# Patient Record
Sex: MALE | Race: ASIAN | NOT HISPANIC OR LATINO | Employment: OTHER | URBAN - METROPOLITAN AREA
[De-identification: names, ages, dates, MRNs, and addresses within clinical notes are randomized per-mention and may not be internally consistent; named-entity substitution may affect disease eponyms.]

---

## 2017-02-07 PROBLEM — I67.9 CVD (CEREBROVASCULAR DISEASE): Status: ACTIVE | Noted: 2017-02-07

## 2017-02-07 PROBLEM — R09.89 RIGHT CAROTID BRUIT: Status: ACTIVE | Noted: 2017-02-07

## 2017-02-07 PROBLEM — R01.1 HEART MURMUR: Status: ACTIVE | Noted: 2017-02-07

## 2017-02-13 ENCOUNTER — HOSPITAL ENCOUNTER (OUTPATIENT)
Dept: RADIOLOGY | Facility: HOSPITAL | Age: 67
Discharge: HOME | End: 2017-02-13
Attending: INTERNAL MEDICINE
Payer: MEDICARE

## 2017-02-13 ENCOUNTER — HOSPITAL ENCOUNTER (OUTPATIENT)
Dept: CARDIOLOGY | Facility: HOSPITAL | Age: 67
Discharge: HOME | End: 2017-02-13
Attending: INTERNAL MEDICINE
Payer: MEDICARE

## 2017-02-13 DIAGNOSIS — I25.110 CORONARY ARTERY DISEASE INVOLVING NATIVE CORONARY ARTERY OF NATIVE HEART WITH UNSTABLE ANGINA PECTORIS: ICD-10-CM

## 2017-02-13 DIAGNOSIS — I20.9 ANGINA PECTORIS: ICD-10-CM

## 2017-02-13 PROCEDURE — 78452 HT MUSCLE IMAGE SPECT MULT: CPT | Mod: 26,,, | Performed by: RADIOLOGY

## 2017-02-13 PROCEDURE — 78452 HT MUSCLE IMAGE SPECT MULT: CPT | Mod: TC

## 2017-02-15 LAB — DIASTOLIC DYSFUNCTION: NO

## 2017-02-17 ENCOUNTER — HOSPITAL ENCOUNTER (OUTPATIENT)
Dept: RADIOLOGY | Facility: HOSPITAL | Age: 67
Discharge: HOME | End: 2017-02-17
Attending: INTERNAL MEDICINE
Payer: MEDICARE

## 2017-02-17 DIAGNOSIS — R09.89 RIGHT CAROTID BRUIT: ICD-10-CM

## 2017-02-17 DIAGNOSIS — I67.9 CVD (CEREBROVASCULAR DISEASE): ICD-10-CM

## 2017-02-17 PROCEDURE — 93880 EXTRACRANIAL BILAT STUDY: CPT | Mod: TC

## 2017-02-17 PROCEDURE — 93880 EXTRACRANIAL BILAT STUDY: CPT | Mod: 26,,, | Performed by: RADIOLOGY

## 2017-08-10 DIAGNOSIS — I12.9 HYPERTENSION, RENAL DISEASE, STAGE 1-4 OR UNSPECIFIED CHRONIC KIDNEY DISEASE: ICD-10-CM

## 2017-08-10 DIAGNOSIS — N18.30 CHRONIC KIDNEY DISEASE, STAGE III (MODERATE): Primary | ICD-10-CM

## 2017-11-29 ENCOUNTER — TELEPHONE (OUTPATIENT)
Dept: CARDIOLOGY | Facility: CLINIC | Age: 67
End: 2017-11-29

## 2017-11-29 NOTE — TELEPHONE ENCOUNTER
----- Message from SANCHEZ Mcnair sent at 11/28/2017  2:08 PM CST -----  Angella Hernandez,     This patient would like for you to return his call.     Contact number is [phone]    Deepak

## 2017-12-07 ENCOUNTER — OFFICE VISIT (OUTPATIENT)
Dept: CARDIOLOGY | Facility: CLINIC | Age: 67
End: 2017-12-07
Payer: MEDICARE

## 2017-12-07 VITALS
BODY MASS INDEX: 26.12 KG/M2 | SYSTOLIC BLOOD PRESSURE: 120 MMHG | HEIGHT: 64 IN | DIASTOLIC BLOOD PRESSURE: 72 MMHG | HEART RATE: 77 BPM | WEIGHT: 153 LBS

## 2017-12-07 DIAGNOSIS — Z95.1 S/P CABG (CORONARY ARTERY BYPASS GRAFT): ICD-10-CM

## 2017-12-07 DIAGNOSIS — I67.9 CVD (CEREBROVASCULAR DISEASE): ICD-10-CM

## 2017-12-07 DIAGNOSIS — I10 ESSENTIAL HYPERTENSION: ICD-10-CM

## 2017-12-07 DIAGNOSIS — N18.30 STAGE 3 CHRONIC KIDNEY DISEASE: ICD-10-CM

## 2017-12-07 DIAGNOSIS — J84.9 INTERSTITIAL LUNG DISEASE: ICD-10-CM

## 2017-12-07 DIAGNOSIS — I20.9 ANGINA PECTORIS: ICD-10-CM

## 2017-12-07 DIAGNOSIS — I25.118 CORONARY ARTERY DISEASE OF NATIVE ARTERY OF NATIVE HEART WITH STABLE ANGINA PECTORIS: Primary | ICD-10-CM

## 2017-12-07 DIAGNOSIS — E78.5 HYPERLIPIDEMIA, UNSPECIFIED HYPERLIPIDEMIA TYPE: ICD-10-CM

## 2017-12-07 PROCEDURE — 99213 OFFICE O/P EST LOW 20 MIN: CPT | Mod: S$GLB,,, | Performed by: INTERNAL MEDICINE

## 2017-12-07 PROCEDURE — 93000 ELECTROCARDIOGRAM COMPLETE: CPT | Mod: S$GLB,,, | Performed by: INTERNAL MEDICINE

## 2017-12-07 PROCEDURE — 99999 PR PBB SHADOW E&M-EST. PATIENT-LVL II: CPT | Mod: PBBFAC,,, | Performed by: INTERNAL MEDICINE

## 2017-12-07 NOTE — PROGRESS NOTES
Subjective:   Patient ID:  Jose Antonio Allen is a 67 y.o. male     Chief Complaint   Patient presents with    Follow-up       HPI: Had chest pain last Monday while mowing the lawn; the pain eased with resting for 5 minutes.  Pt also has mild shortness of breath with exertion.  No overall change in pattern of angina pectoris.  Review of Systems   Cardiovascular: Positive for chest pain and dyspnea on exertion. Negative for claudication, irregular heartbeat, leg swelling, near-syncope, orthopnea, palpitations and syncope.       Objective:   Physical Exam   Constitutional: He is oriented to person, place, and time. He appears well-developed and well-nourished. No distress.   HENT:   Head: Normocephalic.   Eyes: No scleral icterus.   Neck: No JVD present.   Cardiovascular: Normal rate and regular rhythm.  Exam reveals no gallop (Ii/VI systolic) and no friction rub.    Murmur heard.  Pulmonary/Chest: Effort normal. No stridor. He has rales.   Musculoskeletal: He exhibits no edema.   Neurological: He is alert and oriented to person, place, and time.   Skin: Skin is warm and dry. He is not diaphoretic.   Psychiatric: He has a normal mood and affect. His behavior is normal. Judgment and thought content normal.   Vitals reviewed.    Note Cardiolite stress test 2/17 showed no ischemia, mildly depressed LVEF 43-50%    Note October 2017 lab--creat 1.59, BUN 31, HgbA1C 8.2,    CT of chest 11/10/17--calcified blood vessels, emphysema and interstitial fibrosis bilaterally, no nodule    ECG: NSR, vertical axis, minimal ST sagging    Wt up 5 lbs  Assessment:     1. Coronary artery disease of native artery of native heart with stable angina pectoris    2. Angina pectoris    3. Essential hypertension    4. Hyperlipidemia, unspecified hyperlipidemia type    5. CVD (cerebrovascular disease)    6. S/P CABG (coronary artery bypass graft)    7. Stage 3 chronic kidney disease    8. Interstitial lung disease        Plan:   F/u with pulmonary  MD who recently ordered PFT's  F/u with PCP  Continue same medical regimen.  Return to clinic in 6 months with ECG.  Discussed with pt--would repeat stress test if angina pectoris worsens.

## 2018-04-17 ENCOUNTER — OFFICE VISIT (OUTPATIENT)
Dept: FAMILY MEDICINE | Facility: CLINIC | Age: 68
End: 2018-04-17
Payer: MEDICARE

## 2018-04-17 VITALS
BODY MASS INDEX: 25.97 KG/M2 | HEIGHT: 64 IN | HEART RATE: 83 BPM | WEIGHT: 152.13 LBS | SYSTOLIC BLOOD PRESSURE: 130 MMHG | DIASTOLIC BLOOD PRESSURE: 62 MMHG

## 2018-04-17 DIAGNOSIS — R53.82 CHRONIC FATIGUE: ICD-10-CM

## 2018-04-17 DIAGNOSIS — Z79.4 TYPE 2 DIABETES MELLITUS WITH HYPERGLYCEMIA, WITH LONG-TERM CURRENT USE OF INSULIN: ICD-10-CM

## 2018-04-17 DIAGNOSIS — E11.65 TYPE 2 DIABETES MELLITUS WITH HYPERGLYCEMIA, WITH LONG-TERM CURRENT USE OF INSULIN: ICD-10-CM

## 2018-04-17 DIAGNOSIS — I25.708 CORONARY ARTERY DISEASE OF BYPASS GRAFT OF NATIVE HEART WITH STABLE ANGINA PECTORIS: ICD-10-CM

## 2018-04-17 DIAGNOSIS — J84.10 INTERSTITIAL PULMONARY FIBROSIS: ICD-10-CM

## 2018-04-17 DIAGNOSIS — I73.9 CLAUDICATION: ICD-10-CM

## 2018-04-17 DIAGNOSIS — N18.30 STAGE 3 CHRONIC KIDNEY DISEASE: Primary | ICD-10-CM

## 2018-04-17 DIAGNOSIS — E03.9 HYPOTHYROIDISM, UNSPECIFIED TYPE: ICD-10-CM

## 2018-04-17 DIAGNOSIS — Z12.5 SCREENING FOR PROSTATE CANCER: ICD-10-CM

## 2018-04-17 DIAGNOSIS — R06.02 SOB (SHORTNESS OF BREATH): ICD-10-CM

## 2018-04-17 DIAGNOSIS — I10 ESSENTIAL HYPERTENSION: ICD-10-CM

## 2018-04-17 PROCEDURE — 99387 INIT PM E/M NEW PAT 65+ YRS: CPT | Mod: S$GLB,,, | Performed by: FAMILY MEDICINE

## 2018-04-17 PROCEDURE — 3078F DIAST BP <80 MM HG: CPT | Mod: CPTII,S$GLB,, | Performed by: FAMILY MEDICINE

## 2018-04-17 PROCEDURE — 3075F SYST BP GE 130 - 139MM HG: CPT | Mod: CPTII,S$GLB,, | Performed by: FAMILY MEDICINE

## 2018-04-17 PROCEDURE — 99999 PR PBB SHADOW E&M-EST. PATIENT-LVL III: CPT | Mod: PBBFAC,,, | Performed by: FAMILY MEDICINE

## 2018-04-17 RX ORDER — BUDESONIDE AND FORMOTEROL FUMARATE DIHYDRATE 160; 4.5 UG/1; UG/1
2 AEROSOL RESPIRATORY (INHALATION) EVERY 12 HOURS
Qty: 10.2 G | Refills: 0 | Status: SHIPPED | OUTPATIENT
Start: 2018-04-17 | End: 2018-07-09

## 2018-04-17 RX ORDER — AMLODIPINE BESYLATE 10 MG/1
10 TABLET ORAL DAILY
COMMUNITY
End: 2022-05-03

## 2018-04-17 RX ORDER — CILOSTAZOL 100 MG/1
100 TABLET ORAL 2 TIMES DAILY
COMMUNITY
End: 2018-08-07

## 2018-04-17 RX ORDER — INSULIN DEGLUDEC 100 U/ML
34 INJECTION, SOLUTION SUBCUTANEOUS DAILY
COMMUNITY
End: 2021-05-05 | Stop reason: SDUPTHER

## 2018-04-17 RX ORDER — FERROUS GLUCONATE 324(38)MG
324 TABLET ORAL
COMMUNITY
End: 2019-06-27

## 2018-04-17 NOTE — PATIENT INSTRUCTIONS
Diabetes: Activity Tips    Being more active can help you manage your diabetes. The tips on this sheet can help you get the most from your exercise. They can also help you stay safe.  Staying Active  Its important for adults to spend less time sitting and being inactive. This is especially true if you have type 2 diabetes. When you are sitting for long periods of time, get up for short sessions of light activity every 30 minutes.  You should aim for at least 150 minutes a week of exercise or physical activity. Dont let more than 2 days go by without being active.  Benefit from briskness  Brisk activity gets your heart beating faster. This can help you increase your fitness, lose extra weight, and manage your blood sugar level. Try brisk walking. Or, if you have foot or leg problems, you can try swimming or bike riding. You can break up your exercise into chunks throughout the day. Work up to at least 30 minutes of steady, brisk exercise on most days.  Warm up and cool down  Warming up and cooling down reduce your risk of injury. They also help limit muscle soreness. Do a mild version of your activity for 5 minutes before and after your routine. You can also learn stretches that will help keep your muscles loose. Your healthcare provider may show you good ways to warm up and stretch.  Do the talk-sing test  The talk-sing test is a simple way to tell how hard youre exercising. If you can talk while exercising, youre in a safe range. If youre out of breath, slow down. If you can carry a tune, its time to  the pace. Walk up a hill. Increase the resistance on your stationary bike. Or swim faster.  What about eating?  You may be told to plan your exercise for 1 to 2 hours after a meal. In most cases, you dont need to eat while being active. If you take insulin or medicine that can cause low blood sugar, test your blood sugar before exercising. And carry a fast-acting sugar that will raise your blood sugar  level quickly. This includes glucose tablets or hard candy. Use it if you feel low blood sugar symptoms.  Safety tips  These tips can help you stay safe as you become fit:  · Exercise with a friend or carry a cell phone if you have one.  · Carry or wear identification, such as a necklace or bracelet, that says you have diabetes.  · Use the proper footwear and safety equipment for your activity.  · Drink water before, during, and after exercise.  · Dress properly for the weather.  · Dont exercise in very hot or very cold weather.  · Dont exercise if you are sick.  · If you are instructed to do so, test your blood sugar before and after you exercise. Have a small carbohydrate snack if your blood sugar is low before you start exercising.   When to stop exercising and call your healthcare provider  Stop exercising and call your healthcare provider right away if you notice any of the following:  · Pain, pressure, tightness, or heaviness in the chest  · Pain or heaviness in the neck, shoulders, back, arms, legs, or feet  · Unusual shortness of breath  · Dizziness or lightheadedness  · Unusually rapid or slow pulse  · Increased joint or muscle pain  · Nausea or vomiting  Date Last Reviewed: 5/1/2016  © 0609-4450 [employer]. [address] [place] [address], [place] [address]. All rights reserved. This information is not intended as a substitute for professional medical care. Always follow your healthcare professional's instructions.

## 2018-04-17 NOTE — PROGRESS NOTES
Subjective:       Patient ID: Jose Antonio Allen is a 67 y.o. male.    Chief Complaint: Establish Care and Annual Exam    67 years old male came to the clinic for his physical examination.  Patient with no recent A1c.  No polyuria polydipsia polyphagia.  Blood pressure today stable.  No chest pain palpitations orthopnea or PND.  Patient with pulmonary fibrosis requesting a medicine to help him with the persistent shortness of breath.  Patient with coronary artery disease sometimes with episodic angina.  No palpitations orthopnea or PND.  Patient feels tired for the last year.  Patient with decreased kidney function but stable in comparison with previous reports.        Review of Systems   Constitutional: Positive for fatigue.   HENT: Negative.    Eyes: Negative.    Respiratory: Negative.    Cardiovascular: Positive for chest pain. Negative for palpitations and leg swelling.   Gastrointestinal: Negative.    Endocrine: Negative for polydipsia, polyphagia and polyuria.   Genitourinary: Negative.    Musculoskeletal: Negative.    Skin: Negative.    Neurological: Negative.    Psychiatric/Behavioral: Negative.        Objective:      Physical Exam   Constitutional: He is oriented to person, place, and time. He appears well-developed and well-nourished. No distress.   HENT:   Head: Normocephalic and atraumatic.   Right Ear: External ear normal.   Left Ear: External ear normal.   Nose: Nose normal.   Mouth/Throat: Oropharynx is clear and moist. No oropharyngeal exudate.   Eyes: Conjunctivae and EOM are normal. Pupils are equal, round, and reactive to light. Right eye exhibits no discharge. Left eye exhibits no discharge. No scleral icterus.   Neck: Normal range of motion. Neck supple. No JVD present. No tracheal deviation present. No thyromegaly present.   Cardiovascular: Normal rate, regular rhythm, normal heart sounds and intact distal pulses.  Exam reveals no gallop and no friction rub.    No murmur heard.  Pulmonary/Chest:  Effort normal and breath sounds normal. No stridor. No respiratory distress. He has no wheezes. He has no rales. He exhibits no tenderness.   Abdominal: Soft. Bowel sounds are normal. He exhibits no distension and no mass. There is no tenderness. There is no rebound and no guarding.   Musculoskeletal: Normal range of motion. He exhibits no edema or tenderness.   Feet:   Right Foot:   Protective Sensation: 10 sites tested. 10 sites sensed.   Skin Integrity: Negative for ulcer, blister, skin breakdown, erythema, warmth, callus or dry skin.   Left Foot:   Protective Sensation: 10 sites tested. 10 sites sensed.   Skin Integrity: Negative for ulcer, blister, skin breakdown, erythema, warmth, callus or dry skin.   Lymphadenopathy:     He has no cervical adenopathy.   Neurological: He is alert and oriented to person, place, and time. He has normal reflexes. He displays normal reflexes. No cranial nerve deficit. He exhibits normal muscle tone. Coordination normal.   Skin: Skin is warm and dry. No rash noted. He is not diaphoretic. No erythema. No pallor.   Psychiatric: He has a normal mood and affect. His behavior is normal. Judgment and thought content normal.   Nursing note and vitals reviewed.      Assessment:       1. Stage 3 chronic kidney disease    2. SOB (shortness of breath)    3. Claudication    4. Coronary artery disease of bypass graft of native heart with stable angina pectoris    5. Interstitial pulmonary fibrosis    6. Hypothyroidism, unspecified type    7. Essential hypertension    8. Screening for prostate cancer    9. Chronic fatigue    10. Type 2 diabetes mellitus with hyperglycemia, with long-term current use of insulin        Plan:         Jose Antonio was seen today for establish care and annual exam.    Diagnoses and all orders for this visit:    Stage 3 chronic kidney disease  -     Vitamin D; Future    SOB (shortness of breath)  -     Comprehensive metabolic panel; Future  -     TSH; Future  -     CBC auto  differential; Future    Claudication  -     US Lower Extremity Arteries Bilateral; Future    Coronary artery disease of bypass graft of native heart with stable angina pectoris  -     Lipid panel; Future    Interstitial pulmonary fibrosis  -     budesonide-formoterol 160-4.5 mcg (SYMBICORT) 160-4.5 mcg/actuation HFAA; Inhale 2 puffs into the lungs every 12 (twelve) hours. Controller    Hypothyroidism, unspecified type  -     Comprehensive metabolic panel; Future  -     Lipid panel; Future  -     TSH; Future    Essential hypertension  -     Comprehensive metabolic panel; Future  -     Lipid panel; Future  -     Urinalysis; Future  -     TSH; Future  -     CBC auto differential; Future    Screening for prostate cancer  -     PSA, Screening; Future    Chronic fatigue  -     TSH; Future    Type 2 diabetes mellitus with hyperglycemia, with long-term current use of insulin  -     Hemoglobin A1c; Future

## 2018-04-18 ENCOUNTER — LAB VISIT (OUTPATIENT)
Dept: LAB | Facility: HOSPITAL | Age: 68
End: 2018-04-18
Attending: FAMILY MEDICINE
Payer: MEDICARE

## 2018-04-18 ENCOUNTER — HOSPITAL ENCOUNTER (OUTPATIENT)
Dept: RADIOLOGY | Facility: HOSPITAL | Age: 68
Discharge: HOME | End: 2018-04-18
Attending: FAMILY MEDICINE
Payer: MEDICARE

## 2018-04-18 DIAGNOSIS — Z79.4 TYPE 2 DIABETES MELLITUS WITH HYPERGLYCEMIA, WITH LONG-TERM CURRENT USE OF INSULIN: ICD-10-CM

## 2018-04-18 DIAGNOSIS — I10 ESSENTIAL HYPERTENSION: ICD-10-CM

## 2018-04-18 DIAGNOSIS — R06.02 SOB (SHORTNESS OF BREATH): ICD-10-CM

## 2018-04-18 DIAGNOSIS — I73.9 CLAUDICATION: ICD-10-CM

## 2018-04-18 DIAGNOSIS — Z12.5 SCREENING FOR PROSTATE CANCER: ICD-10-CM

## 2018-04-18 DIAGNOSIS — I25.708 CORONARY ARTERY DISEASE OF BYPASS GRAFT OF NATIVE HEART WITH STABLE ANGINA PECTORIS: ICD-10-CM

## 2018-04-18 DIAGNOSIS — E11.65 TYPE 2 DIABETES MELLITUS WITH HYPERGLYCEMIA, WITH LONG-TERM CURRENT USE OF INSULIN: ICD-10-CM

## 2018-04-18 DIAGNOSIS — N18.30 STAGE 3 CHRONIC KIDNEY DISEASE: ICD-10-CM

## 2018-04-18 DIAGNOSIS — E03.9 HYPOTHYROIDISM, UNSPECIFIED TYPE: ICD-10-CM

## 2018-04-18 DIAGNOSIS — R53.82 CHRONIC FATIGUE: ICD-10-CM

## 2018-04-18 LAB
25(OH)D3+25(OH)D2 SERPL-MCNC: 70 NG/ML
ALBUMIN SERPL BCP-MCNC: 3.5 G/DL
ALP SERPL-CCNC: 89 U/L
ALT SERPL W/O P-5'-P-CCNC: 23 U/L
ANION GAP SERPL CALC-SCNC: 10 MMOL/L
AST SERPL-CCNC: 30 U/L
BASOPHILS # BLD AUTO: 0.04 K/UL
BASOPHILS NFR BLD: 0.6 %
BILIRUB SERPL-MCNC: 0.3 MG/DL
BUN SERPL-MCNC: 21 MG/DL
CALCIUM SERPL-MCNC: 9.7 MG/DL
CHLORIDE SERPL-SCNC: 110 MMOL/L
CHOLEST SERPL-MCNC: 126 MG/DL
CHOLEST/HDLC SERPL: 2.3 {RATIO}
CO2 SERPL-SCNC: 25 MMOL/L
COMPLEXED PSA SERPL-MCNC: 3 NG/ML
CREAT SERPL-MCNC: 1.8 MG/DL
DIFFERENTIAL METHOD: ABNORMAL
EOSINOPHIL # BLD AUTO: 0.4 K/UL
EOSINOPHIL NFR BLD: 5.2 %
ERYTHROCYTE [DISTWIDTH] IN BLOOD BY AUTOMATED COUNT: 16.9 %
EST. GFR  (AFRICAN AMERICAN): 44 ML/MIN/1.73 M^2
EST. GFR  (NON AFRICAN AMERICAN): 38.1 ML/MIN/1.73 M^2
ESTIMATED AVG GLUCOSE: 143 MG/DL
GLUCOSE SERPL-MCNC: 59 MG/DL
HBA1C MFR BLD HPLC: 6.6 %
HCT VFR BLD AUTO: 34.6 %
HDLC SERPL-MCNC: 54 MG/DL
HDLC SERPL: 42.9 %
HGB BLD-MCNC: 9.7 G/DL
IMM GRANULOCYTES # BLD AUTO: 0.03 K/UL
IMM GRANULOCYTES NFR BLD AUTO: 0.4 %
LDLC SERPL CALC-MCNC: 63 MG/DL
LYMPHOCYTES # BLD AUTO: 1.1 K/UL
LYMPHOCYTES NFR BLD: 16.3 %
MCH RBC QN AUTO: 21.4 PG
MCHC RBC AUTO-ENTMCNC: 28 G/DL
MCV RBC AUTO: 76 FL
MONOCYTES # BLD AUTO: 0.7 K/UL
MONOCYTES NFR BLD: 9.6 %
NEUTROPHILS # BLD AUTO: 4.7 K/UL
NEUTROPHILS NFR BLD: 67.9 %
NONHDLC SERPL-MCNC: 72 MG/DL
NRBC BLD-RTO: 0 /100 WBC
PLATELET # BLD AUTO: 212 K/UL
PMV BLD AUTO: 11 FL
POTASSIUM SERPL-SCNC: 4 MMOL/L
PROT SERPL-MCNC: 7.5 G/DL
RBC # BLD AUTO: 4.54 M/UL
SODIUM SERPL-SCNC: 145 MMOL/L
TRIGL SERPL-MCNC: 45 MG/DL
TSH SERPL DL<=0.005 MIU/L-ACNC: 2.81 UIU/ML
WBC # BLD AUTO: 6.95 K/UL

## 2018-04-18 PROCEDURE — 93925 LOWER EXTREMITY STUDY: CPT | Mod: TC

## 2018-04-18 PROCEDURE — 82306 VITAMIN D 25 HYDROXY: CPT

## 2018-04-18 PROCEDURE — 80061 LIPID PANEL: CPT

## 2018-04-18 PROCEDURE — 80053 COMPREHEN METABOLIC PANEL: CPT

## 2018-04-18 PROCEDURE — 93925 LOWER EXTREMITY STUDY: CPT | Mod: 26,,, | Performed by: RADIOLOGY

## 2018-04-18 PROCEDURE — 83036 HEMOGLOBIN GLYCOSYLATED A1C: CPT

## 2018-04-18 PROCEDURE — 36415 COLL VENOUS BLD VENIPUNCTURE: CPT | Mod: PO

## 2018-04-18 PROCEDURE — 84153 ASSAY OF PSA TOTAL: CPT

## 2018-04-18 PROCEDURE — 85025 COMPLETE CBC W/AUTO DIFF WBC: CPT

## 2018-04-18 PROCEDURE — 84443 ASSAY THYROID STIM HORMONE: CPT

## 2018-04-20 DIAGNOSIS — Z12.11 COLON CANCER SCREENING: ICD-10-CM

## 2018-04-21 ENCOUNTER — TELEPHONE (OUTPATIENT)
Dept: FAMILY MEDICINE | Facility: CLINIC | Age: 68
End: 2018-04-21

## 2018-04-21 DIAGNOSIS — I73.9 PAD (PERIPHERAL ARTERY DISEASE): Primary | ICD-10-CM

## 2018-04-21 DIAGNOSIS — D50.9 IRON DEFICIENCY ANEMIA, UNSPECIFIED IRON DEFICIENCY ANEMIA TYPE: Primary | ICD-10-CM

## 2018-04-21 DIAGNOSIS — R31.21 ASYMPTOMATIC MICROSCOPIC HEMATURIA: Primary | ICD-10-CM

## 2018-04-21 RX ORDER — FERROUS SULFATE 325(65) MG
325 TABLET ORAL
Qty: 90 TABLET | Refills: 0 | Status: SHIPPED | OUTPATIENT
Start: 2018-04-21 | End: 2018-07-09

## 2018-04-21 NOTE — TELEPHONE ENCOUNTER
Decreased kidney function but stable in comparison with previous reports.      Stable blood sugar for the last 3 months.      Significant anemia in comparison with previous reports.  Iron therapy was ordered for 1 month.  Repeat testing after the treatment.    Please notify the patient with appointment.

## 2018-04-21 NOTE — TELEPHONE ENCOUNTER
Urine with trace of blood.  Repeat urine with culture.  Please notify the patient with appointment.

## 2018-04-21 NOTE — TELEPHONE ENCOUNTER
Patient with positive lower extremities ultrasound for PAD.  Vascular referral was done.  Please notify the patient with appointment.

## 2018-04-23 ENCOUNTER — LAB VISIT (OUTPATIENT)
Dept: LAB | Facility: HOSPITAL | Age: 68
End: 2018-04-23
Attending: FAMILY MEDICINE
Payer: MEDICARE

## 2018-04-23 DIAGNOSIS — R31.21 ASYMPTOMATIC MICROSCOPIC HEMATURIA: ICD-10-CM

## 2018-04-23 LAB
BACTERIA #/AREA URNS AUTO: ABNORMAL /HPF
BILIRUB UR QL STRIP: NEGATIVE
CLARITY UR REFRACT.AUTO: CLEAR
COLOR UR AUTO: YELLOW
GLUCOSE UR QL STRIP: NEGATIVE
HGB UR QL STRIP: NEGATIVE
HYALINE CASTS UR QL AUTO: 0 /LPF
KETONES UR QL STRIP: NEGATIVE
LEUKOCYTE ESTERASE UR QL STRIP: ABNORMAL
MICROSCOPIC COMMENT: ABNORMAL
NITRITE UR QL STRIP: NEGATIVE
PH UR STRIP: 6 [PH] (ref 5–8)
PROT UR QL STRIP: ABNORMAL
RBC #/AREA URNS AUTO: 2 /HPF (ref 0–4)
SP GR UR STRIP: 1.01 (ref 1–1.03)
URN SPEC COLLECT METH UR: ABNORMAL
UROBILINOGEN UR STRIP-ACNC: NEGATIVE EU/DL
WBC #/AREA URNS AUTO: 6 /HPF (ref 0–5)

## 2018-04-23 PROCEDURE — 87086 URINE CULTURE/COLONY COUNT: CPT

## 2018-04-23 PROCEDURE — 81001 URINALYSIS AUTO W/SCOPE: CPT

## 2018-04-25 LAB
BACTERIA UR CULT: NORMAL
BACTERIA UR CULT: NORMAL

## 2018-05-02 DIAGNOSIS — E78.5 HYPERLIPIDEMIA, UNSPECIFIED HYPERLIPIDEMIA TYPE: ICD-10-CM

## 2018-05-02 RX ORDER — ATORVASTATIN CALCIUM 80 MG/1
80 TABLET, FILM COATED ORAL DAILY
Qty: 90 TABLET | Refills: 3 | Status: SHIPPED | OUTPATIENT
Start: 2018-05-02 | End: 2018-05-02 | Stop reason: SDUPTHER

## 2018-05-02 RX ORDER — ATORVASTATIN CALCIUM 80 MG/1
80 TABLET, FILM COATED ORAL DAILY
Qty: 30 TABLET | Refills: 3 | Status: SHIPPED | OUTPATIENT
Start: 2018-05-02 | End: 2018-05-07 | Stop reason: SDUPTHER

## 2018-05-07 DIAGNOSIS — E78.5 HYPERLIPIDEMIA, UNSPECIFIED HYPERLIPIDEMIA TYPE: ICD-10-CM

## 2018-05-07 RX ORDER — ATORVASTATIN CALCIUM 80 MG/1
80 TABLET, FILM COATED ORAL DAILY
Qty: 90 TABLET | Refills: 3 | Status: SHIPPED | OUTPATIENT
Start: 2018-05-07 | End: 2018-05-21 | Stop reason: SDUPTHER

## 2018-05-18 DIAGNOSIS — Z11.59 NEED FOR HEPATITIS C SCREENING TEST: ICD-10-CM

## 2018-05-21 DIAGNOSIS — E78.5 HYPERLIPIDEMIA, UNSPECIFIED HYPERLIPIDEMIA TYPE: ICD-10-CM

## 2018-05-21 RX ORDER — ATORVASTATIN CALCIUM 80 MG/1
80 TABLET, FILM COATED ORAL DAILY
Qty: 90 TABLET | Refills: 3 | Status: SHIPPED | OUTPATIENT
Start: 2018-05-21 | End: 2018-11-15 | Stop reason: SDUPTHER

## 2018-06-15 DIAGNOSIS — Z11.59 NEED FOR HEPATITIS C SCREENING TEST: ICD-10-CM

## 2018-07-09 ENCOUNTER — OFFICE VISIT (OUTPATIENT)
Dept: CARDIOLOGY | Facility: CLINIC | Age: 68
End: 2018-07-09
Payer: MEDICARE

## 2018-07-09 VITALS
BODY MASS INDEX: 25.66 KG/M2 | HEART RATE: 89 BPM | DIASTOLIC BLOOD PRESSURE: 74 MMHG | SYSTOLIC BLOOD PRESSURE: 136 MMHG | HEIGHT: 64 IN | WEIGHT: 150.31 LBS

## 2018-07-09 DIAGNOSIS — I25.10 CAD (CORONARY ARTERY DISEASE), NATIVE CORONARY ARTERY: ICD-10-CM

## 2018-07-09 DIAGNOSIS — G89.29 CHRONIC CHEST PAIN: ICD-10-CM

## 2018-07-09 DIAGNOSIS — R07.9 CHRONIC CHEST PAIN: ICD-10-CM

## 2018-07-09 DIAGNOSIS — I20.9 ANGINA PECTORIS: ICD-10-CM

## 2018-07-09 DIAGNOSIS — Z87.19 HISTORY OF GI DIVERTICULAR BLEED: ICD-10-CM

## 2018-07-09 DIAGNOSIS — I73.9 PAD (PERIPHERAL ARTERY DISEASE): ICD-10-CM

## 2018-07-09 DIAGNOSIS — R09.89 RIGHT CAROTID BRUIT: ICD-10-CM

## 2018-07-09 DIAGNOSIS — Z95.1 S/P CABG (CORONARY ARTERY BYPASS GRAFT): Primary | ICD-10-CM

## 2018-07-09 DIAGNOSIS — I67.9 CVD (CEREBROVASCULAR DISEASE): ICD-10-CM

## 2018-07-09 DIAGNOSIS — E78.5 HYPERLIPIDEMIA, UNSPECIFIED HYPERLIPIDEMIA TYPE: ICD-10-CM

## 2018-07-09 DIAGNOSIS — I10 ESSENTIAL HYPERTENSION: ICD-10-CM

## 2018-07-09 DIAGNOSIS — J84.9 INTERSTITIAL LUNG DISEASE: ICD-10-CM

## 2018-07-09 DIAGNOSIS — N18.30 STAGE 3 CHRONIC KIDNEY DISEASE: ICD-10-CM

## 2018-07-09 DIAGNOSIS — I25.708 CORONARY ARTERY DISEASE OF BYPASS GRAFT OF NATIVE HEART WITH STABLE ANGINA PECTORIS: ICD-10-CM

## 2018-07-09 PROCEDURE — 93000 ELECTROCARDIOGRAM COMPLETE: CPT | Mod: S$GLB,,, | Performed by: INTERNAL MEDICINE

## 2018-07-09 PROCEDURE — 3078F DIAST BP <80 MM HG: CPT | Mod: CPTII,S$GLB,, | Performed by: INTERNAL MEDICINE

## 2018-07-09 PROCEDURE — 99214 OFFICE O/P EST MOD 30 MIN: CPT | Mod: S$GLB,,, | Performed by: INTERNAL MEDICINE

## 2018-07-09 PROCEDURE — 99999 PR PBB SHADOW E&M-EST. PATIENT-LVL III: CPT | Mod: PBBFAC,,, | Performed by: INTERNAL MEDICINE

## 2018-07-09 PROCEDURE — 3075F SYST BP GE 130 - 139MM HG: CPT | Mod: CPTII,S$GLB,, | Performed by: INTERNAL MEDICINE

## 2018-07-09 RX ORDER — FINASTERIDE 5 MG/1
5 TABLET, FILM COATED ORAL DAILY
Refills: 3 | COMMUNITY
Start: 2018-06-04 | End: 2020-12-08 | Stop reason: ALTCHOICE

## 2018-07-09 RX ORDER — OMEPRAZOLE 40 MG/1
40 CAPSULE, DELAYED RELEASE ORAL EVERY MORNING
COMMUNITY
Start: 2018-04-23 | End: 2020-05-28

## 2018-07-09 RX ORDER — MELOXICAM 7.5 MG/1
7.5 TABLET ORAL DAILY
COMMUNITY
Start: 2018-05-07 | End: 2018-08-07

## 2018-07-09 NOTE — PROGRESS NOTES
Subjective:      Patient ID: Jose Antonio Allen is a 68 y.o. male.    Chief Complaint: Pre-op Exam (Dr Scotty Schulz)    HPI:  Pt is scheduled for vascular surgery with Dr Schulz in 7 days. Pt had an angiogram of his legs a couple of weeks ago at [facility].  Pt is scheduled for a staged left fem-pop bypass and a right femoral endarterectomy.   Pt exercises at the gym--does a little lifting and walks on the treadmill slowly for about a mile.  Sometimes legs feel bad and weak.  Last May pt walked for 5 or 10 minutes and developed severe leg pain and shortness of breath    Review of Systems   Cardiovascular: Positive for chest pain (No chest pain since last May), claudication (which has improved with walking program) and dyspnea on exertion (chronic when shampooing or when bending over.). Negative for irregular heartbeat, leg swelling, near-syncope, orthopnea, palpitations and syncope.      Pt sees Dr Dykes for chronic interstitial lung disease and chronic shortness of breath with exertion.    Pt also sees a nephrologist for CKD    Pt denies any sores on the skin of his feet.    Pt also sees Dr Silva  Past Medical History:   Diagnosis Date    Angina pectoris     CKD (chronic kidney disease)     Coronary artery disease     Diabetes mellitus     Diabetes mellitus, type 2     GERD (gastroesophageal reflux disease)     Hyperlipidemia     Hypertension     S/P CABG (coronary artery bypass graft) 1996    Thyroid disease         Past Surgical History:   Procedure Laterality Date    CATARACT EXTRACTION  3YRS    CORONARY ARTERY BYPASS GRAFT  1996    EYELID SURGERY  03/2012    VEIN BYPASS SURGERY  1996       Family History   Problem Relation Age of Onset    Diabetes Mother     Diabetes Father     Blindness Neg Hx     Glaucoma Neg Hx     Macular degeneration Neg Hx     Retinal detachment Neg Hx     Strabismus Neg Hx     Stroke Neg Hx     Thyroid disease Neg Hx     Cancer Neg Hx     Cataracts Neg Hx     Hypertension  Neg Hx        Social History     Social History    Marital status:      Spouse name: N/A    Number of children: N/A    Years of education: N/A     Social History Main Topics    Smoking status: Never Smoker    Smokeless tobacco: Never Used    Alcohol use Yes    Drug use: No    Sexual activity: Not Asked     Other Topics Concern    None     Social History Narrative    None       Current Outpatient Prescriptions on File Prior to Visit   Medication Sig Dispense Refill    amLODIPine (NORVASC) 10 MG tablet Take 10 mg by mouth once daily.      aspirin 81 MG Chew 81 mg once daily.       atorvastatin (LIPITOR) 80 MG tablet Take 1 tablet (80 mg total) by mouth once daily. 90 tablet 3    cilostazol (PLETAL) 100 MG Tab Take 100 mg by mouth 2 (two) times daily.      ferrous gluconate (FERGON) 324 MG tablet Take 324 mg by mouth 3 (three) times daily with meals.      insulin degludec (TRESIBA FLEXTOUCH U-100) 100 unit/mL (3 mL) InPn Inject 36 Units into the skin once daily.      levothyroxine (SYNTHROID) 75 MCG tablet Take 1 tablet (75 mcg total) by mouth once daily. 90 tablet 3    LUBRICANT EYE DROPS 0.5 % Dpet       metoprolol succinate (TOPROL-XL) 100 MG 24 hr tablet TAKE 1 TABLET ONE TIME DAILY 90 tablet 3    nitroGLYCERIN (NITROSTAT) 0.4 MG SL tablet Place 1 tablet (0.4 mg total) under the tongue every 5 (five) minutes as needed for Chest pain. 25 tablet 12    NOVOLOG 100 unit/mL injection Sliding scale      ramipril (ALTACE) 10 MG capsule TAKE 1 CAPSULE EVERY DAY 90 capsule 3    tamsulosin (FLOMAX) 0.4 mg Cp24 0.4 mg once daily.       [DISCONTINUED] B-complex with vitamin C (Z-BEC OR EQUIV) tablet       [DISCONTINUED] budesonide-formoterol 160-4.5 mcg (SYMBICORT) 160-4.5 mcg/actuation HFAA Inhale 2 puffs into the lungs every 12 (twelve) hours. Controller 10.2 g 0    [DISCONTINUED] ferrous sulfate 325 mg (65 mg iron) Tab tablet Take 1 tablet (325 mg total) by mouth daily with breakfast. 90  "tablet 0    [DISCONTINUED] folic acid (FOLVITE) 800 MCG Tab Take 800 mcg by mouth once daily.      [DISCONTINUED] multivitamin (THERAGRAN) per tablet Take 1 tablet by mouth once daily.      [DISCONTINUED] pantoprazole (PROTONIX) 40 MG tablet Take 40 mg by mouth once daily.       [DISCONTINUED] VITAMIN B-12 1000 MCG tablet       [DISCONTINUED] vitamin D 1000 units Tab Take 1,000 Units by mouth once daily.       No current facility-administered medications on file prior to visit.        Review of patient's allergies indicates:  No Known Allergies  Objective:     Vitals:    07/09/18 1636   BP: 136/74   BP Location: Left arm   Patient Position: Sitting   BP Method: Medium (Automatic)   Pulse: 89   Weight: 68.2 kg (150 lb 4.8 oz)   Height: 5' 4" (1.626 m)        Physical Exam   Constitutional: He is oriented to person, place, and time. He appears well-developed and well-nourished. No distress.   Eyes: No scleral icterus.   Neck: No JVD present. Carotid bruit is not present.       Cardiovascular: Regular rhythm and normal heart sounds.  Exam reveals no gallop and no friction rub.    No murmur heard.  Pulmonary/Chest: Effort normal and breath sounds normal. No respiratory distress.   Musculoskeletal: He exhibits no edema.   Neurological: He is alert and oriented to person, place, and time.   Skin: Skin is warm and dry. He is not diaphoretic.   Psychiatric: He has a normal mood and affect. His behavior is normal. Judgment and thought content normal.   Vitals reviewed.     Note Cardiolite stress test 2/17 showed a mildly depressed LVEF and no evidence of infarct or ischemia.    Lab 2 months ago showed creatinine 1.8, Hgb 9.7  Pt sees Dr Zurita for chronic GI blood loss    ECG today--NSR, WNL  Assessment:     1. S/P CABG (coronary artery bypass graft)    2. Coronary artery disease of bypass graft of native heart with stable angina pectoris    3. Angina pectoris    4. Essential hypertension    5. Hyperlipidemia, " unspecified hyperlipidemia type    6. Right carotid bruit    7. Stage 3 chronic kidney disease    8. CVD (cerebrovascular disease)    9. Interstitial lung disease    10. PAD (peripheral artery disease)    11. History of GI diverticular bleed    12. Chronic chest pain    13. CAD (coronary artery disease), native coronary artery      Plan:   Jose Antonio was seen today for pre-op exam.    Diagnoses and all orders for this visit:    S/P CABG (coronary artery bypass graft)  -     IN OFFICE EKG 12-LEAD (to [facility])    Coronary artery disease of bypass graft of native heart with stable angina pectoris  -     NM Multi Study RX Stress Card Component; Future    Angina pectoris    Essential hypertension    Hyperlipidemia, unspecified hyperlipidemia type    Right carotid bruit    Stage 3 chronic kidney disease    CVD (cerebrovascular disease)    Interstitial lung disease    PAD (peripheral artery disease)    History of GI diverticular bleed    Chronic chest pain  -     NM Myocardial Perfusion Spect Multi Pharmacologic; Future  -     NM Multi Study RX Stress Card Component; Future    CAD (coronary artery disease), native coronary artery  -     IN OFFICE EKG 12-LEAD (to [facility])     Will repeat Lexiscan Cardiolite stress test pre-op  Pt is clinically stable for left fem-pop bypass and right femoral endarterectomy    Pt is scheduled for pre-op at [facility] tomorrow at 8 AM    Follow-up in about 3 months (around 10/9/2018).

## 2018-07-12 ENCOUNTER — TELEPHONE (OUTPATIENT)
Dept: FAMILY MEDICINE | Facility: CLINIC | Age: 68
End: 2018-07-12

## 2018-07-12 ENCOUNTER — HOSPITAL ENCOUNTER (OUTPATIENT)
Dept: RADIOLOGY | Facility: HOSPITAL | Age: 68
Discharge: HOME | End: 2018-07-12
Attending: INTERNAL MEDICINE
Payer: MEDICARE

## 2018-07-12 ENCOUNTER — TELEPHONE (OUTPATIENT)
Dept: CARDIOLOGY | Facility: CLINIC | Age: 68
End: 2018-07-12

## 2018-07-12 ENCOUNTER — HOSPITAL ENCOUNTER (OUTPATIENT)
Dept: CARDIOLOGY | Facility: HOSPITAL | Age: 68
Discharge: HOME | End: 2018-07-12
Attending: INTERNAL MEDICINE
Payer: MEDICARE

## 2018-07-12 DIAGNOSIS — I25.708 CORONARY ARTERY DISEASE OF BYPASS GRAFT OF NATIVE HEART WITH STABLE ANGINA PECTORIS: ICD-10-CM

## 2018-07-12 DIAGNOSIS — G89.29 CHRONIC CHEST PAIN: ICD-10-CM

## 2018-07-12 DIAGNOSIS — R07.9 CHRONIC CHEST PAIN: ICD-10-CM

## 2018-07-12 LAB — DIASTOLIC DYSFUNCTION: NO

## 2018-07-12 PROCEDURE — 78452 HT MUSCLE IMAGE SPECT MULT: CPT | Mod: 26,,, | Performed by: RADIOLOGY

## 2018-07-12 PROCEDURE — 78452 HT MUSCLE IMAGE SPECT MULT: CPT | Mod: TC

## 2018-07-12 PROCEDURE — 93016 CV STRESS TEST SUPVJ ONLY: CPT | Mod: ,,, | Performed by: INTERNAL MEDICINE

## 2018-07-12 PROCEDURE — 93018 CV STRESS TEST I&R ONLY: CPT | Mod: ,,, | Performed by: INTERNAL MEDICINE

## 2018-07-12 NOTE — TELEPHONE ENCOUNTER
Lexiscan Cardiolite stress test is normal with normal LVEF.    Pt reassured.    Cardiac status is stable for surgery

## 2018-08-07 ENCOUNTER — TELEPHONE (OUTPATIENT)
Dept: FAMILY MEDICINE | Facility: CLINIC | Age: 68
End: 2018-08-07

## 2018-08-07 ENCOUNTER — OFFICE VISIT (OUTPATIENT)
Dept: INTERNAL MEDICINE | Facility: CLINIC | Age: 68
End: 2018-08-07
Payer: MEDICARE

## 2018-08-07 VITALS
TEMPERATURE: 98 F | SYSTOLIC BLOOD PRESSURE: 124 MMHG | DIASTOLIC BLOOD PRESSURE: 72 MMHG | HEIGHT: 64 IN | WEIGHT: 147.5 LBS | BODY MASS INDEX: 25.18 KG/M2 | OXYGEN SATURATION: 94 % | HEART RATE: 85 BPM

## 2018-08-07 DIAGNOSIS — M70.22 OLECRANON BURSITIS OF LEFT ELBOW: Primary | ICD-10-CM

## 2018-08-07 PROCEDURE — 99999 PR PBB SHADOW E&M-EST. PATIENT-LVL III: CPT | Mod: PBBFAC,,, | Performed by: INTERNAL MEDICINE

## 2018-08-07 PROCEDURE — 3078F DIAST BP <80 MM HG: CPT | Mod: CPTII,S$GLB,, | Performed by: INTERNAL MEDICINE

## 2018-08-07 PROCEDURE — 3074F SYST BP LT 130 MM HG: CPT | Mod: CPTII,S$GLB,, | Performed by: INTERNAL MEDICINE

## 2018-08-07 PROCEDURE — 20605 DRAIN/INJ JOINT/BURSA W/O US: CPT | Mod: LT,S$GLB,, | Performed by: INTERNAL MEDICINE

## 2018-08-07 PROCEDURE — 99202 OFFICE O/P NEW SF 15 MIN: CPT | Mod: 25,S$GLB,, | Performed by: INTERNAL MEDICINE

## 2018-08-07 RX ORDER — FUROSEMIDE 20 MG/1
20 TABLET ORAL DAILY
Refills: 1 | COMMUNITY
Start: 2018-07-31 | End: 2019-05-09

## 2018-08-07 RX ORDER — OXYCODONE AND ACETAMINOPHEN 5; 325 MG/1; MG/1
1 TABLET ORAL EVERY 4 HOURS PRN
Refills: 0 | COMMUNITY
Start: 2018-07-25 | End: 2019-03-07

## 2018-08-07 NOTE — PROGRESS NOTES
Subjective:       Patient ID: Jose Antonio Allen is a 68 y.o. male.    Chief Complaint: Edema (left elbow)    HPI  Pt c/o swelling in his L elbow x 1 day.  No pain.  Denies trauma but just started lifting weights.  Review of Systems    Objective:      Physical Exam   Musculoskeletal:   L olecranon bursa boggy.       Assessment:       1. Olecranon bursitis of left elbow        Plan:       Jose Antonio was seen today for edema.    Diagnoses and all orders for this visit:    Olecranon bursitis of left elbow   Injected L olecranon bursa with 1 cc Kenalog NDC# [id number]    Follow-up if symptoms worsen or fail to improve.

## 2018-08-07 NOTE — TELEPHONE ENCOUNTER
----- Message from Netta Suarez sent at 8/7/2018  4:15 PM CDT -----  Contact: self/[phone]  He has a painful swollen elbow and he would like to have an appointment some time tomorrow.

## 2018-11-08 ENCOUNTER — OFFICE VISIT (OUTPATIENT)
Dept: CARDIOLOGY | Facility: CLINIC | Age: 68
End: 2018-11-08
Payer: MEDICARE

## 2018-11-08 VITALS
WEIGHT: 149.69 LBS | HEIGHT: 64 IN | SYSTOLIC BLOOD PRESSURE: 139 MMHG | BODY MASS INDEX: 25.56 KG/M2 | HEART RATE: 86 BPM | DIASTOLIC BLOOD PRESSURE: 70 MMHG

## 2018-11-08 DIAGNOSIS — I67.9 CVD (CEREBROVASCULAR DISEASE): ICD-10-CM

## 2018-11-08 DIAGNOSIS — Z95.1 S/P CABG (CORONARY ARTERY BYPASS GRAFT): Primary | ICD-10-CM

## 2018-11-08 DIAGNOSIS — I73.9 PAD (PERIPHERAL ARTERY DISEASE): ICD-10-CM

## 2018-11-08 DIAGNOSIS — E78.2 MIXED HYPERLIPIDEMIA: ICD-10-CM

## 2018-11-08 DIAGNOSIS — Z95.1 HX OF CABG: ICD-10-CM

## 2018-11-08 DIAGNOSIS — I25.10 CORONARY ARTERY DISEASE INVOLVING NATIVE CORONARY ARTERY OF NATIVE HEART WITHOUT ANGINA PECTORIS: ICD-10-CM

## 2018-11-08 DIAGNOSIS — I10 ESSENTIAL HYPERTENSION: ICD-10-CM

## 2018-11-08 DIAGNOSIS — J84.9 INTERSTITIAL LUNG DISEASE: ICD-10-CM

## 2018-11-08 PROCEDURE — 3078F DIAST BP <80 MM HG: CPT | Mod: CPTII,HCNC,S$GLB, | Performed by: INTERNAL MEDICINE

## 2018-11-08 PROCEDURE — 3075F SYST BP GE 130 - 139MM HG: CPT | Mod: CPTII,HCNC,S$GLB, | Performed by: INTERNAL MEDICINE

## 2018-11-08 PROCEDURE — 1101F PT FALLS ASSESS-DOCD LE1/YR: CPT | Mod: CPTII,HCNC,S$GLB, | Performed by: INTERNAL MEDICINE

## 2018-11-08 PROCEDURE — 93000 ELECTROCARDIOGRAM COMPLETE: CPT | Mod: HCNC,S$GLB,, | Performed by: INTERNAL MEDICINE

## 2018-11-08 PROCEDURE — 99999 PR PBB SHADOW E&M-EST. PATIENT-LVL III: CPT | Mod: PBBFAC,HCNC,, | Performed by: INTERNAL MEDICINE

## 2018-11-08 PROCEDURE — 99213 OFFICE O/P EST LOW 20 MIN: CPT | Mod: HCNC,S$GLB,, | Performed by: INTERNAL MEDICINE

## 2018-11-08 RX ORDER — NINTEDANIB 150 MG/1
CAPSULE ORAL
COMMUNITY
Start: 2018-10-17 | End: 2019-05-09

## 2018-11-08 RX ORDER — POTASSIUM CITRATE 15 MEQ/1
TABLET, EXTENDED RELEASE ORAL
COMMUNITY
Start: 2018-10-12 | End: 2018-11-08

## 2018-11-08 RX ORDER — POTASSIUM CITRATE 15 MEQ/1
1 TABLET, EXTENDED RELEASE ORAL
Refills: 3 | COMMUNITY
Start: 2018-10-21 | End: 2019-05-09

## 2018-11-08 NOTE — PROGRESS NOTES
Subjective:      Patient ID: Jose Antonio Allen is a 68 y.o. male.    Chief Complaint: Follow-up (CAD)    HPI:  Pt has had bilateral vascular surgery with Dr Schulz.  Pt still has incisional pain.    Main concern is breathing  Review of Systems   Cardiovascular: Positive for claudication and dyspnea on exertion (chronic). Negative for chest pain, irregular heartbeat, leg swelling, near-syncope, orthopnea, palpitations and syncope.      Pt is taking OPEV from pulmonologist, Dr Boss  Past Medical History:   Diagnosis Date    Angina pectoris     CKD (chronic kidney disease)     Coronary artery disease     Diabetes mellitus     Diabetes mellitus, type 2     GERD (gastroesophageal reflux disease)     Hyperlipidemia     Hypertension     S/P CABG (coronary artery bypass graft) 1996    Thyroid disease         Past Surgical History:   Procedure Laterality Date    CATARACT EXTRACTION  3YRS    CORONARY ARTERY BYPASS GRAFT  1996    EYELID SURGERY  03/2012    VEIN BYPASS SURGERY  1996       Family History   Problem Relation Age of Onset    Diabetes Mother     Diabetes Father     Blindness Neg Hx     Glaucoma Neg Hx     Macular degeneration Neg Hx     Retinal detachment Neg Hx     Strabismus Neg Hx     Stroke Neg Hx     Thyroid disease Neg Hx     Cancer Neg Hx     Cataracts Neg Hx     Hypertension Neg Hx        Social History     Socioeconomic History    Marital status:      Spouse name: None    Number of children: None    Years of education: None    Highest education level: None   Social Needs    Financial resource strain: None    Food insecurity - worry: None    Food insecurity - inability: None    Transportation needs - medical: None    Transportation needs - non-medical: None   Occupational History    None   Tobacco Use    Smoking status: Never Smoker    Smokeless tobacco: Never Used   Substance and Sexual Activity    Alcohol use: Yes    Drug use: No    Sexual activity: None    Other Topics Concern    None   Social History Narrative    None       Current Outpatient Medications on File Prior to Visit   Medication Sig Dispense Refill    amLODIPine (NORVASC) 10 MG tablet Take 10 mg by mouth once daily.      aspirin 81 MG Chew 81 mg once daily.       atorvastatin (LIPITOR) 80 MG tablet Take 1 tablet (80 mg total) by mouth once daily. 90 tablet 3    ferrous gluconate (FERGON) 324 MG tablet Take 324 mg by mouth 3 (three) times daily with meals.      finasteride (PROSCAR) 5 mg tablet Take 5 mg by mouth once daily.  3    furosemide (LASIX) 20 MG tablet Take 20 mg by mouth once daily.  1    insulin degludec (TRESIBA FLEXTOUCH U-100) 100 unit/mL (3 mL) InPn Inject 36 Units into the skin once daily.      levothyroxine (SYNTHROID) 75 MCG tablet Take 1 tablet (75 mcg total) by mouth once daily. 90 tablet 3    LUBRICANT EYE DROPS 0.5 % Dpet       metoprolol succinate (TOPROL-XL) 100 MG 24 hr tablet TAKE 1 TABLET ONE TIME DAILY 90 tablet 3    nitroGLYCERIN (NITROSTAT) 0.4 MG SL tablet Place 1 tablet (0.4 mg total) under the tongue every 5 (five) minutes as needed for Chest pain. 25 tablet 12    NOVOLOG 100 unit/mL injection Sliding scale      OFEV 150 mg Cap       omeprazole (PRILOSEC) 40 MG capsule Take 40 mg by mouth every morning.       oxyCODONE-acetaminophen (PERCOCET) 5-325 mg per tablet Take 1 tablet by mouth every 4 (four) hours as needed. for pain.  0    potassium citrate (UROCIT-K 15) 15 mEq TbSR Take 1 tablet by mouth 3 (three) times daily with meals.  3    tamsulosin (FLOMAX) 0.4 mg Cp24 0.4 mg once daily.       [DISCONTINUED] potassium citrate (UROCIT-K 15) 15 mEq TbSR Take by mouth.       No current facility-administered medications on file prior to visit.        Review of patient's allergies indicates:  No Known Allergies  Objective:     Vitals:    11/08/18 1322 11/08/18 1345   BP: (!) 149/85 139/70   BP Location: Left arm Left arm   Patient Position: Sitting Sitting  "  BP Method: Large (Automatic)    Pulse: 86    Weight: 67.9 kg (149 lb 11.2 oz)    Height: 5' 4" (1.626 m)         Physical Exam   Constitutional: He is oriented to person, place, and time. He appears well-developed and well-nourished. No distress.   Eyes: No scleral icterus.   Neck: No JVD present. Carotid bruit is not present.   Cardiovascular: Regular rhythm and normal heart sounds. Exam reveals no gallop and no friction rub.   No murmur heard.  Pulmonary/Chest: Effort normal. No respiratory distress. He has rales.   Musculoskeletal: He exhibits edema (trace bilateral RLE>LLE).   Neurological: He is alert and oriented to person, place, and time.   Skin: Skin is warm and dry. He is not diaphoretic.   Psychiatric: He has a normal mood and affect. His behavior is normal. Judgment and thought content normal.   Vitals reviewed.     ECG:NSR, slightly low T waves, WNL    Note recent Cardiolite stress test WNL    Note April lab OK  Assessment:     1. S/P CABG (coronary artery bypass graft)    2. Coronary artery disease involving native coronary artery of native heart without angina pectoris    3. Interstitial lung disease    4. CVD (cerebrovascular disease)    5. Mixed hyperlipidemia    6. Essential hypertension    7. PAD (peripheral artery disease)      Plan:   Jose Antonio was seen today for follow-up.    Diagnoses and all orders for this visit:    S/P CABG (coronary artery bypass graft)    Coronary artery disease involving native coronary artery of native heart without angina pectoris    Interstitial lung disease    CVD (cerebrovascular disease)    Mixed hyperlipidemia    Essential hypertension    PAD (peripheral artery disease)    Other orders  -     IN OFFICE EKG 12-LEAD (to Muse)     Same meds    RTC 4 months    F/u with Dr Schulz    F/u with Dr De Ramsey    F/u with Dr Mcclure, nephrologist, who does labs    F/u with Dr Jillian Howard, endocrinologist who does labs    Follow-up in about 4 months (around 3/8/2019). "     Pt will bring copies of labs

## 2018-11-15 DIAGNOSIS — E78.5 HYPERLIPIDEMIA, UNSPECIFIED HYPERLIPIDEMIA TYPE: ICD-10-CM

## 2018-11-15 DIAGNOSIS — I10 ESSENTIAL HYPERTENSION: ICD-10-CM

## 2018-11-15 RX ORDER — LEVOTHYROXINE SODIUM 75 UG/1
75 TABLET ORAL DAILY
Qty: 90 TABLET | Refills: 3 | Status: SHIPPED | OUTPATIENT
Start: 2018-11-15 | End: 2019-11-18 | Stop reason: SDUPTHER

## 2018-11-15 RX ORDER — ATORVASTATIN CALCIUM 80 MG/1
80 TABLET, FILM COATED ORAL DAILY
Qty: 90 TABLET | Refills: 3 | Status: SHIPPED | OUTPATIENT
Start: 2018-11-15 | End: 2019-11-22 | Stop reason: SDUPTHER

## 2018-11-15 RX ORDER — METOPROLOL SUCCINATE 100 MG/1
TABLET, EXTENDED RELEASE ORAL
Qty: 90 TABLET | Refills: 3 | Status: SHIPPED | OUTPATIENT
Start: 2018-11-15 | End: 2019-11-18 | Stop reason: SDUPTHER

## 2019-02-19 ENCOUNTER — OFFICE VISIT (OUTPATIENT)
Dept: INTERNAL MEDICINE | Facility: CLINIC | Age: 69
End: 2019-02-19
Payer: MEDICARE

## 2019-02-19 VITALS
HEART RATE: 88 BPM | BODY MASS INDEX: 25.52 KG/M2 | DIASTOLIC BLOOD PRESSURE: 70 MMHG | OXYGEN SATURATION: 94 % | TEMPERATURE: 98 F | HEIGHT: 64 IN | WEIGHT: 149.5 LBS | SYSTOLIC BLOOD PRESSURE: 122 MMHG

## 2019-02-19 DIAGNOSIS — R55 NEAR SYNCOPE: Primary | ICD-10-CM

## 2019-02-19 DIAGNOSIS — I10 ESSENTIAL HYPERTENSION: ICD-10-CM

## 2019-02-19 DIAGNOSIS — J84.9 INTERSTITIAL LUNG DISEASE: ICD-10-CM

## 2019-02-19 DIAGNOSIS — I73.9 PAD (PERIPHERAL ARTERY DISEASE): ICD-10-CM

## 2019-02-19 DIAGNOSIS — I25.10 CORONARY ARTERY DISEASE INVOLVING NATIVE CORONARY ARTERY OF NATIVE HEART WITHOUT ANGINA PECTORIS: ICD-10-CM

## 2019-02-19 PROCEDURE — 99999 PR PBB SHADOW E&M-EST. PATIENT-LVL III: ICD-10-PCS | Mod: PBBFAC,HCNC,, | Performed by: INTERNAL MEDICINE

## 2019-02-19 PROCEDURE — 3288F PR FALLS RISK ASSESSMENT DOCUMENTED: ICD-10-PCS | Mod: HCNC,CPTII,S$GLB, | Performed by: INTERNAL MEDICINE

## 2019-02-19 PROCEDURE — 3078F DIAST BP <80 MM HG: CPT | Mod: HCNC,CPTII,S$GLB, | Performed by: INTERNAL MEDICINE

## 2019-02-19 PROCEDURE — 3074F SYST BP LT 130 MM HG: CPT | Mod: HCNC,CPTII,S$GLB, | Performed by: INTERNAL MEDICINE

## 2019-02-19 PROCEDURE — 99214 PR OFFICE/OUTPT VISIT, EST, LEVL IV, 30-39 MIN: ICD-10-PCS | Mod: HCNC,S$GLB,, | Performed by: INTERNAL MEDICINE

## 2019-02-19 PROCEDURE — 93000 EKG 12-LEAD: ICD-10-PCS | Mod: HCNC,S$GLB,, | Performed by: STUDENT IN AN ORGANIZED HEALTH CARE EDUCATION/TRAINING PROGRAM

## 2019-02-19 PROCEDURE — 1100F PTFALLS ASSESS-DOCD GE2>/YR: CPT | Mod: HCNC,CPTII,S$GLB, | Performed by: INTERNAL MEDICINE

## 2019-02-19 PROCEDURE — 93000 ELECTROCARDIOGRAM COMPLETE: CPT | Mod: HCNC,S$GLB,, | Performed by: STUDENT IN AN ORGANIZED HEALTH CARE EDUCATION/TRAINING PROGRAM

## 2019-02-19 PROCEDURE — 99999 PR PBB SHADOW E&M-EST. PATIENT-LVL III: CPT | Mod: PBBFAC,HCNC,, | Performed by: INTERNAL MEDICINE

## 2019-02-19 PROCEDURE — 3078F PR MOST RECENT DIASTOLIC BLOOD PRESSURE < 80 MM HG: ICD-10-PCS | Mod: HCNC,CPTII,S$GLB, | Performed by: INTERNAL MEDICINE

## 2019-02-19 PROCEDURE — 99214 OFFICE O/P EST MOD 30 MIN: CPT | Mod: HCNC,S$GLB,, | Performed by: INTERNAL MEDICINE

## 2019-02-19 PROCEDURE — 3288F FALL RISK ASSESSMENT DOCD: CPT | Mod: HCNC,CPTII,S$GLB, | Performed by: INTERNAL MEDICINE

## 2019-02-19 PROCEDURE — 1100F PR PT FALLS ASSESS DOC 2+ FALLS/FALL W/INJURY/YR: ICD-10-PCS | Mod: HCNC,CPTII,S$GLB, | Performed by: INTERNAL MEDICINE

## 2019-02-19 PROCEDURE — 3074F PR MOST RECENT SYSTOLIC BLOOD PRESSURE < 130 MM HG: ICD-10-PCS | Mod: HCNC,CPTII,S$GLB, | Performed by: INTERNAL MEDICINE

## 2019-02-20 NOTE — PROGRESS NOTES
Subjective:       Patient ID: Jose Antonio Allen is a 68 y.o. male.    Chief Complaint: Dizziness    HPI  Yesterday pt felt suddenly weak, causing him to fall. Episode lasted 2-3 minutes.  Deniies cP or palpitations.  SOB at baseline.  Review of Systems   All other systems reviewed and are negative.      Objective:      Physical Exam   Constitutional: He appears well-developed. No distress.   HENT:   Head: Normocephalic.   Eyes: EOM are normal. No scleral icterus.   Neck: Normal range of motion. No tracheal deviation present.   Cardiovascular: Normal rate, regular rhythm, normal heart sounds and intact distal pulses.   Pulmonary/Chest: Effort normal and breath sounds normal. No respiratory distress.   Abdominal: Soft. Bowel sounds are normal. He exhibits no distension. There is no tenderness.   Musculoskeletal: Normal range of motion. He exhibits no edema.   Neurological: He is alert.   Skin: Skin is warm and dry. No rash noted. He is not diaphoretic. No erythema.   Psychiatric: He has a normal mood and affect. His behavior is normal.   Vitals reviewed.      Assessment:       1. Near syncope    2. PAD (peripheral artery disease)    3. Interstitial lung disease    4. Essential hypertension    5. Coronary artery disease involving native coronary artery of native heart without angina pectoris        Plan:       Jose Antonio was seen today for dizziness.    Diagnoses and all orders for this visit:    Near syncope  -     EKG 12-lead    PAD (peripheral artery disease)    Interstitial lung disease   Per Pulmonary    Essential hypertension   Well-cont    Coronary artery disease involving native coronary artery of native heart without angina pectoris  -     EKG 12-lead      No Follow-up on file.

## 2019-02-21 ENCOUNTER — HOSPITAL ENCOUNTER (OUTPATIENT)
Dept: CARDIOLOGY | Facility: HOSPITAL | Age: 69
Discharge: HOME | End: 2019-02-21
Attending: INTERNAL MEDICINE
Payer: MEDICARE

## 2019-02-21 DIAGNOSIS — R55 NEAR SYNCOPE: ICD-10-CM

## 2019-02-21 PROCEDURE — 93226 XTRNL ECG REC<48 HR SCAN A/R: CPT | Mod: HCNC

## 2019-02-21 PROCEDURE — 93227 HOLTER MONITOR - 24 HOUR (CUPID ONLY): ICD-10-PCS | Mod: HCNC,,, | Performed by: INTERNAL MEDICINE

## 2019-02-21 PROCEDURE — 93227 XTRNL ECG REC<48 HR R&I: CPT | Mod: HCNC,,, | Performed by: INTERNAL MEDICINE

## 2019-02-26 ENCOUNTER — TELEPHONE (OUTPATIENT)
Dept: INTERNAL MEDICINE | Facility: CLINIC | Age: 69
End: 2019-02-26

## 2019-02-26 DIAGNOSIS — I47.20 V-TACH: Primary | ICD-10-CM

## 2019-02-26 LAB
OHS CV EVENT MONITOR DAY: 0
OHS CV HOLTER LENGTH DECIMAL HOURS: 23.98
OHS CV HOLTER LENGTH HOURS: 23
OHS CV HOLTER LENGTH MINUTES: 59

## 2019-03-07 ENCOUNTER — OFFICE VISIT (OUTPATIENT)
Dept: CARDIOLOGY | Facility: CLINIC | Age: 69
End: 2019-03-07
Payer: MEDICARE

## 2019-03-07 VITALS
HEIGHT: 64 IN | WEIGHT: 155 LBS | SYSTOLIC BLOOD PRESSURE: 132 MMHG | HEART RATE: 74 BPM | BODY MASS INDEX: 26.46 KG/M2 | OXYGEN SATURATION: 97 % | DIASTOLIC BLOOD PRESSURE: 60 MMHG

## 2019-03-07 DIAGNOSIS — Z95.1 HX OF CABG: ICD-10-CM

## 2019-03-07 DIAGNOSIS — Z87.19 HISTORY OF GI DIVERTICULAR BLEED: ICD-10-CM

## 2019-03-07 DIAGNOSIS — J84.9 INTERSTITIAL LUNG DISEASE: ICD-10-CM

## 2019-03-07 DIAGNOSIS — I10 ESSENTIAL HYPERTENSION: ICD-10-CM

## 2019-03-07 DIAGNOSIS — I25.10 CORONARY ARTERY DISEASE INVOLVING NATIVE CORONARY ARTERY OF NATIVE HEART WITHOUT ANGINA PECTORIS: Primary | ICD-10-CM

## 2019-03-07 DIAGNOSIS — R55 NEAR SYNCOPE: ICD-10-CM

## 2019-03-07 DIAGNOSIS — R01.1 HEART MURMUR: ICD-10-CM

## 2019-03-07 DIAGNOSIS — I73.9 PAD (PERIPHERAL ARTERY DISEASE): ICD-10-CM

## 2019-03-07 DIAGNOSIS — R09.89 RIGHT CAROTID BRUIT: ICD-10-CM

## 2019-03-07 DIAGNOSIS — E78.00 PURE HYPERCHOLESTEROLEMIA: ICD-10-CM

## 2019-03-07 DIAGNOSIS — Z95.1 S/P CABG (CORONARY ARTERY BYPASS GRAFT): ICD-10-CM

## 2019-03-07 DIAGNOSIS — N18.30 STAGE 3 CHRONIC KIDNEY DISEASE: ICD-10-CM

## 2019-03-07 PROCEDURE — 3075F PR MOST RECENT SYSTOLIC BLOOD PRESS GE 130-139MM HG: ICD-10-PCS | Mod: HCNC,CPTII,S$GLB, | Performed by: INTERNAL MEDICINE

## 2019-03-07 PROCEDURE — 3078F PR MOST RECENT DIASTOLIC BLOOD PRESSURE < 80 MM HG: ICD-10-PCS | Mod: HCNC,CPTII,S$GLB, | Performed by: INTERNAL MEDICINE

## 2019-03-07 PROCEDURE — 99999 PR PBB SHADOW E&M-EST. PATIENT-LVL IV: CPT | Mod: PBBFAC,HCNC,, | Performed by: INTERNAL MEDICINE

## 2019-03-07 PROCEDURE — 99214 OFFICE O/P EST MOD 30 MIN: CPT | Mod: HCNC,S$GLB,, | Performed by: INTERNAL MEDICINE

## 2019-03-07 PROCEDURE — 3078F DIAST BP <80 MM HG: CPT | Mod: HCNC,CPTII,S$GLB, | Performed by: INTERNAL MEDICINE

## 2019-03-07 PROCEDURE — 1101F PR PT FALLS ASSESS DOC 0-1 FALLS W/OUT INJ PAST YR: ICD-10-PCS | Mod: HCNC,CPTII,S$GLB, | Performed by: INTERNAL MEDICINE

## 2019-03-07 PROCEDURE — 3075F SYST BP GE 130 - 139MM HG: CPT | Mod: HCNC,CPTII,S$GLB, | Performed by: INTERNAL MEDICINE

## 2019-03-07 PROCEDURE — 1101F PT FALLS ASSESS-DOCD LE1/YR: CPT | Mod: HCNC,CPTII,S$GLB, | Performed by: INTERNAL MEDICINE

## 2019-03-07 PROCEDURE — 99999 PR PBB SHADOW E&M-EST. PATIENT-LVL IV: ICD-10-PCS | Mod: PBBFAC,HCNC,, | Performed by: INTERNAL MEDICINE

## 2019-03-07 PROCEDURE — 93000 ELECTROCARDIOGRAM COMPLETE: CPT | Mod: HCNC,S$GLB,, | Performed by: INTERNAL MEDICINE

## 2019-03-07 PROCEDURE — 99214 PR OFFICE/OUTPT VISIT, EST, LEVL IV, 30-39 MIN: ICD-10-PCS | Mod: HCNC,S$GLB,, | Performed by: INTERNAL MEDICINE

## 2019-03-07 PROCEDURE — 93000 EKG 12-LEAD: ICD-10-PCS | Mod: HCNC,S$GLB,, | Performed by: INTERNAL MEDICINE

## 2019-03-07 NOTE — LETTER
March 7, 2019      Choco Montanez MD  [address]  [address]  [facility] [address]           [facility]  [address], [address]  [facility] [address]  Phone: [phone]          Patient: Jose Antonio Allen   MR Number: [id number]   YOB: 1950   Date of Visit: 3/7/2019       Dear Dr. Choco Montanez:    Thank you for referring Jose Antonio Allen to me for evaluation. Attached you will find relevant portions of my assessment and plan of care.    If you have questions, please do not hesitate to call me. I look forward to following Jose Antonio Allen along with you.    Sincerely,    Rey Connelly MD    Enclosure  CC:  No Recipients    If you would like to receive this communication electronically, please contact [email or web address] or [phone] to request more information on [facility] access.    For providers and/or their staff who would like to refer a patient to [facility], please contact us through our one-stop-shop provider referral line, [facility], at [phone].    If you feel you have received this communication in error or would no longer like to receive these types of communications, please e-mail [email or web address]

## 2019-03-07 NOTE — PROGRESS NOTES
"  Subjective:      Patient ID: Jose Antonio Allen is a 68 y.o. male.    Chief Complaint: Shortness of Breath    HPI:  Sees Dr Linares at [facility] has recommended pt use oxygen continuously for interstitial lung disease.  Legs are better after vascular surgery by Dr Schulz.    Pt rports that both legs are numb below the knees.  Pt is able to walk 15 minutes on treadmill  Pt is mostly limited by shortness of breath.    Review of Systems   Cardiovascular: Positive for dyspnea on exertion and palpitations ("My pulse rate is high when I get short of breath with exertion"). Negative for chest pain, claudication, irregular heartbeat, leg swelling, near-syncope, orthopnea and syncope.      Legs are not swelling even after discontinuing the furosemide.    Dr De Ramsey recently did a holter to evaluate 2 episodes of dizziness which occurred 2 or 3 weeks ago..  The first dizzy episode occurred after arising from a sofa and lasted a minute or two, abating with sitting down.  The second episode occurred while walking around his bedroom and pt felt very weak and kneeled down so he wouldn't fall.  Pt lay down for a few minutes and flet better.  No loss of consciousness..Pt has had no recurrent weak or dizzy spells since the last episode a couple of weeks ago.    Dr Jillian Howard manages pt's DM    Dr Mcclure manages CKD  Past Medical History:   Diagnosis Date    Angina pectoris     CKD (chronic kidney disease)     Coronary artery disease     Diabetes mellitus     Diabetes mellitus, type 2     GERD (gastroesophageal reflux disease)     Hyperlipidemia     Hypertension     S/P CABG (coronary artery bypass graft) 1996    Thyroid disease         Past Surgical History:   Procedure Laterality Date    CATARACT EXTRACTION  3YRS    CORONARY ARTERY BYPASS GRAFT  1996    EYELID SURGERY  03/2012    VEIN BYPASS SURGERY  1996       Family History   Problem Relation Age of Onset    Diabetes Mother     Diabetes Father     Blindness Neg " Hx     Glaucoma Neg Hx     Macular degeneration Neg Hx     Retinal detachment Neg Hx     Strabismus Neg Hx     Stroke Neg Hx     Thyroid disease Neg Hx     Cancer Neg Hx     Cataracts Neg Hx     Hypertension Neg Hx        Social History     Socioeconomic History    Marital status:      Spouse name: None    Number of children: None    Years of education: None    Highest education level: None   Social Needs    Financial resource strain: None    Food insecurity - worry: None    Food insecurity - inability: None    Transportation needs - medical: None    Transportation needs - non-medical: None   Occupational History    None   Tobacco Use    Smoking status: Never Smoker    Smokeless tobacco: Never Used   Substance and Sexual Activity    Alcohol use: Yes    Drug use: No    Sexual activity: None   Other Topics Concern    None   Social History Narrative    None       Current Outpatient Medications on File Prior to Visit   Medication Sig Dispense Refill    amLODIPine (NORVASC) 10 MG tablet Take 10 mg by mouth once daily.      aspirin 81 MG Chew 81 mg once daily.       atorvastatin (LIPITOR) 80 MG tablet Take 1 tablet (80 mg total) by mouth once daily. 90 tablet 3    ferrous gluconate (FERGON) 324 MG tablet Take 324 mg by mouth 3 (three) times daily with meals.      finasteride (PROSCAR) 5 mg tablet Take 5 mg by mouth once daily.  3    insulin degludec (TRESIBA FLEXTOUCH U-100) 100 unit/mL (3 mL) InPn Inject 36 Units into the skin once daily.      levothyroxine (SYNTHROID) 75 MCG tablet Take 1 tablet (75 mcg total) by mouth once daily. 90 tablet 3    LUBRICANT EYE DROPS 0.5 % Dpet       metoprolol succinate (TOPROL-XL) 100 MG 24 hr tablet TAKE 1 TABLET ONE TIME DAILY 90 tablet 3    nitroGLYCERIN (NITROSTAT) 0.4 MG SL tablet Place 1 tablet (0.4 mg total) under the tongue every 5 (five) minutes as needed for Chest pain. 25 tablet 12    NOVOLOG 100 unit/mL injection Sliding scale    "   OFEV 150 mg Cap       omeprazole (PRILOSEC) 40 MG capsule Take 40 mg by mouth every morning.       potassium citrate (UROCIT-K 15) 15 mEq TbSR Take 1 tablet by mouth 3 (three) times daily with meals.  3    tamsulosin (FLOMAX) 0.4 mg Cp24 0.4 mg once daily.       furosemide (LASIX) 20 MG tablet Take 20 mg by mouth once daily.  1    [DISCONTINUED] oxyCODONE-acetaminophen (PERCOCET) 5-325 mg per tablet Take 1 tablet by mouth every 4 (four) hours as needed. for pain.  0     No current facility-administered medications on file prior to visit.        Review of patient's allergies indicates:  No Known Allergies  Objective:     Vitals:    03/07/19 0816 03/07/19 0850   BP: 118/69 132/60   BP Location: Left arm Left arm   Patient Position: Sitting Sitting   BP Method: Large (Automatic)    Pulse: 74    SpO2: 97%    Weight: 70.3 kg (154 lb 15.7 oz)    Height: 5' 4" (1.626 m)         Physical Exam   Constitutional: He is oriented to person, place, and time. He appears well-developed and well-nourished. No distress.   Eyes: No scleral icterus.   Neck: No JVD present. Carotid bruit is present (right).   Cardiovascular: Regular rhythm and normal heart sounds. Exam reveals no gallop and no friction rub.   No murmur heard.  Pulmonary/Chest: Effort normal. No respiratory distress. He has rales (Velcro crackles bilaterally).   Musculoskeletal: He exhibits edema (trace bilateral).   Neurological: He is alert and oriented to person, place, and time.   Skin: Skin is warm and dry. He is not diaphoretic.   Psychiatric: He has a normal mood and affect. His behavior is normal. Judgment and thought content normal.   Vitals reviewed.     Recent holter showed NSR within the physiologic range with occasional PVC's and one 4 beat run of ventricular tachycardia.    Note Cardiolite stress test 7/18 was normal with no ischemia and no infarct and normal LVEF    ECG today-NSR, right axis deviation, otherwise normal ECG  Assessment:     1. " Coronary artery disease involving native coronary artery of native heart without angina pectoris    2. S/P CABG (coronary artery bypass graft)    3. Essential hypertension    4. Pure hypercholesterolemia    5. Right carotid bruit    6. Heart murmur    7. PAD (peripheral artery disease)    8. Stage 3 chronic kidney disease    9. History of GI diverticular bleed    10. Interstitial lung disease    11. Hx of CABG    12. Near syncope      Plan:   Jose Antonio was seen today for shortness of breath.    Diagnoses and all orders for this visit:    Coronary artery disease involving native coronary artery of native heart without angina pectoris    S/P CABG (coronary artery bypass graft)  -     IN OFFICE EKG 12-LEAD (to Muse)    Essential hypertension    Pure hypercholesterolemia    Right carotid bruit    Heart murmur    PAD (peripheral artery disease)    Stage 3 chronic kidney disease    History of GI diverticular bleed    Interstitial lung disease    Hx of CABG  -     IN OFFICE EKG 12-LEAD (to Muse)  -     Transthoracic echo (TTE) complete (Cupid Only); Future    Near syncope  -     Transthoracic echo (TTE) complete (Cupid Only); Future       Recent near-syncope was likely due to orthostatic hypotension.  Pt has not had any recurrent dizziness or near syncope since discontinuing the furosemide.  Doubt symptomatic nonsustained ventricular tachycardia.    Edema is venous insufficiency edema exacerbated by amlodipine    Pt instructed to stay off the furosemide    Will repeat the echocardiogram    If near syncope recurs will consider implanted loop recorder and will consider empirically reducing the dose of amlodipine to 5 mg daily    RTC 2 months    Will review last labs from nephrologist and endocrinologist    Follow-up in about 2 months (around 5/7/2019).

## 2019-03-14 ENCOUNTER — HOSPITAL ENCOUNTER (OUTPATIENT)
Dept: CARDIOLOGY | Facility: HOSPITAL | Age: 69
Discharge: HOME | End: 2019-03-14
Attending: INTERNAL MEDICINE
Payer: MEDICARE

## 2019-03-14 ENCOUNTER — TELEPHONE (OUTPATIENT)
Dept: CARDIOLOGY | Facility: CLINIC | Age: 69
End: 2019-03-14

## 2019-03-14 DIAGNOSIS — Z95.1 HX OF CABG: ICD-10-CM

## 2019-03-14 DIAGNOSIS — R55 NEAR SYNCOPE: ICD-10-CM

## 2019-03-14 LAB
AORTIC ROOT ANNULUS: 2.93 CM
AORTIC VALVE CUSP SEPERATION: 1.27 CM
AV INDEX (PROSTH): 0.66
AV MEAN GRADIENT: 5.56 MMHG
AV PEAK GRADIENT: 10.89 MMHG
AV VALVE AREA: 2.06 CM2
AV VELOCITY RATIO: 0.68
CV ECHO LV RWT: 0.5 CM
DOP CALC AO PEAK VEL: 1.65 M/S
DOP CALC AO VTI: 35.85 CM
DOP CALC LVOT AREA: 3.14 CM2
DOP CALC LVOT DIAMETER: 2 CM
DOP CALC LVOT PEAK VEL: 1.12 M/S
DOP CALC LVOT STROKE VOLUME: 73.76 CM3
DOP CALCLVOT PEAK VEL VTI: 23.49 CM
E WAVE DECELERATION TIME: 136 MSEC
E/A RATIO: 0.96
ECHO LV POSTERIOR WALL: 1.2 CM (ref 0.6–1.1)
FRACTIONAL SHORTENING: 31 % (ref 28–44)
INTERVENTRICULAR SEPTUM: 1.3 CM (ref 0.6–1.1)
IVRT: 0.06 MSEC
LA MAJOR: 4.34 CM
LA WIDTH: 2.98 CM
LEFT ATRIUM SIZE: 4.1 CM
LEFT INTERNAL DIMENSION IN SYSTOLE: 3.32 CM (ref 2.1–4)
LEFT VENTRICLE DIASTOLIC VOLUME: 108.17 ML
LEFT VENTRICLE SYSTOLIC VOLUME: 44.86 ML
LEFT VENTRICULAR INTERNAL DIMENSION IN DIASTOLE: 4.81 CM (ref 3.5–6)
LEFT VENTRICULAR MASS: 233.01 G
MV PEAK A VEL: 1.01 M/S
MV PEAK E VEL: 0.97 M/S
PISA TR MAX VEL: 1.93 M/S
PULM VEIN S/D RATIO: 1.08
PV PEAK D VEL: 0.37 M/S
PV PEAK S VEL: 0.4 M/S
PV PEAK VELOCITY: 0.99 CM/S
RA MAJOR: 4.22 CM
RA PRESSURE: 3 MMHG
RIGHT VENTRICULAR END-DIASTOLIC DIMENSION: 2.9 CM
TR MAX PG: 14.9 MMHG
TV REST PULMONARY ARTERY PRESSURE: 18 MMHG

## 2019-03-14 PROCEDURE — 93306 TTE W/DOPPLER COMPLETE: CPT | Mod: HCNC

## 2019-03-14 PROCEDURE — 93306 TRANSTHORACIC ECHO (TTE) COMPLETE (CUPID ONLY): ICD-10-PCS | Mod: 26,HCNC,, | Performed by: INTERNAL MEDICINE

## 2019-03-14 PROCEDURE — 93306 TTE W/DOPPLER COMPLETE: CPT | Mod: 26,HCNC,, | Performed by: INTERNAL MEDICINE

## 2019-03-14 NOTE — TELEPHONE ENCOUNTER
Message left on voicemail: Echocardiogram shows that the heart is working perfectly strong. Echo results are good.

## 2019-05-09 ENCOUNTER — OFFICE VISIT (OUTPATIENT)
Dept: CARDIOLOGY | Facility: CLINIC | Age: 69
End: 2019-05-09
Payer: MEDICARE

## 2019-05-09 VITALS
WEIGHT: 151.44 LBS | BODY MASS INDEX: 25.85 KG/M2 | SYSTOLIC BLOOD PRESSURE: 119 MMHG | HEIGHT: 64 IN | DIASTOLIC BLOOD PRESSURE: 67 MMHG | HEART RATE: 73 BPM

## 2019-05-09 DIAGNOSIS — J84.9 INTERSTITIAL LUNG DISEASE: ICD-10-CM

## 2019-05-09 DIAGNOSIS — I10 ESSENTIAL HYPERTENSION: ICD-10-CM

## 2019-05-09 DIAGNOSIS — I73.9 PAD (PERIPHERAL ARTERY DISEASE): ICD-10-CM

## 2019-05-09 DIAGNOSIS — Z95.1 S/P CABG (CORONARY ARTERY BYPASS GRAFT): Primary | ICD-10-CM

## 2019-05-09 DIAGNOSIS — I25.10 CORONARY ARTERY DISEASE INVOLVING NATIVE CORONARY ARTERY OF NATIVE HEART WITHOUT ANGINA PECTORIS: ICD-10-CM

## 2019-05-09 DIAGNOSIS — E78.00 PURE HYPERCHOLESTEROLEMIA: ICD-10-CM

## 2019-05-09 PROCEDURE — 3078F DIAST BP <80 MM HG: CPT | Mod: HCNC,CPTII,S$GLB, | Performed by: INTERNAL MEDICINE

## 2019-05-09 PROCEDURE — 99214 OFFICE O/P EST MOD 30 MIN: CPT | Mod: HCNC,S$GLB,, | Performed by: INTERNAL MEDICINE

## 2019-05-09 PROCEDURE — 1101F PR PT FALLS ASSESS DOC 0-1 FALLS W/OUT INJ PAST YR: ICD-10-PCS | Mod: HCNC,CPTII,S$GLB, | Performed by: INTERNAL MEDICINE

## 2019-05-09 PROCEDURE — 3074F SYST BP LT 130 MM HG: CPT | Mod: HCNC,CPTII,S$GLB, | Performed by: INTERNAL MEDICINE

## 2019-05-09 PROCEDURE — 99999 PR PBB SHADOW E&M-EST. PATIENT-LVL II: ICD-10-PCS | Mod: PBBFAC,HCNC,, | Performed by: INTERNAL MEDICINE

## 2019-05-09 PROCEDURE — 3078F PR MOST RECENT DIASTOLIC BLOOD PRESSURE < 80 MM HG: ICD-10-PCS | Mod: HCNC,CPTII,S$GLB, | Performed by: INTERNAL MEDICINE

## 2019-05-09 PROCEDURE — 99214 PR OFFICE/OUTPT VISIT, EST, LEVL IV, 30-39 MIN: ICD-10-PCS | Mod: HCNC,S$GLB,, | Performed by: INTERNAL MEDICINE

## 2019-05-09 PROCEDURE — 99999 PR PBB SHADOW E&M-EST. PATIENT-LVL II: CPT | Mod: PBBFAC,HCNC,, | Performed by: INTERNAL MEDICINE

## 2019-05-09 PROCEDURE — 3074F PR MOST RECENT SYSTOLIC BLOOD PRESSURE < 130 MM HG: ICD-10-PCS | Mod: HCNC,CPTII,S$GLB, | Performed by: INTERNAL MEDICINE

## 2019-05-09 PROCEDURE — 1101F PT FALLS ASSESS-DOCD LE1/YR: CPT | Mod: HCNC,CPTII,S$GLB, | Performed by: INTERNAL MEDICINE

## 2019-05-09 RX ORDER — CHOLECALCIFEROL (VITAMIN D3) 25 MCG
1000 TABLET ORAL DAILY
COMMUNITY

## 2019-05-09 RX ORDER — MULTIVIT WITH MINERALS/HERBS
1 TABLET ORAL DAILY
COMMUNITY
End: 2020-06-12 | Stop reason: SDUPTHER

## 2019-05-09 RX ORDER — PIRFENIDONE 267 MG/1
TABLET, COATED ORAL 3 TIMES DAILY
COMMUNITY
Start: 2019-04-26 | End: 2020-12-08

## 2019-05-09 NOTE — PROGRESS NOTES
Subjective:      Patient ID: Jose Antonio Allen is a 68 y.o. male.    Chief Complaint: Follow-up    HPI: Still exercises.  Goes to the gym   Uses portable oxygen when exercising  No syncope since discontinuing the furosemide    Review of Systems   Cardiovascular: Positive for dyspnea on exertion and leg swelling. Negative for chest pain, claudication, irregular heartbeat, near-syncope, orthopnea, palpitations and syncope.      Legs don't hurt    Past Medical History:   Diagnosis Date    Angina pectoris     CKD (chronic kidney disease)     Coronary artery disease     Diabetes mellitus     Diabetes mellitus, type 2     GERD (gastroesophageal reflux disease)     Hyperlipidemia     Hypertension     S/P CABG (coronary artery bypass graft) 1996    Thyroid disease         Past Surgical History:   Procedure Laterality Date    CATARACT EXTRACTION  3YRS    CORONARY ARTERY BYPASS GRAFT  1996    EYELID SURGERY  03/2012    VEIN BYPASS SURGERY  1996       Family History   Problem Relation Age of Onset    Diabetes Mother     Diabetes Father     Blindness Neg Hx     Glaucoma Neg Hx     Macular degeneration Neg Hx     Retinal detachment Neg Hx     Strabismus Neg Hx     Stroke Neg Hx     Thyroid disease Neg Hx     Cancer Neg Hx     Cataracts Neg Hx     Hypertension Neg Hx        Social History     Socioeconomic History    Marital status:      Spouse name: Not on file    Number of children: Not on file    Years of education: Not on file    Highest education level: Not on file   Occupational History    Not on file   Social Needs    Financial resource strain: Not on file    Food insecurity:     Worry: Not on file     Inability: Not on file    Transportation needs:     Medical: Not on file     Non-medical: Not on file   Tobacco Use    Smoking status: Never Smoker    Smokeless tobacco: Never Used   Substance and Sexual Activity    Alcohol use: Yes    Drug use: No    Sexual activity: Not on file    Lifestyle    Physical activity:     Days per week: Not on file     Minutes per session: Not on file    Stress: Not on file   Relationships    Social connections:     Talks on phone: Not on file     Gets together: Not on file     Attends Shinto service: Not on file     Active member of club or organization: Not on file     Attends meetings of clubs or organizations: Not on file     Relationship status: Not on file   Other Topics Concern    Not on file   Social History Narrative    Not on file       Current Outpatient Medications on File Prior to Visit   Medication Sig Dispense Refill    amLODIPine (NORVASC) 10 MG tablet Take 10 mg by mouth once daily.      aspirin 81 MG Chew 81 mg once daily.       atorvastatin (LIPITOR) 80 MG tablet Take 1 tablet (80 mg total) by mouth once daily. 90 tablet 3    b complex vitamins tablet Take 1 tablet by mouth once daily.      ESBRIET 267 mg Tab       ferrous gluconate (FERGON) 324 MG tablet Take 324 mg by mouth 3 (three) times daily with meals.      finasteride (PROSCAR) 5 mg tablet Take 5 mg by mouth once daily.  3    insulin degludec (TRESIBA FLEXTOUCH U-100) 100 unit/mL (3 mL) InPn Inject 36 Units into the skin once daily.      levothyroxine (SYNTHROID) 75 MCG tablet Take 1 tablet (75 mcg total) by mouth once daily. 90 tablet 3    LUBRICANT EYE DROPS 0.5 % Dpet       methylcellulose, laxative, (CITRUCEL) 500 mg Tab Take by mouth.      metoprolol succinate (TOPROL-XL) 100 MG 24 hr tablet TAKE 1 TABLET ONE TIME DAILY 90 tablet 3    nitroGLYCERIN (NITROSTAT) 0.4 MG SL tablet Place 1 tablet (0.4 mg total) under the tongue every 5 (five) minutes as needed for Chest pain. 25 tablet 12    NOVOLOG 100 unit/mL injection Sliding scale      omeprazole (PRILOSEC) 40 MG capsule Take 40 mg by mouth every morning.       tamsulosin (FLOMAX) 0.4 mg Cp24 0.4 mg once daily.       vitamin D (VITAMIN D3) 1000 units Tab Take 1,000 Units by mouth once daily.       "[DISCONTINUED] furosemide (LASIX) 20 MG tablet Take 20 mg by mouth once daily.  1    [DISCONTINUED] OFEV 150 mg Cap       [DISCONTINUED] potassium citrate (UROCIT-K 15) 15 mEq TbSR Take 1 tablet by mouth 3 (three) times daily with meals.  3     No current facility-administered medications on file prior to visit.        Review of patient's allergies indicates:  No Known Allergies  Objective:     Vitals:    05/09/19 1101   BP: 119/67   BP Location: Left arm   Patient Position: Sitting   BP Method: Medium (Automatic)   Pulse: 73   Weight: 68.7 kg (151 lb 7.3 oz)   Height: 5' 4" (1.626 m)        Physical Exam   Constitutional: He is oriented to person, place, and time. He appears well-developed and well-nourished. No distress.   Eyes: No scleral icterus.   Neck: No JVD present. Carotid bruit is not present.   Cardiovascular: Regular rhythm and normal heart sounds. Exam reveals no gallop and no friction rub.   No murmur heard.  Pulmonary/Chest: Effort normal. No respiratory distress. He has rales (bibasilar crackles).   Musculoskeletal: He exhibits edema (trace bilateral).   Neurological: He is alert and oriented to person, place, and time.   Skin: Skin is warm and dry. He is not diaphoretic.   Psychiatric: He has a normal mood and affect. His behavior is normal. Judgment and thought content normal.   Vitals reviewed.     Note recent Cardiolite stress test WNL  Assessment:     1. S/P CABG (coronary artery bypass graft)    2. Coronary artery disease involving native coronary artery of native heart without angina pectoris    3. Essential hypertension    4. Pure hypercholesterolemia    5. Interstitial lung disease    6. PAD (peripheral artery disease)      Plan:   Jose Antonio was seen today for follow-up.    Diagnoses and all orders for this visit:    S/P CABG (coronary artery bypass graft)    Coronary artery disease involving native coronary artery of native heart without angina pectoris    Essential hypertension    Pure " hypercholesterolemia    Interstitial lung disease    PAD (peripheral artery disease)     Note syncope was likely due to orthostatic hypotension and has not recurred since cessation of the furosemide    CAD/old CABG--stable    PAD--improved since procedure with Dr Schulz at [facility]    Main problem at present is interstitial fibrosis--pt has appt in June with pulmonary Dr Medina    Edema is side effecct of amlodipine    Same meds    Continue to exercise with oxygen    Follow up in about 6 months (around 11/9/2019).

## 2019-06-27 ENCOUNTER — LAB VISIT (OUTPATIENT)
Dept: LAB | Facility: HOSPITAL | Age: 69
End: 2019-06-27
Attending: FAMILY MEDICINE
Payer: MEDICARE

## 2019-06-27 ENCOUNTER — OFFICE VISIT (OUTPATIENT)
Dept: FAMILY MEDICINE | Facility: CLINIC | Age: 69
End: 2019-06-27
Payer: MEDICARE

## 2019-06-27 VITALS
HEART RATE: 78 BPM | DIASTOLIC BLOOD PRESSURE: 60 MMHG | WEIGHT: 152.56 LBS | OXYGEN SATURATION: 95 % | HEIGHT: 64 IN | BODY MASS INDEX: 26.05 KG/M2 | SYSTOLIC BLOOD PRESSURE: 128 MMHG

## 2019-06-27 DIAGNOSIS — Z00.00 ANNUAL PHYSICAL EXAM: Primary | ICD-10-CM

## 2019-06-27 DIAGNOSIS — Z23 NEED FOR VACCINATION AGAINST STREPTOCOCCUS PNEUMONIAE: ICD-10-CM

## 2019-06-27 DIAGNOSIS — Z11.59 NEED FOR HEPATITIS C SCREENING TEST: ICD-10-CM

## 2019-06-27 DIAGNOSIS — L30.9 DERMATITIS: ICD-10-CM

## 2019-06-27 DIAGNOSIS — F51.01 PRIMARY INSOMNIA: ICD-10-CM

## 2019-06-27 DIAGNOSIS — N18.30 STAGE 3 CHRONIC KIDNEY DISEASE: ICD-10-CM

## 2019-06-27 PROCEDURE — G0009 PNEUMOCOCCAL CONJUGATE VACCINE 13-VALENT LESS THAN 5YO & GREATER THAN: ICD-10-PCS | Mod: S$GLB,,, | Performed by: FAMILY MEDICINE

## 2019-06-27 PROCEDURE — 1101F PR PT FALLS ASSESS DOC 0-1 FALLS W/OUT INJ PAST YR: ICD-10-PCS | Mod: CPTII,S$GLB,, | Performed by: FAMILY MEDICINE

## 2019-06-27 PROCEDURE — 99214 OFFICE O/P EST MOD 30 MIN: CPT | Mod: 25,S$GLB,, | Performed by: FAMILY MEDICINE

## 2019-06-27 PROCEDURE — G0009 ADMIN PNEUMOCOCCAL VACCINE: HCPCS | Mod: S$GLB,,, | Performed by: FAMILY MEDICINE

## 2019-06-27 PROCEDURE — 1101F PT FALLS ASSESS-DOCD LE1/YR: CPT | Mod: CPTII,S$GLB,, | Performed by: FAMILY MEDICINE

## 2019-06-27 PROCEDURE — 36415 COLL VENOUS BLD VENIPUNCTURE: CPT | Mod: PO

## 2019-06-27 PROCEDURE — 3074F SYST BP LT 130 MM HG: CPT | Mod: CPTII,S$GLB,, | Performed by: FAMILY MEDICINE

## 2019-06-27 PROCEDURE — 99214 PR OFFICE/OUTPT VISIT, EST, LEVL IV, 30-39 MIN: ICD-10-PCS | Mod: 25,S$GLB,, | Performed by: FAMILY MEDICINE

## 2019-06-27 PROCEDURE — 99999 PR PBB SHADOW E&M-EST. PATIENT-LVL III: ICD-10-PCS | Mod: PBBFAC,,, | Performed by: FAMILY MEDICINE

## 2019-06-27 PROCEDURE — 86803 HEPATITIS C AB TEST: CPT

## 2019-06-27 PROCEDURE — 3078F DIAST BP <80 MM HG: CPT | Mod: CPTII,S$GLB,, | Performed by: FAMILY MEDICINE

## 2019-06-27 PROCEDURE — 90670 PNEUMOCOCCAL CONJUGATE VACCINE 13-VALENT LESS THAN 5YO & GREATER THAN: ICD-10-PCS | Mod: S$GLB,,, | Performed by: FAMILY MEDICINE

## 2019-06-27 PROCEDURE — 3078F PR MOST RECENT DIASTOLIC BLOOD PRESSURE < 80 MM HG: ICD-10-PCS | Mod: CPTII,S$GLB,, | Performed by: FAMILY MEDICINE

## 2019-06-27 PROCEDURE — 90670 PCV13 VACCINE IM: CPT | Mod: S$GLB,,, | Performed by: FAMILY MEDICINE

## 2019-06-27 PROCEDURE — 3074F PR MOST RECENT SYSTOLIC BLOOD PRESSURE < 130 MM HG: ICD-10-PCS | Mod: CPTII,S$GLB,, | Performed by: FAMILY MEDICINE

## 2019-06-27 PROCEDURE — 99999 PR PBB SHADOW E&M-EST. PATIENT-LVL III: CPT | Mod: PBBFAC,,, | Performed by: FAMILY MEDICINE

## 2019-06-27 RX ORDER — CLOTRIMAZOLE AND BETAMETHASONE DIPROPIONATE 10; .64 MG/G; MG/G
CREAM TOPICAL 2 TIMES DAILY
Qty: 45 G | Refills: 0 | Status: SHIPPED | OUTPATIENT
Start: 2019-06-27 | End: 2020-06-12

## 2019-06-27 NOTE — PROGRESS NOTES
Subjective:       Patient ID: Jose Antonio Allen is a 69 y.o. male.    Chief Complaint: Annual Exam    69 years old came to the clinic for his physical examination.  Patient with decreased kidney function but stable in comparison with previous reports.  Patient currently chronic oxygen therapy with physical activity.  Patient requests medicine to him with the insomnia.  Patient usually go to sleep about 8:30 a.m. wake up at 1:00 a.m. patient with rash over the left lower extremity for the last year.  Patient with significant itching around the area.  Patient due for her pneumonia shot.    Review of Systems   Constitutional: Negative.    HENT: Negative.    Eyes: Negative.    Respiratory: Positive for shortness of breath.    Cardiovascular: Negative.    Gastrointestinal: Negative.    Genitourinary: Negative.    Musculoskeletal: Negative.    Skin: Positive for rash.   Neurological: Negative.    Psychiatric/Behavioral: Negative.        Objective:      Physical Exam   Constitutional: He is oriented to person, place, and time. He appears well-developed and well-nourished. No distress.   HENT:   Head: Normocephalic and atraumatic.   Right Ear: External ear normal.   Left Ear: External ear normal.   Nose: Nose normal.   Mouth/Throat: Oropharynx is clear and moist. No oropharyngeal exudate.   Eyes: Pupils are equal, round, and reactive to light. Conjunctivae and EOM are normal. Right eye exhibits no discharge. Left eye exhibits no discharge. No scleral icterus.   Neck: Normal range of motion. Neck supple. No JVD present. No tracheal deviation present. No thyromegaly present.   Cardiovascular: Normal rate, regular rhythm, normal heart sounds and intact distal pulses. Exam reveals no gallop and no friction rub.   No murmur heard.  Pulmonary/Chest: Effort normal and breath sounds normal. No stridor. No respiratory distress. He has no wheezes. He has no rales. He exhibits no tenderness.   Abdominal: Soft. Bowel sounds are normal. He  exhibits no distension and no mass. There is no tenderness. There is no rebound and no guarding.   Musculoskeletal: Normal range of motion. He exhibits no edema or tenderness.   Feet:   Right Foot:   Protective Sensation: 10 sites tested. 10 sites sensed.   Skin Integrity: Negative for ulcer, blister, skin breakdown, erythema, warmth, callus or dry skin.   Left Foot:   Protective Sensation: 10 sites tested. 10 sites sensed.   Skin Integrity: Negative for ulcer, blister, skin breakdown, erythema, warmth, callus or dry skin.   Lymphadenopathy:     He has no cervical adenopathy.   Neurological: He is alert and oriented to person, place, and time. He has normal reflexes. He displays normal reflexes. No cranial nerve deficit. He exhibits normal muscle tone. Coordination normal.   Skin: Skin is warm and dry. Rash noted. Rash is maculopapular (Left lower leg over the medial side). He is not diaphoretic. No erythema. No pallor.   Psychiatric: He has a normal mood and affect. His behavior is normal. Judgment and thought content normal.   Nursing note and vitals reviewed.      Assessment:       1. Annual physical exam    2. Stage 3 chronic kidney disease    3. Primary insomnia    4. Need for hepatitis C screening test    5. Need for vaccination against Streptococcus pneumoniae    6. Dermatitis        Plan:         Jose Antonio was seen today for annual exam.    Diagnoses and all orders for this visit:    Annual physical exam    Stage 3 chronic kidney disease    Primary insomnia  -     suvorexant (BELSOMRA) 5 mg Tab; Take 5 mg by mouth nightly as needed.    Need for hepatitis C screening test  -     Hepatitis C antibody; Future    Need for vaccination against Streptococcus pneumoniae  -     (In Office Administered) Pneumococcal Conjugate Vaccine (13 Valent) (IM)    Dermatitis  -     clotrimazole-betamethasone 1-0.05% (LOTRISONE) cream; Apply topically 2 (two) times daily.     Sleep hygiene.  Increase fluid intake.

## 2019-06-28 DIAGNOSIS — E11.9 TYPE 2 DIABETES MELLITUS WITHOUT COMPLICATION: ICD-10-CM

## 2019-06-28 LAB — HCV AB SERPL QL IA: NEGATIVE

## 2019-07-31 NOTE — TELEPHONE ENCOUNTER
----- Message from Carlotta Reddy sent at 7/31/2019  9:01 AM CDT -----  Contact: self / [phone]  Patient is requesting a call back regarding, his medication. Please advise

## 2019-08-12 NOTE — TELEPHONE ENCOUNTER
----- Message from Carolina Lan sent at 8/12/2019  8:58 AM CDT -----  Contact: [phone]/self  Patient requesting to speak with you regarding a Rx suvorexant (BELSOMRA) 10 mg Tab. It needs to be changed to a 30 day supply per [employer]. Please advise.

## 2019-11-18 DIAGNOSIS — I10 ESSENTIAL HYPERTENSION: ICD-10-CM

## 2019-11-18 RX ORDER — METOPROLOL SUCCINATE 100 MG/1
TABLET, EXTENDED RELEASE ORAL
Qty: 90 TABLET | Refills: 3 | Status: SHIPPED | OUTPATIENT
Start: 2019-11-18 | End: 2020-12-08 | Stop reason: SDUPTHER

## 2019-11-18 RX ORDER — LEVOTHYROXINE SODIUM 75 UG/1
75 TABLET ORAL DAILY
Qty: 90 TABLET | Refills: 3 | Status: SHIPPED | OUTPATIENT
Start: 2019-11-18 | End: 2019-11-21

## 2019-11-20 ENCOUNTER — PATIENT OUTREACH (OUTPATIENT)
Dept: ADMINISTRATIVE | Facility: OTHER | Age: 69
End: 2019-11-20

## 2019-11-21 ENCOUNTER — OFFICE VISIT (OUTPATIENT)
Dept: CARDIOLOGY | Facility: CLINIC | Age: 69
End: 2019-11-21
Payer: MEDICARE

## 2019-11-21 VITALS
OXYGEN SATURATION: 92 % | DIASTOLIC BLOOD PRESSURE: 73 MMHG | BODY MASS INDEX: 25.99 KG/M2 | SYSTOLIC BLOOD PRESSURE: 124 MMHG | WEIGHT: 152.25 LBS | HEIGHT: 64 IN | HEART RATE: 65 BPM

## 2019-11-21 DIAGNOSIS — E78.00 PURE HYPERCHOLESTEROLEMIA: ICD-10-CM

## 2019-11-21 DIAGNOSIS — I25.110 CORONARY ARTERY DISEASE INVOLVING NATIVE CORONARY ARTERY OF NATIVE HEART WITH UNSTABLE ANGINA PECTORIS: ICD-10-CM

## 2019-11-21 DIAGNOSIS — I67.9 CVD (CEREBROVASCULAR DISEASE): ICD-10-CM

## 2019-11-21 DIAGNOSIS — I10 ESSENTIAL HYPERTENSION: ICD-10-CM

## 2019-11-21 DIAGNOSIS — E03.9 ACQUIRED HYPOTHYROIDISM: ICD-10-CM

## 2019-11-21 DIAGNOSIS — Z95.1 S/P CABG (CORONARY ARTERY BYPASS GRAFT): ICD-10-CM

## 2019-11-21 DIAGNOSIS — Z95.1 HX OF CABG: ICD-10-CM

## 2019-11-21 DIAGNOSIS — I25.118 CORONARY ARTERY DISEASE OF NATIVE ARTERY OF NATIVE HEART WITH STABLE ANGINA PECTORIS: Primary | ICD-10-CM

## 2019-11-21 DIAGNOSIS — J84.9 INTERSTITIAL LUNG DISEASE: ICD-10-CM

## 2019-11-21 PROCEDURE — 1101F PT FALLS ASSESS-DOCD LE1/YR: CPT | Mod: HCNC,CPTII,S$GLB, | Performed by: INTERNAL MEDICINE

## 2019-11-21 PROCEDURE — 93000 EKG 12-LEAD: ICD-10-PCS | Mod: HCNC,S$GLB,, | Performed by: INTERNAL MEDICINE

## 2019-11-21 PROCEDURE — 3074F SYST BP LT 130 MM HG: CPT | Mod: HCNC,CPTII,S$GLB, | Performed by: INTERNAL MEDICINE

## 2019-11-21 PROCEDURE — 3078F DIAST BP <80 MM HG: CPT | Mod: HCNC,CPTII,S$GLB, | Performed by: INTERNAL MEDICINE

## 2019-11-21 PROCEDURE — 99214 OFFICE O/P EST MOD 30 MIN: CPT | Mod: 25,HCNC,S$GLB, | Performed by: INTERNAL MEDICINE

## 2019-11-21 PROCEDURE — 3074F PR MOST RECENT SYSTOLIC BLOOD PRESSURE < 130 MM HG: ICD-10-PCS | Mod: HCNC,CPTII,S$GLB, | Performed by: INTERNAL MEDICINE

## 2019-11-21 PROCEDURE — 3078F PR MOST RECENT DIASTOLIC BLOOD PRESSURE < 80 MM HG: ICD-10-PCS | Mod: HCNC,CPTII,S$GLB, | Performed by: INTERNAL MEDICINE

## 2019-11-21 PROCEDURE — 1159F PR MEDICATION LIST DOCUMENTED IN MEDICAL RECORD: ICD-10-PCS | Mod: HCNC,S$GLB,, | Performed by: INTERNAL MEDICINE

## 2019-11-21 PROCEDURE — 1101F PR PT FALLS ASSESS DOC 0-1 FALLS W/OUT INJ PAST YR: ICD-10-PCS | Mod: HCNC,CPTII,S$GLB, | Performed by: INTERNAL MEDICINE

## 2019-11-21 PROCEDURE — 99499 RISK ADDL DX/OHS AUDIT: ICD-10-PCS | Mod: HCNC,S$GLB,, | Performed by: INTERNAL MEDICINE

## 2019-11-21 PROCEDURE — 99999 PR PBB SHADOW E&M-EST. PATIENT-LVL III: ICD-10-PCS | Mod: PBBFAC,HCNC,, | Performed by: INTERNAL MEDICINE

## 2019-11-21 PROCEDURE — 99999 PR PBB SHADOW E&M-EST. PATIENT-LVL III: CPT | Mod: PBBFAC,HCNC,, | Performed by: INTERNAL MEDICINE

## 2019-11-21 PROCEDURE — 99499 UNLISTED E&M SERVICE: CPT | Mod: HCNC,S$GLB,, | Performed by: INTERNAL MEDICINE

## 2019-11-21 PROCEDURE — 99214 PR OFFICE/OUTPT VISIT, EST, LEVL IV, 30-39 MIN: ICD-10-PCS | Mod: 25,HCNC,S$GLB, | Performed by: INTERNAL MEDICINE

## 2019-11-21 PROCEDURE — 93000 ELECTROCARDIOGRAM COMPLETE: CPT | Mod: HCNC,S$GLB,, | Performed by: INTERNAL MEDICINE

## 2019-11-21 PROCEDURE — 1159F MED LIST DOCD IN RCRD: CPT | Mod: HCNC,S$GLB,, | Performed by: INTERNAL MEDICINE

## 2019-11-21 RX ORDER — NITROGLYCERIN 0.4 MG/1
0.4 TABLET SUBLINGUAL EVERY 5 MIN PRN
Qty: 25 TABLET | Refills: 12 | Status: SHIPPED | OUTPATIENT
Start: 2019-11-21 | End: 2020-01-08 | Stop reason: SDUPTHER

## 2019-11-21 RX ORDER — LEVOTHYROXINE SODIUM 100 UG/1
TABLET ORAL
COMMUNITY
Start: 2019-09-17 | End: 2019-11-21 | Stop reason: SDUPTHER

## 2019-11-21 RX ORDER — LEVOTHYROXINE SODIUM 100 UG/1
100 TABLET ORAL
Qty: 90 TABLET | Refills: 3 | Status: SHIPPED | OUTPATIENT
Start: 2019-11-21 | End: 2021-01-11 | Stop reason: SDUPTHER

## 2019-11-22 DIAGNOSIS — E78.5 HYPERLIPIDEMIA, UNSPECIFIED HYPERLIPIDEMIA TYPE: ICD-10-CM

## 2019-11-22 RX ORDER — ATORVASTATIN CALCIUM 80 MG/1
80 TABLET, FILM COATED ORAL DAILY
Qty: 90 TABLET | Refills: 3 | Status: SHIPPED | OUTPATIENT
Start: 2019-11-22 | End: 2020-12-08 | Stop reason: SDUPTHER

## 2019-12-19 ENCOUNTER — TELEPHONE (OUTPATIENT)
Dept: FAMILY MEDICINE | Facility: CLINIC | Age: 69
End: 2019-12-19

## 2020-01-08 DIAGNOSIS — I25.110 CORONARY ARTERY DISEASE INVOLVING NATIVE CORONARY ARTERY OF NATIVE HEART WITH UNSTABLE ANGINA PECTORIS: ICD-10-CM

## 2020-01-08 RX ORDER — NITROGLYCERIN 0.4 MG/1
0.4 TABLET SUBLINGUAL EVERY 5 MIN PRN
Qty: 25 TABLET | Refills: 12 | Status: SHIPPED | OUTPATIENT
Start: 2020-01-08 | End: 2020-11-04 | Stop reason: SDUPTHER

## 2020-01-15 ENCOUNTER — PES CALL (OUTPATIENT)
Dept: ADMINISTRATIVE | Facility: CLINIC | Age: 70
End: 2020-01-15

## 2020-01-24 ENCOUNTER — TELEPHONE (OUTPATIENT)
Dept: FAMILY MEDICINE | Facility: CLINIC | Age: 70
End: 2020-01-24

## 2020-01-24 ENCOUNTER — OFFICE VISIT (OUTPATIENT)
Dept: FAMILY MEDICINE | Facility: CLINIC | Age: 70
End: 2020-01-24
Payer: MEDICARE

## 2020-01-24 VITALS
WEIGHT: 149.94 LBS | DIASTOLIC BLOOD PRESSURE: 78 MMHG | HEIGHT: 64 IN | OXYGEN SATURATION: 95 % | SYSTOLIC BLOOD PRESSURE: 140 MMHG | BODY MASS INDEX: 25.6 KG/M2 | HEART RATE: 78 BPM

## 2020-01-24 DIAGNOSIS — L30.9 DERMATITIS: Primary | ICD-10-CM

## 2020-01-24 PROCEDURE — 99214 OFFICE O/P EST MOD 30 MIN: CPT | Mod: HCNC,S$GLB,, | Performed by: FAMILY MEDICINE

## 2020-01-24 PROCEDURE — 99999 PR PBB SHADOW E&M-EST. PATIENT-LVL III: ICD-10-PCS | Mod: PBBFAC,HCNC,, | Performed by: FAMILY MEDICINE

## 2020-01-24 PROCEDURE — 1126F AMNT PAIN NOTED NONE PRSNT: CPT | Mod: HCNC,S$GLB,, | Performed by: FAMILY MEDICINE

## 2020-01-24 PROCEDURE — 1101F PR PT FALLS ASSESS DOC 0-1 FALLS W/OUT INJ PAST YR: ICD-10-PCS | Mod: HCNC,CPTII,S$GLB, | Performed by: FAMILY MEDICINE

## 2020-01-24 PROCEDURE — 99999 PR PBB SHADOW E&M-EST. PATIENT-LVL III: CPT | Mod: PBBFAC,HCNC,, | Performed by: FAMILY MEDICINE

## 2020-01-24 PROCEDURE — 1159F MED LIST DOCD IN RCRD: CPT | Mod: HCNC,S$GLB,, | Performed by: FAMILY MEDICINE

## 2020-01-24 PROCEDURE — 3078F PR MOST RECENT DIASTOLIC BLOOD PRESSURE < 80 MM HG: ICD-10-PCS | Mod: HCNC,CPTII,S$GLB, | Performed by: FAMILY MEDICINE

## 2020-01-24 PROCEDURE — 1101F PT FALLS ASSESS-DOCD LE1/YR: CPT | Mod: HCNC,CPTII,S$GLB, | Performed by: FAMILY MEDICINE

## 2020-01-24 PROCEDURE — 1159F PR MEDICATION LIST DOCUMENTED IN MEDICAL RECORD: ICD-10-PCS | Mod: HCNC,S$GLB,, | Performed by: FAMILY MEDICINE

## 2020-01-24 PROCEDURE — 3077F PR MOST RECENT SYSTOLIC BLOOD PRESSURE >= 140 MM HG: ICD-10-PCS | Mod: HCNC,CPTII,S$GLB, | Performed by: FAMILY MEDICINE

## 2020-01-24 PROCEDURE — 1126F PR PAIN SEVERITY QUANTIFIED, NO PAIN PRESENT: ICD-10-PCS | Mod: HCNC,S$GLB,, | Performed by: FAMILY MEDICINE

## 2020-01-24 PROCEDURE — 3078F DIAST BP <80 MM HG: CPT | Mod: HCNC,CPTII,S$GLB, | Performed by: FAMILY MEDICINE

## 2020-01-24 PROCEDURE — 3077F SYST BP >= 140 MM HG: CPT | Mod: HCNC,CPTII,S$GLB, | Performed by: FAMILY MEDICINE

## 2020-01-24 PROCEDURE — 99214 PR OFFICE/OUTPT VISIT, EST, LEVL IV, 30-39 MIN: ICD-10-PCS | Mod: HCNC,S$GLB,, | Performed by: FAMILY MEDICINE

## 2020-01-24 RX ORDER — BLOOD SUGAR DIAGNOSTIC
STRIP MISCELLANEOUS
COMMUNITY
Start: 2019-12-17

## 2020-01-24 RX ORDER — TRIAMCINOLONE ACETONIDE 0.25 MG/G
CREAM TOPICAL 2 TIMES DAILY
Qty: 15 G | Refills: 0 | Status: SHIPPED | OUTPATIENT
Start: 2020-01-24 | End: 2020-06-12

## 2020-01-24 RX ORDER — PREDNISONE 5 MG/1
5 TABLET ORAL DAILY
Qty: 3 TABLET | Refills: 0 | Status: SHIPPED | OUTPATIENT
Start: 2020-01-24 | End: 2020-01-27

## 2020-01-24 NOTE — TELEPHONE ENCOUNTER
Called and spoke w/ patient. Scheduled appt today at 4:00 for rash on neck.        ----- Message from Marcia Maddox sent at 1/24/2020  8:53 AM CST -----  Contact: [phone]/self  Type:  Same Day Appointment Request    Caller is requesting a same day appointment.  Caller declined first available appointment listed below.    Name of Caller:self  When is the first available appointment?03/18/2020  Symptoms: Rash on the back of leg   Best Call Back Number:[phone]  Additional Information:N/A

## 2020-01-25 NOTE — PROGRESS NOTES
Subjective:       Patient ID: Jose Antonio Allen is a 69 y.o. male.    Chief Complaint: Rash (on neck x 1 week)    69 years old male came to the clinic with neck rash for the last week.  The rash is associated with itching.  No fever or chills.      Review of Systems   Constitutional: Negative.    HENT: Negative.    Eyes: Negative.    Respiratory: Negative.    Cardiovascular: Negative.    Gastrointestinal: Negative.    Genitourinary: Negative.    Musculoskeletal: Negative.    Skin: Positive for rash.   Neurological: Negative.    Psychiatric/Behavioral: Negative.        Objective:      Physical Exam   Constitutional: He is oriented to person, place, and time. He appears well-developed and well-nourished. No distress.   HENT:   Head: Normocephalic and atraumatic.   Right Ear: External ear normal.   Left Ear: External ear normal.   Nose: Nose normal.   Mouth/Throat: Oropharynx is clear and moist. No oropharyngeal exudate.   Eyes: Pupils are equal, round, and reactive to light. Conjunctivae and EOM are normal. Right eye exhibits no discharge. Left eye exhibits no discharge. No scleral icterus.   Neck: Normal range of motion. Neck supple. No JVD present. No tracheal deviation present. No thyromegaly present.   Cardiovascular: Normal rate, regular rhythm, normal heart sounds and intact distal pulses. Exam reveals no gallop and no friction rub.   No murmur heard.  Pulmonary/Chest: Effort normal and breath sounds normal. No stridor. No respiratory distress. He has no wheezes. He has no rales. He exhibits no tenderness.   Abdominal: Soft. Bowel sounds are normal. He exhibits no distension and no mass. There is no tenderness. There is no rebound and no guarding.   Musculoskeletal: Normal range of motion. He exhibits no edema or tenderness.   Lymphadenopathy:     He has no cervical adenopathy.   Neurological: He is alert and oriented to person, place, and time. He has normal reflexes. He displays normal reflexes. No cranial nerve  deficit. He exhibits normal muscle tone. Coordination normal.   Skin: Skin is warm and dry. Rash noted. Rash is maculopapular (Posterior neck and upper back). He is not diaphoretic. No erythema. No pallor.   Psychiatric: He has a normal mood and affect. His behavior is normal. Judgment and thought content normal.   Nursing note and vitals reviewed.      Assessment:       1. Dermatitis        Plan:         Jose Antonio was seen today for rash.    Diagnoses and all orders for this visit:    Dermatitis  -     triamcinolone acetonide 0.025% (KENALOG) 0.025 % cream; Apply topically 2 (two) times daily.  -     predniSONE (DELTASONE) 5 MG tablet; Take 1 tablet (5 mg total) by mouth once daily. for 3 days

## 2020-02-12 NOTE — PROGRESS NOTES
Subjective:      Patient ID: Jose Antonio Allen is a 69 y.o. male.    Chief Complaint: Follow-up (CAD)    HPI:   Exercises at the gym while wearing oxygen.    Lifts weights.   Walks on treadmill.    Review of Systems   Cardiovascular: Positive for chest pain (mild chest discomfort only with unusual physical exertion.  This is a chronic and stable symptom) and dyspnea on exertion (chronic,stable). Negative for claudication, irregular heartbeat, leg swelling, near-syncope, orthopnea, palpitations and syncope (No syncope since stopping the furosemide).      Sugars have been OK    Past Medical History:   Diagnosis Date    Angina pectoris     CKD (chronic kidney disease)     Colon polyps 09/14/2018    Coronary artery disease     Diabetes mellitus     Diabetes mellitus, type 2     GERD (gastroesophageal reflux disease)     Hyperlipidemia     Hypertension     S/P CABG (coronary artery bypass graft) 1996    Thyroid disease         Past Surgical History:   Procedure Laterality Date    CATARACT EXTRACTION  3YRS    CORONARY ARTERY BYPASS GRAFT  1996    EYELID SURGERY  03/2012    VEIN BYPASS SURGERY  1996       Family History   Problem Relation Age of Onset    Diabetes Mother     Diabetes Father     Blindness Neg Hx     Glaucoma Neg Hx     Macular degeneration Neg Hx     Retinal detachment Neg Hx     Strabismus Neg Hx     Stroke Neg Hx     Thyroid disease Neg Hx     Cancer Neg Hx     Cataracts Neg Hx     Hypertension Neg Hx        Social History     Socioeconomic History    Marital status:      Spouse name: Not on file    Number of children: Not on file    Years of education: Not on file    Highest education level: Not on file   Occupational History    Not on file   Social Needs    Financial resource strain: Not on file    Food insecurity:     Worry: Not on file     Inability: Not on file    Transportation needs:     Medical: Not on file     Non-medical: Not on file   Tobacco Use     Outpatient Medications Marked as Taking for the 2/12/20 encounter (Refill) with Andres Lr MD   Medication Sig Dispense Refill   • methylpheniDATE (METADATE ER) 20 MG ER tablet Take 1 tablet by mouth every morning. 30 tablet 0  Last filled 12/25/19   • ibuprofen (MOTRIN) 800 MG tablet Take 1 tablet by mouth 3 times daily as needed for Pain. 60 tablet 1  Last filled 8/13/19       Last Visit: 10/02/19  Next Visit: 4/6/20    Ok to refill?   Smoking status: Never Smoker    Smokeless tobacco: Never Used   Substance and Sexual Activity    Alcohol use: Yes    Drug use: No    Sexual activity: Not on file   Lifestyle    Physical activity:     Days per week: Not on file     Minutes per session: Not on file    Stress: Not on file   Relationships    Social connections:     Talks on phone: Not on file     Gets together: Not on file     Attends Jewish service: Not on file     Active member of club or organization: Not on file     Attends meetings of clubs or organizations: Not on file     Relationship status: Not on file   Other Topics Concern    Not on file   Social History Narrative    Not on file       Current Outpatient Medications on File Prior to Visit   Medication Sig Dispense Refill    amLODIPine (NORVASC) 10 MG tablet Take 10 mg by mouth once daily.      aspirin 81 MG Chew 81 mg once daily.       atorvastatin (LIPITOR) 80 MG tablet Take 1 tablet (80 mg total) by mouth once daily. 90 tablet 3    b complex vitamins tablet Take 1 tablet by mouth once daily.      clotrimazole-betamethasone 1-0.05% (LOTRISONE) cream Apply topically 2 (two) times daily. 45 g 0    ESBRIET 267 mg Tab 3 (three) times daily.       finasteride (PROSCAR) 5 mg tablet Take 5 mg by mouth once daily.  3    insulin degludec (TRESIBA FLEXTOUCH U-100) 100 unit/mL (3 mL) InPn Inject 34 Units into the skin once daily.       LUBRICANT EYE DROPS 0.5 % Dpet       methylcellulose, laxative, (CITRUCEL) 500 mg Tab Take by mouth.      metoprolol succinate (TOPROL-XL) 100 MG 24 hr tablet TAKE 1 TABLET ONE TIME DAILY 90 tablet 3    NOVOLOG 100 unit/mL injection Sliding scale      omeprazole (PRILOSEC) 40 MG capsule Take 40 mg by mouth every morning.       tamsulosin (FLOMAX) 0.4 mg Cp24 0.4 mg once daily.       vitamin D (VITAMIN D3) 1000 units Tab Take 1,000 Units by mouth once daily.      [DISCONTINUED] levothyroxine (SYNTHROID) 100 MCG tablet       [DISCONTINUED]  "nitroGLYCERIN (NITROSTAT) 0.4 MG SL tablet Place 1 tablet (0.4 mg total) under the tongue every 5 (five) minutes as needed for Chest pain. 25 tablet 12    suvorexant (BELSOMRA) 10 mg Tab Take 10 mg by mouth nightly as needed (caution on sedative effects). 30 tablet 0    [DISCONTINUED] levothyroxine (SYNTHROID) 75 MCG tablet Take 1 tablet (75 mcg total) by mouth once daily. 90 tablet 3     No current facility-administered medications on file prior to visit.        Review of patient's allergies indicates:  No Known Allergies  Objective:     Vitals:    11/21/19 0815   BP: 124/73   BP Location: Left arm   Patient Position: Sitting   BP Method: Large (Automatic)   Pulse: 65   SpO2: (!) 92%   Weight: 69 kg (152 lb 3.6 oz)   Height: 5' 4" (1.626 m)        Physical Exam   Constitutional: He is oriented to person, place, and time. He appears well-developed and well-nourished. No distress.   Eyes: No scleral icterus.   Neck: No JVD present. Carotid bruit is not present.       Cardiovascular: Regular rhythm. Exam reveals no gallop and no friction rub.   Murmur (II/VI systolic) heard.  Pulmonary/Chest: Effort normal. No respiratory distress. He has rales (bibasilar Velcro crackles).   Musculoskeletal: He exhibits no edema.   Neurological: He is alert and oriented to person, place, and time.   Skin: Skin is warm and dry. He is not diaphoretic.   Psychiatric: He has a normal mood and affect. His behavior is normal. Judgment and thought content normal.   Vitals reviewed.     ECG: NSR, WNL    Oxygen saturation on RA for a minute 92    11/18 lab from Dr Howard: HgbA1C 7.5, LDL 91, HDL 57, creat 1.5    Lab 6/19 form Dr Cary:  Creat 1.38, BUN 18    Note echocardiogram 3/19 showed normal LVEF 55% and mild diastolic dysfunction, sclerotic aortic valve    Note Cardiolite stress test 7/18 WNL  Assessment:     1. Coronary artery disease of native artery of native heart with stable angina pectoris    2. S/P CABG (coronary artery " bypass graft)    3. Essential hypertension    4. Pure hypercholesterolemia    5. Interstitial lung disease    6. CVD (cerebrovascular disease)    7. Acquired hypothyroidism    8. Hx of CABG    9. Coronary artery disease involving native coronary artery of native heart with unstable angina pectoris      Plan:   Jose Antonio was seen today for follow-up.    Diagnoses and all orders for this visit:    Coronary artery disease of native artery of native heart with stable angina pectoris  -     IN OFFICE EKG 12-LEAD (to [place])    S/P CABG (coronary artery bypass graft)  -     IN OFFICE EKG 12-LEAD (to [place])    Essential hypertension    Pure hypercholesterolemia    Interstitial lung disease    CVD (cerebrovascular disease)    Acquired hypothyroidism  -     levothyroxine (SYNTHROID) 100 MCG tablet; Take 1 tablet (100 mcg total) by mouth before breakfast.    Hx of CABG  -     IN OFFICE EKG 12-LEAD (to [place])    Coronary artery disease involving native coronary artery of native heart with unstable angina pectoris  -     nitroGLYCERIN (NITROSTAT) 0.4 MG SL tablet; Place 1 tablet (0.4 mg total) under the tongue every 5 (five) minutes as needed for Chest pain.    Same meds     CKD is improved off furosemide    Angina is stable    Most of pt's shortness of breath and chest tightness with exertion is due to interstitial lung disease    F/u with nephrologist, Dr Cary    F/u with endocrinologist, Dr Howard    F/u with PCP, Dr Weber    F/u with pulmonologist, Dr Barahona    Follow up in about 6 months (around 5/21/2020).

## 2020-03-16 ENCOUNTER — TELEPHONE (OUTPATIENT)
Dept: FAMILY MEDICINE | Facility: CLINIC | Age: 70
End: 2020-03-16

## 2020-03-16 NOTE — TELEPHONE ENCOUNTER
Patient was notified of the appointment scheduled, we rescheduled as per Dr. Kc because of travel hx. Also spoke to his son Isaias about the appointment being rescheduled. Isaias (verbalized an understanding.

## 2020-03-16 NOTE — TELEPHONE ENCOUNTER
----- Message from Chris Shell sent at 3/16/2020 12:53 PM CDT -----  Contact: [phone] / Isaias , son   Patient's son would like to speak with your office states he is confused about the patient's appointment with your office. Please Advise.

## 2020-03-18 ENCOUNTER — NURSE TRIAGE (OUTPATIENT)
Dept: ADMINISTRATIVE | Facility: CLINIC | Age: 70
End: 2020-03-18

## 2020-03-18 NOTE — TELEPHONE ENCOUNTER
Pt states he was in the [facility] from 2-3-20 thru 3-13-20, he states he now has mild SOB, muscle aches, cough, denies fever, gave him information for [facility] and advised him to call back with any needs or concerns, caller agreed

## 2020-03-18 NOTE — TELEPHONE ENCOUNTER
I tried to contact the patient with no success.    Please check if he was evaluated by [facility].

## 2020-04-09 ENCOUNTER — TELEPHONE (OUTPATIENT)
Dept: FAMILY MEDICINE | Facility: CLINIC | Age: 70
End: 2020-04-09

## 2020-04-09 ENCOUNTER — PATIENT MESSAGE (OUTPATIENT)
Dept: FAMILY MEDICINE | Facility: CLINIC | Age: 70
End: 2020-04-09

## 2020-04-09 NOTE — TELEPHONE ENCOUNTER
Spoke with patient and walked him through the process of  Downloading the mychart for his up coming appointment. Patient voiced understanding.

## 2020-04-09 NOTE — TELEPHONE ENCOUNTER
----- Message from Nita Maxwell sent at 4/9/2020  9:19 AM CDT -----  Contact: [phone]-self  Patient is returning your call.

## 2020-04-09 NOTE — TELEPHONE ENCOUNTER
Spoke with patient about up coming appointment. Patient voiced understanding.    ----- Message from Nita Maxwell sent at 4/9/2020  9:19 AM   CDT -----  Contact: [phone]-self  Patient is returning your call.

## 2020-04-13 ENCOUNTER — OFFICE VISIT (OUTPATIENT)
Dept: FAMILY MEDICINE | Facility: CLINIC | Age: 70
End: 2020-04-13
Payer: MEDICARE

## 2020-04-13 VITALS — DIASTOLIC BLOOD PRESSURE: 60 MMHG | SYSTOLIC BLOOD PRESSURE: 106 MMHG

## 2020-04-13 DIAGNOSIS — I10 ESSENTIAL HYPERTENSION: ICD-10-CM

## 2020-04-13 DIAGNOSIS — E11.65 TYPE 2 DIABETES MELLITUS WITH HYPERGLYCEMIA, WITH LONG-TERM CURRENT USE OF INSULIN: ICD-10-CM

## 2020-04-13 DIAGNOSIS — Z79.4 TYPE 2 DIABETES MELLITUS WITH HYPERGLYCEMIA, WITH LONG-TERM CURRENT USE OF INSULIN: ICD-10-CM

## 2020-04-13 DIAGNOSIS — N18.30 STAGE 3 CHRONIC KIDNEY DISEASE: Primary | ICD-10-CM

## 2020-04-13 DIAGNOSIS — R63.0 LOSS OF APPETITE: ICD-10-CM

## 2020-04-13 DIAGNOSIS — M15.9 PRIMARY OSTEOARTHRITIS INVOLVING MULTIPLE JOINTS: ICD-10-CM

## 2020-04-13 PROCEDURE — 99499 RISK ADDL DX/OHS AUDIT: ICD-10-PCS | Mod: 95,,, | Performed by: FAMILY MEDICINE

## 2020-04-13 PROCEDURE — 99499 UNLISTED E&M SERVICE: CPT | Mod: 95,,, | Performed by: FAMILY MEDICINE

## 2020-04-13 PROCEDURE — 99442 PR PHYSICIAN TELEPHONE EVALUATION 11-20 MIN: ICD-10-PCS | Mod: HCNC,95,, | Performed by: FAMILY MEDICINE

## 2020-04-13 PROCEDURE — 99442 PR PHYSICIAN TELEPHONE EVALUATION 11-20 MIN: CPT | Mod: HCNC,95,, | Performed by: FAMILY MEDICINE

## 2020-04-13 RX ORDER — CELECOXIB 200 MG/1
200 CAPSULE ORAL DAILY PRN
Qty: 30 CAPSULE | Refills: 0 | Status: SHIPPED | OUTPATIENT
Start: 2020-04-13 | End: 2020-05-13

## 2020-04-13 RX ORDER — MIRTAZAPINE 7.5 MG/1
7.5 TABLET, FILM COATED ORAL NIGHTLY
Qty: 30 TABLET | Refills: 11 | Status: SHIPPED | OUTPATIENT
Start: 2020-04-13 | End: 2021-05-30 | Stop reason: SDUPTHER

## 2020-04-13 NOTE — PROGRESS NOTES
The patient location is: home   The chief complaint leading to consultation is: leg and shoulder pain  Visit type: Virtual visit with synchronous audio and video  Total time spent with patient: 20 min  Each patient to whom he or she provides medical services by telemedicine is:  (1) informed of the relationship between the physician and patient and the respective role of any other health care provider with respect to management of the patient; and (2) notified that he or she may decline to receive medical services by telemedicine and may withdraw from such care at any time.    Notes:  69 years old male who was evaluated by telemedicine.  Patient with left shoulder and right thigh pain for the last couple of weeks.  The pain is 4/10 of intensity on and off aggravated with activity better with rest.  Patient with good range motion.  The pain is limiting the activities.  Patient is not physically active at home.  Patient also reporting losing of appetite.  He is currently on quarantine.  Blood pressure today stable.  No chest pain, palpitation, orthopnea or PND.  Blood sugar in the 150s.  No  polyphagia.  Patient with follow-up with endocrinologist next month.  Patient with decreased kidney function but stable in comparison with previous reports.  Patient was oriented about possible side effects of nonsteroidal anti-inflammatory drugs.  Patient is willing to try it to help with the pain.  Patient was treated with steroids before with significant improvement.  Patient is concerned about elevated blood sugar with the steroid.  Answers for HPI/ROS submitted by the patient on 4/11/2020   activity change: No  unexpected weight change: No  neck pain: No  hearing loss: No  rhinorrhea: No  trouble swallowing: No  eye discharge: No  visual disturbance: No  chest tightness: No  wheezing: No  chest pain: No  palpitations: No  blood in stool: No  constipation: No  vomiting: No  diarrhea: No  polydipsia: Yes  polyuria:  Yes  difficulty urinating: No  urgency: Yes  hematuria: No  joint swelling: No  arthralgias: Yes  headaches: No  weakness: Yes  confusion: No  dysphoric mood: No    Jose Antonio was seen today for leg pain and shoulder pain.    Diagnoses and all orders for this visit:    Stage 3 chronic kidney disease    Essential hypertension    Primary osteoarthritis involving multiple joints  -     celecoxib (CELEBREX) 200 MG capsule; Take 1 capsule (200 mg total) by mouth daily as needed for Pain.    Loss of appetite  -     mirtazapine (REMERON) 7.5 MG Tab; Take 1 tablet (7.5 mg total) by mouth every evening.    Type 2 diabetes mellitus with hyperglycemia, with long-term current use of insulin    Continue monitoring blood pressure at home, low sodium diet.  Continue monitoring blood sugar at home,ADA diet.  Follow-up with endocrinology next month.    Physical activity as tolerated.

## 2020-04-13 NOTE — PATIENT INSTRUCTIONS

## 2020-04-20 ENCOUNTER — TELEPHONE (OUTPATIENT)
Dept: FAMILY MEDICINE | Facility: CLINIC | Age: 70
End: 2020-04-20

## 2020-04-20 DIAGNOSIS — M15.9 PRIMARY OSTEOARTHRITIS INVOLVING MULTIPLE JOINTS: Primary | ICD-10-CM

## 2020-04-20 RX ORDER — TRAMADOL HYDROCHLORIDE 50 MG/1
50 TABLET ORAL EVERY 12 HOURS PRN
Qty: 40 TABLET | Refills: 0 | Status: SHIPPED | OUTPATIENT
Start: 2020-04-20 | End: 2020-12-08

## 2020-04-20 NOTE — TELEPHONE ENCOUNTER
----- Message from Janis Echeverria sent at 4/20/2020  9:22 AM CDT -----  Contact: Self [phone]  Patient is requesting to talk to nurse in regards to the medications that were prescribed last week are not helping.

## 2020-04-20 NOTE — TELEPHONE ENCOUNTER
Pt reported that the Celebrex is not helping with joint pain, he has been taking it for a week. Please advise, thank you.

## 2020-05-08 ENCOUNTER — LAB VISIT (OUTPATIENT)
Dept: PRIMARY CARE CLINIC | Facility: CLINIC | Age: 70
End: 2020-05-08
Payer: MEDICARE

## 2020-05-08 DIAGNOSIS — R05.9 COUGH: Primary | ICD-10-CM

## 2020-05-08 PROCEDURE — U0002 COVID-19 LAB TEST NON-CDC: HCPCS | Mod: HCNC

## 2020-05-09 LAB — SARS-COV-2 RNA RESP QL NAA+PROBE: NOT DETECTED

## 2020-05-26 ENCOUNTER — PATIENT OUTREACH (OUTPATIENT)
Dept: ADMINISTRATIVE | Facility: OTHER | Age: 70
End: 2020-05-26

## 2020-05-28 ENCOUNTER — OFFICE VISIT (OUTPATIENT)
Dept: CARDIOLOGY | Facility: CLINIC | Age: 70
End: 2020-05-28
Payer: MEDICARE

## 2020-05-28 VITALS
SYSTOLIC BLOOD PRESSURE: 116 MMHG | HEIGHT: 64 IN | BODY MASS INDEX: 24.41 KG/M2 | WEIGHT: 143 LBS | DIASTOLIC BLOOD PRESSURE: 70 MMHG | HEART RATE: 86 BPM

## 2020-05-28 DIAGNOSIS — R09.89 RIGHT CAROTID BRUIT: ICD-10-CM

## 2020-05-28 DIAGNOSIS — E78.00 PURE HYPERCHOLESTEROLEMIA: ICD-10-CM

## 2020-05-28 DIAGNOSIS — I25.118 CORONARY ARTERY DISEASE OF NATIVE ARTERY OF NATIVE HEART WITH STABLE ANGINA PECTORIS: ICD-10-CM

## 2020-05-28 DIAGNOSIS — M25.511 CHRONIC PAIN OF BOTH SHOULDERS: ICD-10-CM

## 2020-05-28 DIAGNOSIS — I73.9 PAD (PERIPHERAL ARTERY DISEASE): ICD-10-CM

## 2020-05-28 DIAGNOSIS — E03.9 ACQUIRED HYPOTHYROIDISM: ICD-10-CM

## 2020-05-28 DIAGNOSIS — G89.29 CHRONIC PAIN OF BOTH SHOULDERS: ICD-10-CM

## 2020-05-28 DIAGNOSIS — I10 HTN (HYPERTENSION): ICD-10-CM

## 2020-05-28 DIAGNOSIS — M25.512 CHRONIC PAIN OF BOTH SHOULDERS: ICD-10-CM

## 2020-05-28 DIAGNOSIS — N18.30 STAGE 3 CHRONIC KIDNEY DISEASE: ICD-10-CM

## 2020-05-28 DIAGNOSIS — Z95.1 S/P CABG (CORONARY ARTERY BYPASS GRAFT): Primary | ICD-10-CM

## 2020-05-28 DIAGNOSIS — I10 ESSENTIAL HYPERTENSION: ICD-10-CM

## 2020-05-28 PROCEDURE — 99999 PR PBB SHADOW E&M-EST. PATIENT-LVL III: ICD-10-PCS | Mod: PBBFAC,HCNC,, | Performed by: INTERNAL MEDICINE

## 2020-05-28 PROCEDURE — 3078F DIAST BP <80 MM HG: CPT | Mod: HCNC,CPTII,S$GLB, | Performed by: INTERNAL MEDICINE

## 2020-05-28 PROCEDURE — 3074F PR MOST RECENT SYSTOLIC BLOOD PRESSURE < 130 MM HG: ICD-10-PCS | Mod: HCNC,CPTII,S$GLB, | Performed by: INTERNAL MEDICINE

## 2020-05-28 PROCEDURE — 1101F PR PT FALLS ASSESS DOC 0-1 FALLS W/OUT INJ PAST YR: ICD-10-PCS | Mod: HCNC,CPTII,S$GLB, | Performed by: INTERNAL MEDICINE

## 2020-05-28 PROCEDURE — 99214 PR OFFICE/OUTPT VISIT, EST, LEVL IV, 30-39 MIN: ICD-10-PCS | Mod: HCNC,25,S$GLB, | Performed by: INTERNAL MEDICINE

## 2020-05-28 PROCEDURE — 99214 OFFICE O/P EST MOD 30 MIN: CPT | Mod: HCNC,25,S$GLB, | Performed by: INTERNAL MEDICINE

## 2020-05-28 PROCEDURE — 3078F PR MOST RECENT DIASTOLIC BLOOD PRESSURE < 80 MM HG: ICD-10-PCS | Mod: HCNC,CPTII,S$GLB, | Performed by: INTERNAL MEDICINE

## 2020-05-28 PROCEDURE — 93000 ELECTROCARDIOGRAM COMPLETE: CPT | Mod: HCNC,S$GLB,, | Performed by: INTERNAL MEDICINE

## 2020-05-28 PROCEDURE — 1101F PT FALLS ASSESS-DOCD LE1/YR: CPT | Mod: HCNC,CPTII,S$GLB, | Performed by: INTERNAL MEDICINE

## 2020-05-28 PROCEDURE — 1159F MED LIST DOCD IN RCRD: CPT | Mod: HCNC,S$GLB,, | Performed by: INTERNAL MEDICINE

## 2020-05-28 PROCEDURE — 1126F AMNT PAIN NOTED NONE PRSNT: CPT | Mod: HCNC,S$GLB,, | Performed by: INTERNAL MEDICINE

## 2020-05-28 PROCEDURE — 99999 PR PBB SHADOW E&M-EST. PATIENT-LVL III: CPT | Mod: PBBFAC,HCNC,, | Performed by: INTERNAL MEDICINE

## 2020-05-28 PROCEDURE — 1126F PR PAIN SEVERITY QUANTIFIED, NO PAIN PRESENT: ICD-10-PCS | Mod: HCNC,S$GLB,, | Performed by: INTERNAL MEDICINE

## 2020-05-28 PROCEDURE — 1159F PR MEDICATION LIST DOCUMENTED IN MEDICAL RECORD: ICD-10-PCS | Mod: HCNC,S$GLB,, | Performed by: INTERNAL MEDICINE

## 2020-05-28 PROCEDURE — 93000 EKG 12-LEAD: ICD-10-PCS | Mod: HCNC,S$GLB,, | Performed by: INTERNAL MEDICINE

## 2020-05-28 PROCEDURE — 3074F SYST BP LT 130 MM HG: CPT | Mod: HCNC,CPTII,S$GLB, | Performed by: INTERNAL MEDICINE

## 2020-05-28 NOTE — PROGRESS NOTES
"  Subjective:      Patient ID: Jose Antonio Allen is a 70 y.o. male.    Chief Complaint: No chief complaint on file.    HPI:  For the past several days pt feels weak and nauseated in the mornings upon arising.     Pt feels great in the afternoon.    Pt travelled to the [facility] in March.    Shoulders ache in certain positions.  Pt saw an orthopedic who injected steroids in knee and shoulders.    Pt takes tramadol with relief.    MRI showed torn ligament on left shoulder    "I have a hard time raising my arms above my head"    No recent chest pains    Review of Systems   Cardiovascular: Positive for dyspnea on exertion (chronic). Negative for chest pain, claudication, irregular heartbeat, leg swelling, near-syncope, orthopnea, palpitations and syncope.      Dr Linares is pulmonologist with [facility] who has prescribed continuous oxygen for interstitial lung disease    Pt walks a mile in the park    Past Medical History:   Diagnosis Date    Angina pectoris     CKD (chronic kidney disease)     Colon polyps 09/14/2018    Coronary artery disease     Diabetes mellitus     Diabetes mellitus, type 2     GERD (gastroesophageal reflux disease)     Hyperlipidemia     Hypertension     S/P CABG (coronary artery bypass graft) 1996    Thyroid disease         Past Surgical History:   Procedure Laterality Date    CATARACT EXTRACTION  3YRS    CORONARY ARTERY BYPASS GRAFT  1996    EYELID SURGERY  03/2012    VEIN BYPASS SURGERY  1996       Family History   Problem Relation Age of Onset    Diabetes Mother     Diabetes Father     Blindness Neg Hx     Glaucoma Neg Hx     Macular degeneration Neg Hx     Retinal detachment Neg Hx     Strabismus Neg Hx     Stroke Neg Hx     Thyroid disease Neg Hx     Cancer Neg Hx     Cataracts Neg Hx     Hypertension Neg Hx        Social History     Socioeconomic History    Marital status:      Spouse name: Not on file    Number of children: Not on file    Years of education: " Not on file    Highest education level: Not on file   Occupational History    Not on file   Social Needs    Financial resource strain: Not hard at all    Food insecurity:     Worry: Never true     Inability: Never true    Transportation needs:     Medical: No     Non-medical: No   Tobacco Use    Smoking status: Never Smoker    Smokeless tobacco: Never Used   Substance and Sexual Activity    Alcohol use: Yes     Frequency: Monthly or less     Drinks per session: 1 or 2     Binge frequency: Never    Drug use: No    Sexual activity: Not on file   Lifestyle    Physical activity:     Days per week: 4 days     Minutes per session: 60 min    Stress: To some extent   Relationships    Social connections:     Talks on phone: Twice a week     Gets together: Once a week     Attends Roman [employer] service: Not on file     Active member of club or organization: No     Attends meetings of clubs or organizations: Never     Relationship status:    Other Topics Concern    Not on file   Social History Narrative    Not on file       Current Outpatient Medications on File Prior to Visit   Medication Sig Dispense Refill    ACCU-CHEK SMARTVIEW TEST STRIP Strp       amLODIPine (NORVASC) 10 MG tablet Take 10 mg by mouth once daily.      aspirin 81 MG Chew 81 mg once daily.       atorvastatin (LIPITOR) 80 MG tablet Take 1 tablet (80 mg total) by mouth once daily. 90 tablet 3    b complex vitamins tablet Take 1 tablet by mouth once daily.      ESBRIET 267 mg Tab 3 (three) times daily.       finasteride (PROSCAR) 5 mg tablet Take 5 mg by mouth once daily.  3    insulin degludec (TRESIBA FLEXTOUCH U-100) 100 unit/mL (3 mL) InPn Inject 34 Units into the skin once daily.       levothyroxine (SYNTHROID) 100 MCG tablet Take 1 tablet (100 mcg total) by mouth before breakfast. 90 tablet 3    LUBRICANT EYE DROPS 0.5 % Dpet       methylcellulose, laxative, (CITRUCEL) 500 mg Tab Take by mouth.      metoprolol succinate  "(TOPROL-XL) 100 MG 24 hr tablet TAKE 1 TABLET ONE TIME DAILY 90 tablet 3    nitroGLYCERIN (NITROSTAT) 0.4 MG SL tablet Place 1 tablet (0.4 mg total) under the tongue every 5 (five) minutes as needed for Chest pain. 25 tablet 12    NOVOLOG 100 unit/mL injection Sliding scale      tamsulosin (FLOMAX) 0.4 mg Cp24 0.4 mg once daily.       traMADoL (ULTRAM) 50 mg tablet Take 1 tablet (50 mg total) by mouth every 12 (twelve) hours as needed for Pain. 40 tablet 0    vitamin D (VITAMIN D3) 1000 units Tab Take 1,000 Units by mouth once daily.      clotrimazole-betamethasone 1-0.05% (LOTRISONE) cream Apply topically 2 (two) times daily. 45 g 0    mirtazapine (REMERON) 7.5 MG Tab Take 1 tablet (7.5 mg total) by mouth every evening. 30 tablet 11    suvorexant (BELSOMRA) 10 mg Tab Take 10 mg by mouth nightly as needed (caution on sedative effects). 30 tablet 0    triamcinolone acetonide 0.025% (KENALOG) 0.025 % cream Apply topically 2 (two) times daily. 15 g 0    [DISCONTINUED] omeprazole (PRILOSEC) 40 MG capsule Take 40 mg by mouth every morning.        No current facility-administered medications on file prior to visit.        Review of patient's allergies indicates:  No Known Allergies  Objective:     Vitals:    05/28/20 0951   BP: 116/70   Pulse: 86   Weight: 64.9 kg (143 lb)   Height: 5' 4" (1.626 m)        Physical Exam   Constitutional: He is oriented to person, place, and time. He appears well-developed and well-nourished. No distress.   Eyes: No scleral icterus.   Neck: No JVD present. Carotid bruit is not present.   Cardiovascular: Regular rhythm and normal heart sounds. Exam reveals no gallop and no friction rub.   No murmur heard.  Pulmonary/Chest: Effort normal. No respiratory distress. He has rales (crackles in bases).   Musculoskeletal: He exhibits no edema.   Neurological: He is alert and oriented to person, place, and time.   Skin: Skin is warm and dry. He is not diaphoretic.   Psychiatric: He has a " normal mood and affect. His behavior is normal. Judgment and thought content normal.   Vitals reviewed.     Wt down 7 lbs    ECG: NSR, WNL  Assessment:     1. S/P CABG (coronary artery bypass graft)    2. Right carotid bruit    3. PAD (peripheral artery disease)    4. Essential hypertension    5. Pure hypercholesterolemia    6. Coronary artery disease of native artery of native heart with stable angina pectoris    7. Stage 3 chronic kidney disease    8. Acquired hypothyroidism    9. Chronic pain of both shoulders      Plan:   Diagnoses and all orders for this visit:    S/P CABG (coronary artery bypass graft)    Right carotid bruit    PAD (peripheral artery disease)    Essential hypertension    Pure hypercholesterolemia    Coronary artery disease of native artery of native heart with stable angina pectoris    Stage 3 chronic kidney disease    Acquired hypothyroidism    Chronic pain of both shoulders  -     Ambulatory referral/consult to Rheumatology; Future       Continue OTC pepcid daily  CAD is stable  Same meds  RTC 6 months  F/u with endocrinology and nephrology and PCP.  Consult rheumatology for chronic shoulder and knee pain  Follow up in about 6 months (around 11/28/2020).

## 2020-06-12 ENCOUNTER — OFFICE VISIT (OUTPATIENT)
Dept: FAMILY MEDICINE | Facility: CLINIC | Age: 70
End: 2020-06-12
Payer: MEDICARE

## 2020-06-12 VITALS
DIASTOLIC BLOOD PRESSURE: 60 MMHG | OXYGEN SATURATION: 95 % | HEIGHT: 64 IN | SYSTOLIC BLOOD PRESSURE: 120 MMHG | HEART RATE: 78 BPM | TEMPERATURE: 98 F | BODY MASS INDEX: 25.29 KG/M2 | WEIGHT: 148.13 LBS

## 2020-06-12 DIAGNOSIS — J84.10 FIBROSIS OF LUNG: ICD-10-CM

## 2020-06-12 DIAGNOSIS — I73.9 PAD (PERIPHERAL ARTERY DISEASE): ICD-10-CM

## 2020-06-12 DIAGNOSIS — I20.9 ANGINA PECTORIS: ICD-10-CM

## 2020-06-12 DIAGNOSIS — R26.9 ABNORMALITY OF GAIT AND MOBILITY: ICD-10-CM

## 2020-06-12 DIAGNOSIS — I73.9 INTERMITTENT CLAUDICATION: ICD-10-CM

## 2020-06-12 DIAGNOSIS — I77.9 BILATERAL CAROTID ARTERY DISEASE, UNSPECIFIED TYPE: ICD-10-CM

## 2020-06-12 DIAGNOSIS — E78.5 HYPERLIPIDEMIA, UNSPECIFIED HYPERLIPIDEMIA TYPE: ICD-10-CM

## 2020-06-12 DIAGNOSIS — I25.118 CORONARY ARTERY DISEASE OF NATIVE ARTERY OF NATIVE HEART WITH STABLE ANGINA PECTORIS: ICD-10-CM

## 2020-06-12 DIAGNOSIS — E03.9 ACQUIRED HYPOTHYROIDISM: ICD-10-CM

## 2020-06-12 DIAGNOSIS — N18.30 CKD (CHRONIC KIDNEY DISEASE) STAGE 3, GFR 30-59 ML/MIN: ICD-10-CM

## 2020-06-12 DIAGNOSIS — I10 ESSENTIAL HYPERTENSION: ICD-10-CM

## 2020-06-12 DIAGNOSIS — Z99.81 DEPENDENCE ON SUPPLEMENTAL OXYGEN: ICD-10-CM

## 2020-06-12 DIAGNOSIS — E11.9 TYPE 2 DIABETES MELLITUS WITHOUT COMPLICATION, WITHOUT LONG-TERM CURRENT USE OF INSULIN: ICD-10-CM

## 2020-06-12 DIAGNOSIS — E66.3 OVERWEIGHT (BMI 25.0-29.9): ICD-10-CM

## 2020-06-12 DIAGNOSIS — Z00.00 ENCOUNTER FOR PREVENTIVE HEALTH EXAMINATION: Primary | ICD-10-CM

## 2020-06-12 PROBLEM — K92.2 GI BLEEDING: Status: ACTIVE | Noted: 2020-06-12

## 2020-06-12 PROBLEM — K57.90 DIVERTICULAR DISEASE: Status: ACTIVE | Noted: 2020-06-12

## 2020-06-12 PROCEDURE — 99499 UNLISTED E&M SERVICE: CPT | Mod: S$GLB,,, | Performed by: NURSE PRACTITIONER

## 2020-06-12 PROCEDURE — 99999 PR PBB SHADOW E&M-EST. PATIENT-LVL V: ICD-10-PCS | Mod: PBBFAC,HCNC,, | Performed by: NURSE PRACTITIONER

## 2020-06-12 PROCEDURE — 3074F PR MOST RECENT SYSTOLIC BLOOD PRESSURE < 130 MM HG: ICD-10-PCS | Mod: HCNC,CPTII,S$GLB, | Performed by: NURSE PRACTITIONER

## 2020-06-12 PROCEDURE — 99999 PR PBB SHADOW E&M-EST. PATIENT-LVL V: CPT | Mod: PBBFAC,HCNC,, | Performed by: NURSE PRACTITIONER

## 2020-06-12 PROCEDURE — G0439 PPPS, SUBSEQ VISIT: HCPCS | Mod: HCNC,S$GLB,, | Performed by: NURSE PRACTITIONER

## 2020-06-12 PROCEDURE — 3078F PR MOST RECENT DIASTOLIC BLOOD PRESSURE < 80 MM HG: ICD-10-PCS | Mod: HCNC,CPTII,S$GLB, | Performed by: NURSE PRACTITIONER

## 2020-06-12 PROCEDURE — 3074F SYST BP LT 130 MM HG: CPT | Mod: HCNC,CPTII,S$GLB, | Performed by: NURSE PRACTITIONER

## 2020-06-12 PROCEDURE — 99499 RISK ADDL DX/OHS AUDIT: ICD-10-PCS | Mod: S$GLB,,, | Performed by: NURSE PRACTITIONER

## 2020-06-12 PROCEDURE — 3078F DIAST BP <80 MM HG: CPT | Mod: HCNC,CPTII,S$GLB, | Performed by: NURSE PRACTITIONER

## 2020-06-12 PROCEDURE — G0439 PR MEDICARE ANNUAL WELLNESS SUBSEQUENT VISIT: ICD-10-PCS | Mod: HCNC,S$GLB,, | Performed by: NURSE PRACTITIONER

## 2020-06-12 RX ORDER — LANCETS
100 EACH MISCELLANEOUS DAILY PRN
COMMUNITY
Start: 2020-04-15

## 2020-06-12 RX ORDER — PREDNISONE 5 MG/1
5 TABLET ORAL DAILY
COMMUNITY
Start: 2020-06-09 | End: 2020-12-08

## 2020-06-12 RX ORDER — MULTIVIT WITH MINERALS/HERBS
1 TABLET ORAL DAILY
COMMUNITY
End: 2023-01-01

## 2020-06-12 RX ORDER — PEN NEEDLE, DIABETIC 32GX 5/32"
1 NEEDLE, DISPOSABLE MISCELLANEOUS DAILY PRN
COMMUNITY
Start: 2020-06-01

## 2020-06-12 NOTE — PROGRESS NOTES
"Jose Antonio Allen presented for a  Medicare AWV and comprehensive Health Risk Assessment today. The following components were reviewed and updated:    · Medical history  · Family History  · Social history  · Allergies and Current Medications  · Health Risk Assessment  · Health Maintenance  · Care Team     ** See Completed Assessments for Annual Wellness Visit within the encounter summary.**       The following assessments were completed:  · Living Situation  · CAGE  · Depression Screening  · Timed Get Up and Go  · Whisper Test  · Cognitive Function Screening      · Nutrition Screening  · ADL Screening  · PAQ Screening    Vitals:    06/12/20 1358   BP: 120/60   BP Location: Right arm   Patient Position: Sitting   BP Method: Medium (Manual)   Pulse: 78   Temp: 97.9 °F (36.6 °C)   SpO2: 95%   Weight: 67.2 kg (148 lb 2.4 oz)   Height: 5' 4" (1.626 m)     Body mass index is 25.43 kg/m².     Physical Exam   Constitutional: He is oriented to person, place, and time. Vital signs are normal. He appears well-developed and well-nourished. He is cooperative.   HENT:   Head: Normocephalic and atraumatic.   Right Ear: Hearing normal.   Left Ear: Hearing normal.   Nose: Nose normal.   Mouth/Throat: Uvula is midline, oropharynx is clear and moist and mucous membranes are normal.   Eyes: Pupils are equal, round, and reactive to light. Conjunctivae, EOM and lids are normal. Right eye exhibits no discharge. Left eye exhibits no discharge.   Wears glasses   Neck: Trachea normal and normal range of motion. Neck supple.   Cardiovascular: Normal rate, regular rhythm, S1 normal, S2 normal, normal heart sounds and normal pulses.   Pulmonary/Chest: Effort normal and breath sounds normal. No respiratory distress.   Abdominal: Soft. Normal appearance and bowel sounds are normal.   Musculoskeletal: Normal range of motion.   Neurological: He is alert and oriented to person, place, and time. He has normal strength. Coordination and gait normal. "   Skin: Skin is warm, dry and intact. Capillary refill takes less than 2 seconds.   Psychiatric: He has a normal mood and affect. His speech is normal and behavior is normal. Thought content normal. Cognition and memory are normal.   Vitals reviewed.        Diagnoses and health risks identified today and associated recommendations/orders:    1. Encounter for preventive health examination    2. Type 2 diabetes mellitus without complication, without long-term current use of insulin  Chronic; stable on medication. Follow up with PCP.    3. Essential hypertension  Chronic; stable on medication. Followed by Endocrinology.    4. CKD (chronic kidney disease) stage 3, GFR 30-59 ml/min  Chronic; stable. Followed by Nephrology.    5. 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 Followed by Cardiology.    6. Bilateral carotid artery disease, unspecified type  Chronic; stable. Followed by Cardiology.    7. Hyperlipidemia, unspecified hyperlipidemia type  Chronic; stable on medication. Follow up with PCP.    8. PAD (peripheral artery disease)  Chronic; stable. Followed by Cardiology.    9. Intermittent claudication  Chronic; stable. Follow up with PCP.    10. Acquired hypothyroidism  Chronic; stable on medication. Follow up with PCP.    11. Fibrosis of lung  Chronic; stable. Followed by Pulmonary Disease.    12. Angina pectoris  Chronic; stable. Has not needed to use rx nitro recently. Followed by Cardiology.    13. Abnormality of gait and mobility  Chronic; stable. Follow up with PCP.    14. Dependence on supplemental oxygen  Chronic; stable. Followed by Pulmonary Disease.    15. Overweight (BMI 25.0-29.9)  Encouraged patient to continue to eat a low salt/low fat ADA diet and discussed importance of engaging in physical activity at least 5x/week for a minimum of 30 min/day.      Provided Jose Antonio with a 5-10 year written screening schedule and personal prevention plan.  Recommendations were developed using the USPSTF age appropriate recommendations. Education, counseling, and referrals were provided as needed. After Visit Summary printed and given to patient which includes a list of additional screenings/tests needed.    I offered to discuss end of life issues, including information on how to make advance directives that the patient could use to name someone who would make medical decisions on their behalf if they became too ill to make themselves.    _X_Patient declined  ___Patient is interested, I provided paper work and offered to discuss.      Follow up for your next annual wellness visit.       Anjana Harman NP

## 2020-06-12 NOTE — PATIENT INSTRUCTIONS
- Please contact your gastroenterologist about scheduling your colonoscopy.  - Be sure to ask about your pneumonia vaccine at your next follow-up appointment.    Counseling and Referral of Other Preventative  (Italic type indicates deductible and co-insurance are waived)    Patient Name: Jose Antonio Allen  Today's Date: 6/12/2020    Health Maintenance       Date Due Completion Date    Abdominal Aortic Aneurysm Screening 05/27/2015 ---    Pneumococcal Vaccine (65+ High/Highest Risk) (2 of 2 - PPSV23) 06/07/2021 (Originally 9/3/2019) 6/27/2019    Colonoscopy 06/12/2021 (Originally 9/14/2019) 9/14/2018    High Dose Statin 06/12/2021 6/12/2020    Aspirin/Antiplatelet Therapy 06/12/2021 6/12/2020    Lipid Panel 04/18/2023 4/18/2018    TETANUS VACCINE 08/15/2029 8/15/2019        No orders of the defined types were placed in this encounter.    The following information is provided to all patients.  This information is to help you find resources for any of the problems found today that may be affecting your health:                Living healthy guide: www.[facility].[place].gov      Understanding Diabetes: www.diabetes.org      Eating healthy: www.cdc.gov/healthyweight      CDC home safety checklist: www.cdc.gov/steadi/patient.html      Agency on Aging: www.goea.[place].gov      Alcoholics anonymous (AA): www.aa.org      Physical Activity: www.gavino.nih.gov/rm2rozt      Tobacco use: www.quitwithusla.org

## 2020-08-28 DIAGNOSIS — E11.9 TYPE 2 DIABETES MELLITUS WITHOUT COMPLICATION: ICD-10-CM

## 2020-09-29 ENCOUNTER — PATIENT MESSAGE (OUTPATIENT)
Dept: OTHER | Facility: OTHER | Age: 70
End: 2020-09-29

## 2020-10-05 ENCOUNTER — PATIENT MESSAGE (OUTPATIENT)
Dept: ADMINISTRATIVE | Facility: HOSPITAL | Age: 70
End: 2020-10-05

## 2020-10-24 ENCOUNTER — IMMUNIZATION (OUTPATIENT)
Dept: INTERNAL MEDICINE | Facility: CLINIC | Age: 70
End: 2020-10-24
Payer: MEDICARE

## 2020-10-24 PROCEDURE — 90694 VACC AIIV4 NO PRSRV 0.5ML IM: CPT | Mod: HCNC,S$GLB,, | Performed by: FAMILY MEDICINE

## 2020-10-24 PROCEDURE — G0008 FLU VACCINE - QUADRIVALENT - ADJUVANTED: ICD-10-PCS | Mod: HCNC,S$GLB,, | Performed by: FAMILY MEDICINE

## 2020-10-24 PROCEDURE — G0008 ADMIN INFLUENZA VIRUS VAC: HCPCS | Mod: HCNC,S$GLB,, | Performed by: FAMILY MEDICINE

## 2020-10-24 PROCEDURE — 90694 FLU VACCINE - QUADRIVALENT - ADJUVANTED: ICD-10-PCS | Mod: HCNC,S$GLB,, | Performed by: FAMILY MEDICINE

## 2020-11-04 DIAGNOSIS — I25.110 CORONARY ARTERY DISEASE INVOLVING NATIVE CORONARY ARTERY OF NATIVE HEART WITH UNSTABLE ANGINA PECTORIS: ICD-10-CM

## 2020-11-04 RX ORDER — NITROGLYCERIN 0.4 MG/1
0.4 TABLET SUBLINGUAL EVERY 5 MIN PRN
Qty: 25 TABLET | Refills: 12 | Status: SHIPPED | OUTPATIENT
Start: 2020-11-04 | End: 2021-05-31 | Stop reason: SDUPTHER

## 2020-12-04 ENCOUNTER — PATIENT OUTREACH (OUTPATIENT)
Dept: ADMINISTRATIVE | Facility: OTHER | Age: 70
End: 2020-12-04

## 2020-12-04 NOTE — PROGRESS NOTES
Health Maintenance Due   Topic Date Due    Abdominal Aortic Aneurysm Screening  05/27/2015    Colorectal Cancer Screening  09/14/2019    Eye Exam  11/28/2019    Lipid Panel  11/29/2019    Pneumococcal Vaccine (65+ Low/Medium Risk) (2 of 2 - PPSV23) 06/27/2020    Foot Exam  06/27/2020    Hemoglobin A1c  09/09/2020    Urine Microalbumin  12/11/2020     Updates were requested from care everywhere.  Chart was reviewed for overdue Proactive [facility] Encounters ([facility]) topics (CRS, Breast Cancer Screening, Eye exam)  Health Maintenance has been updated.  LINKS immunization registry triggered.  Immunizations were reconciled.

## 2020-12-08 ENCOUNTER — OFFICE VISIT (OUTPATIENT)
Dept: CARDIOLOGY | Facility: CLINIC | Age: 70
End: 2020-12-08
Payer: MEDICARE

## 2020-12-08 VITALS
HEART RATE: 78 BPM | SYSTOLIC BLOOD PRESSURE: 139 MMHG | DIASTOLIC BLOOD PRESSURE: 60 MMHG | HEIGHT: 64 IN | OXYGEN SATURATION: 94 % | WEIGHT: 150.81 LBS | BODY MASS INDEX: 25.75 KG/M2

## 2020-12-08 DIAGNOSIS — Z95.1 S/P CABG (CORONARY ARTERY BYPASS GRAFT): Primary | ICD-10-CM

## 2020-12-08 DIAGNOSIS — E03.9 ACQUIRED HYPOTHYROIDISM: ICD-10-CM

## 2020-12-08 DIAGNOSIS — I20.89 STABLE ANGINA PECTORIS: ICD-10-CM

## 2020-12-08 DIAGNOSIS — I77.9 BILATERAL CAROTID ARTERY DISEASE, UNSPECIFIED TYPE: ICD-10-CM

## 2020-12-08 DIAGNOSIS — Z95.1 HX OF CABG: ICD-10-CM

## 2020-12-08 DIAGNOSIS — I10 ESSENTIAL HYPERTENSION: ICD-10-CM

## 2020-12-08 DIAGNOSIS — Z87.19 HISTORY OF GI DIVERTICULAR BLEED: ICD-10-CM

## 2020-12-08 DIAGNOSIS — I25.118 CORONARY ARTERY DISEASE OF NATIVE ARTERY OF NATIVE HEART WITH STABLE ANGINA PECTORIS: ICD-10-CM

## 2020-12-08 DIAGNOSIS — R09.89 RIGHT CAROTID BRUIT: ICD-10-CM

## 2020-12-08 DIAGNOSIS — J84.10 FIBROSIS OF LUNG: ICD-10-CM

## 2020-12-08 DIAGNOSIS — E78.5 HYPERLIPIDEMIA, UNSPECIFIED HYPERLIPIDEMIA TYPE: ICD-10-CM

## 2020-12-08 DIAGNOSIS — I73.9 PAD (PERIPHERAL ARTERY DISEASE): ICD-10-CM

## 2020-12-08 PROCEDURE — 3008F PR BODY MASS INDEX (BMI) DOCUMENTED: ICD-10-PCS | Mod: HCNC,CPTII,S$GLB, | Performed by: INTERNAL MEDICINE

## 2020-12-08 PROCEDURE — 1126F PR PAIN SEVERITY QUANTIFIED, NO PAIN PRESENT: ICD-10-PCS | Mod: HCNC,S$GLB,, | Performed by: INTERNAL MEDICINE

## 2020-12-08 PROCEDURE — 1159F PR MEDICATION LIST DOCUMENTED IN MEDICAL RECORD: ICD-10-PCS | Mod: HCNC,S$GLB,, | Performed by: INTERNAL MEDICINE

## 2020-12-08 PROCEDURE — 3008F BODY MASS INDEX DOCD: CPT | Mod: HCNC,CPTII,S$GLB, | Performed by: INTERNAL MEDICINE

## 2020-12-08 PROCEDURE — 1101F PR PT FALLS ASSESS DOC 0-1 FALLS W/OUT INJ PAST YR: ICD-10-PCS | Mod: HCNC,CPTII,S$GLB, | Performed by: INTERNAL MEDICINE

## 2020-12-08 PROCEDURE — 3288F PR FALLS RISK ASSESSMENT DOCUMENTED: ICD-10-PCS | Mod: HCNC,CPTII,S$GLB, | Performed by: INTERNAL MEDICINE

## 2020-12-08 PROCEDURE — 3288F FALL RISK ASSESSMENT DOCD: CPT | Mod: HCNC,CPTII,S$GLB, | Performed by: INTERNAL MEDICINE

## 2020-12-08 PROCEDURE — 1101F PT FALLS ASSESS-DOCD LE1/YR: CPT | Mod: HCNC,CPTII,S$GLB, | Performed by: INTERNAL MEDICINE

## 2020-12-08 PROCEDURE — 3078F DIAST BP <80 MM HG: CPT | Mod: HCNC,CPTII,S$GLB, | Performed by: INTERNAL MEDICINE

## 2020-12-08 PROCEDURE — 93000 EKG 12-LEAD: ICD-10-PCS | Mod: HCNC,S$GLB,, | Performed by: INTERNAL MEDICINE

## 2020-12-08 PROCEDURE — 93000 ELECTROCARDIOGRAM COMPLETE: CPT | Mod: HCNC,S$GLB,, | Performed by: INTERNAL MEDICINE

## 2020-12-08 PROCEDURE — 99499 UNLISTED E&M SERVICE: CPT | Mod: S$GLB,,, | Performed by: INTERNAL MEDICINE

## 2020-12-08 PROCEDURE — 99214 PR OFFICE/OUTPT VISIT, EST, LEVL IV, 30-39 MIN: ICD-10-PCS | Mod: 25,HCNC,S$GLB, | Performed by: INTERNAL MEDICINE

## 2020-12-08 PROCEDURE — 3078F PR MOST RECENT DIASTOLIC BLOOD PRESSURE < 80 MM HG: ICD-10-PCS | Mod: HCNC,CPTII,S$GLB, | Performed by: INTERNAL MEDICINE

## 2020-12-08 PROCEDURE — 1126F AMNT PAIN NOTED NONE PRSNT: CPT | Mod: HCNC,S$GLB,, | Performed by: INTERNAL MEDICINE

## 2020-12-08 PROCEDURE — 1159F MED LIST DOCD IN RCRD: CPT | Mod: HCNC,S$GLB,, | Performed by: INTERNAL MEDICINE

## 2020-12-08 PROCEDURE — 3075F SYST BP GE 130 - 139MM HG: CPT | Mod: HCNC,CPTII,S$GLB, | Performed by: INTERNAL MEDICINE

## 2020-12-08 PROCEDURE — 99214 OFFICE O/P EST MOD 30 MIN: CPT | Mod: 25,HCNC,S$GLB, | Performed by: INTERNAL MEDICINE

## 2020-12-08 PROCEDURE — 3075F PR MOST RECENT SYSTOLIC BLOOD PRESS GE 130-139MM HG: ICD-10-PCS | Mod: HCNC,CPTII,S$GLB, | Performed by: INTERNAL MEDICINE

## 2020-12-08 PROCEDURE — 99999 PR PBB SHADOW E&M-EST. PATIENT-LVL IV: CPT | Mod: PBBFAC,HCNC,, | Performed by: INTERNAL MEDICINE

## 2020-12-08 PROCEDURE — 99499 RISK ADDL DX/OHS AUDIT: ICD-10-PCS | Mod: S$GLB,,, | Performed by: INTERNAL MEDICINE

## 2020-12-08 PROCEDURE — 99999 PR PBB SHADOW E&M-EST. PATIENT-LVL IV: ICD-10-PCS | Mod: PBBFAC,HCNC,, | Performed by: INTERNAL MEDICINE

## 2020-12-08 RX ORDER — LEFLUNOMIDE 10 MG/1
10 TABLET ORAL EVERY OTHER DAY
COMMUNITY
Start: 2020-11-16 | End: 2022-01-25

## 2020-12-08 RX ORDER — METOPROLOL SUCCINATE 100 MG/1
TABLET, EXTENDED RELEASE ORAL
Qty: 90 TABLET | Refills: 3 | Status: SHIPPED | OUTPATIENT
Start: 2020-12-08 | End: 2022-01-25 | Stop reason: SDUPTHER

## 2020-12-08 RX ORDER — PIRFENIDONE 801 MG/1
TABLET, COATED ORAL 3 TIMES DAILY
COMMUNITY
Start: 2020-11-12

## 2020-12-08 RX ORDER — ATORVASTATIN CALCIUM 80 MG/1
80 TABLET, FILM COATED ORAL DAILY
Qty: 90 TABLET | Refills: 3 | Status: SHIPPED | OUTPATIENT
Start: 2020-12-08 | End: 2021-08-10 | Stop reason: SDUPTHER

## 2020-12-08 NOTE — PROGRESS NOTES
Subjective:      Patient ID: Jose Antonio Allen is a 70 y.o. male.    Chief Complaint: Follow-up    HPI:  Seeing rheumatologist for aches and pains in shoulders and legs.  Pt was given prednisone for rheumatoid arthritis which helped the arthritis a lot but pt stopped it due to hyperglycemia.  Pt was advised to take Orencia but it is too expensive and pt has applied for assistance.    Pt exercises in the gym    Pt walks on the treadmill wearing home oxygen      Review of Systems   Cardiovascular: Positive for chest pain and dyspnea on exertion. Negative for claudication, irregular heartbeat, leg swelling, near-syncope, orthopnea, palpitations and syncope.      Pt c/o chest pains when walking fast or when pushing garbage cans if he is not wearing the oxygen.  Pt sits down and relaxes for 5 minutes wearing the oxygen and then feels fine.  The pattern of chest pains with exertion is getting worse.      The intermittent claudication is much better since Dr Schulz did lower extremity revascularization        Past Medical History:   Diagnosis Date    Angina pectoris     Arthritis     CKD (chronic kidney disease)     Colon polyps 09/14/2018    Coronary artery disease     Diabetes mellitus     Diabetes mellitus, type 2     GERD (gastroesophageal reflux disease)     History of tobacco use     Hyperlipidemia     Hypertension     S/P CABG (coronary artery bypass graft) 1996    Thyroid disease         Past Surgical History:   Procedure Laterality Date    CATARACT EXTRACTION Bilateral 3YRS    COLONOSCOPY W/ POLYPECTOMY  09/14/2018    CORONARY ARTERY BYPASS GRAFT  1996    EYELID SURGERY  03/2012    VEIN BYPASS SURGERY  1996    VEIN SURGERY Left 2017    PAD    VEIN SURGERY Right 2018    PAD       Family History   Problem Relation Age of Onset    Diabetes Mother     Diabetes Father     Diabetes Sister     Kidney disease Sister     Diabetes Brother     Diabetes Sister     Blindness Neg Hx     Glaucoma Neg Hx      Macular degeneration Neg Hx     Retinal detachment Neg Hx     Strabismus Neg Hx     Stroke Neg Hx     Thyroid disease Neg Hx     Cancer Neg Hx     Cataracts Neg Hx     Hypertension Neg Hx        Social History     Socioeconomic History    Marital status:      Spouse name: Not on file    Number of children: Not on file    Years of education: Not on file    Highest education level: Not on file   Occupational History    Not on file   Social Needs    Financial resource strain: Not hard at all    Food insecurity     Worry: Never true     Inability: Never true    Transportation needs     Medical: No     Non-medical: No   Tobacco Use    Smoking status: Former Smoker     Packs/day: 2.00     Years: 27.00     Pack years: 54.00     Types: Cigarettes     Quit date:      Years since quittin.9    Smokeless tobacco: Never Used   Substance and Sexual Activity    Alcohol use: Yes     Frequency: Monthly or less     Drinks per session: 1 or 2     Binge frequency: Never     Comment: Occasionally has a drink    Drug use: No    Sexual activity: Yes     Partners: Female   Lifestyle    Physical activity     Days per week: 4 days     Minutes per session: 60 min    Stress: To some extent   Relationships    Social connections     Talks on phone: Twice a week     Gets together: Once a week     Attends Nondenominational service: Not on file     Active member of club or organization: No     Attends meetings of clubs or organizations: Never     Relationship status:    Other Topics Concern    Not on file   Social History Narrative    Not on file       Current Outpatient Medications on File Prior to Visit   Medication Sig Dispense Refill    ACCU-CHEK ADRIEN PLUS METER Misc 1 Product by Other route daily as needed.      ACCU-CHEK SMARTVIEW TEST STRIP Strp       ACCU-CHEK SOFTCLIX LANCETS Misc Inject 100 lancets into the skin daily as needed.      amLODIPine (NORVASC) 10 MG tablet Take 10 mg by mouth  "once daily.      aspirin 81 MG Chew 81 mg once daily.       b complex vitamins tablet Take 1 tablet by mouth once daily.      DROPLET PEN NEEDLE 32 gauge x 5/32" Ndle Inject 1 Product into the skin daily as needed.      insulin degludec (TRESIBA FLEXTOUCH U-100) 100 unit/mL (3 mL) InPn Inject 34 Units into the skin once daily.       levothyroxine (SYNTHROID) 100 MCG tablet Take 1 tablet (100 mcg total) by mouth before breakfast. 90 tablet 3    LUBRICANT EYE DROPS 0.5 % Dpet       methylcellulose, laxative, (CITRUCEL) 500 mg Tab Take by mouth.      mirtazapine (REMERON) 7.5 MG Tab Take 1 tablet (7.5 mg total) by mouth every evening. 30 tablet 11    nitroGLYCERIN (NITROSTAT) 0.4 MG SL tablet Place 1 tablet (0.4 mg total) under the tongue every 5 (five) minutes as needed for Chest pain. 25 tablet 12    NOVOLOG 100 unit/mL injection Sliding scale      vitamin D (VITAMIN D3) 1000 units Tab Take 1,000 Units by mouth once daily.      [DISCONTINUED] atorvastatin (LIPITOR) 80 MG tablet Take 1 tablet (80 mg total) by mouth once daily. 90 tablet 3    [DISCONTINUED] ESBRIET 267 mg Tab 3 (three) times daily.       [DISCONTINUED] metoprolol succinate (TOPROL-XL) 100 MG 24 hr tablet TAKE 1 TABLET ONE TIME DAILY 90 tablet 3    ESBRIET 801 mg Tab       leflunomide (ARAVA) 10 MG Tab Take 10 mg by mouth every other day.      [DISCONTINUED] finasteride (PROSCAR) 5 mg tablet Take 5 mg by mouth once daily.  3    [DISCONTINUED] predniSONE (DELTASONE) 5 MG tablet Take 5 mg by mouth once daily.      [DISCONTINUED] tamsulosin (FLOMAX) 0.4 mg Cp24 0.4 mg once daily.       [DISCONTINUED] traMADoL (ULTRAM) 50 mg tablet Take 1 tablet (50 mg total) by mouth every 12 (twelve) hours as needed for Pain. (Patient not taking: Reported on 12/8/2020) 40 tablet 0     No current facility-administered medications on file prior to visit.        Review of patient's allergies indicates:  No Known Allergies  Objective:     Vitals:    " "12/08/20 0933 12/08/20 1018   BP: (!) 145/78 139/60   BP Location: Left arm Left arm   Patient Position: Sitting Sitting   BP Method: Large (Automatic)    Pulse: 78    SpO2: (!) 94%    Weight: 68.4 kg (150 lb 12.7 oz)    Height: 5' 4" (1.626 m)         Physical Exam   Constitutional: He is oriented to person, place, and time. He appears well-developed and well-nourished. No distress.   Eyes: No scleral icterus.   Neck: No JVD present. Carotid bruit is not present.   Cardiovascular: Regular rhythm and normal heart sounds. Exam reveals no gallop and no friction rub.   No murmur heard.  Pulmonary/Chest: Effort normal. No respiratory distress. He has rales (bibasilar crackles).   Musculoskeletal:         General: No edema.   Neurological: He is alert and oriented to person, place, and time.   Skin: Skin is warm and dry. He is not diaphoretic.   Psychiatric: He has a normal mood and affect. His behavior is normal. Judgment and thought content normal.   Vitals reviewed.     Note carotid ultrasound showed occluded SHAILA and less than 50% LICA stenosis    Note last Cardiolite stress test in 2018 was normal    ECG today reviewed by me:  NSR with PVC, slightly low T waves, not significantly changed.    No visits with results within 6 Month(s) from this visit.   Latest known visit with results is:   Lab Visit on 05/08/2020   Component Date Value Ref Range Status    SARS-CoV2 (COVID-19) Qualitative P* 05/08/2020 Not Detected  Not Detected Final   (      Lab 4/18/18 HDL 54, LDL 63, TG 45    Lab 6/20:   HgbA1C 10.6, CMP WNL, TSH 14      Assessment:     1. S/P CABG (coronary artery bypass graft)    2. Essential hypertension    3. Hyperlipidemia, unspecified hyperlipidemia type    4. Right carotid bruit    5. PAD (peripheral artery disease)    6. Coronary artery disease of native artery of native heart with stable angina pectoris    7. Bilateral carotid artery disease, unspecified type    8. Acquired hypothyroidism    9. History of " GI diverticular bleed    10. Fibrosis of lung    11. Hx of CABG    12. Stable angina pectoris      Plan:   Jose Antonio was seen today for follow-up.    Diagnoses and all orders for this visit:    S/P CABG (coronary artery bypass graft)  -     IN OFFICE EKG 12-LEAD (to Muse)  -     Nuclear Stress Test; Future    Essential hypertension  -     metoprolol succinate (TOPROL-XL) 100 MG 24 hr tablet; TAKE 1 TABLET ONE TIME DAILY    Hyperlipidemia, unspecified hyperlipidemia type  -     atorvastatin (LIPITOR) 80 MG tablet; Take 1 tablet (80 mg total) by mouth once daily.    Right carotid bruit    PAD (peripheral artery disease)    Coronary artery disease of native artery of native heart with stable angina pectoris  -     Nuclear Stress Test; Future    Bilateral carotid artery disease, unspecified type    Acquired hypothyroidism    History of GI diverticular bleed    Fibrosis of lung    Hx of CABG  -     IN OFFICE EKG 12-LEAD (to Muse)    Stable angina pectoris  -     NM Myocardial Perfusion Spect Multi Pharmacologic; Future  -     Nuclear Stress Test; Future     f/u with Dr Howard, endocrinologist    F/u with PCP    [place] [facility] [place]    Will repeat the Lexiscan Cardiolite stress test due to worsening exertional angina and hx of CABG.  However, patient may be having exertional chest pain due to his pulmonary fibrosis.    Follow up in about 6 months (around 6/8/2021).

## 2020-12-11 ENCOUNTER — PATIENT MESSAGE (OUTPATIENT)
Dept: OTHER | Facility: OTHER | Age: 70
End: 2020-12-11

## 2020-12-17 ENCOUNTER — LAB VISIT (OUTPATIENT)
Dept: PRIMARY CARE CLINIC | Facility: OTHER | Age: 70
End: 2020-12-17
Attending: INTERNAL MEDICINE
Payer: MEDICARE

## 2020-12-17 DIAGNOSIS — Z03.818 ENCOUNTER FOR OBSERVATION FOR SUSPECTED EXPOSURE TO OTHER BIOLOGICAL AGENTS RULED OUT: ICD-10-CM

## 2020-12-17 PROCEDURE — U0003 INFECTIOUS AGENT DETECTION BY NUCLEIC ACID (DNA OR RNA); SEVERE ACUTE RESPIRATORY SYNDROME CORONAVIRUS 2 (SARS-COV-2) (CORONAVIRUS DISEASE [COVID-19]), AMPLIFIED PROBE TECHNIQUE, MAKING USE OF HIGH THROUGHPUT TECHNOLOGIES AS DESCRIBED BY CMS-2020-01-R: HCPCS | Mod: HCNC

## 2020-12-19 LAB — SARS-COV-2 RNA RESP QL NAA+PROBE: NOT DETECTED

## 2020-12-22 LAB — HBA1C MFR BLD: 9 % (ref 4–6)

## 2021-01-04 ENCOUNTER — TELEPHONE (OUTPATIENT)
Dept: CARDIOLOGY | Facility: CLINIC | Age: 71
End: 2021-01-04

## 2021-01-04 ENCOUNTER — HOSPITAL ENCOUNTER (OUTPATIENT)
Dept: RADIOLOGY | Facility: HOSPITAL | Age: 71
Discharge: HOME | End: 2021-01-04
Attending: INTERNAL MEDICINE
Payer: MEDICARE

## 2021-01-04 ENCOUNTER — HOSPITAL ENCOUNTER (OUTPATIENT)
Dept: CARDIOLOGY | Facility: HOSPITAL | Age: 71
Discharge: HOME | End: 2021-01-04
Attending: INTERNAL MEDICINE
Payer: MEDICARE

## 2021-01-04 DIAGNOSIS — I20.89 STABLE ANGINA PECTORIS: ICD-10-CM

## 2021-01-04 DIAGNOSIS — Z95.1 S/P CABG (CORONARY ARTERY BYPASS GRAFT): ICD-10-CM

## 2021-01-04 DIAGNOSIS — I25.118 CORONARY ARTERY DISEASE OF NATIVE ARTERY OF NATIVE HEART WITH STABLE ANGINA PECTORIS: ICD-10-CM

## 2021-01-04 LAB
CV PHARM DOSE: 0.4 MG
CV STRESS BASE HR: 85 BPM
DIASTOLIC BLOOD PRESSURE: 75 MMHG
OHS CV CPX 85 PERCENT MAX PREDICTED HEART RATE MALE: 128
OHS CV CPX MAX PREDICTED HEART RATE: 150
OHS CV CPX PATIENT IS FEMALE: 0
OHS CV CPX PATIENT IS MALE: 1
OHS CV CPX PEAK DIASTOLIC BLOOD PRESSURE: 75 MMHG
OHS CV CPX PEAK HEAR RATE: 96 BPM
OHS CV CPX PEAK RATE PRESSURE PRODUCT: NORMAL
OHS CV CPX PEAK SYSTOLIC BLOOD PRESSURE: 151 MMHG
OHS CV CPX PERCENT MAX PREDICTED HEART RATE ACHIEVED: 64
OHS CV CPX RATE PRESSURE PRODUCT PRESENTING: NORMAL
SYSTOLIC BLOOD PRESSURE: 151 MMHG

## 2021-01-04 PROCEDURE — 93017 CV STRESS TEST TRACING ONLY: CPT | Mod: HCNC

## 2021-01-04 PROCEDURE — 93018 CV STRESS TEST I&R ONLY: CPT | Mod: HCNC,,, | Performed by: INTERNAL MEDICINE

## 2021-01-04 PROCEDURE — 78452 NM MYOCARDIAL PERFUSION SPECT MULTI PHARM: ICD-10-PCS | Mod: 26,HCNC,, | Performed by: RADIOLOGY

## 2021-01-04 PROCEDURE — 93016 NUCLEAR STRESS TEST (CUPID ONLY): ICD-10-PCS | Mod: HCNC,,, | Performed by: INTERNAL MEDICINE

## 2021-01-04 PROCEDURE — 93016 CV STRESS TEST SUPVJ ONLY: CPT | Mod: HCNC,,, | Performed by: INTERNAL MEDICINE

## 2021-01-04 PROCEDURE — 93018 NUCLEAR STRESS TEST (CUPID ONLY): ICD-10-PCS | Mod: HCNC,,, | Performed by: INTERNAL MEDICINE

## 2021-01-04 PROCEDURE — 63600175 PHARM REV CODE 636 W HCPCS: Mod: HCNC | Performed by: INTERNAL MEDICINE

## 2021-01-04 PROCEDURE — 78452 HT MUSCLE IMAGE SPECT MULT: CPT | Mod: 26,HCNC,, | Performed by: RADIOLOGY

## 2021-01-04 PROCEDURE — 78452 HT MUSCLE IMAGE SPECT MULT: CPT | Mod: TC,HCNC

## 2021-01-04 PROCEDURE — A9502 TC99M TETROFOSMIN: HCPCS | Mod: HCNC

## 2021-01-04 RX ORDER — REGADENOSON 0.08 MG/ML
0.4 INJECTION, SOLUTION INTRAVENOUS ONCE
Status: COMPLETED | OUTPATIENT
Start: 2021-01-04 | End: 2021-01-04

## 2021-01-04 RX ADMIN — REGADENOSON 0.4 MG: 0.08 INJECTION, SOLUTION INTRAVENOUS at 12:01

## 2021-01-05 ENCOUNTER — PATIENT MESSAGE (OUTPATIENT)
Dept: ADMINISTRATIVE | Facility: HOSPITAL | Age: 71
End: 2021-01-05

## 2021-01-11 DIAGNOSIS — E03.9 ACQUIRED HYPOTHYROIDISM: ICD-10-CM

## 2021-01-11 RX ORDER — LEVOTHYROXINE SODIUM 100 UG/1
100 TABLET ORAL
Qty: 90 TABLET | Refills: 3 | Status: SHIPPED | OUTPATIENT
Start: 2021-01-11 | End: 2022-01-25 | Stop reason: SDUPTHER

## 2021-01-12 ENCOUNTER — PATIENT OUTREACH (OUTPATIENT)
Dept: ADMINISTRATIVE | Facility: HOSPITAL | Age: 71
End: 2021-01-12

## 2021-01-15 ENCOUNTER — IMMUNIZATION (OUTPATIENT)
Dept: INTERNAL MEDICINE | Facility: CLINIC | Age: 71
End: 2021-01-15
Payer: MEDICARE

## 2021-01-15 DIAGNOSIS — Z23 NEED FOR VACCINATION: Primary | ICD-10-CM

## 2021-01-15 PROCEDURE — 91300 COVID-19, MRNA, LNP-S, PF, 30 MCG/0.3 ML DOSE VACCINE: CPT | Mod: PBBFAC | Performed by: FAMILY MEDICINE

## 2021-01-20 DIAGNOSIS — E11.9 TYPE 2 DIABETES MELLITUS WITHOUT COMPLICATION, UNSPECIFIED WHETHER LONG TERM INSULIN USE: ICD-10-CM

## 2021-02-01 ENCOUNTER — OFFICE VISIT (OUTPATIENT)
Dept: FAMILY MEDICINE | Facility: CLINIC | Age: 71
End: 2021-02-01
Payer: MEDICARE

## 2021-02-01 VITALS
HEIGHT: 64 IN | SYSTOLIC BLOOD PRESSURE: 118 MMHG | WEIGHT: 151 LBS | TEMPERATURE: 97 F | HEART RATE: 77 BPM | DIASTOLIC BLOOD PRESSURE: 70 MMHG | BODY MASS INDEX: 25.78 KG/M2 | OXYGEN SATURATION: 98 %

## 2021-02-01 DIAGNOSIS — J84.10 FIBROSIS OF LUNG: ICD-10-CM

## 2021-02-01 DIAGNOSIS — N18.30 STAGE 3 CHRONIC KIDNEY DISEASE, UNSPECIFIED WHETHER STAGE 3A OR 3B CKD: ICD-10-CM

## 2021-02-01 DIAGNOSIS — I10 ESSENTIAL HYPERTENSION: ICD-10-CM

## 2021-02-01 DIAGNOSIS — E78.5 HYPERLIPIDEMIA, UNSPECIFIED HYPERLIPIDEMIA TYPE: ICD-10-CM

## 2021-02-01 DIAGNOSIS — E11.9 TYPE 2 DIABETES MELLITUS WITHOUT COMPLICATION, WITHOUT LONG-TERM CURRENT USE OF INSULIN: Primary | ICD-10-CM

## 2021-02-01 DIAGNOSIS — E66.3 OVERWEIGHT (BMI 25.0-29.9): ICD-10-CM

## 2021-02-01 PROBLEM — R80.9 PROTEINURIA: Status: ACTIVE | Noted: 2020-12-14

## 2021-02-01 PROBLEM — N20.2 CALCULUS OF KIDNEY AND URETER: Status: ACTIVE | Noted: 2020-12-14

## 2021-02-01 PROCEDURE — 3078F DIAST BP <80 MM HG: CPT | Mod: CPTII,S$GLB,, | Performed by: NURSE PRACTITIONER

## 2021-02-01 PROCEDURE — 99999 PR PBB SHADOW E&M-EST. PATIENT-LVL V: CPT | Mod: PBBFAC,,, | Performed by: NURSE PRACTITIONER

## 2021-02-01 PROCEDURE — 3052F HG A1C>EQUAL 8.0%<EQUAL 9.0%: CPT | Mod: CPTII,S$GLB,, | Performed by: NURSE PRACTITIONER

## 2021-02-01 PROCEDURE — 3078F PR MOST RECENT DIASTOLIC BLOOD PRESSURE < 80 MM HG: ICD-10-PCS | Mod: CPTII,S$GLB,, | Performed by: NURSE PRACTITIONER

## 2021-02-01 PROCEDURE — 99499 UNLISTED E&M SERVICE: CPT | Mod: S$GLB,,, | Performed by: NURSE PRACTITIONER

## 2021-02-01 PROCEDURE — 3288F FALL RISK ASSESSMENT DOCD: CPT | Mod: CPTII,S$GLB,, | Performed by: NURSE PRACTITIONER

## 2021-02-01 PROCEDURE — 1101F PT FALLS ASSESS-DOCD LE1/YR: CPT | Mod: CPTII,S$GLB,, | Performed by: NURSE PRACTITIONER

## 2021-02-01 PROCEDURE — 3052F PR MOST RECENT HEMOGLOBIN A1C LEVEL 8.0 - < 9.0%: ICD-10-PCS | Mod: CPTII,S$GLB,, | Performed by: NURSE PRACTITIONER

## 2021-02-01 PROCEDURE — 99214 PR OFFICE/OUTPT VISIT, EST, LEVL IV, 30-39 MIN: ICD-10-PCS | Mod: S$GLB,,, | Performed by: NURSE PRACTITIONER

## 2021-02-01 PROCEDURE — 99999 PR PBB SHADOW E&M-EST. PATIENT-LVL V: ICD-10-PCS | Mod: PBBFAC,,, | Performed by: NURSE PRACTITIONER

## 2021-02-01 PROCEDURE — 1101F PR PT FALLS ASSESS DOC 0-1 FALLS W/OUT INJ PAST YR: ICD-10-PCS | Mod: CPTII,S$GLB,, | Performed by: NURSE PRACTITIONER

## 2021-02-01 PROCEDURE — 3074F PR MOST RECENT SYSTOLIC BLOOD PRESSURE < 130 MM HG: ICD-10-PCS | Mod: CPTII,S$GLB,, | Performed by: NURSE PRACTITIONER

## 2021-02-01 PROCEDURE — 1159F PR MEDICATION LIST DOCUMENTED IN MEDICAL RECORD: ICD-10-PCS | Mod: S$GLB,,, | Performed by: NURSE PRACTITIONER

## 2021-02-01 PROCEDURE — 3008F PR BODY MASS INDEX (BMI) DOCUMENTED: ICD-10-PCS | Mod: CPTII,S$GLB,, | Performed by: NURSE PRACTITIONER

## 2021-02-01 PROCEDURE — 99499 RISK ADDL DX/OHS AUDIT: ICD-10-PCS | Mod: S$GLB,,, | Performed by: NURSE PRACTITIONER

## 2021-02-01 PROCEDURE — 1125F AMNT PAIN NOTED PAIN PRSNT: CPT | Mod: S$GLB,,, | Performed by: NURSE PRACTITIONER

## 2021-02-01 PROCEDURE — 3288F PR FALLS RISK ASSESSMENT DOCUMENTED: ICD-10-PCS | Mod: CPTII,S$GLB,, | Performed by: NURSE PRACTITIONER

## 2021-02-01 PROCEDURE — 99214 OFFICE O/P EST MOD 30 MIN: CPT | Mod: S$GLB,,, | Performed by: NURSE PRACTITIONER

## 2021-02-01 PROCEDURE — 1125F PR PAIN SEVERITY QUANTIFIED, PAIN PRESENT: ICD-10-PCS | Mod: S$GLB,,, | Performed by: NURSE PRACTITIONER

## 2021-02-01 PROCEDURE — 1159F MED LIST DOCD IN RCRD: CPT | Mod: S$GLB,,, | Performed by: NURSE PRACTITIONER

## 2021-02-01 PROCEDURE — 3008F BODY MASS INDEX DOCD: CPT | Mod: CPTII,S$GLB,, | Performed by: NURSE PRACTITIONER

## 2021-02-01 PROCEDURE — 3074F SYST BP LT 130 MM HG: CPT | Mod: CPTII,S$GLB,, | Performed by: NURSE PRACTITIONER

## 2021-02-01 RX ORDER — ABATACEPT 125 MG/ML
INJECTION, SOLUTION SUBCUTANEOUS WEEKLY
COMMUNITY
Start: 2021-01-13

## 2021-02-01 RX ORDER — TRAMADOL HYDROCHLORIDE 50 MG/1
TABLET ORAL
COMMUNITY
Start: 2021-01-14 | End: 2021-06-22

## 2021-02-05 ENCOUNTER — IMMUNIZATION (OUTPATIENT)
Dept: INTERNAL MEDICINE | Facility: CLINIC | Age: 71
End: 2021-02-05
Payer: MEDICARE

## 2021-02-05 DIAGNOSIS — Z23 NEED FOR VACCINATION: Primary | ICD-10-CM

## 2021-02-05 PROCEDURE — 0002A COVID-19, MRNA, LNP-S, PF, 30 MCG/0.3 ML DOSE VACCINE: CPT | Mod: PBBFAC | Performed by: FAMILY MEDICINE

## 2021-02-05 PROCEDURE — 91300 COVID-19, MRNA, LNP-S, PF, 30 MCG/0.3 ML DOSE VACCINE: CPT | Mod: PBBFAC | Performed by: FAMILY MEDICINE

## 2021-02-17 DIAGNOSIS — E11.9 TYPE 2 DIABETES MELLITUS WITHOUT COMPLICATION: ICD-10-CM

## 2021-02-25 ENCOUNTER — PES CALL (OUTPATIENT)
Dept: ADMINISTRATIVE | Facility: CLINIC | Age: 71
End: 2021-02-25

## 2021-03-31 ENCOUNTER — TELEPHONE (OUTPATIENT)
Dept: ADMINISTRATIVE | Facility: HOSPITAL | Age: 71
End: 2021-03-31

## 2021-04-05 ENCOUNTER — PATIENT MESSAGE (OUTPATIENT)
Dept: ADMINISTRATIVE | Facility: HOSPITAL | Age: 71
End: 2021-04-05

## 2021-04-22 ENCOUNTER — OFFICE VISIT (OUTPATIENT)
Dept: FAMILY MEDICINE | Facility: CLINIC | Age: 71
End: 2021-04-22
Payer: MEDICARE

## 2021-04-22 VITALS
HEIGHT: 64 IN | DIASTOLIC BLOOD PRESSURE: 80 MMHG | OXYGEN SATURATION: 96 % | SYSTOLIC BLOOD PRESSURE: 130 MMHG | BODY MASS INDEX: 26.2 KG/M2 | WEIGHT: 153.44 LBS | HEART RATE: 76 BPM | TEMPERATURE: 98 F

## 2021-04-22 DIAGNOSIS — E11.9 TYPE 2 DIABETES MELLITUS WITHOUT COMPLICATION, WITHOUT LONG-TERM CURRENT USE OF INSULIN: ICD-10-CM

## 2021-04-22 DIAGNOSIS — N18.30 STAGE 3 CHRONIC KIDNEY DISEASE, UNSPECIFIED WHETHER STAGE 3A OR 3B CKD: ICD-10-CM

## 2021-04-22 DIAGNOSIS — M25.561 ACUTE PAIN OF RIGHT KNEE: Primary | ICD-10-CM

## 2021-04-22 DIAGNOSIS — R14.3 EXCESSIVE GAS: ICD-10-CM

## 2021-04-22 PROCEDURE — 1101F PR PT FALLS ASSESS DOC 0-1 FALLS W/OUT INJ PAST YR: ICD-10-PCS | Mod: CPTII,S$GLB,, | Performed by: NURSE PRACTITIONER

## 2021-04-22 PROCEDURE — 1159F MED LIST DOCD IN RCRD: CPT | Mod: S$GLB,,, | Performed by: NURSE PRACTITIONER

## 2021-04-22 PROCEDURE — 1125F PR PAIN SEVERITY QUANTIFIED, PAIN PRESENT: ICD-10-PCS | Mod: S$GLB,,, | Performed by: NURSE PRACTITIONER

## 2021-04-22 PROCEDURE — 1101F PT FALLS ASSESS-DOCD LE1/YR: CPT | Mod: CPTII,S$GLB,, | Performed by: NURSE PRACTITIONER

## 2021-04-22 PROCEDURE — 3008F BODY MASS INDEX DOCD: CPT | Mod: CPTII,S$GLB,, | Performed by: NURSE PRACTITIONER

## 2021-04-22 PROCEDURE — 99499 RISK ADDL DX/OHS AUDIT: ICD-10-PCS | Mod: S$GLB,,, | Performed by: NURSE PRACTITIONER

## 2021-04-22 PROCEDURE — 3052F PR MOST RECENT HEMOGLOBIN A1C LEVEL 8.0 - < 9.0%: ICD-10-PCS | Mod: CPTII,S$GLB,, | Performed by: NURSE PRACTITIONER

## 2021-04-22 PROCEDURE — 1125F AMNT PAIN NOTED PAIN PRSNT: CPT | Mod: S$GLB,,, | Performed by: NURSE PRACTITIONER

## 2021-04-22 PROCEDURE — 1159F PR MEDICATION LIST DOCUMENTED IN MEDICAL RECORD: ICD-10-PCS | Mod: S$GLB,,, | Performed by: NURSE PRACTITIONER

## 2021-04-22 PROCEDURE — 3288F PR FALLS RISK ASSESSMENT DOCUMENTED: ICD-10-PCS | Mod: CPTII,S$GLB,, | Performed by: NURSE PRACTITIONER

## 2021-04-22 PROCEDURE — 3288F FALL RISK ASSESSMENT DOCD: CPT | Mod: CPTII,S$GLB,, | Performed by: NURSE PRACTITIONER

## 2021-04-22 PROCEDURE — 3008F PR BODY MASS INDEX (BMI) DOCUMENTED: ICD-10-PCS | Mod: CPTII,S$GLB,, | Performed by: NURSE PRACTITIONER

## 2021-04-22 PROCEDURE — 99999 PR PBB SHADOW E&M-EST. PATIENT-LVL V: ICD-10-PCS | Mod: PBBFAC,,, | Performed by: NURSE PRACTITIONER

## 2021-04-22 PROCEDURE — 99999 PR PBB SHADOW E&M-EST. PATIENT-LVL V: CPT | Mod: PBBFAC,,, | Performed by: NURSE PRACTITIONER

## 2021-04-22 PROCEDURE — 99214 PR OFFICE/OUTPT VISIT, EST, LEVL IV, 30-39 MIN: ICD-10-PCS | Mod: S$GLB,,, | Performed by: NURSE PRACTITIONER

## 2021-04-22 PROCEDURE — 3052F HG A1C>EQUAL 8.0%<EQUAL 9.0%: CPT | Mod: CPTII,S$GLB,, | Performed by: NURSE PRACTITIONER

## 2021-04-22 PROCEDURE — 99499 UNLISTED E&M SERVICE: CPT | Mod: S$GLB,,, | Performed by: NURSE PRACTITIONER

## 2021-04-22 PROCEDURE — 99214 OFFICE O/P EST MOD 30 MIN: CPT | Mod: S$GLB,,, | Performed by: NURSE PRACTITIONER

## 2021-04-22 RX ORDER — PREDNISONE 5 MG/1
TABLET ORAL
COMMUNITY
Start: 2021-03-28 | End: 2022-01-25

## 2021-04-22 RX ORDER — MELOXICAM 15 MG/1
15 TABLET ORAL DAILY
Qty: 7 TABLET | Refills: 0 | Status: SHIPPED | OUTPATIENT
Start: 2021-04-22 | End: 2021-04-29

## 2021-05-05 ENCOUNTER — OFFICE VISIT (OUTPATIENT)
Dept: FAMILY MEDICINE | Facility: CLINIC | Age: 71
End: 2021-05-05
Payer: MEDICARE

## 2021-05-05 VITALS
SYSTOLIC BLOOD PRESSURE: 136 MMHG | BODY MASS INDEX: 25.45 KG/M2 | HEART RATE: 86 BPM | WEIGHT: 149.06 LBS | DIASTOLIC BLOOD PRESSURE: 76 MMHG | HEIGHT: 64 IN | OXYGEN SATURATION: 95 %

## 2021-05-05 DIAGNOSIS — Z13.6 SCREENING FOR ABDOMINAL AORTIC ANEURYSM: ICD-10-CM

## 2021-05-05 DIAGNOSIS — Z79.4 TYPE 2 DIABETES MELLITUS WITH HYPERGLYCEMIA, WITH LONG-TERM CURRENT USE OF INSULIN: ICD-10-CM

## 2021-05-05 DIAGNOSIS — B02.9 HERPES ZOSTER WITHOUT COMPLICATION: Primary | ICD-10-CM

## 2021-05-05 DIAGNOSIS — E11.65 TYPE 2 DIABETES MELLITUS WITH HYPERGLYCEMIA, WITH LONG-TERM CURRENT USE OF INSULIN: ICD-10-CM

## 2021-05-05 DIAGNOSIS — N18.32 STAGE 3B CHRONIC KIDNEY DISEASE: ICD-10-CM

## 2021-05-05 PROCEDURE — 1159F PR MEDICATION LIST DOCUMENTED IN MEDICAL RECORD: ICD-10-PCS | Mod: S$GLB,,, | Performed by: FAMILY MEDICINE

## 2021-05-05 PROCEDURE — 3052F HG A1C>EQUAL 8.0%<EQUAL 9.0%: CPT | Mod: CPTII,S$GLB,, | Performed by: FAMILY MEDICINE

## 2021-05-05 PROCEDURE — 99999 PR PBB SHADOW E&M-EST. PATIENT-LVL V: CPT | Mod: PBBFAC,,, | Performed by: FAMILY MEDICINE

## 2021-05-05 PROCEDURE — 1101F PR PT FALLS ASSESS DOC 0-1 FALLS W/OUT INJ PAST YR: ICD-10-PCS | Mod: CPTII,S$GLB,, | Performed by: FAMILY MEDICINE

## 2021-05-05 PROCEDURE — 1101F PT FALLS ASSESS-DOCD LE1/YR: CPT | Mod: CPTII,S$GLB,, | Performed by: FAMILY MEDICINE

## 2021-05-05 PROCEDURE — 1159F MED LIST DOCD IN RCRD: CPT | Mod: S$GLB,,, | Performed by: FAMILY MEDICINE

## 2021-05-05 PROCEDURE — 3288F PR FALLS RISK ASSESSMENT DOCUMENTED: ICD-10-PCS | Mod: CPTII,S$GLB,, | Performed by: FAMILY MEDICINE

## 2021-05-05 PROCEDURE — 99999 PR PBB SHADOW E&M-EST. PATIENT-LVL V: ICD-10-PCS | Mod: PBBFAC,,, | Performed by: FAMILY MEDICINE

## 2021-05-05 PROCEDURE — 99499 RISK ADDL DX/OHS AUDIT: ICD-10-PCS | Mod: S$GLB,,, | Performed by: FAMILY MEDICINE

## 2021-05-05 PROCEDURE — 1126F AMNT PAIN NOTED NONE PRSNT: CPT | Mod: S$GLB,,, | Performed by: FAMILY MEDICINE

## 2021-05-05 PROCEDURE — 99214 PR OFFICE/OUTPT VISIT, EST, LEVL IV, 30-39 MIN: ICD-10-PCS | Mod: S$GLB,,, | Performed by: FAMILY MEDICINE

## 2021-05-05 PROCEDURE — 99499 UNLISTED E&M SERVICE: CPT | Mod: S$GLB,,, | Performed by: FAMILY MEDICINE

## 2021-05-05 PROCEDURE — 99214 OFFICE O/P EST MOD 30 MIN: CPT | Mod: S$GLB,,, | Performed by: FAMILY MEDICINE

## 2021-05-05 PROCEDURE — 3052F PR MOST RECENT HEMOGLOBIN A1C LEVEL 8.0 - < 9.0%: ICD-10-PCS | Mod: CPTII,S$GLB,, | Performed by: FAMILY MEDICINE

## 2021-05-05 PROCEDURE — 1126F PR PAIN SEVERITY QUANTIFIED, NO PAIN PRESENT: ICD-10-PCS | Mod: S$GLB,,, | Performed by: FAMILY MEDICINE

## 2021-05-05 PROCEDURE — 3288F FALL RISK ASSESSMENT DOCD: CPT | Mod: CPTII,S$GLB,, | Performed by: FAMILY MEDICINE

## 2021-05-05 PROCEDURE — 3008F PR BODY MASS INDEX (BMI) DOCUMENTED: ICD-10-PCS | Mod: CPTII,S$GLB,, | Performed by: FAMILY MEDICINE

## 2021-05-05 PROCEDURE — 3008F BODY MASS INDEX DOCD: CPT | Mod: CPTII,S$GLB,, | Performed by: FAMILY MEDICINE

## 2021-05-05 RX ORDER — INSULIN DEGLUDEC 100 U/ML
48 INJECTION, SOLUTION SUBCUTANEOUS NIGHTLY
Qty: 9 ML | Refills: 0 | Status: SHIPPED | OUTPATIENT
Start: 2021-05-05 | End: 2022-02-14 | Stop reason: SDUPTHER

## 2021-05-06 ENCOUNTER — TELEPHONE (OUTPATIENT)
Dept: FAMILY MEDICINE | Facility: CLINIC | Age: 71
End: 2021-05-06

## 2021-05-06 ENCOUNTER — TELEPHONE (OUTPATIENT)
Dept: ADMINISTRATIVE | Facility: OTHER | Age: 71
End: 2021-05-06

## 2021-05-26 ENCOUNTER — HOSPITAL ENCOUNTER (OUTPATIENT)
Dept: RADIOLOGY | Facility: HOSPITAL | Age: 71
Discharge: HOME | End: 2021-05-26
Attending: FAMILY MEDICINE
Payer: MEDICARE

## 2021-05-26 DIAGNOSIS — Z13.6 SCREENING FOR ABDOMINAL AORTIC ANEURYSM: ICD-10-CM

## 2021-05-26 PROCEDURE — 76775 US EXAM ABDO BACK WALL LIM: CPT | Mod: 26,,, | Performed by: RADIOLOGY

## 2021-05-26 PROCEDURE — 76775 US EXAM ABDO BACK WALL LIM: CPT | Mod: TC

## 2021-05-26 PROCEDURE — 76775 US ABDOMINAL AORTA: ICD-10-PCS | Mod: 26,,, | Performed by: RADIOLOGY

## 2021-05-30 DIAGNOSIS — I25.110 CORONARY ARTERY DISEASE INVOLVING NATIVE CORONARY ARTERY OF NATIVE HEART WITH UNSTABLE ANGINA PECTORIS: ICD-10-CM

## 2021-05-30 RX ORDER — NITROGLYCERIN 0.4 MG/1
0.4 TABLET SUBLINGUAL EVERY 5 MIN PRN
Qty: 25 TABLET | Refills: 12 | Status: CANCELLED | OUTPATIENT
Start: 2021-05-30

## 2021-05-31 RX ORDER — NITROGLYCERIN 0.4 MG/1
0.4 TABLET SUBLINGUAL EVERY 5 MIN PRN
Qty: 25 TABLET | Refills: 11 | Status: SHIPPED | OUTPATIENT
Start: 2021-05-31 | End: 2023-01-23 | Stop reason: SDUPTHER

## 2021-06-22 ENCOUNTER — OFFICE VISIT (OUTPATIENT)
Dept: CARDIOLOGY | Facility: CLINIC | Age: 71
End: 2021-06-22
Payer: MEDICARE

## 2021-06-22 VITALS
OXYGEN SATURATION: 90 % | HEART RATE: 75 BPM | HEIGHT: 64 IN | BODY MASS INDEX: 26.29 KG/M2 | SYSTOLIC BLOOD PRESSURE: 136 MMHG | DIASTOLIC BLOOD PRESSURE: 70 MMHG | WEIGHT: 154 LBS

## 2021-06-22 DIAGNOSIS — K57.90 DIVERTICULAR DISEASE: ICD-10-CM

## 2021-06-22 DIAGNOSIS — J84.10 FIBROSIS OF LUNG: ICD-10-CM

## 2021-06-22 DIAGNOSIS — I70.0 ABDOMINAL AORTIC ATHEROSCLEROSIS: ICD-10-CM

## 2021-06-22 DIAGNOSIS — I25.118 CORONARY ARTERY DISEASE OF NATIVE ARTERY OF NATIVE HEART WITH STABLE ANGINA PECTORIS: ICD-10-CM

## 2021-06-22 DIAGNOSIS — I65.21 INTERNAL CAROTID ARTERY OCCLUSION, RIGHT: ICD-10-CM

## 2021-06-22 DIAGNOSIS — E03.9 ACQUIRED HYPOTHYROIDISM: ICD-10-CM

## 2021-06-22 DIAGNOSIS — Z95.1 S/P CABG (CORONARY ARTERY BYPASS GRAFT): Primary | ICD-10-CM

## 2021-06-22 DIAGNOSIS — I10 ESSENTIAL HYPERTENSION: ICD-10-CM

## 2021-06-22 DIAGNOSIS — N18.30 STAGE 3 CHRONIC KIDNEY DISEASE, UNSPECIFIED WHETHER STAGE 3A OR 3B CKD: ICD-10-CM

## 2021-06-22 DIAGNOSIS — I73.9 PAD (PERIPHERAL ARTERY DISEASE): ICD-10-CM

## 2021-06-22 DIAGNOSIS — I73.9 INTERMITTENT CLAUDICATION: ICD-10-CM

## 2021-06-22 DIAGNOSIS — I77.9 BILATERAL CAROTID ARTERY DISEASE, UNSPECIFIED TYPE: ICD-10-CM

## 2021-06-22 DIAGNOSIS — E78.00 PURE HYPERCHOLESTEROLEMIA: ICD-10-CM

## 2021-06-22 PROCEDURE — 3008F PR BODY MASS INDEX (BMI) DOCUMENTED: ICD-10-PCS | Mod: CPTII,S$GLB,, | Performed by: INTERNAL MEDICINE

## 2021-06-22 PROCEDURE — 1159F PR MEDICATION LIST DOCUMENTED IN MEDICAL RECORD: ICD-10-PCS | Mod: S$GLB,,, | Performed by: INTERNAL MEDICINE

## 2021-06-22 PROCEDURE — 3288F FALL RISK ASSESSMENT DOCD: CPT | Mod: CPTII,S$GLB,, | Performed by: INTERNAL MEDICINE

## 2021-06-22 PROCEDURE — 99999 PR PBB SHADOW E&M-EST. PATIENT-LVL IV: CPT | Mod: PBBFAC,,, | Performed by: INTERNAL MEDICINE

## 2021-06-22 PROCEDURE — 1126F PR PAIN SEVERITY QUANTIFIED, NO PAIN PRESENT: ICD-10-PCS | Mod: S$GLB,,, | Performed by: INTERNAL MEDICINE

## 2021-06-22 PROCEDURE — 93000 ELECTROCARDIOGRAM COMPLETE: CPT | Mod: S$GLB,,, | Performed by: INTERNAL MEDICINE

## 2021-06-22 PROCEDURE — 99499 RISK ADDL DX/OHS AUDIT: ICD-10-PCS | Mod: S$GLB,,, | Performed by: INTERNAL MEDICINE

## 2021-06-22 PROCEDURE — 93000 EKG 12-LEAD: ICD-10-PCS | Mod: S$GLB,,, | Performed by: INTERNAL MEDICINE

## 2021-06-22 PROCEDURE — 1101F PR PT FALLS ASSESS DOC 0-1 FALLS W/OUT INJ PAST YR: ICD-10-PCS | Mod: CPTII,S$GLB,, | Performed by: INTERNAL MEDICINE

## 2021-06-22 PROCEDURE — 1159F MED LIST DOCD IN RCRD: CPT | Mod: S$GLB,,, | Performed by: INTERNAL MEDICINE

## 2021-06-22 PROCEDURE — 99499 UNLISTED E&M SERVICE: CPT | Mod: S$GLB,,, | Performed by: INTERNAL MEDICINE

## 2021-06-22 PROCEDURE — 3288F PR FALLS RISK ASSESSMENT DOCUMENTED: ICD-10-PCS | Mod: CPTII,S$GLB,, | Performed by: INTERNAL MEDICINE

## 2021-06-22 PROCEDURE — 99214 PR OFFICE/OUTPT VISIT, EST, LEVL IV, 30-39 MIN: ICD-10-PCS | Mod: 25,S$GLB,, | Performed by: INTERNAL MEDICINE

## 2021-06-22 PROCEDURE — 3008F BODY MASS INDEX DOCD: CPT | Mod: CPTII,S$GLB,, | Performed by: INTERNAL MEDICINE

## 2021-06-22 PROCEDURE — 1101F PT FALLS ASSESS-DOCD LE1/YR: CPT | Mod: CPTII,S$GLB,, | Performed by: INTERNAL MEDICINE

## 2021-06-22 PROCEDURE — 99999 PR PBB SHADOW E&M-EST. PATIENT-LVL IV: ICD-10-PCS | Mod: PBBFAC,,, | Performed by: INTERNAL MEDICINE

## 2021-06-22 PROCEDURE — 1126F AMNT PAIN NOTED NONE PRSNT: CPT | Mod: S$GLB,,, | Performed by: INTERNAL MEDICINE

## 2021-06-22 PROCEDURE — 99214 OFFICE O/P EST MOD 30 MIN: CPT | Mod: 25,S$GLB,, | Performed by: INTERNAL MEDICINE

## 2021-06-22 RX ORDER — PANTOPRAZOLE SODIUM 40 MG/1
TABLET, DELAYED RELEASE ORAL
COMMUNITY
Start: 2021-06-02 | End: 2022-01-25

## 2021-07-06 ENCOUNTER — PATIENT MESSAGE (OUTPATIENT)
Dept: ADMINISTRATIVE | Facility: HOSPITAL | Age: 71
End: 2021-07-06

## 2021-07-28 ENCOUNTER — PATIENT OUTREACH (OUTPATIENT)
Dept: ADMINISTRATIVE | Facility: HOSPITAL | Age: 71
End: 2021-07-28

## 2021-07-29 ENCOUNTER — PES CALL (OUTPATIENT)
Dept: ADMINISTRATIVE | Facility: CLINIC | Age: 71
End: 2021-07-29

## 2021-08-02 ENCOUNTER — PATIENT OUTREACH (OUTPATIENT)
Dept: ADMINISTRATIVE | Facility: HOSPITAL | Age: 71
End: 2021-08-02

## 2021-08-02 ENCOUNTER — LAB VISIT (OUTPATIENT)
Dept: LAB | Facility: HOSPITAL | Age: 71
End: 2021-08-02
Attending: FAMILY MEDICINE
Payer: MEDICARE

## 2021-08-02 DIAGNOSIS — E11.65 TYPE 2 DIABETES MELLITUS WITH HYPERGLYCEMIA, WITH LONG-TERM CURRENT USE OF INSULIN: ICD-10-CM

## 2021-08-02 DIAGNOSIS — Z79.4 TYPE 2 DIABETES MELLITUS WITH HYPERGLYCEMIA, WITH LONG-TERM CURRENT USE OF INSULIN: ICD-10-CM

## 2021-08-02 DIAGNOSIS — E78.00 PURE HYPERCHOLESTEROLEMIA: ICD-10-CM

## 2021-08-02 DIAGNOSIS — E11.9 TYPE 2 DIABETES MELLITUS WITHOUT COMPLICATION: ICD-10-CM

## 2021-08-02 LAB
ALBUMIN SERPL BCP-MCNC: 3.7 G/DL (ref 3.5–5.2)
ALP SERPL-CCNC: 77 U/L (ref 55–135)
ALT SERPL W/O P-5'-P-CCNC: 30 U/L (ref 10–44)
ANION GAP SERPL CALC-SCNC: 11 MMOL/L (ref 8–16)
AST SERPL-CCNC: 19 U/L (ref 10–40)
BILIRUB SERPL-MCNC: 0.5 MG/DL (ref 0.1–1)
BUN SERPL-MCNC: 20 MG/DL (ref 8–23)
CALCIUM SERPL-MCNC: 10 MG/DL (ref 8.7–10.5)
CHLORIDE SERPL-SCNC: 109 MMOL/L (ref 95–110)
CHOLEST SERPL-MCNC: 199 MG/DL (ref 120–199)
CHOLEST SERPL-MCNC: 199 MG/DL (ref 120–199)
CHOLEST/HDLC SERPL: 3.3 {RATIO} (ref 2–5)
CHOLEST/HDLC SERPL: 3.3 {RATIO} (ref 2–5)
CO2 SERPL-SCNC: 25 MMOL/L (ref 23–29)
CREAT SERPL-MCNC: 1.4 MG/DL (ref 0.5–1.4)
EST. GFR  (AFRICAN AMERICAN): 58 ML/MIN/1.73 M^2
EST. GFR  (NON AFRICAN AMERICAN): 50.2 ML/MIN/1.73 M^2
GLUCOSE SERPL-MCNC: 128 MG/DL (ref 70–110)
HDLC SERPL-MCNC: 60 MG/DL (ref 40–75)
HDLC SERPL-MCNC: 60 MG/DL (ref 40–75)
HDLC SERPL: 30.2 % (ref 20–50)
HDLC SERPL: 30.2 % (ref 20–50)
LDLC SERPL CALC-MCNC: 115.2 MG/DL (ref 63–159)
LDLC SERPL CALC-MCNC: 115.2 MG/DL (ref 63–159)
NONHDLC SERPL-MCNC: 139 MG/DL
NONHDLC SERPL-MCNC: 139 MG/DL
POTASSIUM SERPL-SCNC: 3.9 MMOL/L (ref 3.5–5.1)
PROT SERPL-MCNC: 7.1 G/DL (ref 6–8.4)
SODIUM SERPL-SCNC: 145 MMOL/L (ref 136–145)
TRIGL SERPL-MCNC: 119 MG/DL (ref 30–150)
TRIGL SERPL-MCNC: 119 MG/DL (ref 30–150)

## 2021-08-02 PROCEDURE — 80053 COMPREHEN METABOLIC PANEL: CPT | Performed by: FAMILY MEDICINE

## 2021-08-02 PROCEDURE — 83036 HEMOGLOBIN GLYCOSYLATED A1C: CPT | Performed by: FAMILY MEDICINE

## 2021-08-02 PROCEDURE — 36415 COLL VENOUS BLD VENIPUNCTURE: CPT | Mod: PO | Performed by: FAMILY MEDICINE

## 2021-08-02 PROCEDURE — 80061 LIPID PANEL: CPT | Performed by: FAMILY MEDICINE

## 2021-08-03 ENCOUNTER — LAB VISIT (OUTPATIENT)
Dept: PRIMARY CARE CLINIC | Facility: OTHER | Age: 71
End: 2021-08-03
Attending: INTERNAL MEDICINE
Payer: MEDICARE

## 2021-08-03 DIAGNOSIS — Z20.822 ENCOUNTER FOR LABORATORY TESTING FOR COVID-19 VIRUS: ICD-10-CM

## 2021-08-03 LAB
ESTIMATED AVG GLUCOSE: 186 MG/DL (ref 68–131)
HBA1C MFR BLD: 8.1 % (ref 4–5.6)

## 2021-08-03 PROCEDURE — U0003 INFECTIOUS AGENT DETECTION BY NUCLEIC ACID (DNA OR RNA); SEVERE ACUTE RESPIRATORY SYNDROME CORONAVIRUS 2 (SARS-COV-2) (CORONAVIRUS DISEASE [COVID-19]), AMPLIFIED PROBE TECHNIQUE, MAKING USE OF HIGH THROUGHPUT TECHNOLOGIES AS DESCRIBED BY CMS-2020-01-R: HCPCS | Performed by: INTERNAL MEDICINE

## 2021-08-04 ENCOUNTER — TELEPHONE (OUTPATIENT)
Dept: CARDIOLOGY | Facility: CLINIC | Age: 71
End: 2021-08-04

## 2021-08-04 RX ORDER — EZETIMIBE 10 MG/1
10 TABLET ORAL DAILY
Qty: 90 TABLET | Refills: 3 | Status: SHIPPED | OUTPATIENT
Start: 2021-08-04 | End: 2021-09-27 | Stop reason: SDUPTHER

## 2021-08-06 LAB
SARS-COV-2 RNA RESP QL NAA+PROBE: NOT DETECTED
SARS-COV-2- CYCLE NUMBER: -1

## 2021-08-10 ENCOUNTER — TELEPHONE (OUTPATIENT)
Dept: FAMILY MEDICINE | Facility: CLINIC | Age: 71
End: 2021-08-10

## 2021-08-10 DIAGNOSIS — E78.5 HYPERLIPIDEMIA, UNSPECIFIED HYPERLIPIDEMIA TYPE: ICD-10-CM

## 2021-08-10 RX ORDER — ATORVASTATIN CALCIUM 80 MG/1
80 TABLET, FILM COATED ORAL DAILY
Qty: 90 TABLET | Refills: 3 | Status: CANCELLED | OUTPATIENT
Start: 2021-08-10

## 2021-08-10 RX ORDER — ATORVASTATIN CALCIUM 80 MG/1
80 TABLET, FILM COATED ORAL DAILY
Qty: 90 TABLET | Refills: 3 | Status: SHIPPED | OUTPATIENT
Start: 2021-08-10 | End: 2022-07-18

## 2021-08-16 ENCOUNTER — OFFICE VISIT (OUTPATIENT)
Dept: FAMILY MEDICINE | Facility: CLINIC | Age: 71
End: 2021-08-16
Payer: MEDICARE

## 2021-08-16 VITALS
HEART RATE: 84 BPM | BODY MASS INDEX: 26.46 KG/M2 | SYSTOLIC BLOOD PRESSURE: 128 MMHG | HEIGHT: 64 IN | WEIGHT: 155 LBS | DIASTOLIC BLOOD PRESSURE: 72 MMHG | OXYGEN SATURATION: 95 %

## 2021-08-16 DIAGNOSIS — E03.9 HYPOTHYROIDISM, UNSPECIFIED TYPE: ICD-10-CM

## 2021-08-16 DIAGNOSIS — E11.65 TYPE 2 DIABETES MELLITUS WITH HYPERGLYCEMIA, WITH LONG-TERM CURRENT USE OF INSULIN: Primary | ICD-10-CM

## 2021-08-16 DIAGNOSIS — E78.5 DYSLIPIDEMIA: ICD-10-CM

## 2021-08-16 DIAGNOSIS — Z79.4 TYPE 2 DIABETES MELLITUS WITH HYPERGLYCEMIA, WITH LONG-TERM CURRENT USE OF INSULIN: Primary | ICD-10-CM

## 2021-08-16 DIAGNOSIS — I10 ESSENTIAL HYPERTENSION: ICD-10-CM

## 2021-08-16 PROCEDURE — 1101F PT FALLS ASSESS-DOCD LE1/YR: CPT | Mod: CPTII,S$GLB,, | Performed by: FAMILY MEDICINE

## 2021-08-16 PROCEDURE — 99214 OFFICE O/P EST MOD 30 MIN: CPT | Mod: S$GLB,,, | Performed by: FAMILY MEDICINE

## 2021-08-16 PROCEDURE — 1126F AMNT PAIN NOTED NONE PRSNT: CPT | Mod: CPTII,S$GLB,, | Performed by: FAMILY MEDICINE

## 2021-08-16 PROCEDURE — 99999 PR PBB SHADOW E&M-EST. PATIENT-LVL IV: CPT | Mod: PBBFAC,,, | Performed by: FAMILY MEDICINE

## 2021-08-16 PROCEDURE — 1126F PR PAIN SEVERITY QUANTIFIED, NO PAIN PRESENT: ICD-10-PCS | Mod: CPTII,S$GLB,, | Performed by: FAMILY MEDICINE

## 2021-08-16 PROCEDURE — 1160F RVW MEDS BY RX/DR IN RCRD: CPT | Mod: CPTII,S$GLB,, | Performed by: FAMILY MEDICINE

## 2021-08-16 PROCEDURE — 3052F PR MOST RECENT HEMOGLOBIN A1C LEVEL 8.0 - < 9.0%: ICD-10-PCS | Mod: CPTII,S$GLB,, | Performed by: FAMILY MEDICINE

## 2021-08-16 PROCEDURE — 3008F PR BODY MASS INDEX (BMI) DOCUMENTED: ICD-10-PCS | Mod: CPTII,S$GLB,, | Performed by: FAMILY MEDICINE

## 2021-08-16 PROCEDURE — 3288F FALL RISK ASSESSMENT DOCD: CPT | Mod: CPTII,S$GLB,, | Performed by: FAMILY MEDICINE

## 2021-08-16 PROCEDURE — 3074F SYST BP LT 130 MM HG: CPT | Mod: CPTII,S$GLB,, | Performed by: FAMILY MEDICINE

## 2021-08-16 PROCEDURE — 3052F HG A1C>EQUAL 8.0%<EQUAL 9.0%: CPT | Mod: CPTII,S$GLB,, | Performed by: FAMILY MEDICINE

## 2021-08-16 PROCEDURE — 3078F DIAST BP <80 MM HG: CPT | Mod: CPTII,S$GLB,, | Performed by: FAMILY MEDICINE

## 2021-08-16 PROCEDURE — 99214 PR OFFICE/OUTPT VISIT, EST, LEVL IV, 30-39 MIN: ICD-10-PCS | Mod: S$GLB,,, | Performed by: FAMILY MEDICINE

## 2021-08-16 PROCEDURE — 99999 PR PBB SHADOW E&M-EST. PATIENT-LVL IV: ICD-10-PCS | Mod: PBBFAC,,, | Performed by: FAMILY MEDICINE

## 2021-08-16 PROCEDURE — 3288F PR FALLS RISK ASSESSMENT DOCUMENTED: ICD-10-PCS | Mod: CPTII,S$GLB,, | Performed by: FAMILY MEDICINE

## 2021-08-16 PROCEDURE — 99499 RISK ADDL DX/OHS AUDIT: ICD-10-PCS | Mod: HCNC,S$GLB,, | Performed by: FAMILY MEDICINE

## 2021-08-16 PROCEDURE — 1159F MED LIST DOCD IN RCRD: CPT | Mod: CPTII,S$GLB,, | Performed by: FAMILY MEDICINE

## 2021-08-16 PROCEDURE — 3074F PR MOST RECENT SYSTOLIC BLOOD PRESSURE < 130 MM HG: ICD-10-PCS | Mod: CPTII,S$GLB,, | Performed by: FAMILY MEDICINE

## 2021-08-16 PROCEDURE — 1160F PR REVIEW ALL MEDS BY PRESCRIBER/CLIN PHARMACIST DOCUMENTED: ICD-10-PCS | Mod: CPTII,S$GLB,, | Performed by: FAMILY MEDICINE

## 2021-08-16 PROCEDURE — 3008F BODY MASS INDEX DOCD: CPT | Mod: CPTII,S$GLB,, | Performed by: FAMILY MEDICINE

## 2021-08-16 PROCEDURE — 1159F PR MEDICATION LIST DOCUMENTED IN MEDICAL RECORD: ICD-10-PCS | Mod: CPTII,S$GLB,, | Performed by: FAMILY MEDICINE

## 2021-08-16 PROCEDURE — 99499 UNLISTED E&M SERVICE: CPT | Mod: HCNC,S$GLB,, | Performed by: FAMILY MEDICINE

## 2021-08-16 PROCEDURE — 1101F PR PT FALLS ASSESS DOC 0-1 FALLS W/OUT INJ PAST YR: ICD-10-PCS | Mod: CPTII,S$GLB,, | Performed by: FAMILY MEDICINE

## 2021-08-16 PROCEDURE — 3078F PR MOST RECENT DIASTOLIC BLOOD PRESSURE < 80 MM HG: ICD-10-PCS | Mod: CPTII,S$GLB,, | Performed by: FAMILY MEDICINE

## 2021-08-17 ENCOUNTER — PATIENT OUTREACH (OUTPATIENT)
Dept: ADMINISTRATIVE | Facility: HOSPITAL | Age: 71
End: 2021-08-17

## 2021-08-18 ENCOUNTER — PATIENT MESSAGE (OUTPATIENT)
Dept: FAMILY MEDICINE | Facility: CLINIC | Age: 71
End: 2021-08-18

## 2021-08-19 ENCOUNTER — TELEPHONE (OUTPATIENT)
Dept: FAMILY MEDICINE | Facility: CLINIC | Age: 71
End: 2021-08-19

## 2021-08-24 ENCOUNTER — TELEPHONE (OUTPATIENT)
Dept: FAMILY MEDICINE | Facility: CLINIC | Age: 71
End: 2021-08-24

## 2021-08-24 DIAGNOSIS — E11.9 TYPE 2 DIABETES MELLITUS WITHOUT COMPLICATION, WITHOUT LONG-TERM CURRENT USE OF INSULIN: Primary | ICD-10-CM

## 2021-08-26 ENCOUNTER — PATIENT MESSAGE (OUTPATIENT)
Dept: FAMILY MEDICINE | Facility: CLINIC | Age: 71
End: 2021-08-26

## 2021-09-09 ENCOUNTER — IMMUNIZATION (OUTPATIENT)
Dept: INTERNAL MEDICINE | Facility: CLINIC | Age: 71
End: 2021-09-09
Payer: MEDICARE

## 2021-09-09 DIAGNOSIS — Z23 NEED FOR VACCINATION: Primary | ICD-10-CM

## 2021-09-09 PROCEDURE — 91300 COVID-19, MRNA, LNP-S, PF, 30 MCG/0.3 ML DOSE VACCINE: ICD-10-PCS | Mod: ,,, | Performed by: INTERNAL MEDICINE

## 2021-09-09 PROCEDURE — 91300 COVID-19, MRNA, LNP-S, PF, 30 MCG/0.3 ML DOSE VACCINE: CPT | Mod: ,,, | Performed by: INTERNAL MEDICINE

## 2021-09-09 PROCEDURE — 0003A COVID-19, MRNA, LNP-S, PF, 30 MCG/0.3 ML DOSE VACCINE: CPT | Mod: CV19,,, | Performed by: INTERNAL MEDICINE

## 2021-09-09 PROCEDURE — 0003A COVID-19, MRNA, LNP-S, PF, 30 MCG/0.3 ML DOSE VACCINE: ICD-10-PCS | Mod: CV19,,, | Performed by: INTERNAL MEDICINE

## 2021-09-26 RX ORDER — EZETIMIBE 10 MG/1
10 TABLET ORAL DAILY
Qty: 90 TABLET | Refills: 3 | Status: CANCELLED | OUTPATIENT
Start: 2021-09-26 | End: 2022-09-26

## 2021-09-27 RX ORDER — EZETIMIBE 10 MG/1
10 TABLET ORAL DAILY
Qty: 90 TABLET | Refills: 3 | Status: SHIPPED | OUTPATIENT
Start: 2021-09-27 | End: 2022-07-18

## 2021-10-01 DIAGNOSIS — E11.9 TYPE 2 DIABETES MELLITUS WITHOUT COMPLICATION, WITHOUT LONG-TERM CURRENT USE OF INSULIN: ICD-10-CM

## 2021-10-11 RX ORDER — SEMAGLUTIDE 1.34 MG/ML
0.25 INJECTION, SOLUTION SUBCUTANEOUS WEEKLY
Qty: 1 PEN | Refills: 4 | Status: SHIPPED | OUTPATIENT
Start: 2021-10-11 | End: 2022-01-25

## 2021-10-12 ENCOUNTER — TELEPHONE (OUTPATIENT)
Dept: CARDIOLOGY | Facility: CLINIC | Age: 71
End: 2021-10-12

## 2021-10-12 DIAGNOSIS — M05.10 RHEUMATOID LUNG: Primary | ICD-10-CM

## 2021-10-14 ENCOUNTER — TELEPHONE (OUTPATIENT)
Dept: CARDIOLOGY | Facility: CLINIC | Age: 71
End: 2021-10-14

## 2021-10-14 ENCOUNTER — TELEPHONE (OUTPATIENT)
Dept: FAMILY MEDICINE | Facility: CLINIC | Age: 71
End: 2021-10-14

## 2021-10-20 ENCOUNTER — PATIENT OUTREACH (OUTPATIENT)
Dept: ADMINISTRATIVE | Facility: OTHER | Age: 71
End: 2021-10-20

## 2021-10-21 ENCOUNTER — OFFICE VISIT (OUTPATIENT)
Dept: CARDIOLOGY | Facility: CLINIC | Age: 71
End: 2021-10-21
Payer: MEDICARE

## 2021-10-21 VITALS
WEIGHT: 147.69 LBS | DIASTOLIC BLOOD PRESSURE: 65 MMHG | HEART RATE: 85 BPM | OXYGEN SATURATION: 98 % | SYSTOLIC BLOOD PRESSURE: 111 MMHG | BODY MASS INDEX: 25.35 KG/M2

## 2021-10-21 DIAGNOSIS — M05.10 RHEUMATOID LUNG: Primary | ICD-10-CM

## 2021-10-21 DIAGNOSIS — I25.118 CORONARY ARTERY DISEASE OF NATIVE ARTERY OF NATIVE HEART WITH STABLE ANGINA PECTORIS: ICD-10-CM

## 2021-10-21 DIAGNOSIS — Z95.1 S/P CABG (CORONARY ARTERY BYPASS GRAFT): ICD-10-CM

## 2021-10-21 DIAGNOSIS — E11.9 TYPE 2 DIABETES MELLITUS WITHOUT COMPLICATION, WITHOUT LONG-TERM CURRENT USE OF INSULIN: ICD-10-CM

## 2021-10-21 DIAGNOSIS — I73.9 PAD (PERIPHERAL ARTERY DISEASE): ICD-10-CM

## 2021-10-21 DIAGNOSIS — E11.69 HYPERLIPIDEMIA ASSOCIATED WITH TYPE 2 DIABETES MELLITUS: ICD-10-CM

## 2021-10-21 DIAGNOSIS — E78.5 HYPERLIPIDEMIA ASSOCIATED WITH TYPE 2 DIABETES MELLITUS: ICD-10-CM

## 2021-10-21 DIAGNOSIS — I15.2 HYPERTENSION ASSOCIATED WITH DIABETES: ICD-10-CM

## 2021-10-21 DIAGNOSIS — E11.59 HYPERTENSION ASSOCIATED WITH DIABETES: ICD-10-CM

## 2021-10-21 DIAGNOSIS — I70.0 AORTIC ATHEROSCLEROSIS: ICD-10-CM

## 2021-10-21 PROCEDURE — 1126F PR PAIN SEVERITY QUANTIFIED, NO PAIN PRESENT: ICD-10-PCS | Mod: HCNC,CPTII,S$GLB, | Performed by: INTERNAL MEDICINE

## 2021-10-21 PROCEDURE — 99203 OFFICE O/P NEW LOW 30 MIN: CPT | Mod: HCNC,S$GLB,, | Performed by: INTERNAL MEDICINE

## 2021-10-21 PROCEDURE — 99499 RISK ADDL DX/OHS AUDIT: ICD-10-PCS | Mod: S$GLB,,, | Performed by: INTERNAL MEDICINE

## 2021-10-21 PROCEDURE — 3074F SYST BP LT 130 MM HG: CPT | Mod: HCNC,CPTII,S$GLB, | Performed by: INTERNAL MEDICINE

## 2021-10-21 PROCEDURE — 3008F PR BODY MASS INDEX (BMI) DOCUMENTED: ICD-10-PCS | Mod: HCNC,CPTII,S$GLB, | Performed by: INTERNAL MEDICINE

## 2021-10-21 PROCEDURE — 3052F PR MOST RECENT HEMOGLOBIN A1C LEVEL 8.0 - < 9.0%: ICD-10-PCS | Mod: HCNC,CPTII,S$GLB, | Performed by: INTERNAL MEDICINE

## 2021-10-21 PROCEDURE — 1160F RVW MEDS BY RX/DR IN RCRD: CPT | Mod: HCNC,CPTII,S$GLB, | Performed by: INTERNAL MEDICINE

## 2021-10-21 PROCEDURE — 3074F PR MOST RECENT SYSTOLIC BLOOD PRESSURE < 130 MM HG: ICD-10-PCS | Mod: HCNC,CPTII,S$GLB, | Performed by: INTERNAL MEDICINE

## 2021-10-21 PROCEDURE — 1126F AMNT PAIN NOTED NONE PRSNT: CPT | Mod: HCNC,CPTII,S$GLB, | Performed by: INTERNAL MEDICINE

## 2021-10-21 PROCEDURE — 3078F PR MOST RECENT DIASTOLIC BLOOD PRESSURE < 80 MM HG: ICD-10-PCS | Mod: HCNC,CPTII,S$GLB, | Performed by: INTERNAL MEDICINE

## 2021-10-21 PROCEDURE — 3052F HG A1C>EQUAL 8.0%<EQUAL 9.0%: CPT | Mod: HCNC,CPTII,S$GLB, | Performed by: INTERNAL MEDICINE

## 2021-10-21 PROCEDURE — 99499 UNLISTED E&M SERVICE: CPT | Mod: S$GLB,,, | Performed by: INTERNAL MEDICINE

## 2021-10-21 PROCEDURE — 3008F BODY MASS INDEX DOCD: CPT | Mod: HCNC,CPTII,S$GLB, | Performed by: INTERNAL MEDICINE

## 2021-10-21 PROCEDURE — 99203 PR OFFICE/OUTPT VISIT, NEW, LEVL III, 30-44 MIN: ICD-10-PCS | Mod: HCNC,S$GLB,, | Performed by: INTERNAL MEDICINE

## 2021-10-21 PROCEDURE — 99999 PR PBB SHADOW E&M-EST. PATIENT-LVL V: CPT | Mod: PBBFAC,HCNC,, | Performed by: INTERNAL MEDICINE

## 2021-10-21 PROCEDURE — 1160F PR REVIEW ALL MEDS BY PRESCRIBER/CLIN PHARMACIST DOCUMENTED: ICD-10-PCS | Mod: HCNC,CPTII,S$GLB, | Performed by: INTERNAL MEDICINE

## 2021-10-21 PROCEDURE — 1159F PR MEDICATION LIST DOCUMENTED IN MEDICAL RECORD: ICD-10-PCS | Mod: HCNC,CPTII,S$GLB, | Performed by: INTERNAL MEDICINE

## 2021-10-21 PROCEDURE — 99999 PR PBB SHADOW E&M-EST. PATIENT-LVL V: ICD-10-PCS | Mod: PBBFAC,HCNC,, | Performed by: INTERNAL MEDICINE

## 2021-10-21 PROCEDURE — 3078F DIAST BP <80 MM HG: CPT | Mod: HCNC,CPTII,S$GLB, | Performed by: INTERNAL MEDICINE

## 2021-10-21 PROCEDURE — 1159F MED LIST DOCD IN RCRD: CPT | Mod: HCNC,CPTII,S$GLB, | Performed by: INTERNAL MEDICINE

## 2021-10-21 RX ORDER — SODIUM CHLORIDE 0.9 % (FLUSH) 0.9 %
10 SYRINGE (ML) INJECTION
Status: DISCONTINUED | OUTPATIENT
Start: 2021-10-21 | End: 2021-11-03 | Stop reason: HOSPADM

## 2021-11-02 ENCOUNTER — LAB VISIT (OUTPATIENT)
Dept: LAB | Facility: HOSPITAL | Age: 71
End: 2021-11-02
Attending: INTERNAL MEDICINE
Payer: MEDICARE

## 2021-11-02 DIAGNOSIS — Z01.818 PREOP TESTING: ICD-10-CM

## 2021-11-02 LAB
ANION GAP SERPL CALC-SCNC: 12 MMOL/L (ref 8–16)
BASOPHILS # BLD AUTO: 0.05 K/UL (ref 0–0.2)
BASOPHILS NFR BLD: 0.7 % (ref 0–1.9)
BUN SERPL-MCNC: 12 MG/DL (ref 8–23)
CALCIUM SERPL-MCNC: 9.2 MG/DL (ref 8.7–10.5)
CHLORIDE SERPL-SCNC: 108 MMOL/L (ref 95–110)
CO2 SERPL-SCNC: 22 MMOL/L (ref 23–29)
CREAT SERPL-MCNC: 1.3 MG/DL (ref 0.5–1.4)
DIFFERENTIAL METHOD: ABNORMAL
EOSINOPHIL # BLD AUTO: 0.3 K/UL (ref 0–0.5)
EOSINOPHIL NFR BLD: 3.9 % (ref 0–8)
ERYTHROCYTE [DISTWIDTH] IN BLOOD BY AUTOMATED COUNT: 12.8 % (ref 11.5–14.5)
EST. GFR  (AFRICAN AMERICAN): >60 ML/MIN/1.73 M^2
EST. GFR  (NON AFRICAN AMERICAN): 55 ML/MIN/1.73 M^2
GLUCOSE SERPL-MCNC: 245 MG/DL (ref 70–110)
HCT VFR BLD AUTO: 39.8 % (ref 40–54)
HGB BLD-MCNC: 13.3 G/DL (ref 14–18)
IMM GRANULOCYTES # BLD AUTO: 0.02 K/UL (ref 0–0.04)
IMM GRANULOCYTES NFR BLD AUTO: 0.3 % (ref 0–0.5)
LYMPHOCYTES # BLD AUTO: 1.3 K/UL (ref 1–4.8)
LYMPHOCYTES NFR BLD: 17.1 % (ref 18–48)
MCH RBC QN AUTO: 28.4 PG (ref 27–31)
MCHC RBC AUTO-ENTMCNC: 33.4 G/DL (ref 32–36)
MCV RBC AUTO: 85 FL (ref 82–98)
MONOCYTES # BLD AUTO: 0.6 K/UL (ref 0.3–1)
MONOCYTES NFR BLD: 8.3 % (ref 4–15)
NEUTROPHILS # BLD AUTO: 5.3 K/UL (ref 1.8–7.7)
NEUTROPHILS NFR BLD: 69.7 % (ref 38–73)
NRBC BLD-RTO: 0 /100 WBC
PLATELET # BLD AUTO: 187 K/UL (ref 150–450)
PMV BLD AUTO: 9.6 FL (ref 9.2–12.9)
POTASSIUM SERPL-SCNC: 3.8 MMOL/L (ref 3.5–5.1)
RBC # BLD AUTO: 4.68 M/UL (ref 4.6–6.2)
SODIUM SERPL-SCNC: 142 MMOL/L (ref 136–145)
WBC # BLD AUTO: 7.6 K/UL (ref 3.9–12.7)

## 2021-11-02 PROCEDURE — 85025 COMPLETE CBC W/AUTO DIFF WBC: CPT | Mod: HCNC | Performed by: INTERNAL MEDICINE

## 2021-11-02 PROCEDURE — 80048 BASIC METABOLIC PNL TOTAL CA: CPT | Mod: HCNC | Performed by: INTERNAL MEDICINE

## 2021-11-02 PROCEDURE — 36415 COLL VENOUS BLD VENIPUNCTURE: CPT | Mod: HCNC | Performed by: INTERNAL MEDICINE

## 2021-11-03 ENCOUNTER — HOSPITAL ENCOUNTER (OUTPATIENT)
Facility: HOSPITAL | Age: 71
Discharge: HOME | End: 2021-11-03
Attending: INTERNAL MEDICINE | Admitting: INTERNAL MEDICINE
Payer: MEDICARE

## 2021-11-03 VITALS
WEIGHT: 145 LBS | RESPIRATION RATE: 16 BRPM | BODY MASS INDEX: 24.75 KG/M2 | TEMPERATURE: 97 F | HEART RATE: 71 BPM | DIASTOLIC BLOOD PRESSURE: 75 MMHG | HEIGHT: 64 IN | OXYGEN SATURATION: 99 % | SYSTOLIC BLOOD PRESSURE: 144 MMHG

## 2021-11-03 DIAGNOSIS — M05.10 RHEUMATOID LUNG: ICD-10-CM

## 2021-11-03 DIAGNOSIS — Z01.818 PREOP TESTING: Primary | ICD-10-CM

## 2021-11-03 PROCEDURE — 99152 PR MOD CONSCIOUS SEDATION, SAME PHYS, 5+ YRS, FIRST 15 MIN: ICD-10-PCS | Mod: HCNC,,, | Performed by: INTERNAL MEDICINE

## 2021-11-03 PROCEDURE — 93010 EKG 12-LEAD: ICD-10-PCS | Mod: HCNC,,, | Performed by: INTERNAL MEDICINE

## 2021-11-03 PROCEDURE — 99152 MOD SED SAME PHYS/QHP 5/>YRS: CPT | Mod: HCNC | Performed by: INTERNAL MEDICINE

## 2021-11-03 PROCEDURE — 93010 ELECTROCARDIOGRAM REPORT: CPT | Mod: HCNC,,, | Performed by: INTERNAL MEDICINE

## 2021-11-03 PROCEDURE — 25000003 PHARM REV CODE 250: Mod: HCNC | Performed by: INTERNAL MEDICINE

## 2021-11-03 PROCEDURE — 93451 RIGHT HEART CATH: CPT | Mod: HCNC | Performed by: INTERNAL MEDICINE

## 2021-11-03 PROCEDURE — C1751 CATH, INF, PER/CENT/MIDLINE: HCPCS | Mod: HCNC | Performed by: INTERNAL MEDICINE

## 2021-11-03 PROCEDURE — 93451 RIGHT HEART CATH: CPT | Mod: 26,HCNC,, | Performed by: INTERNAL MEDICINE

## 2021-11-03 PROCEDURE — 63600175 PHARM REV CODE 636 W HCPCS: Mod: HCNC | Performed by: INTERNAL MEDICINE

## 2021-11-03 PROCEDURE — 93451 PR RIGHT HEART CATH O2 SATURATION & CARDIAC OUTPUT: ICD-10-PCS | Mod: 26,HCNC,, | Performed by: INTERNAL MEDICINE

## 2021-11-03 PROCEDURE — 99152 MOD SED SAME PHYS/QHP 5/>YRS: CPT | Mod: HCNC,,, | Performed by: INTERNAL MEDICINE

## 2021-11-03 PROCEDURE — 93005 ELECTROCARDIOGRAM TRACING: CPT | Mod: HCNC

## 2021-11-03 PROCEDURE — C1894 INTRO/SHEATH, NON-LASER: HCPCS | Mod: HCNC | Performed by: INTERNAL MEDICINE

## 2021-11-03 RX ORDER — DIPHENHYDRAMINE HYDROCHLORIDE 50 MG/ML
INJECTION INTRAMUSCULAR; INTRAVENOUS
Status: DISCONTINUED | OUTPATIENT
Start: 2021-11-03 | End: 2021-11-03 | Stop reason: HOSPADM

## 2021-11-03 RX ORDER — FENTANYL CITRATE 50 UG/ML
INJECTION, SOLUTION INTRAMUSCULAR; INTRAVENOUS
Status: DISCONTINUED | OUTPATIENT
Start: 2021-11-03 | End: 2021-11-03 | Stop reason: HOSPADM

## 2021-11-03 RX ORDER — LIDOCAINE HYDROCHLORIDE 10 MG/ML
INJECTION INFILTRATION; PERINEURAL
Status: DISCONTINUED | OUTPATIENT
Start: 2021-11-03 | End: 2021-11-03 | Stop reason: HOSPADM

## 2021-11-03 RX ORDER — SODIUM CHLORIDE 9 MG/ML
INJECTION, SOLUTION INTRAVENOUS
Status: DISCONTINUED | OUTPATIENT
Start: 2021-11-03 | End: 2021-11-03 | Stop reason: HOSPADM

## 2021-11-03 RX ORDER — MIDAZOLAM HYDROCHLORIDE 1 MG/ML
INJECTION, SOLUTION INTRAMUSCULAR; INTRAVENOUS
Status: DISCONTINUED | OUTPATIENT
Start: 2021-11-03 | End: 2021-11-03 | Stop reason: HOSPADM

## 2021-11-03 RX ORDER — ACETAMINOPHEN 325 MG/1
650 TABLET ORAL EVERY 4 HOURS PRN
Status: DISCONTINUED | OUTPATIENT
Start: 2021-11-03 | End: 2021-11-03 | Stop reason: HOSPADM

## 2021-11-03 RX ORDER — HEPARIN SODIUM 200 [USP'U]/100ML
INJECTION, SOLUTION INTRAVENOUS
Status: DISCONTINUED | OUTPATIENT
Start: 2021-11-03 | End: 2021-11-03 | Stop reason: HOSPADM

## 2021-11-04 LAB
HCO3 UR-SCNC: 28.3 MMOL/L (ref 24–28)
PCO2 BLDA: 55.8 MMHG (ref 35–45)
PH SMN: 7.31 [PH] (ref 7.35–7.45)
PO2 BLDA: 35 MMHG (ref 80–100)
POC BE: 2 MMOL/L
POC SATURATED O2: 60 % (ref 95–100)
POC TCO2: 30 MMOL/L (ref 23–27)
SAMPLE: ABNORMAL

## 2021-11-12 ENCOUNTER — TELEPHONE (OUTPATIENT)
Dept: CARDIOLOGY | Facility: CLINIC | Age: 71
End: 2021-11-12
Payer: MEDICARE

## 2021-11-12 ENCOUNTER — LAB VISIT (OUTPATIENT)
Dept: LAB | Facility: HOSPITAL | Age: 71
End: 2021-11-12
Attending: FAMILY MEDICINE
Payer: MEDICARE

## 2021-11-12 DIAGNOSIS — E03.9 HYPOTHYROIDISM, UNSPECIFIED TYPE: ICD-10-CM

## 2021-11-12 DIAGNOSIS — Z79.4 TYPE 2 DIABETES MELLITUS WITH HYPERGLYCEMIA, WITH LONG-TERM CURRENT USE OF INSULIN: ICD-10-CM

## 2021-11-12 DIAGNOSIS — E11.65 TYPE 2 DIABETES MELLITUS WITH HYPERGLYCEMIA, WITH LONG-TERM CURRENT USE OF INSULIN: ICD-10-CM

## 2021-11-12 DIAGNOSIS — I10 ESSENTIAL HYPERTENSION: ICD-10-CM

## 2021-11-12 DIAGNOSIS — E78.5 DYSLIPIDEMIA: ICD-10-CM

## 2021-11-12 LAB
ALBUMIN SERPL BCP-MCNC: 3.9 G/DL (ref 3.5–5.2)
ALP SERPL-CCNC: 107 U/L (ref 55–135)
ALT SERPL W/O P-5'-P-CCNC: 31 U/L (ref 10–44)
ANION GAP SERPL CALC-SCNC: 11 MMOL/L (ref 8–16)
AST SERPL-CCNC: 25 U/L (ref 10–40)
BILIRUB SERPL-MCNC: 0.5 MG/DL (ref 0.1–1)
BUN SERPL-MCNC: 12 MG/DL (ref 8–23)
CALCIUM SERPL-MCNC: 9.8 MG/DL (ref 8.7–10.5)
CHLORIDE SERPL-SCNC: 109 MMOL/L (ref 95–110)
CHOLEST SERPL-MCNC: 131 MG/DL (ref 120–199)
CHOLEST/HDLC SERPL: 3.2 {RATIO} (ref 2–5)
CO2 SERPL-SCNC: 27 MMOL/L (ref 23–29)
CREAT SERPL-MCNC: 1.3 MG/DL (ref 0.5–1.4)
EST. GFR  (AFRICAN AMERICAN): >60 ML/MIN/1.73 M^2
EST. GFR  (NON AFRICAN AMERICAN): 54.9 ML/MIN/1.73 M^2
ESTIMATED AVG GLUCOSE: 157 MG/DL (ref 68–131)
GLUCOSE SERPL-MCNC: 75 MG/DL (ref 70–110)
HBA1C MFR BLD: 7.1 % (ref 4–5.6)
HDLC SERPL-MCNC: 41 MG/DL (ref 40–75)
HDLC SERPL: 31.3 % (ref 20–50)
LDLC SERPL CALC-MCNC: 68.4 MG/DL (ref 63–159)
NONHDLC SERPL-MCNC: 90 MG/DL
POTASSIUM SERPL-SCNC: 3.7 MMOL/L (ref 3.5–5.1)
PROT SERPL-MCNC: 7.1 G/DL (ref 6–8.4)
SODIUM SERPL-SCNC: 147 MMOL/L (ref 136–145)
T4 FREE SERPL-MCNC: 0.99 NG/DL (ref 0.71–1.51)
TRIGL SERPL-MCNC: 108 MG/DL (ref 30–150)
TSH SERPL DL<=0.005 MIU/L-ACNC: 4.91 UIU/ML (ref 0.4–4)

## 2021-11-12 PROCEDURE — 84443 ASSAY THYROID STIM HORMONE: CPT | Mod: HCNC | Performed by: FAMILY MEDICINE

## 2021-11-12 PROCEDURE — 36415 COLL VENOUS BLD VENIPUNCTURE: CPT | Mod: HCNC,PO | Performed by: FAMILY MEDICINE

## 2021-11-12 PROCEDURE — 84439 ASSAY OF FREE THYROXINE: CPT | Mod: HCNC | Performed by: FAMILY MEDICINE

## 2021-11-12 PROCEDURE — 80053 COMPREHEN METABOLIC PANEL: CPT | Mod: HCNC | Performed by: FAMILY MEDICINE

## 2021-11-12 PROCEDURE — 80061 LIPID PANEL: CPT | Mod: HCNC | Performed by: FAMILY MEDICINE

## 2021-11-12 PROCEDURE — 83036 HEMOGLOBIN GLYCOSYLATED A1C: CPT | Mod: HCNC | Performed by: FAMILY MEDICINE

## 2021-11-13 ENCOUNTER — TELEPHONE (OUTPATIENT)
Dept: FAMILY MEDICINE | Facility: CLINIC | Age: 71
End: 2021-11-13
Payer: MEDICARE

## 2021-11-13 DIAGNOSIS — R80.9 MICROALBUMINURIA: Primary | ICD-10-CM

## 2021-11-13 RX ORDER — LISINOPRIL 2.5 MG/1
2.5 TABLET ORAL DAILY
Qty: 90 TABLET | Refills: 3 | Status: SHIPPED | OUTPATIENT
Start: 2021-11-13 | End: 2022-10-18

## 2021-11-15 ENCOUNTER — TELEPHONE (OUTPATIENT)
Dept: FAMILY MEDICINE | Facility: CLINIC | Age: 71
End: 2021-11-15
Payer: MEDICARE

## 2021-11-17 ENCOUNTER — OFFICE VISIT (OUTPATIENT)
Dept: FAMILY MEDICINE | Facility: CLINIC | Age: 71
End: 2021-11-17
Payer: MEDICARE

## 2021-11-17 VITALS
WEIGHT: 151.25 LBS | DIASTOLIC BLOOD PRESSURE: 78 MMHG | OXYGEN SATURATION: 95 % | BODY MASS INDEX: 25.82 KG/M2 | SYSTOLIC BLOOD PRESSURE: 136 MMHG | HEIGHT: 64 IN | HEART RATE: 83 BPM

## 2021-11-17 DIAGNOSIS — E03.8 SUBCLINICAL HYPOTHYROIDISM: ICD-10-CM

## 2021-11-17 DIAGNOSIS — N18.31 STAGE 3A CHRONIC KIDNEY DISEASE: ICD-10-CM

## 2021-11-17 DIAGNOSIS — Z23 NEED FOR INFLUENZA VACCINATION: ICD-10-CM

## 2021-11-17 DIAGNOSIS — N18.31 TYPE 2 DIABETES MELLITUS WITH STAGE 3A CHRONIC KIDNEY DISEASE, WITHOUT LONG-TERM CURRENT USE OF INSULIN: ICD-10-CM

## 2021-11-17 DIAGNOSIS — E11.22 TYPE 2 DIABETES MELLITUS WITH STAGE 3A CHRONIC KIDNEY DISEASE, WITHOUT LONG-TERM CURRENT USE OF INSULIN: ICD-10-CM

## 2021-11-17 DIAGNOSIS — R80.9 MICROALBUMINURIA: Primary | ICD-10-CM

## 2021-11-17 PROCEDURE — 3008F PR BODY MASS INDEX (BMI) DOCUMENTED: ICD-10-PCS | Mod: HCNC,CPTII,S$GLB, | Performed by: FAMILY MEDICINE

## 2021-11-17 PROCEDURE — 3008F BODY MASS INDEX DOCD: CPT | Mod: HCNC,CPTII,S$GLB, | Performed by: FAMILY MEDICINE

## 2021-11-17 PROCEDURE — 90694 FLU VACCINE - QUADRIVALENT - ADJUVANTED: ICD-10-PCS | Mod: HCNC,S$GLB,, | Performed by: FAMILY MEDICINE

## 2021-11-17 PROCEDURE — 4010F ACE/ARB THERAPY RXD/TAKEN: CPT | Mod: HCNC,CPTII,S$GLB, | Performed by: FAMILY MEDICINE

## 2021-11-17 PROCEDURE — 1101F PT FALLS ASSESS-DOCD LE1/YR: CPT | Mod: HCNC,CPTII,S$GLB, | Performed by: FAMILY MEDICINE

## 2021-11-17 PROCEDURE — 1126F PR PAIN SEVERITY QUANTIFIED, NO PAIN PRESENT: ICD-10-PCS | Mod: HCNC,CPTII,S$GLB, | Performed by: FAMILY MEDICINE

## 2021-11-17 PROCEDURE — 1160F RVW MEDS BY RX/DR IN RCRD: CPT | Mod: HCNC,CPTII,S$GLB, | Performed by: FAMILY MEDICINE

## 2021-11-17 PROCEDURE — G0008 ADMIN INFLUENZA VIRUS VAC: HCPCS | Mod: HCNC,S$GLB,, | Performed by: FAMILY MEDICINE

## 2021-11-17 PROCEDURE — 4010F PR ACE/ARB THEARPY RXD/TAKEN: ICD-10-PCS | Mod: HCNC,CPTII,S$GLB, | Performed by: FAMILY MEDICINE

## 2021-11-17 PROCEDURE — 90694 VACC AIIV4 NO PRSRV 0.5ML IM: CPT | Mod: HCNC,S$GLB,, | Performed by: FAMILY MEDICINE

## 2021-11-17 PROCEDURE — G0008 FLU VACCINE - QUADRIVALENT - ADJUVANTED: ICD-10-PCS | Mod: HCNC,S$GLB,, | Performed by: FAMILY MEDICINE

## 2021-11-17 PROCEDURE — 1159F PR MEDICATION LIST DOCUMENTED IN MEDICAL RECORD: ICD-10-PCS | Mod: HCNC,CPTII,S$GLB, | Performed by: FAMILY MEDICINE

## 2021-11-17 PROCEDURE — 3062F POS MACROALBUMINURIA REV: CPT | Mod: HCNC,CPTII,S$GLB, | Performed by: FAMILY MEDICINE

## 2021-11-17 PROCEDURE — 3075F PR MOST RECENT SYSTOLIC BLOOD PRESS GE 130-139MM HG: ICD-10-PCS | Mod: HCNC,CPTII,S$GLB, | Performed by: FAMILY MEDICINE

## 2021-11-17 PROCEDURE — 1159F MED LIST DOCD IN RCRD: CPT | Mod: HCNC,CPTII,S$GLB, | Performed by: FAMILY MEDICINE

## 2021-11-17 PROCEDURE — 3075F SYST BP GE 130 - 139MM HG: CPT | Mod: HCNC,CPTII,S$GLB, | Performed by: FAMILY MEDICINE

## 2021-11-17 PROCEDURE — 3062F PR POS MACROALBUMINURIA RESULT DOCUMENTED/REVIEW: ICD-10-PCS | Mod: HCNC,CPTII,S$GLB, | Performed by: FAMILY MEDICINE

## 2021-11-17 PROCEDURE — 3288F PR FALLS RISK ASSESSMENT DOCUMENTED: ICD-10-PCS | Mod: HCNC,CPTII,S$GLB, | Performed by: FAMILY MEDICINE

## 2021-11-17 PROCEDURE — 3051F PR MOST RECENT HEMOGLOBIN A1C LEVEL 7.0 - < 8.0%: ICD-10-PCS | Mod: HCNC,CPTII,S$GLB, | Performed by: FAMILY MEDICINE

## 2021-11-17 PROCEDURE — 3078F PR MOST RECENT DIASTOLIC BLOOD PRESSURE < 80 MM HG: ICD-10-PCS | Mod: HCNC,CPTII,S$GLB, | Performed by: FAMILY MEDICINE

## 2021-11-17 PROCEDURE — 99999 PR PBB SHADOW E&M-EST. PATIENT-LVL V: CPT | Mod: PBBFAC,HCNC,, | Performed by: FAMILY MEDICINE

## 2021-11-17 PROCEDURE — 99214 PR OFFICE/OUTPT VISIT, EST, LEVL IV, 30-39 MIN: ICD-10-PCS | Mod: HCNC,S$GLB,, | Performed by: FAMILY MEDICINE

## 2021-11-17 PROCEDURE — 3066F NEPHROPATHY DOC TX: CPT | Mod: HCNC,CPTII,S$GLB, | Performed by: FAMILY MEDICINE

## 2021-11-17 PROCEDURE — 3288F FALL RISK ASSESSMENT DOCD: CPT | Mod: HCNC,CPTII,S$GLB, | Performed by: FAMILY MEDICINE

## 2021-11-17 PROCEDURE — 99214 OFFICE O/P EST MOD 30 MIN: CPT | Mod: HCNC,S$GLB,, | Performed by: FAMILY MEDICINE

## 2021-11-17 PROCEDURE — 3078F DIAST BP <80 MM HG: CPT | Mod: HCNC,CPTII,S$GLB, | Performed by: FAMILY MEDICINE

## 2021-11-17 PROCEDURE — 1101F PR PT FALLS ASSESS DOC 0-1 FALLS W/OUT INJ PAST YR: ICD-10-PCS | Mod: HCNC,CPTII,S$GLB, | Performed by: FAMILY MEDICINE

## 2021-11-17 PROCEDURE — 3066F PR DOCUMENTATION OF TREATMENT FOR NEPHROPATHY: ICD-10-PCS | Mod: HCNC,CPTII,S$GLB, | Performed by: FAMILY MEDICINE

## 2021-11-17 PROCEDURE — 3051F HG A1C>EQUAL 7.0%<8.0%: CPT | Mod: HCNC,CPTII,S$GLB, | Performed by: FAMILY MEDICINE

## 2021-11-17 PROCEDURE — 99999 PR PBB SHADOW E&M-EST. PATIENT-LVL V: ICD-10-PCS | Mod: PBBFAC,HCNC,, | Performed by: FAMILY MEDICINE

## 2021-11-17 PROCEDURE — 1160F PR REVIEW ALL MEDS BY PRESCRIBER/CLIN PHARMACIST DOCUMENTED: ICD-10-PCS | Mod: HCNC,CPTII,S$GLB, | Performed by: FAMILY MEDICINE

## 2021-11-17 PROCEDURE — 1126F AMNT PAIN NOTED NONE PRSNT: CPT | Mod: HCNC,CPTII,S$GLB, | Performed by: FAMILY MEDICINE

## 2022-01-07 ENCOUNTER — LAB VISIT (OUTPATIENT)
Dept: PRIMARY CARE CLINIC | Facility: CLINIC | Age: 72
End: 2022-01-07
Payer: MEDICARE

## 2022-01-07 ENCOUNTER — TELEPHONE (OUTPATIENT)
Dept: FAMILY MEDICINE | Facility: CLINIC | Age: 72
End: 2022-01-07
Payer: MEDICARE

## 2022-01-07 DIAGNOSIS — Z20.822 CONTACT WITH AND (SUSPECTED) EXPOSURE TO COVID-19: ICD-10-CM

## 2022-01-07 LAB
CTP QC/QA: YES
SARS-COV-2 AG RESP QL IA.RAPID: NEGATIVE

## 2022-01-07 PROCEDURE — 87811 SARS-COV-2 COVID19 W/OPTIC: CPT

## 2022-01-07 NOTE — TELEPHONE ENCOUNTER
Spoke with patient and provider him with the number for Covid testing patient voiced understanding

## 2022-01-07 NOTE — TELEPHONE ENCOUNTER
----- Message from Oh Harris sent at 1/7/2022  2:10 PM CST -----  Type:  Needs Medical Advice    Who Called: #self  Reason:covid-19 concerns   Would the patient rather a call back or a response via [facility]? call  Best Call Back Number: [phone]  Additional Information: none

## 2022-01-20 DIAGNOSIS — E11.22 TYPE 2 DIABETES MELLITUS WITH STAGE 3A CHRONIC KIDNEY DISEASE, WITHOUT LONG-TERM CURRENT USE OF INSULIN: ICD-10-CM

## 2022-01-20 DIAGNOSIS — N18.31 TYPE 2 DIABETES MELLITUS WITH STAGE 3A CHRONIC KIDNEY DISEASE, WITHOUT LONG-TERM CURRENT USE OF INSULIN: ICD-10-CM

## 2022-01-25 ENCOUNTER — OFFICE VISIT (OUTPATIENT)
Dept: CARDIOLOGY | Facility: CLINIC | Age: 72
End: 2022-01-25
Payer: MEDICARE

## 2022-01-25 VITALS
OXYGEN SATURATION: 93 % | BODY MASS INDEX: 25.35 KG/M2 | HEIGHT: 64 IN | WEIGHT: 148.5 LBS | SYSTOLIC BLOOD PRESSURE: 118 MMHG | DIASTOLIC BLOOD PRESSURE: 60 MMHG | HEART RATE: 77 BPM

## 2022-01-25 DIAGNOSIS — I27.20 PULMONARY HYPERTENSION: ICD-10-CM

## 2022-01-25 DIAGNOSIS — I70.0 ABDOMINAL AORTIC ATHEROSCLEROSIS: ICD-10-CM

## 2022-01-25 DIAGNOSIS — E03.9 ACQUIRED HYPOTHYROIDISM: ICD-10-CM

## 2022-01-25 DIAGNOSIS — I25.118 CORONARY ARTERY DISEASE OF NATIVE ARTERY OF NATIVE HEART WITH STABLE ANGINA PECTORIS: Primary | ICD-10-CM

## 2022-01-25 DIAGNOSIS — I10 ESSENTIAL HYPERTENSION: ICD-10-CM

## 2022-01-25 DIAGNOSIS — I73.9 INTERMITTENT CLAUDICATION: ICD-10-CM

## 2022-01-25 DIAGNOSIS — I65.23 BILATERAL CAROTID ARTERY STENOSIS: ICD-10-CM

## 2022-01-25 DIAGNOSIS — E78.5 HYPERLIPIDEMIA, UNSPECIFIED HYPERLIPIDEMIA TYPE: ICD-10-CM

## 2022-01-25 DIAGNOSIS — I73.9 PAD (PERIPHERAL ARTERY DISEASE): ICD-10-CM

## 2022-01-25 DIAGNOSIS — K57.90 DIVERTICULAR DISEASE: ICD-10-CM

## 2022-01-25 DIAGNOSIS — Z95.1 S/P CABG (CORONARY ARTERY BYPASS GRAFT): ICD-10-CM

## 2022-01-25 DIAGNOSIS — J84.10 FIBROSIS OF LUNG: ICD-10-CM

## 2022-01-25 DIAGNOSIS — R09.89 RIGHT CAROTID BRUIT: ICD-10-CM

## 2022-01-25 PROCEDURE — 99499 UNLISTED E&M SERVICE: CPT | Mod: S$GLB,,, | Performed by: INTERNAL MEDICINE

## 2022-01-25 PROCEDURE — 99999 PR PBB SHADOW E&M-EST. PATIENT-LVL III: CPT | Mod: PBBFAC,HCNC,, | Performed by: INTERNAL MEDICINE

## 2022-01-25 PROCEDURE — 1160F PR REVIEW ALL MEDS BY PRESCRIBER/CLIN PHARMACIST DOCUMENTED: ICD-10-PCS | Mod: HCNC,CPTII,S$GLB, | Performed by: INTERNAL MEDICINE

## 2022-01-25 PROCEDURE — 1160F RVW MEDS BY RX/DR IN RCRD: CPT | Mod: HCNC,CPTII,S$GLB, | Performed by: INTERNAL MEDICINE

## 2022-01-25 PROCEDURE — 1126F AMNT PAIN NOTED NONE PRSNT: CPT | Mod: HCNC,CPTII,S$GLB, | Performed by: INTERNAL MEDICINE

## 2022-01-25 PROCEDURE — 3074F SYST BP LT 130 MM HG: CPT | Mod: HCNC,CPTII,S$GLB, | Performed by: INTERNAL MEDICINE

## 2022-01-25 PROCEDURE — 1126F PR PAIN SEVERITY QUANTIFIED, NO PAIN PRESENT: ICD-10-PCS | Mod: HCNC,CPTII,S$GLB, | Performed by: INTERNAL MEDICINE

## 2022-01-25 PROCEDURE — 99214 OFFICE O/P EST MOD 30 MIN: CPT | Mod: HCNC,S$GLB,, | Performed by: INTERNAL MEDICINE

## 2022-01-25 PROCEDURE — 1159F MED LIST DOCD IN RCRD: CPT | Mod: HCNC,CPTII,S$GLB, | Performed by: INTERNAL MEDICINE

## 2022-01-25 PROCEDURE — 99499 RISK ADDL DX/OHS AUDIT: ICD-10-PCS | Mod: S$GLB,,, | Performed by: INTERNAL MEDICINE

## 2022-01-25 PROCEDURE — 1159F PR MEDICATION LIST DOCUMENTED IN MEDICAL RECORD: ICD-10-PCS | Mod: HCNC,CPTII,S$GLB, | Performed by: INTERNAL MEDICINE

## 2022-01-25 PROCEDURE — 3288F PR FALLS RISK ASSESSMENT DOCUMENTED: ICD-10-PCS | Mod: HCNC,CPTII,S$GLB, | Performed by: INTERNAL MEDICINE

## 2022-01-25 PROCEDURE — 3078F PR MOST RECENT DIASTOLIC BLOOD PRESSURE < 80 MM HG: ICD-10-PCS | Mod: HCNC,CPTII,S$GLB, | Performed by: INTERNAL MEDICINE

## 2022-01-25 PROCEDURE — 3008F PR BODY MASS INDEX (BMI) DOCUMENTED: ICD-10-PCS | Mod: HCNC,CPTII,S$GLB, | Performed by: INTERNAL MEDICINE

## 2022-01-25 PROCEDURE — 3074F PR MOST RECENT SYSTOLIC BLOOD PRESSURE < 130 MM HG: ICD-10-PCS | Mod: HCNC,CPTII,S$GLB, | Performed by: INTERNAL MEDICINE

## 2022-01-25 PROCEDURE — 1101F PR PT FALLS ASSESS DOC 0-1 FALLS W/OUT INJ PAST YR: ICD-10-PCS | Mod: HCNC,CPTII,S$GLB, | Performed by: INTERNAL MEDICINE

## 2022-01-25 PROCEDURE — 3288F FALL RISK ASSESSMENT DOCD: CPT | Mod: HCNC,CPTII,S$GLB, | Performed by: INTERNAL MEDICINE

## 2022-01-25 PROCEDURE — 99214 PR OFFICE/OUTPT VISIT, EST, LEVL IV, 30-39 MIN: ICD-10-PCS | Mod: HCNC,S$GLB,, | Performed by: INTERNAL MEDICINE

## 2022-01-25 PROCEDURE — 1101F PT FALLS ASSESS-DOCD LE1/YR: CPT | Mod: HCNC,CPTII,S$GLB, | Performed by: INTERNAL MEDICINE

## 2022-01-25 PROCEDURE — 99999 PR PBB SHADOW E&M-EST. PATIENT-LVL III: ICD-10-PCS | Mod: PBBFAC,HCNC,, | Performed by: INTERNAL MEDICINE

## 2022-01-25 PROCEDURE — 93000 EKG 12-LEAD: ICD-10-PCS | Mod: HCNC,S$GLB,, | Performed by: INTERNAL MEDICINE

## 2022-01-25 PROCEDURE — 3008F BODY MASS INDEX DOCD: CPT | Mod: HCNC,CPTII,S$GLB, | Performed by: INTERNAL MEDICINE

## 2022-01-25 PROCEDURE — 93000 ELECTROCARDIOGRAM COMPLETE: CPT | Mod: HCNC,S$GLB,, | Performed by: INTERNAL MEDICINE

## 2022-01-25 PROCEDURE — 3078F DIAST BP <80 MM HG: CPT | Mod: HCNC,CPTII,S$GLB, | Performed by: INTERNAL MEDICINE

## 2022-01-25 RX ORDER — LEVOTHYROXINE SODIUM 100 UG/1
100 TABLET ORAL
Qty: 90 TABLET | Refills: 3 | Status: SHIPPED | OUTPATIENT
Start: 2022-01-25 | End: 2023-01-01 | Stop reason: SDUPTHER

## 2022-01-25 RX ORDER — METOPROLOL SUCCINATE 100 MG/1
TABLET, EXTENDED RELEASE ORAL
Qty: 90 TABLET | Refills: 3 | Status: SHIPPED | OUTPATIENT
Start: 2022-01-25 | End: 2023-01-01 | Stop reason: SDUPTHER

## 2022-01-25 NOTE — PROGRESS NOTES
Subjective:      Patient ID: Jose Antonio Allen is a 71 y.o. male.    Chief Complaint: Follow-up and Coronary Artery Disease    HPI:  Walks 15 minutes on treadmill.  Pt wears portable oxygen    Lifts weights.        Review of Systems   Cardiovascular: Positive for dyspnea on exertion (Chronic with any exertion, worse in cold weather.). Negative for chest pain, claudication, irregular heartbeat, leg swelling, near-syncope, orthopnea, palpitations and syncope.        Occasional nausea.    Pulmonologist at [facility] Dr Linares recommended Tyvaso but pt cannot afford it.  [employer] did not approve it.      No hx of sandblasting or asbestos exposure.    Past Medical History:   Diagnosis Date    Angina pectoris     Arthritis     CKD (chronic kidney disease)     Colon polyps 09/14/2018    Coronary artery disease     Diabetes mellitus     Diabetes mellitus, type 2     GERD (gastroesophageal reflux disease)     History of tobacco use     Hyperlipidemia     Hypertension     S/P CABG (coronary artery bypass graft) 1996    Thyroid disease         Past Surgical History:   Procedure Laterality Date    CATARACT EXTRACTION Bilateral 3YRS    COLONOSCOPY W/ POLYPECTOMY  09/14/2018    CORONARY ARTERY BYPASS GRAFT  1996    EYELID SURGERY  03/2012    RIGHT HEART CATHETERIZATION Right 11/3/2021    Procedure: INSERTION, CATHETER, RIGHT HEART;  Surgeon: Temitope Rahman MD;  Location: [facility] CATH LAB/EP;  Service: Cardiology;  Laterality: Right;    VEIN BYPASS SURGERY  1996    VEIN SURGERY Left 2017    PAD    VEIN SURGERY Right 2018    PAD       Family History   Problem Relation Age of Onset    Diabetes Mother     Diabetes Father     Diabetes Sister     Kidney disease Sister     Diabetes Brother     Diabetes Sister     Blindness Neg Hx     Glaucoma Neg Hx     Macular degeneration Neg Hx     Retinal detachment Neg Hx     Strabismus Neg Hx     Stroke Neg Hx     Thyroid disease Neg Hx     Cancer Neg Hx     Cataracts Neg  "Hx     Hypertension Neg Hx        Social History     Socioeconomic History    Marital status:    Tobacco Use    Smoking status: Former Smoker     Packs/day: 2.00     Years: 27.00     Pack years: 54.00     Types: Cigarettes     Quit date:      Years since quittin.0    Smokeless tobacco: Never Used   Substance and Sexual Activity    Alcohol use: Yes     Comment: Occasionally has a drink    Drug use: No    Sexual activity: Yes     Partners: Female       Current Outpatient Medications on File Prior to Visit   Medication Sig Dispense Refill    ACCU-CHEK ADRIEN PLUS METER Misc 1 Product by Other route daily as needed.      ACCU-CHEK SMARTVIEW TEST STRIP Strp       ACCU-CHEK SOFTCLIX LANCETS Misc Inject 100 lancets into the skin daily as needed.      amLODIPine (NORVASC) 10 MG tablet Take 10 mg by mouth once daily.      aspirin 81 MG Chew 81 mg once daily.       atorvastatin (LIPITOR) 80 MG tablet Take 1 tablet (80 mg total) by mouth once daily. 90 tablet 3    b complex vitamins tablet Take 1 tablet by mouth once daily.      DROPLET PEN NEEDLE 32 gauge x 5/32" Ndle Inject 1 Product into the skin daily as needed.      ESBRIET 801 mg Tab       ezetimibe (ZETIA) 10 mg tablet Take 1 tablet (10 mg total) by mouth once daily. 90 tablet 3    insulin degludec (TRESIBA FLEXTOUCH U-100) 100 unit/mL (3 mL) InPn Inject 48 Units into the skin every evening. 9 mL 0    lisinopriL (PRINIVIL,ZESTRIL) 2.5 MG tablet Take 1 tablet (2.5 mg total) by mouth once daily. 90 tablet 3    LUBRICANT EYE DROPS 0.5 % Dpet       nitroGLYCERIN (NITROSTAT) 0.4 MG SL tablet Place 1 tablet (0.4 mg total) under the tongue every 5 (five) minutes as needed for Chest pain. 25 tablet 11    NOVOLOG 100 unit/mL injection Sliding scale      ORENCIA 125 mg/mL Syrg INJECT CONTENTS OF ONE SYRINGE UNDER THE SKIN EVERY 7 DAYS      vitamin D (VITAMIN D3) 1000 units Tab Take 1,000 Units by mouth once daily.      [DISCONTINUED] " "levothyroxine (SYNTHROID) 100 MCG tablet Take 1 tablet (100 mcg total) by mouth before breakfast. 90 tablet 3    [DISCONTINUED] metoprolol succinate (TOPROL-XL) 100 MG 24 hr tablet TAKE 1 TABLET ONE TIME DAILY 90 tablet 3    [DISCONTINUED] leflunomide (ARAVA) 10 MG Tab Take 10 mg by mouth every other day.      [DISCONTINUED] methylcellulose, laxative, 500 mg Tab Take by mouth.      [DISCONTINUED] mirtazapine (REMERON) 7.5 MG Tab Take 1 tablet (7.5 mg total) by mouth every evening. (Patient not taking: Reported on 1/25/2022) 30 tablet 11    [DISCONTINUED] pantoprazole (PROTONIX) 40 MG tablet       [DISCONTINUED] predniSONE (DELTASONE) 5 MG tablet       [DISCONTINUED] semaglutide (OZEMPIC) 0.25 mg or 0.5 mg(2 mg/1.5 mL) pen injector Inject 0.25 mg into the skin once a week. (Patient not taking: Reported on 1/25/2022) 1 pen 4     No current facility-administered medications on file prior to visit.       Review of patient's allergies indicates:  No Known Allergies  Objective:     Vitals:    01/25/22 1037   BP: 118/60   BP Location: Left arm   Patient Position: Sitting   BP Method: Large (Manual)   Pulse: 77   SpO2: (!) 93%   Weight: 67.4 kg (148 lb 7.7 oz)   Height: 5' 4" (1.626 m)        Physical Exam  Vitals reviewed.   Constitutional:       General: He is not in acute distress.     Appearance: He is well-developed and well-nourished. He is not diaphoretic.   Eyes:      General: No scleral icterus.  Neck:      Vascular: No carotid bruit or JVD.   Cardiovascular:      Rate and Rhythm: Regular rhythm.      Heart sounds: Normal heart sounds. No murmur heard.  No friction rub. No gallop.    Pulmonary:      Effort: Pulmonary effort is normal. No respiratory distress.      Breath sounds: Normal breath sounds.   Musculoskeletal:      Right lower leg: Edema (trace) present.      Left lower leg: Edema (trace) present.   Skin:     General: Skin is warm and dry.   Neurological:      Mental Status: He is alert and oriented " to person, place, and time.   Psychiatric:         Mood and Affect: Mood and affect normal.         Behavior: Behavior normal.         Thought Content: Thought content normal.         Judgment: Judgment normal.                ECG: NSR, mild ST sagging, low T waves, reviewed by me, no significant change        Lab Visit on 01/07/2022   Component Date Value Ref Range Status    SARS Coronavirus 2 Antigen 01/07/2022 Negative  Negative Final     Acceptable 01/07/2022 Yes   Final   Lab Visit on 11/12/2021   Component Date Value Ref Range Status    Sodium 11/12/2021 147* 136 - 145 mmol/L Final    Potassium 11/12/2021 3.7  3.5 - 5.1 mmol/L Final    Chloride 11/12/2021 109  95 - 110 mmol/L Final    CO2 11/12/2021 27  23 - 29 mmol/L Final    Glucose 11/12/2021 75  70 - 110 mg/dL Final    BUN 11/12/2021 12  8 - 23 mg/dL Final    Creatinine 11/12/2021 1.3  0.5 - 1.4 mg/dL Final    Calcium 11/12/2021 9.8  8.7 - 10.5 mg/dL Final    Total Protein 11/12/2021 7.1  6.0 - 8.4 g/dL Final    Albumin 11/12/2021 3.9  3.5 - 5.2 g/dL Final    Total Bilirubin 11/12/2021 0.5  0.1 - 1.0 mg/dL Final    Alkaline Phosphatase 11/12/2021 107  55 - 135 U/L Final    AST 11/12/2021 25  10 - 40 U/L Final    ALT 11/12/2021 31  10 - 44 U/L Final    Anion Gap 11/12/2021 11  8 - 16 mmol/L Final    eGFR if African American 11/12/2021 >60.0  >60 mL/min/1.73 m^2 Final    eGFR if non African American 11/12/2021 54.9* >60 mL/min/1.73 m^2 Final    Hemoglobin A1C 11/12/2021 7.1* 4.0 - 5.6 % Final    Estimated Avg Glucose 11/12/2021 157* 68 - 131 mg/dL Final    Cholesterol 11/12/2021 131  120 - 199 mg/dL Final    Triglycerides 11/12/2021 108  30 - 150 mg/dL Final    HDL 11/12/2021 41  40 - 75 mg/dL Final    LDL Cholesterol 11/12/2021 68.4  63.0 - 159.0 mg/dL Final    HDL/Cholesterol Ratio 11/12/2021 31.3  20.0 - 50.0 % Final    Total Cholesterol/HDL Ratio 11/12/2021 3.2  2.0 - 5.0 Final    Non-HDL Cholesterol 11/12/2021  90  mg/dL Final    TSH 11/12/2021 4.914* 0.400 - 4.000 uIU/mL Final    Free T4 11/12/2021 0.99  0.71 - 1.51 ng/dL Final   Lab Visit on 11/12/2021   Component Date Value Ref Range Status    Microalbumin, Urine 11/12/2021 1810.0  ug/mL Final    Creatinine, Urine 11/12/2021 95.0  23.0 - 375.0 mg/dL Final    Microalb/Creat Ratio 11/12/2021 1905.3* 0.0 - 30.0 ug/mg Final   Admission on 11/03/2021, Discharged on 11/03/2021   Component Date Value Ref Range Status    POC PH 11/03/2021 7.312* 7.35 - 7.45 Final    POC PCO2 11/03/2021 55.8* 35 - 45 mmHg Final    POC PO2 11/03/2021 35* 80 - 100 mmHg Final    POC HCO3 11/03/2021 28.3* 24 - 28 mmol/L Final    POC BE 11/03/2021 2  -2 to 2 mmol/L Final    POC SATURATED O2 11/03/2021 60* 95 - 100 % Final    POC TCO2 11/03/2021 30* 23 - 27 mmol/L Final    Sample 11/03/2021 ARTERIAL   Final   Lab Visit on 11/02/2021   Component Date Value Ref Range Status    WBC 11/02/2021 7.60  3.90 - 12.70 K/uL Final    RBC 11/02/2021 4.68  4.60 - 6.20 M/uL Final    Hemoglobin 11/02/2021 13.3* 14.0 - 18.0 g/dL Final    Hematocrit 11/02/2021 39.8* 40.0 - 54.0 % Final    MCV 11/02/2021 85  82 - 98 fL Final    MCH 11/02/2021 28.4  27.0 - 31.0 pg Final    MCHC 11/02/2021 33.4  32.0 - 36.0 g/dL Final    RDW 11/02/2021 12.8  11.5 - 14.5 % Final    Platelets 11/02/2021 187  150 - 450 K/uL Final    MPV 11/02/2021 9.6  9.2 - 12.9 fL Final    Immature Granulocytes 11/02/2021 0.3  0.0 - 0.5 % Final    Gran # (ANC) 11/02/2021 5.3  1.8 - 7.7 K/uL Final    Immature Grans (Abs) 11/02/2021 0.02  0.00 - 0.04 K/uL Final    Lymph # 11/02/2021 1.3  1.0 - 4.8 K/uL Final    Mono # 11/02/2021 0.6  0.3 - 1.0 K/uL Final    Eos # 11/02/2021 0.3  0.0 - 0.5 K/uL Final    Baso # 11/02/2021 0.05  0.00 - 0.20 K/uL Final    nRBC 11/02/2021 0  0 /100 WBC Final    Gran % 11/02/2021 69.7  38.0 - 73.0 % Final    Lymph % 11/02/2021 17.1* 18.0 - 48.0 % Final    Mono % 11/02/2021 8.3  4.0 - 15.0 % Final     Eosinophil % 11/02/2021 3.9  0.0 - 8.0 % Final    Basophil % 11/02/2021 0.7  0.0 - 1.9 % Final    Differential Method 11/02/2021 Automated   Final    Sodium 11/02/2021 142  136 - 145 mmol/L Final    Potassium 11/02/2021 3.8  3.5 - 5.1 mmol/L Final    Chloride 11/02/2021 108  95 - 110 mmol/L Final    CO2 11/02/2021 22* 23 - 29 mmol/L Final    Glucose 11/02/2021 245* 70 - 110 mg/dL Final    BUN 11/02/2021 12  8 - 23 mg/dL Final    Creatinine 11/02/2021 1.3  0.5 - 1.4 mg/dL Final    Calcium 11/02/2021 9.2  8.7 - 10.5 mg/dL Final    Anion Gap 11/02/2021 12  8 - 16 mmol/L Final    eGFR if African American 11/02/2021 >60  >60 mL/min/1.73 m^2 Final    eGFR if non African American 11/02/2021 55* >60 mL/min/1.73 m^2 Final   Lab Visit on 08/03/2021   Component Date Value Ref Range Status    SARS-CoV2 (COVID-19) Qualitative P* 08/03/2021 Not Detected  Not Detected Final    SARS-COV-2- Cycle Number 08/03/2021 -1.00  Not Detected Final   Lab Visit on 08/02/2021   Component Date Value Ref Range Status    Hemoglobin A1C 08/02/2021 8.1* 4.0 - 5.6 % Final    Estimated Avg Glucose 08/02/2021 186* 68 - 131 mg/dL Final    Sodium 08/02/2021 145  136 - 145 mmol/L Final    Potassium 08/02/2021 3.9  3.5 - 5.1 mmol/L Final    Chloride 08/02/2021 109  95 - 110 mmol/L Final    CO2 08/02/2021 25  23 - 29 mmol/L Final    Glucose 08/02/2021 128* 70 - 110 mg/dL Final    BUN 08/02/2021 20  8 - 23 mg/dL Final    Creatinine 08/02/2021 1.4  0.5 - 1.4 mg/dL Final    Calcium 08/02/2021 10.0  8.7 - 10.5 mg/dL Final    Total Protein 08/02/2021 7.1  6.0 - 8.4 g/dL Final    Albumin 08/02/2021 3.7  3.5 - 5.2 g/dL Final    Total Bilirubin 08/02/2021 0.5  0.1 - 1.0 mg/dL Final    Alkaline Phosphatase 08/02/2021 77  55 - 135 U/L Final    AST 08/02/2021 19  10 - 40 U/L Final    ALT 08/02/2021 30  10 - 44 U/L Final    Anion Gap 08/02/2021 11  8 - 16 mmol/L Final    eGFR if  08/02/2021 58.0* >60 mL/min/1.73 m^2  "Final    eGFR if non African American 08/02/2021 50.2* >60 mL/min/1.73 m^2 Final    Cholesterol 08/02/2021 199  120 - 199 mg/dL Final    Triglycerides 08/02/2021 119  30 - 150 mg/dL Final    HDL 08/02/2021 60  40 - 75 mg/dL Final    LDL Cholesterol 08/02/2021 115.2  63.0 - 159.0 mg/dL Final    HDL/Cholesterol Ratio 08/02/2021 30.2  20.0 - 50.0 % Final    Total Cholesterol/HDL Ratio 08/02/2021 3.3  2.0 - 5.0 Final    Non-HDL Cholesterol 08/02/2021 139  mg/dL Final    Cholesterol 08/02/2021 199  120 - 199 mg/dL Final    Triglycerides 08/02/2021 119  30 - 150 mg/dL Final    HDL 08/02/2021 60  40 - 75 mg/dL Final    LDL Cholesterol 08/02/2021 115.2  63.0 - 159.0 mg/dL Final    HDL/Cholesterol Ratio 08/02/2021 30.2  20.0 - 50.0 % Final    Total Cholesterol/HDL Ratio 08/02/2021 3.3  2.0 - 5.0 Final    Non-HDL Cholesterol 08/02/2021 139  mg/dL Final   (      Cardiac catheterization  Order# [id number]  Reading physician: Temitope Rahman MD Ordering physician: Temitope Rahman MD Study date: 11/3/21       Patient Information    Name MRN Description   Jose Antonio Allen [id number] 71 y.o. 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     Date: 11/3/2021  Time: 9:09 AM      Procedures    INSERTION, CATHETER, RIGHT HEART     Result Image Hyperlink     Show images for Cardiac catheterization      Pre Procedure Diagnosis           Seen       External Result " Report    External Result Report     Narrative & Impression    EXAMINATION:  NM MYOCARDIAL PERFUSION SPECT MULTI PHARM     CLINICAL HISTORY:  Chest pain, chronic, high prob of CAD;Coronary artery disease, prior revascularization;  Other forms of angina pectoris     TECHNIQUE:  SPECT-CT images through the heart were acquired following the intravenous administration of 11.4 mCi Tc-99m tetrofosmin at rest. SPECT-CT images were then acquired following the intravenous administration of 31.2 mCi Tc-99m tetrofosmin during a Lexiscan cardiac stress. The clinical stress and ECG portion of the study will be interpreted separately.     COMPARISON:  None available     FINDINGS:  Outward bowing of the septum during systole.  Otherwise, no focal wall motion abnormalities.  Some thinning at the apex but no stress-induced perfusion defects to suggest reversible ischemia no fixed defects to suggest infarct.     Stress and rest end diastolic volumes 35 and 39 mL, respectively. Stress and rest end systolic volumes 11 and 17 mL, respectively. Estimated left ventricular ejection fraction 67% during stress and 56% during rest.     Impression:     1. No convincing scintigraphic evidence of ischemia or infarct.  2. Outward bowing of the septum during systole.  Recommend correlation with echocardiography.  3. Left ventricular ejection fraction 67% during stress and 56% during rest.        Electronically signed by: Nas Cr  Date:                                            01/04/2021  Time:                                           15:54      Encounter    View Encounter               Signed by    Signed Time Phone Pager   Nas Cr II, MD 1/04/2021 15:54 [phone]     Reviewed By    Rey Connelly MD on 1/4/2021 17:21     Stress test only, Regadenoson  Order# [id number]  Reading physician: Rey Connelly MD Ordering physician: Rey Connelly MD Study date: 1/4/21       Reason for Exam  Priority:  Routine  Dx: S/P CABG (coronary artery bypass graft) [Z95.1 (ICD-10-CM)]; Coronary artery disease of native artery of native heart with stable angina pectoris [I25.118 (ICD-10-CM)]; Stable angina pectoris [I20.8 (ICD-10-CM)]     Conclusion         The EKG portion of this study is negative for ischemia.    The patient reported no chest pain during the stress test.    There were no arrhythmias during stress.    ECG Stress Nuclear portion of this study will be reported separately.      Assessment:     1. Coronary artery disease of native artery of native heart with stable angina pectoris    2. Essential hypertension    3. Acquired hypothyroidism    4. Fibrosis of lung    5. Abdominal aortic atherosclerosis    6. Bilateral carotid artery stenosis    7. Hyperlipidemia, unspecified hyperlipidemia type    8. Intermittent claudication    9. S/P CABG (coronary artery bypass graft)    10. Right carotid bruit    11. PAD (peripheral artery disease)    12. Diverticular disease    13. Pulmonary hypertension    13.  DM            Plan:   Jose Antonio was seen today for follow-up and coronary artery disease.    Diagnoses and all orders for this visit:    Coronary artery disease of native artery of native heart with stable angina pectoris  -     CBC Auto Differential; Future  -     Comprehensive Metabolic Panel; Future  -     Lipid Panel; Future  -     TSH; Future    Essential hypertension  -     metoprolol succinate (TOPROL-XL) 100 MG 24 hr tablet; TAKE 1 TABLET ONE TIME DAILY  -     CBC Auto Differential; Future  -     Comprehensive Metabolic Panel; Future  -     Lipid Panel; Future  -     TSH; Future    Acquired hypothyroidism  -     levothyroxine (SYNTHROID) 100 MCG tablet; Take 1 tablet (100 mcg total) by mouth before breakfast.  -     CBC Auto Differential; Future  -     Comprehensive Metabolic Panel; Future  -     Lipid Panel; Future  -     TSH; Future    Fibrosis of lung  -     CBC Auto Differential; Future  -     Comprehensive  Metabolic Panel; Future  -     Lipid Panel; Future  -     TSH; Future    Abdominal aortic atherosclerosis  -     CBC Auto Differential; Future  -     Comprehensive Metabolic Panel; Future  -     Lipid Panel; Future  -     TSH; Future    Bilateral carotid artery stenosis  -     CBC Auto Differential; Future  -     Comprehensive Metabolic Panel; Future  -     Lipid Panel; Future  -     TSH; Future    Hyperlipidemia, unspecified hyperlipidemia type  -     CBC Auto Differential; Future  -     Comprehensive Metabolic Panel; Future  -     Lipid Panel; Future  -     TSH; Future    Intermittent claudication  -     CBC Auto Differential; Future  -     Comprehensive Metabolic Panel; Future  -     Lipid Panel; Future  -     TSH; Future    S/P CABG (coronary artery bypass graft)  -     CBC Auto Differential; Future  -     Comprehensive Metabolic Panel; Future  -     Lipid Panel; Future  -     TSH; Future    Right carotid bruit  -     CBC Auto Differential; Future  -     Comprehensive Metabolic Panel; Future  -     Lipid Panel; Future  -     TSH; Future    PAD (peripheral artery disease)  -     CBC Auto Differential; Future  -     Comprehensive Metabolic Panel; Future  -     Lipid Panel; Future  -     TSH; Future    Diverticular disease  -     CBC Auto Differential; Future  -     Comprehensive Metabolic Panel; Future  -     Lipid Panel; Future  -     TSH; Future    Pulmonary hypertension     Same meds    Cardiac status is stable    HBP is controlled    Edema is a side effect of amlodipine    F/u with pulmonary MD, Dr Linares with [facility]    F/u with PCP    Follow up in about 6 months (around 7/25/2022).

## 2022-02-10 ENCOUNTER — LAB VISIT (OUTPATIENT)
Dept: LAB | Facility: HOSPITAL | Age: 72
End: 2022-02-10
Attending: FAMILY MEDICINE
Payer: MEDICARE

## 2022-02-10 DIAGNOSIS — N18.31 TYPE 2 DIABETES MELLITUS WITH STAGE 3A CHRONIC KIDNEY DISEASE, WITHOUT LONG-TERM CURRENT USE OF INSULIN: ICD-10-CM

## 2022-02-10 DIAGNOSIS — E03.8 SUBCLINICAL HYPOTHYROIDISM: ICD-10-CM

## 2022-02-10 DIAGNOSIS — R80.9 MICROALBUMINURIA: ICD-10-CM

## 2022-02-10 DIAGNOSIS — N18.31 STAGE 3A CHRONIC KIDNEY DISEASE: ICD-10-CM

## 2022-02-10 DIAGNOSIS — E11.22 TYPE 2 DIABETES MELLITUS WITH STAGE 3A CHRONIC KIDNEY DISEASE, WITHOUT LONG-TERM CURRENT USE OF INSULIN: ICD-10-CM

## 2022-02-10 LAB
ALBUMIN SERPL BCP-MCNC: 4 G/DL (ref 3.5–5.2)
ALP SERPL-CCNC: 102 U/L (ref 55–135)
ALT SERPL W/O P-5'-P-CCNC: 54 U/L (ref 10–44)
ANION GAP SERPL CALC-SCNC: 15 MMOL/L (ref 8–16)
AST SERPL-CCNC: 34 U/L (ref 10–40)
BILIRUB SERPL-MCNC: 0.4 MG/DL (ref 0.1–1)
BUN SERPL-MCNC: 31 MG/DL (ref 8–23)
CALCIUM SERPL-MCNC: 10.2 MG/DL (ref 8.7–10.5)
CHLORIDE SERPL-SCNC: 106 MMOL/L (ref 95–110)
CO2 SERPL-SCNC: 24 MMOL/L (ref 23–29)
CREAT SERPL-MCNC: 1.8 MG/DL (ref 0.5–1.4)
EST. GFR  (AFRICAN AMERICAN): 42.8 ML/MIN/1.73 M^2
EST. GFR  (NON AFRICAN AMERICAN): 37 ML/MIN/1.73 M^2
ESTIMATED AVG GLUCOSE: 180 MG/DL (ref 68–131)
GLUCOSE SERPL-MCNC: 169 MG/DL (ref 70–110)
HBA1C MFR BLD: 7.9 % (ref 4–5.6)
POTASSIUM SERPL-SCNC: 4.6 MMOL/L (ref 3.5–5.1)
PROT SERPL-MCNC: 7.7 G/DL (ref 6–8.4)
SODIUM SERPL-SCNC: 145 MMOL/L (ref 136–145)
TSH SERPL DL<=0.005 MIU/L-ACNC: 3 UIU/ML (ref 0.4–4)

## 2022-02-10 PROCEDURE — 80053 COMPREHEN METABOLIC PANEL: CPT | Mod: HCNC | Performed by: FAMILY MEDICINE

## 2022-02-10 PROCEDURE — 83036 HEMOGLOBIN GLYCOSYLATED A1C: CPT | Mod: HCNC | Performed by: FAMILY MEDICINE

## 2022-02-10 PROCEDURE — 84443 ASSAY THYROID STIM HORMONE: CPT | Mod: HCNC | Performed by: FAMILY MEDICINE

## 2022-02-10 PROCEDURE — 36415 COLL VENOUS BLD VENIPUNCTURE: CPT | Mod: HCNC,PO | Performed by: FAMILY MEDICINE

## 2022-02-13 ENCOUNTER — TELEPHONE (OUTPATIENT)
Dept: FAMILY MEDICINE | Facility: CLINIC | Age: 72
End: 2022-02-13
Payer: MEDICARE

## 2022-02-14 ENCOUNTER — OFFICE VISIT (OUTPATIENT)
Dept: FAMILY MEDICINE | Facility: CLINIC | Age: 72
End: 2022-02-14
Payer: MEDICARE

## 2022-02-14 VITALS
SYSTOLIC BLOOD PRESSURE: 130 MMHG | HEIGHT: 64 IN | OXYGEN SATURATION: 95 % | DIASTOLIC BLOOD PRESSURE: 60 MMHG | WEIGHT: 147.69 LBS | HEART RATE: 80 BPM | BODY MASS INDEX: 25.21 KG/M2

## 2022-02-14 DIAGNOSIS — E11.65 TYPE 2 DIABETES MELLITUS WITH HYPERGLYCEMIA, WITH LONG-TERM CURRENT USE OF INSULIN: ICD-10-CM

## 2022-02-14 DIAGNOSIS — Z79.4 TYPE 2 DIABETES MELLITUS WITH HYPERGLYCEMIA, WITH LONG-TERM CURRENT USE OF INSULIN: ICD-10-CM

## 2022-02-14 DIAGNOSIS — E03.9 HYPOTHYROIDISM, UNSPECIFIED TYPE: ICD-10-CM

## 2022-02-14 DIAGNOSIS — M05.9 RHEUMATOID ARTHRITIS WITH RHEUMATOID FACTOR, UNSPECIFIED: Primary | ICD-10-CM

## 2022-02-14 DIAGNOSIS — E11.22 TYPE 2 DIABETES MELLITUS WITH STAGE 3A CHRONIC KIDNEY DISEASE, WITHOUT LONG-TERM CURRENT USE OF INSULIN: ICD-10-CM

## 2022-02-14 DIAGNOSIS — N18.31 TYPE 2 DIABETES MELLITUS WITH STAGE 3A CHRONIC KIDNEY DISEASE, WITHOUT LONG-TERM CURRENT USE OF INSULIN: ICD-10-CM

## 2022-02-14 DIAGNOSIS — N18.32 STAGE 3B CHRONIC KIDNEY DISEASE: ICD-10-CM

## 2022-02-14 DIAGNOSIS — J44.9 CHRONIC OBSTRUCTIVE PULMONARY DISEASE, UNSPECIFIED COPD TYPE: ICD-10-CM

## 2022-02-14 PROCEDURE — 3075F SYST BP GE 130 - 139MM HG: CPT | Mod: HCNC,CPTII,S$GLB, | Performed by: FAMILY MEDICINE

## 2022-02-14 PROCEDURE — 1126F AMNT PAIN NOTED NONE PRSNT: CPT | Mod: HCNC,CPTII,S$GLB, | Performed by: FAMILY MEDICINE

## 2022-02-14 PROCEDURE — 99999 PR PBB SHADOW E&M-EST. PATIENT-LVL IV: ICD-10-PCS | Mod: PBBFAC,HCNC,, | Performed by: FAMILY MEDICINE

## 2022-02-14 PROCEDURE — 1101F PR PT FALLS ASSESS DOC 0-1 FALLS W/OUT INJ PAST YR: ICD-10-PCS | Mod: HCNC,CPTII,S$GLB, | Performed by: FAMILY MEDICINE

## 2022-02-14 PROCEDURE — 99499 UNLISTED E&M SERVICE: CPT | Mod: S$GLB,,, | Performed by: FAMILY MEDICINE

## 2022-02-14 PROCEDURE — 4010F PR ACE/ARB THEARPY RXD/TAKEN: ICD-10-PCS | Mod: HCNC,CPTII,S$GLB, | Performed by: FAMILY MEDICINE

## 2022-02-14 PROCEDURE — 1159F PR MEDICATION LIST DOCUMENTED IN MEDICAL RECORD: ICD-10-PCS | Mod: HCNC,CPTII,S$GLB, | Performed by: FAMILY MEDICINE

## 2022-02-14 PROCEDURE — 3288F FALL RISK ASSESSMENT DOCD: CPT | Mod: HCNC,CPTII,S$GLB, | Performed by: FAMILY MEDICINE

## 2022-02-14 PROCEDURE — 3051F PR MOST RECENT HEMOGLOBIN A1C LEVEL 7.0 - < 8.0%: ICD-10-PCS | Mod: HCNC,CPTII,S$GLB, | Performed by: FAMILY MEDICINE

## 2022-02-14 PROCEDURE — 3008F BODY MASS INDEX DOCD: CPT | Mod: HCNC,CPTII,S$GLB, | Performed by: FAMILY MEDICINE

## 2022-02-14 PROCEDURE — 4010F ACE/ARB THERAPY RXD/TAKEN: CPT | Mod: HCNC,CPTII,S$GLB, | Performed by: FAMILY MEDICINE

## 2022-02-14 PROCEDURE — 99214 OFFICE O/P EST MOD 30 MIN: CPT | Mod: HCNC,S$GLB,, | Performed by: FAMILY MEDICINE

## 2022-02-14 PROCEDURE — 1101F PT FALLS ASSESS-DOCD LE1/YR: CPT | Mod: HCNC,CPTII,S$GLB, | Performed by: FAMILY MEDICINE

## 2022-02-14 PROCEDURE — 3051F HG A1C>EQUAL 7.0%<8.0%: CPT | Mod: HCNC,CPTII,S$GLB, | Performed by: FAMILY MEDICINE

## 2022-02-14 PROCEDURE — 3078F PR MOST RECENT DIASTOLIC BLOOD PRESSURE < 80 MM HG: ICD-10-PCS | Mod: HCNC,CPTII,S$GLB, | Performed by: FAMILY MEDICINE

## 2022-02-14 PROCEDURE — 3075F PR MOST RECENT SYSTOLIC BLOOD PRESS GE 130-139MM HG: ICD-10-PCS | Mod: HCNC,CPTII,S$GLB, | Performed by: FAMILY MEDICINE

## 2022-02-14 PROCEDURE — 99214 PR OFFICE/OUTPT VISIT, EST, LEVL IV, 30-39 MIN: ICD-10-PCS | Mod: HCNC,S$GLB,, | Performed by: FAMILY MEDICINE

## 2022-02-14 PROCEDURE — 1159F MED LIST DOCD IN RCRD: CPT | Mod: HCNC,CPTII,S$GLB, | Performed by: FAMILY MEDICINE

## 2022-02-14 PROCEDURE — 3078F DIAST BP <80 MM HG: CPT | Mod: HCNC,CPTII,S$GLB, | Performed by: FAMILY MEDICINE

## 2022-02-14 PROCEDURE — 1126F PR PAIN SEVERITY QUANTIFIED, NO PAIN PRESENT: ICD-10-PCS | Mod: HCNC,CPTII,S$GLB, | Performed by: FAMILY MEDICINE

## 2022-02-14 PROCEDURE — 99499 RISK ADDL DX/OHS AUDIT: ICD-10-PCS | Mod: S$GLB,,, | Performed by: FAMILY MEDICINE

## 2022-02-14 PROCEDURE — 3008F PR BODY MASS INDEX (BMI) DOCUMENTED: ICD-10-PCS | Mod: HCNC,CPTII,S$GLB, | Performed by: FAMILY MEDICINE

## 2022-02-14 PROCEDURE — 3288F PR FALLS RISK ASSESSMENT DOCUMENTED: ICD-10-PCS | Mod: HCNC,CPTII,S$GLB, | Performed by: FAMILY MEDICINE

## 2022-02-14 PROCEDURE — 99999 PR PBB SHADOW E&M-EST. PATIENT-LVL IV: CPT | Mod: PBBFAC,HCNC,, | Performed by: FAMILY MEDICINE

## 2022-02-14 RX ORDER — INSULIN DEGLUDEC 100 U/ML
50 INJECTION, SOLUTION SUBCUTANEOUS NIGHTLY
Qty: 15 PEN | Refills: 3 | Status: SHIPPED | OUTPATIENT
Start: 2022-02-14 | End: 2023-02-22

## 2022-02-14 NOTE — TELEPHONE ENCOUNTER
Elevated blood sugar for the last 3 months .  Diet and physical activity to improve glucose control.  A1c was slightly elevated in comparison with previous reports.     Thyroid test was normal.    Decreased kidney function but stable in comparison with previous reports .    Increase fluid intake.  Avoid over-the-counter medicines like naproxen or ibuprofen.    Please notify the results to the patient.

## 2022-02-14 NOTE — PROGRESS NOTES
Subjective:       Patient ID: Jose Antonio Allen is a 71 y.o. male.    Chief Complaint: Diabetes    71 years old male came to the clinic for diabetes follow-up.  Last A1c was higher than before.  Patient with decreased kidney function but stable in comparison with previous reports .  No recent flare ups of rheumatoid arthritis.  Patient with good compliance with his thyroid medicine.    Review of Systems   Constitutional: Negative.    HENT: Negative.    Eyes: Negative.    Respiratory: Negative.    Cardiovascular: Negative.    Gastrointestinal: Negative.    Endocrine: Negative for polydipsia, polyphagia and polyuria.   Genitourinary: Negative.    Musculoskeletal: Negative.    Integumentary:  Negative.   Neurological: Negative.    Psychiatric/Behavioral: Negative.          Objective:      Physical Exam  Vitals and nursing note reviewed.   Constitutional:       General: He is not in acute distress.     Appearance: He is well-developed and well-nourished. He is not diaphoretic.   HENT:      Head: Normocephalic and atraumatic.      Right Ear: External ear normal.      Left Ear: External ear normal.      Nose: Nose normal.      Mouth/Throat:      Mouth: Oropharynx is clear and moist.      Pharynx: No oropharyngeal exudate.   Eyes:      General: No scleral icterus.        Right eye: No discharge.         Left eye: No discharge.      Extraocular Movements: EOM normal.      Conjunctiva/sclera: Conjunctivae normal.      Pupils: Pupils are equal, round, and reactive to light.   Neck:      Thyroid: No thyromegaly.      Vascular: No JVD.      Trachea: No tracheal deviation.   Cardiovascular:      Rate and Rhythm: Normal rate and regular rhythm.      Pulses: Intact distal pulses.      Heart sounds: Normal heart sounds. No murmur heard.  No friction rub. No gallop.    Pulmonary:      Effort: Pulmonary effort is normal. No respiratory distress.      Breath sounds: Normal breath sounds. No stridor. No wheezing or rales.   Chest:       Chest wall: No tenderness.   Abdominal:      General: Bowel sounds are normal. There is no distension.      Palpations: Abdomen is soft. There is no mass.      Tenderness: There is no abdominal tenderness. There is no guarding or rebound.   Musculoskeletal:         General: No tenderness or edema. Normal range of motion.      Cervical back: Normal range of motion and neck supple.   Lymphadenopathy:      Cervical: No cervical adenopathy.   Skin:     General: Skin is warm and dry.      Coloration: Skin is not pale.      Findings: No erythema or rash.   Neurological:      Mental Status: He is alert and oriented to person, place, and time.      Cranial Nerves: No cranial nerve deficit.      Motor: No abnormal muscle tone.      Coordination: Coordination normal.      Deep Tendon Reflexes: Reflexes are normal and symmetric. Reflexes normal.   Psychiatric:         Mood and Affect: Mood and affect normal.         Behavior: Behavior normal.         Thought Content: Thought content normal.         Judgment: Judgment normal.         Assessment:       Problem List Items Addressed This Visit     CKD (chronic kidney disease) stage 3, GFR 30-59 ml/min    Relevant Orders    Comprehensive Metabolic Panel    Type 2 diabetes mellitus with stage 3a chronic kidney disease, without long-term current use of insulin    Relevant Medications    insulin degludec (TRESIBA FLEXTOUCH U-100) 100 unit/mL (3 mL) insulin pen    Other Relevant Orders    Comprehensive Metabolic Panel    Hemoglobin A1C    Lipid Panel    Microalbumin/Creatinine Ratio, Urine    Rheumatoid arthritis with rheumatoid factor, unspecified - Primary    Chronic obstructive pulmonary disease, unspecified COPD type      Other Visit Diagnoses     Type 2 diabetes mellitus with hyperglycemia, with long-term current use of insulin        Relevant Medications    insulin degludec (TRESIBA FLEXTOUCH U-100) 100 unit/mL (3 mL) insulin pen    Other Relevant Orders    Comprehensive  Metabolic Panel    Hemoglobin A1C    Lipid Panel    Microalbumin/Creatinine Ratio, Urine    Hypothyroidism, unspecified type        Relevant Orders    TSH          Plan:         Jose Antonio was seen today for diabetes.    Diagnoses and all orders for this visit:    Rheumatoid arthritis with rheumatoid factor, unspecified    Chronic obstructive pulmonary disease, unspecified COPD type    Stage 3b chronic kidney disease  -     Comprehensive Metabolic Panel; Future    Type 2 diabetes mellitus with stage 3a chronic kidney disease, without long-term current use of insulin  -     Comprehensive Metabolic Panel; Future  -     Hemoglobin A1C; Future  -     Lipid Panel; Future  -     Microalbumin/Creatinine Ratio, Urine; Future    Type 2 diabetes mellitus with hyperglycemia, with long-term current use of insulin  -     insulin degludec (TRESIBA FLEXTOUCH U-100) 100 unit/mL (3 mL) insulin pen; Inject 50 Units into the skin every evening.  -     Comprehensive Metabolic Panel; Future  -     Hemoglobin A1C; Future  -     Lipid Panel; Future  -     Microalbumin/Creatinine Ratio, Urine; Future    Hypothyroidism, unspecified type  -     TSH; Future    Continue monitoring blood sugar at home,ADA diet.

## 2022-02-14 NOTE — TELEPHONE ENCOUNTER
Spoke with patient and informed him that he has Elevated blood sugar for the last 3 months .  Diet and physical activity to improve glucose control.  A1c was slightly elevated in comparison with previous reports.     Thyroid test was normal.     Decreased kidney function but stable in comparison with previous reports .     Increase fluid intake.  Avoid over-the-counter medicines like naproxen or ibuprofen. Patient voiced understanding

## 2022-02-23 DIAGNOSIS — D84.9 IMMUNOSUPPRESSED STATUS: ICD-10-CM

## 2022-03-08 ENCOUNTER — TELEPHONE (OUTPATIENT)
Dept: CARDIOLOGY | Facility: CLINIC | Age: 72
End: 2022-03-08
Payer: MEDICARE

## 2022-03-08 ENCOUNTER — PATIENT OUTREACH (OUTPATIENT)
Dept: ADMINISTRATIVE | Facility: OTHER | Age: 72
End: 2022-03-08
Payer: MEDICARE

## 2022-03-08 ENCOUNTER — OFFICE VISIT (OUTPATIENT)
Dept: CARDIOLOGY | Facility: CLINIC | Age: 72
End: 2022-03-08
Payer: MEDICARE

## 2022-03-08 VITALS
DIASTOLIC BLOOD PRESSURE: 62 MMHG | HEART RATE: 71 BPM | BODY MASS INDEX: 25.14 KG/M2 | OXYGEN SATURATION: 94 % | WEIGHT: 147.25 LBS | SYSTOLIC BLOOD PRESSURE: 108 MMHG | HEIGHT: 64 IN

## 2022-03-08 DIAGNOSIS — I25.118 CORONARY ARTERY DISEASE OF NATIVE ARTERY OF NATIVE HEART WITH STABLE ANGINA PECTORIS: ICD-10-CM

## 2022-03-08 DIAGNOSIS — I20.89 OTHER FORMS OF ANGINA PECTORIS: ICD-10-CM

## 2022-03-08 DIAGNOSIS — I77.9 BILATERAL CAROTID ARTERY DISEASE, UNSPECIFIED TYPE: ICD-10-CM

## 2022-03-08 DIAGNOSIS — Z95.1 S/P CABG (CORONARY ARTERY BYPASS GRAFT): ICD-10-CM

## 2022-03-08 DIAGNOSIS — E78.5 HYPERLIPIDEMIA, UNSPECIFIED HYPERLIPIDEMIA TYPE: ICD-10-CM

## 2022-03-08 DIAGNOSIS — M05.9 RHEUMATOID ARTHRITIS WITH RHEUMATOID FACTOR, UNSPECIFIED: ICD-10-CM

## 2022-03-08 DIAGNOSIS — N18.30 STAGE 3 CHRONIC KIDNEY DISEASE, UNSPECIFIED WHETHER STAGE 3A OR 3B CKD: ICD-10-CM

## 2022-03-08 DIAGNOSIS — I70.0 ABDOMINAL AORTIC ATHEROSCLEROSIS: ICD-10-CM

## 2022-03-08 DIAGNOSIS — I20.9 ANGINA PECTORIS: ICD-10-CM

## 2022-03-08 DIAGNOSIS — I73.9 INTERMITTENT CLAUDICATION: ICD-10-CM

## 2022-03-08 DIAGNOSIS — R09.89 RIGHT CAROTID BRUIT: ICD-10-CM

## 2022-03-08 DIAGNOSIS — J84.10 FIBROSIS OF LUNG: Primary | ICD-10-CM

## 2022-03-08 DIAGNOSIS — I65.21 INTERNAL CAROTID ARTERY OCCLUSION, RIGHT: ICD-10-CM

## 2022-03-08 DIAGNOSIS — I73.9 PAD (PERIPHERAL ARTERY DISEASE): ICD-10-CM

## 2022-03-08 DIAGNOSIS — I10 ESSENTIAL HYPERTENSION: ICD-10-CM

## 2022-03-08 PROCEDURE — 3074F PR MOST RECENT SYSTOLIC BLOOD PRESSURE < 130 MM HG: ICD-10-PCS | Mod: CPTII,S$GLB,, | Performed by: INTERNAL MEDICINE

## 2022-03-08 PROCEDURE — 3074F SYST BP LT 130 MM HG: CPT | Mod: CPTII,S$GLB,, | Performed by: INTERNAL MEDICINE

## 2022-03-08 PROCEDURE — 1126F PR PAIN SEVERITY QUANTIFIED, NO PAIN PRESENT: ICD-10-PCS | Mod: CPTII,S$GLB,, | Performed by: INTERNAL MEDICINE

## 2022-03-08 PROCEDURE — 1160F PR REVIEW ALL MEDS BY PRESCRIBER/CLIN PHARMACIST DOCUMENTED: ICD-10-PCS | Mod: CPTII,S$GLB,, | Performed by: INTERNAL MEDICINE

## 2022-03-08 PROCEDURE — 99214 OFFICE O/P EST MOD 30 MIN: CPT | Mod: 25,S$GLB,, | Performed by: INTERNAL MEDICINE

## 2022-03-08 PROCEDURE — 93000 EKG 12-LEAD: ICD-10-PCS | Mod: S$GLB,,, | Performed by: INTERNAL MEDICINE

## 2022-03-08 PROCEDURE — 99999 PR PBB SHADOW E&M-EST. PATIENT-LVL IV: ICD-10-PCS | Mod: PBBFAC,,, | Performed by: INTERNAL MEDICINE

## 2022-03-08 PROCEDURE — 4010F PR ACE/ARB THEARPY RXD/TAKEN: ICD-10-PCS | Mod: CPTII,S$GLB,, | Performed by: INTERNAL MEDICINE

## 2022-03-08 PROCEDURE — 3008F PR BODY MASS INDEX (BMI) DOCUMENTED: ICD-10-PCS | Mod: CPTII,S$GLB,, | Performed by: INTERNAL MEDICINE

## 2022-03-08 PROCEDURE — 3051F PR MOST RECENT HEMOGLOBIN A1C LEVEL 7.0 - < 8.0%: ICD-10-PCS | Mod: CPTII,S$GLB,, | Performed by: INTERNAL MEDICINE

## 2022-03-08 PROCEDURE — 99214 PR OFFICE/OUTPT VISIT, EST, LEVL IV, 30-39 MIN: ICD-10-PCS | Mod: 25,S$GLB,, | Performed by: INTERNAL MEDICINE

## 2022-03-08 PROCEDURE — 3008F BODY MASS INDEX DOCD: CPT | Mod: CPTII,S$GLB,, | Performed by: INTERNAL MEDICINE

## 2022-03-08 PROCEDURE — 3078F DIAST BP <80 MM HG: CPT | Mod: CPTII,S$GLB,, | Performed by: INTERNAL MEDICINE

## 2022-03-08 PROCEDURE — 1159F PR MEDICATION LIST DOCUMENTED IN MEDICAL RECORD: ICD-10-PCS | Mod: CPTII,S$GLB,, | Performed by: INTERNAL MEDICINE

## 2022-03-08 PROCEDURE — 99999 PR PBB SHADOW E&M-EST. PATIENT-LVL IV: CPT | Mod: PBBFAC,,, | Performed by: INTERNAL MEDICINE

## 2022-03-08 PROCEDURE — 3078F PR MOST RECENT DIASTOLIC BLOOD PRESSURE < 80 MM HG: ICD-10-PCS | Mod: CPTII,S$GLB,, | Performed by: INTERNAL MEDICINE

## 2022-03-08 PROCEDURE — 1101F PR PT FALLS ASSESS DOC 0-1 FALLS W/OUT INJ PAST YR: ICD-10-PCS | Mod: CPTII,S$GLB,, | Performed by: INTERNAL MEDICINE

## 2022-03-08 PROCEDURE — 3051F HG A1C>EQUAL 7.0%<8.0%: CPT | Mod: CPTII,S$GLB,, | Performed by: INTERNAL MEDICINE

## 2022-03-08 PROCEDURE — 1101F PT FALLS ASSESS-DOCD LE1/YR: CPT | Mod: CPTII,S$GLB,, | Performed by: INTERNAL MEDICINE

## 2022-03-08 PROCEDURE — 3288F PR FALLS RISK ASSESSMENT DOCUMENTED: ICD-10-PCS | Mod: CPTII,S$GLB,, | Performed by: INTERNAL MEDICINE

## 2022-03-08 PROCEDURE — 1126F AMNT PAIN NOTED NONE PRSNT: CPT | Mod: CPTII,S$GLB,, | Performed by: INTERNAL MEDICINE

## 2022-03-08 PROCEDURE — 3288F FALL RISK ASSESSMENT DOCD: CPT | Mod: CPTII,S$GLB,, | Performed by: INTERNAL MEDICINE

## 2022-03-08 PROCEDURE — 1160F RVW MEDS BY RX/DR IN RCRD: CPT | Mod: CPTII,S$GLB,, | Performed by: INTERNAL MEDICINE

## 2022-03-08 PROCEDURE — 4010F ACE/ARB THERAPY RXD/TAKEN: CPT | Mod: CPTII,S$GLB,, | Performed by: INTERNAL MEDICINE

## 2022-03-08 PROCEDURE — 1159F MED LIST DOCD IN RCRD: CPT | Mod: CPTII,S$GLB,, | Performed by: INTERNAL MEDICINE

## 2022-03-08 PROCEDURE — 93000 ELECTROCARDIOGRAM COMPLETE: CPT | Mod: S$GLB,,, | Performed by: INTERNAL MEDICINE

## 2022-03-08 RX ORDER — EMPAGLIFLOZIN 10 MG/1
10 TABLET, FILM COATED ORAL
COMMUNITY
Start: 2022-02-24 | End: 2023-02-24

## 2022-03-08 RX ORDER — PANTOPRAZOLE SODIUM 40 MG/1
40 TABLET, DELAYED RELEASE ORAL DAILY
Qty: 90 TABLET | Refills: 1 | Status: SHIPPED | OUTPATIENT
Start: 2022-03-08 | End: 2022-09-20

## 2022-03-08 NOTE — PROGRESS NOTES
Health Maintenance Due   Topic Date Due    COVID-19 Vaccine (4 - Booster for Pfizer series) 02/09/2022    Eye Exam  02/19/2022    Foot Exam  05/05/2022     Updates were requested from care everywhere.  Chart was reviewed for overdue Proactive [facility] Encounters ([facility]) topics (CRS, Breast Cancer Screening, Eye exam)  Health Maintenance has been updated.  LINKS immunization registry triggered.  Immunizations were reconciled.

## 2022-03-08 NOTE — PROGRESS NOTES
Subjective:      Patient ID: Jose Antonio Allen is a 71 y.o. male.    Chief Complaint: Chest Pain (Chest pain on and off for about 3 weeks)    HPI:  Pt began Tyvaso inhaler a couple of weeks ago.  After 4 days of taking it pt was having more chest pains eased by wearing oxygen.    Pt used to walk around the house without oxygen but now pt gets short of breath if he does not wear it.    Pt saw pulmonologist -- Dr Linares-- yesterday who did not think the chest pains were due to Tyvaso.    Pt stopped the Tyvaso after having taken it for 4 days.    Pulmonologist told pt the PFT's showed no major changed since last year.    ROS     Past Medical History:   Diagnosis Date    Angina pectoris     Arthritis     CKD (chronic kidney disease)     Colon polyps 09/14/2018    Coronary artery disease     Diabetes mellitus     Diabetes mellitus, type 2     GERD (gastroesophageal reflux disease)     History of tobacco use     Hyperlipidemia     Hypertension     S/P CABG (coronary artery bypass graft) 1996    Thyroid disease         Past Surgical History:   Procedure Laterality Date    CATARACT EXTRACTION Bilateral 3YRS    COLONOSCOPY W/ POLYPECTOMY  09/14/2018    CORONARY ARTERY BYPASS GRAFT  1996    EYELID SURGERY  03/2012    RIGHT HEART CATHETERIZATION Right 11/3/2021    Procedure: INSERTION, CATHETER, RIGHT HEART;  Surgeon: Temitope Rahman MD;  Location: [facility] CATH LAB/EP;  Service: Cardiology;  Laterality: Right;    VEIN BYPASS SURGERY  1996    VEIN SURGERY Left 2017    PAD    VEIN SURGERY Right 2018    PAD       Family History   Problem Relation Age of Onset    Diabetes Mother     Diabetes Father     Diabetes Sister     Kidney disease Sister     Diabetes Brother     Diabetes Sister     Blindness Neg Hx     Glaucoma Neg Hx     Macular degeneration Neg Hx     Retinal detachment Neg Hx     Strabismus Neg Hx     Stroke Neg Hx     Thyroid disease Neg Hx     Cancer Neg Hx     Cataracts Neg Hx      "Hypertension Neg Hx        Social History     Socioeconomic History    Marital status:    Tobacco Use    Smoking status: Former Smoker     Packs/day: 2.00     Years: 27.00     Pack years: 54.00     Types: Cigarettes     Quit date:      Years since quittin.2    Smokeless tobacco: Never Used   Substance and Sexual Activity    Alcohol use: Yes     Comment: Occasionally has a drink    Drug use: No    Sexual activity: Yes     Partners: Female       Current Outpatient Medications on File Prior to Visit   Medication Sig Dispense Refill    ACCU-CHEK ADRIEN PLUS METER Misc 1 Product by Other route daily as needed.      ACCU-CHEK SMARTVIEW TEST STRIP Strp       ACCU-CHEK SOFTCLIX LANCETS Misc Inject 100 lancets into the skin daily as needed.      amLODIPine (NORVASC) 10 MG tablet Take 10 mg by mouth once daily.      aspirin 81 MG Chew 81 mg once daily.       atorvastatin (LIPITOR) 80 MG tablet Take 1 tablet (80 mg total) by mouth once daily. 90 tablet 3    b complex vitamins tablet Take 1 tablet by mouth once daily.      DROPLET PEN NEEDLE 32 gauge x 5/32" Ndle Inject 1 Product into the skin daily as needed.      empagliflozin (JARDIANCE) 10 mg tablet Take 10 mg by mouth.      ESBRIET 801 mg Tab 3 (three) times daily.      ezetimibe (ZETIA) 10 mg tablet Take 1 tablet (10 mg total) by mouth once daily. 90 tablet 3    insulin degludec (TRESIBA FLEXTOUCH U-100) 100 unit/mL (3 mL) insulin pen Inject 50 Units into the skin every evening. 15 pen 3    levothyroxine (SYNTHROID) 100 MCG tablet Take 1 tablet (100 mcg total) by mouth before breakfast. 90 tablet 3    lisinopriL (PRINIVIL,ZESTRIL) 2.5 MG tablet Take 1 tablet (2.5 mg total) by mouth once daily. 90 tablet 3    LUBRICANT EYE DROPS 0.5 % Dpet       metoprolol succinate (TOPROL-XL) 100 MG 24 hr tablet TAKE 1 TABLET ONE TIME DAILY 90 tablet 3    nitroGLYCERIN (NITROSTAT) 0.4 MG SL tablet Place 1 tablet (0.4 mg total) under the tongue every " "5 (five) minutes as needed for Chest pain. 25 tablet 11    NOVOLOG 100 unit/mL injection Sliding scale      ORENCIA 125 mg/mL Syrg INJECT CONTENTS OF ONE SYRINGE UNDER THE SKIN EVERY 7 DAYS      vitamin D (VITAMIN D3) 1000 units Tab Take 1,000 Units by mouth once daily.       No current facility-administered medications on file prior to visit.       Review of patient's allergies indicates:  No Known Allergies  Objective:     Vitals:    03/08/22 1408   BP: 108/62   BP Location: Left arm   Patient Position: Sitting   BP Method: Large (Automatic)   Pulse: 71   SpO2: (!) 94%   Weight: 66.8 kg (147 lb 4.3 oz)   Height: 5' 4" (1.626 m)        Physical Exam  Vitals reviewed.   Constitutional:       General: He is not in acute distress.     Appearance: He is well-developed. He is not diaphoretic.   Eyes:      General: No scleral icterus.  Neck:      Vascular: No carotid bruit or JVD.   Cardiovascular:      Rate and Rhythm: Regular rhythm.      Heart sounds: Normal heart sounds. No murmur heard.    No friction rub. No gallop.   Pulmonary:      Effort: Pulmonary effort is normal. No respiratory distress.      Breath sounds: Rales (bilateral crackles posteriorly) present.   Musculoskeletal:      Right lower leg: No edema.      Left lower leg: No edema.   Skin:     General: Skin is warm and dry.   Neurological:      Mental Status: He is alert and oriented to person, place, and time.   Psychiatric:         Behavior: Behavior normal.         Thought Content: Thought content normal.         Judgment: Judgment normal.                ECG today reviewed by me: NSR, nonspecific St segment and T wave abnormality.   NO acute change      Dr Elvis Barahona's note from [facility] reviewed.  Pt's abnormal LFT's similar to a year ago with very low DLCO and moderate restriction    The Tvaso was discontinued and the prifenidone  was continued.      Note Cardiolite stress test 1/21 was normal.    Assessment:     1. Fibrosis of lung    2. Abdominal " aortic atherosclerosis    3. Angina pectoris    4. Bilateral carotid artery disease, unspecified type    5. Coronary artery disease of native artery of native heart with stable angina pectoris    6. Essential hypertension    7. Hyperlipidemia, unspecified hyperlipidemia type    8. Intermittent claudication    9. Internal carotid artery occlusion, right    10. PAD (peripheral artery disease)    11. Right carotid bruit    12. S/P CABG (coronary artery bypass graft)    13. Stage 3 chronic kidney disease, unspecified whether stage 3a or 3b CKD    14. Other forms of angina pectoris    15. Rheumatoid arthritis with rheumatoid factor, unspecified      Plan:   Jose Antonio was seen today for chest pain.    Diagnoses and all orders for this visit:    Fibrosis of lung    Abdominal aortic atherosclerosis    Angina pectoris    Bilateral carotid artery disease, unspecified type    Coronary artery disease of native artery of native heart with stable angina pectoris  -     IN OFFICE EKG 12-LEAD (to Muse)  -     Nuclear Stress Test; Future    Essential hypertension    Hyperlipidemia, unspecified hyperlipidemia type    Intermittent claudication    Internal carotid artery occlusion, right    PAD (peripheral artery disease)    Right carotid bruit    S/P CABG (coronary artery bypass graft)    Stage 3 chronic kidney disease, unspecified whether stage 3a or 3b CKD    Other forms of angina pectoris  -     IN OFFICE EKG 12-LEAD (to Muse)  -     NM Myocardial Perfusion Spect Multi Pharmacologic; Future  -     Nuclear Stress Test; Future    Rheumatoid arthritis with rheumatoid factor, unspecified    Other orders  -     pantoprazole (PROTONIX) 40 MG tablet; Take 1 tablet (40 mg total) by mouth once daily.       The chest pain and worse shortness of breath may be due to his interstitial lung disease;  Consider angina pectoris.  Consider GERD.    Same meds plus pantoprazole 40 mg daily    Will repeat the Lexiscan Cardiolite stress test    RTC 2  months    No follow-ups on file.

## 2022-03-08 NOTE — TELEPHONE ENCOUNTER
Spoke with patient.  C/O chest pain off & on since using a new inhaler.  Spoke to pulmonologist and was told to see cardiology.    Patient does not feel he need to got to the ER.  Scheduled appointment for today.       ----- Message from Genia Smith sent at 3/8/2022 11:16 AM CST -----  Regarding: appointment  Name of Who is Calling: Jose Antonio           What is the request in detail: Patient is requesting a call back to schedule an appointment for chest pains on and off. He stated he don't need to go to ER at this time.           Can the clinic reply by [facility]: No           What Number to Call Back if not in [facility]: [phone]

## 2022-03-29 ENCOUNTER — HOSPITAL ENCOUNTER (OUTPATIENT)
Dept: RADIOLOGY | Facility: HOSPITAL | Age: 72
Discharge: HOME | End: 2022-03-29
Attending: INTERNAL MEDICINE
Payer: MEDICARE

## 2022-03-29 ENCOUNTER — HOSPITAL ENCOUNTER (OUTPATIENT)
Dept: CARDIOLOGY | Facility: HOSPITAL | Age: 72
Discharge: HOME | End: 2022-03-29
Attending: INTERNAL MEDICINE
Payer: MEDICARE

## 2022-03-29 DIAGNOSIS — I25.118 CORONARY ARTERY DISEASE OF NATIVE ARTERY OF NATIVE HEART WITH STABLE ANGINA PECTORIS: ICD-10-CM

## 2022-03-29 DIAGNOSIS — I20.89 OTHER FORMS OF ANGINA PECTORIS: ICD-10-CM

## 2022-03-29 PROCEDURE — A9502 TC99M TETROFOSMIN: HCPCS

## 2022-03-29 PROCEDURE — 93016 CV STRESS TEST SUPVJ ONLY: CPT | Mod: ,,, | Performed by: INTERNAL MEDICINE

## 2022-03-29 PROCEDURE — 93018 CV STRESS TEST I&R ONLY: CPT | Mod: ,,, | Performed by: INTERNAL MEDICINE

## 2022-03-29 PROCEDURE — 78452 NM MYOCARDIAL PERFUSION SPECT MULTI PHARM: ICD-10-PCS | Mod: 26,,, | Performed by: STUDENT IN AN ORGANIZED HEALTH CARE EDUCATION/TRAINING PROGRAM

## 2022-03-29 PROCEDURE — 93016 NUCLEAR STRESS TEST (CUPID ONLY): ICD-10-PCS | Mod: ,,, | Performed by: INTERNAL MEDICINE

## 2022-03-29 PROCEDURE — 93017 CV STRESS TEST TRACING ONLY: CPT

## 2022-03-29 PROCEDURE — 93018 NUCLEAR STRESS TEST (CUPID ONLY): ICD-10-PCS | Mod: ,,, | Performed by: INTERNAL MEDICINE

## 2022-03-29 PROCEDURE — 78452 HT MUSCLE IMAGE SPECT MULT: CPT | Mod: 26,,, | Performed by: STUDENT IN AN ORGANIZED HEALTH CARE EDUCATION/TRAINING PROGRAM

## 2022-03-29 RX ORDER — REGADENOSON 0.08 MG/ML
0.4 INJECTION, SOLUTION INTRAVENOUS ONCE
Status: DISCONTINUED | OUTPATIENT
Start: 2022-03-29 | End: 2022-04-06 | Stop reason: HOSPADM

## 2022-03-30 LAB
CV PHARM DOSE: 0.4 MG
CV STRESS BASE HR: 75 BPM
DIASTOLIC BLOOD PRESSURE: 75 MMHG
OHS CV CPX 85 PERCENT MAX PREDICTED HEART RATE MALE: 127
OHS CV CPX MAX PREDICTED HEART RATE: 149
OHS CV CPX PATIENT IS FEMALE: 0
OHS CV CPX PATIENT IS MALE: 1
OHS CV CPX PEAK DIASTOLIC BLOOD PRESSURE: 75 MMHG
OHS CV CPX PEAK HEAR RATE: 90 BPM
OHS CV CPX PEAK RATE PRESSURE PRODUCT: NORMAL
OHS CV CPX PEAK SYSTOLIC BLOOD PRESSURE: 154 MMHG
OHS CV CPX PERCENT MAX PREDICTED HEART RATE ACHIEVED: 60
OHS CV CPX RATE PRESSURE PRODUCT PRESENTING: NORMAL
STRESS ST DEPRESSION: 0.7 MM
SYSTOLIC BLOOD PRESSURE: 154 MMHG

## 2022-03-31 ENCOUNTER — TELEPHONE (OUTPATIENT)
Dept: CARDIOLOGY | Facility: CLINIC | Age: 72
End: 2022-03-31
Payer: MEDICARE

## 2022-03-31 DIAGNOSIS — I20.89 STABLE ANGINA PECTORIS: ICD-10-CM

## 2022-03-31 DIAGNOSIS — R94.39 POSITIVE CARDIAC STRESS TEST: Primary | ICD-10-CM

## 2022-03-31 RX ORDER — ISOSORBIDE MONONITRATE 30 MG/1
30 TABLET, EXTENDED RELEASE ORAL DAILY
Qty: 90 TABLET | Refills: 3 | Status: SHIPPED | OUTPATIENT
Start: 2022-03-31 | End: 2022-05-03

## 2022-03-31 NOTE — TELEPHONE ENCOUNTER
Spoke with patient.  Let him know that Dr Connelly has been out of the office for a couple of days and he should here from him soon.  Patient verbalized understanding.    ----- Message from Yudith Aguilar MA sent at 3/30/2022  2:57 PM CDT -----  Regarding: FW: Results    ----- Message -----  From: Cierra Zavala  Sent: 3/30/2022  12:57 PM CDT  To: [facility] Staff  Subject: Results                                          Type:  Test Results    Who Called: HARRY OMALLEY [[id number]]    Name of Test (Lab/Mammo/Etc):  stress test    Date of Test:  3/29    Ordering Provider:  Godwin     Where the test was performed:  [facility]     Best Call Back Number:  [phone]    Additional Information:

## 2022-04-04 ENCOUNTER — TELEPHONE (OUTPATIENT)
Dept: CARDIOLOGY | Facility: CLINIC | Age: 72
End: 2022-04-04
Payer: MEDICARE

## 2022-04-04 NOTE — TELEPHONE ENCOUNTER
Spoke with patient.  Referred to Dr Parmar for intervention by Dr Connelly.  Appointment scheduled.    ----- Message from Ana Maria Blanco sent at 4/4/2022  4:04 PM CDT -----  Contact: [phone]  Pt is calling back from a missed call.    [phone]

## 2022-04-05 ENCOUNTER — OFFICE VISIT (OUTPATIENT)
Dept: CARDIOLOGY | Facility: CLINIC | Age: 72
End: 2022-04-05
Payer: MEDICARE

## 2022-04-05 VITALS
OXYGEN SATURATION: 99 % | SYSTOLIC BLOOD PRESSURE: 105 MMHG | DIASTOLIC BLOOD PRESSURE: 61 MMHG | WEIGHT: 147.69 LBS | BODY MASS INDEX: 25.21 KG/M2 | HEIGHT: 64 IN | HEART RATE: 76 BPM

## 2022-04-05 DIAGNOSIS — I25.118 CORONARY ARTERY DISEASE OF NATIVE ARTERY OF NATIVE HEART WITH STABLE ANGINA PECTORIS: ICD-10-CM

## 2022-04-05 DIAGNOSIS — I73.9 INTERMITTENT CLAUDICATION: ICD-10-CM

## 2022-04-05 DIAGNOSIS — E11.22 TYPE 2 DIABETES MELLITUS WITH STAGE 3A CHRONIC KIDNEY DISEASE, WITHOUT LONG-TERM CURRENT USE OF INSULIN: ICD-10-CM

## 2022-04-05 DIAGNOSIS — N18.31 TYPE 2 DIABETES MELLITUS WITH STAGE 3A CHRONIC KIDNEY DISEASE, WITHOUT LONG-TERM CURRENT USE OF INSULIN: ICD-10-CM

## 2022-04-05 DIAGNOSIS — M05.9 RHEUMATOID ARTHRITIS WITH RHEUMATOID FACTOR, UNSPECIFIED: ICD-10-CM

## 2022-04-05 DIAGNOSIS — I20.89 STABLE ANGINA PECTORIS: ICD-10-CM

## 2022-04-05 DIAGNOSIS — I70.0 ABDOMINAL AORTIC ATHEROSCLEROSIS: ICD-10-CM

## 2022-04-05 DIAGNOSIS — R94.39 POSITIVE CARDIAC STRESS TEST: Primary | ICD-10-CM

## 2022-04-05 DIAGNOSIS — I77.9 BILATERAL CAROTID ARTERY DISEASE, UNSPECIFIED TYPE: ICD-10-CM

## 2022-04-05 DIAGNOSIS — E78.49 OTHER HYPERLIPIDEMIA: ICD-10-CM

## 2022-04-05 DIAGNOSIS — I73.9 PAD (PERIPHERAL ARTERY DISEASE): ICD-10-CM

## 2022-04-05 DIAGNOSIS — I10 ESSENTIAL HYPERTENSION: ICD-10-CM

## 2022-04-05 DIAGNOSIS — Z95.1 S/P CABG (CORONARY ARTERY BYPASS GRAFT): ICD-10-CM

## 2022-04-05 PROCEDURE — 3288F FALL RISK ASSESSMENT DOCD: CPT | Mod: CPTII,S$GLB,, | Performed by: INTERNAL MEDICINE

## 2022-04-05 PROCEDURE — 99999 PR PBB SHADOW E&M-EST. PATIENT-LVL V: CPT | Mod: PBBFAC,,, | Performed by: INTERNAL MEDICINE

## 2022-04-05 PROCEDURE — 3008F BODY MASS INDEX DOCD: CPT | Mod: CPTII,S$GLB,, | Performed by: INTERNAL MEDICINE

## 2022-04-05 PROCEDURE — 1101F PR PT FALLS ASSESS DOC 0-1 FALLS W/OUT INJ PAST YR: ICD-10-PCS | Mod: CPTII,S$GLB,, | Performed by: INTERNAL MEDICINE

## 2022-04-05 PROCEDURE — 99499 RISK ADDL DX/OHS AUDIT: ICD-10-PCS | Mod: HCNC,S$GLB,, | Performed by: INTERNAL MEDICINE

## 2022-04-05 PROCEDURE — 1160F PR REVIEW ALL MEDS BY PRESCRIBER/CLIN PHARMACIST DOCUMENTED: ICD-10-PCS | Mod: CPTII,S$GLB,, | Performed by: INTERNAL MEDICINE

## 2022-04-05 PROCEDURE — 1160F RVW MEDS BY RX/DR IN RCRD: CPT | Mod: CPTII,S$GLB,, | Performed by: INTERNAL MEDICINE

## 2022-04-05 PROCEDURE — 3008F PR BODY MASS INDEX (BMI) DOCUMENTED: ICD-10-PCS | Mod: CPTII,S$GLB,, | Performed by: INTERNAL MEDICINE

## 2022-04-05 PROCEDURE — 4010F PR ACE/ARB THEARPY RXD/TAKEN: ICD-10-PCS | Mod: CPTII,S$GLB,, | Performed by: INTERNAL MEDICINE

## 2022-04-05 PROCEDURE — 3051F PR MOST RECENT HEMOGLOBIN A1C LEVEL 7.0 - < 8.0%: ICD-10-PCS | Mod: CPTII,S$GLB,, | Performed by: INTERNAL MEDICINE

## 2022-04-05 PROCEDURE — 99214 OFFICE O/P EST MOD 30 MIN: CPT | Mod: S$GLB,,, | Performed by: INTERNAL MEDICINE

## 2022-04-05 PROCEDURE — 3288F PR FALLS RISK ASSESSMENT DOCUMENTED: ICD-10-PCS | Mod: CPTII,S$GLB,, | Performed by: INTERNAL MEDICINE

## 2022-04-05 PROCEDURE — 3078F DIAST BP <80 MM HG: CPT | Mod: CPTII,S$GLB,, | Performed by: INTERNAL MEDICINE

## 2022-04-05 PROCEDURE — 99999 PR PBB SHADOW E&M-EST. PATIENT-LVL V: ICD-10-PCS | Mod: PBBFAC,,, | Performed by: INTERNAL MEDICINE

## 2022-04-05 PROCEDURE — 3078F PR MOST RECENT DIASTOLIC BLOOD PRESSURE < 80 MM HG: ICD-10-PCS | Mod: CPTII,S$GLB,, | Performed by: INTERNAL MEDICINE

## 2022-04-05 PROCEDURE — 1159F PR MEDICATION LIST DOCUMENTED IN MEDICAL RECORD: ICD-10-PCS | Mod: CPTII,S$GLB,, | Performed by: INTERNAL MEDICINE

## 2022-04-05 PROCEDURE — 99499 UNLISTED E&M SERVICE: CPT | Mod: HCNC,S$GLB,, | Performed by: INTERNAL MEDICINE

## 2022-04-05 PROCEDURE — 3074F SYST BP LT 130 MM HG: CPT | Mod: CPTII,S$GLB,, | Performed by: INTERNAL MEDICINE

## 2022-04-05 PROCEDURE — 1159F MED LIST DOCD IN RCRD: CPT | Mod: CPTII,S$GLB,, | Performed by: INTERNAL MEDICINE

## 2022-04-05 PROCEDURE — 3074F PR MOST RECENT SYSTOLIC BLOOD PRESSURE < 130 MM HG: ICD-10-PCS | Mod: CPTII,S$GLB,, | Performed by: INTERNAL MEDICINE

## 2022-04-05 PROCEDURE — 1101F PT FALLS ASSESS-DOCD LE1/YR: CPT | Mod: CPTII,S$GLB,, | Performed by: INTERNAL MEDICINE

## 2022-04-05 PROCEDURE — 4010F ACE/ARB THERAPY RXD/TAKEN: CPT | Mod: CPTII,S$GLB,, | Performed by: INTERNAL MEDICINE

## 2022-04-05 PROCEDURE — 1126F AMNT PAIN NOTED NONE PRSNT: CPT | Mod: CPTII,S$GLB,, | Performed by: INTERNAL MEDICINE

## 2022-04-05 PROCEDURE — 99214 PR OFFICE/OUTPT VISIT, EST, LEVL IV, 30-39 MIN: ICD-10-PCS | Mod: S$GLB,,, | Performed by: INTERNAL MEDICINE

## 2022-04-05 PROCEDURE — 1126F PR PAIN SEVERITY QUANTIFIED, NO PAIN PRESENT: ICD-10-PCS | Mod: CPTII,S$GLB,, | Performed by: INTERNAL MEDICINE

## 2022-04-05 PROCEDURE — 3051F HG A1C>EQUAL 7.0%<8.0%: CPT | Mod: CPTII,S$GLB,, | Performed by: INTERNAL MEDICINE

## 2022-04-05 RX ORDER — FINERENONE 10 MG/1
1 TABLET, FILM COATED ORAL DAILY
COMMUNITY
Start: 2022-03-04 | End: 2022-09-20

## 2022-04-05 RX ORDER — DIPHENHYDRAMINE HCL 25 MG
50 CAPSULE ORAL ONCE
Status: CANCELLED | OUTPATIENT
Start: 2022-04-05 | End: 2022-04-05

## 2022-04-05 RX ORDER — CLOPIDOGREL BISULFATE 75 MG/1
75 TABLET ORAL DAILY
Qty: 90 TABLET | Refills: 3 | Status: SHIPPED | OUTPATIENT
Start: 2022-04-05 | End: 2022-05-24

## 2022-04-05 RX ORDER — SODIUM CHLORIDE 9 MG/ML
INJECTION, SOLUTION INTRAVENOUS CONTINUOUS
Status: CANCELLED | OUTPATIENT
Start: 2022-04-05 | End: 2022-04-05

## 2022-04-05 NOTE — PROGRESS NOTES
Subjective:    Patient ID:  Jose Antonio Allen is a 71 y.o. male who presents for follow-up of Coronary Artery Disease      HPI    70 y/o male referred by Dr Connelly for abnormal stress test. He has a hx of CAD s/p 4V CABG 1996 at [facility] (LIMA-LAD, SVG-OM, SVG-RCA, SVG-PDA), s/p LHC 2004 with occluded SVG-OM (other grafts patent), s/p PCI 2009 by Dr Bloom (PDA - 2.5 x 12 Promus), currently on SAPT with ASA, PAD, HTN, HLD, DM, RA with rheumatoid lung on chronic O2 (follow with Pulmonology), carotid artery stenosis. Began having substernal, exertional, non radiating chest pain he thinks after starting Tyvaso. Has stopped and continues to have progressive angina. Currently with CCA Class III angina, no rest pain. Has chronic DE [place], unchanged. Denies orthopnea, PND, syncope, palps, LE edema. Usually pretty active and exercises regularly, although, has cut back due to progressive angina. Prescribed isosorbide, but has not received it yet (mail order). Has used SL NTG intermittently with relief. Compliant wit meds. Nuclear stress test with:  1. Scintigraphic evidence of ischemia within the anterolateral wall.  2. The global left ventricular systolic function is within normal limits with an LV ejection fraction of 63 % and no evidence of LV dilatation.  Lateral wall hypokinesis.  3. Additional findings as above.    Review of Systems   Constitutional: Negative for malaise/fatigue.   HENT: Negative for congestion.    Eyes: Negative for blurred vision.   Cardiovascular: Positive for chest pain and dyspnea on exertion. Negative for claudication, cyanosis, irregular heartbeat, leg swelling, near-syncope, orthopnea, palpitations, paroxysmal nocturnal dyspnea and syncope.   Respiratory: Negative for shortness of breath.    Endocrine: Negative for polyuria.   Hematologic/Lymphatic: Negative for bleeding problem.   Skin: Negative for itching and rash.   Musculoskeletal: Negative for joint swelling, muscle cramps and muscle weakness.    Gastrointestinal: Negative for abdominal pain, hematemesis, hematochezia, melena, nausea and vomiting.   Genitourinary: Negative for dysuria and hematuria.   Neurological: Negative for dizziness, focal weakness, headaches, light-headedness, loss of balance and weakness.   Psychiatric/Behavioral: Negative for depression. The patient is not nervous/anxious.         Objective:    Physical Exam  Constitutional:       Appearance: He is well-developed.   HENT:      Head: Normocephalic and atraumatic.   Neck:      Vascular: No JVD.   Cardiovascular:      Rate and Rhythm: Normal rate and regular rhythm.      Pulses:           Carotid pulses are 2+ on the right side and 2+ on the left side.       Radial pulses are 2+ on the right side and 2+ on the left side.        Femoral pulses are 2+ on the right side and 2+ on the left side.       Dorsalis pedis pulses are 2+ on the right side and 2+ on the left side.        Posterior tibial pulses are 2+ on the right side and 2+ on the left side.      Heart sounds: Normal heart sounds.   Pulmonary:      Effort: Pulmonary effort is normal.      Breath sounds: Rales present.   Abdominal:      General: Bowel sounds are normal.      Palpations: Abdomen is soft.   Musculoskeletal:      Cervical back: Neck supple.   Skin:     General: Skin is warm and dry.   Neurological:      Mental Status: He is alert and oriented to person, place, and time.   Psychiatric:         Behavior: Behavior normal.         Thought Content: Thought content normal.           Assessment:       1. Positive cardiac stress test    2. Stable angina pectoris    3. Coronary artery disease of native artery of native heart with stable angina pectoris    4. S/P CABG (coronary artery bypass graft)    5. PAD (peripheral artery disease)    6. Abdominal aortic atherosclerosis    7. Bilateral carotid artery disease, unspecified type    8. Essential hypertension    9. Other hyperlipidemia    10. Intermittent claudication    11.  Type 2 diabetes mellitus with stage 3a chronic kidney disease, without long-term current use of insulin    12. Rheumatoid arthritis with rheumatoid factor, unspecified      71 year old patient with hx and presentation as above. Has hx of CAD and abnormal stress test and will evaluate symptoms with LHC +/-. Start plavix. EDILMA candidate. Start isosorbide. Discussed if CP worsened or persists to present to ED. Needs to stay active. Discussed the etiology, evaluation, and management of CAD, CABG, UA, HTN, HLD, DM. Discussed the importance of med compliance, heart healthy diet, and regular exercise.      Plan:       -LHC +/- intervention  -LEFT RADIAL ACCESS with 5/6 slender, JL 3.5, JR 4, MP, GREY, pigtail  -Consents at time of cath  -Plavix 75 mg daily  -Low contrast technique

## 2022-04-05 NOTE — H&P (VIEW-ONLY)
Subjective:    Patient ID:  Jose Antonio Allen is a 71 y.o. male who presents for follow-up of Coronary Artery Disease      HPI    70 y/o male referred by Dr Connelly for abnormal stress test. 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 Began having substernal, exertional, non radiating chest pain he thinks after starting Tyvaso. Has stopped and continues to have progressive angina. Currently with CCA Class III angina, no rest pain. Has chronic DE LOS SANTOS, unchanged. Denies orthopnea, PND, syncope, palps, LE edema. Usually pretty active and exercises regularly, although, has cut back due to progressive angina. Prescribed isosorbide, but has not received it yet (mail order). Has used SL NTG intermittently with relief. Compliant wit meds. Nuclear stress test with:  1. Scintigraphic evidence of ischemia within the anterolateral wall.  2. The global left ventricular systolic function is within normal limits with an LV ejection fraction of 63 % and no evidence of LV dilatation.  Lateral wall hypokinesis.  3. Additional findings as above.    Review of Systems   Constitutional: Negative for malaise/fatigue.   HENT: Negative for congestion.    Eyes: Negative for blurred vision.   Cardiovascular: Positive for chest pain and dyspnea on exertion. Negative for claudication, cyanosis, irregular heartbeat, leg swelling, near-syncope, orthopnea, palpitations, paroxysmal nocturnal dyspnea and syncope.   Respiratory: Negative for shortness of breath.    Endocrine: Negative for polyuria.   Hematologic/Lymphatic: Negative for bleeding problem.   Skin: Negative for itching and rash.   Musculoskeletal: Negative for joint swelling, muscle cramps and muscle weakness.    Gastrointestinal: Negative for abdominal pain, hematemesis, hematochezia, melena, nausea and vomiting.   Genitourinary: Negative for dysuria and hematuria.   Neurological: Negative for dizziness, focal weakness, headaches, light-headedness, loss of balance and weakness.   Psychiatric/Behavioral: Negative for depression. The patient is not nervous/anxious.         Objective:    Physical Exam  Constitutional:       Appearance: He is well-developed.   HENT:      Head: Normocephalic and atraumatic.   Neck:      Vascular: No JVD.   Cardiovascular:      Rate and Rhythm: Normal rate and regular rhythm.      Pulses:           Carotid pulses are 2+ on the right side and 2+ on the left side.       Radial pulses are 2+ on the right side and 2+ on the left side.        Femoral pulses are 2+ on the right side and 2+ on the left side.       Dorsalis pedis pulses are 2+ on the right side and 2+ on the left side.        Posterior tibial pulses are 2+ on the right side and 2+ on the left side.      Heart sounds: Normal heart sounds.   Pulmonary:      Effort: Pulmonary effort is normal.      Breath sounds: Rales present.   Abdominal:      General: Bowel sounds are normal.      Palpations: Abdomen is soft.   Musculoskeletal:      Cervical back: Neck supple.   Skin:     General: Skin is warm and dry.   Neurological:      Mental Status: He is alert and oriented to person, place, and time.   Psychiatric:         Behavior: Behavior normal.         Thought Content: Thought content normal.           Assessment:       1. Positive cardiac stress test    2. Stable angina pectoris    3. Coronary artery disease of native artery of native heart with stable angina pectoris    4. S/P CABG (coronary artery bypass graft)    5. PAD (peripheral artery disease)    6. Abdominal aortic atherosclerosis    7. Bilateral carotid artery disease, unspecified type    8. Essential hypertension    9. Other hyperlipidemia    10. Intermittent claudication    11.  Type 2 diabetes mellitus with stage 3a chronic kidney disease, without long-term current use of insulin    12. Rheumatoid arthritis with rheumatoid factor, unspecified      71 year old patient with hx and presentation as above. Has hx of CAD and abnormal stress test and will evaluate symptoms with LHC +/-. Start plavix. EDILMA candidate. Start isosorbide. Discussed if CP worsened or persists to present to ED. Needs to stay active. Discussed the etiology, evaluation, and management of CAD, CABG, UA, HTN, HLD, DM. Discussed the importance of med compliance, heart healthy diet, and regular exercise.      Plan:       -LHC +/- intervention  -LEFT RADIAL ACCESS with 5/6 slender, JL 3.5, JR 4, MP, GREY, pigtail  -Consents at time of cath  -Plavix 75 mg daily  -Low contrast technique

## 2022-04-05 NOTE — H&P (VIEW-ONLY)
Subjective:    Patient ID:  Jose Antonio Allen is a 71 y.o. male who presents for follow-up of Coronary Artery Disease      HPI    70 y/o male referred by Dr Connelly for abnormal stress test. 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 Began having substernal, exertional, non radiating chest pain he thinks after starting Tyvaso. Has stopped and continues to have progressive angina. Currently with CCA Class III angina, no rest pain. Has chronic DE LOS SANTOS, unchanged. Denies orthopnea, PND, syncope, palps, LE edema. Usually pretty active and exercises regularly, although, has cut back due to progressive angina. Prescribed isosorbide, but has not received it yet (mail order). Has used SL NTG intermittently with relief. Compliant wit meds. Nuclear stress test with:  1. Scintigraphic evidence of ischemia within the anterolateral wall.  2. The global left ventricular systolic function is within normal limits with an LV ejection fraction of 63 % and no evidence of LV dilatation.  Lateral wall hypokinesis.  3. Additional findings as above.    Review of Systems   Constitutional: Negative for malaise/fatigue.   HENT: Negative for congestion.    Eyes: Negative for blurred vision.   Cardiovascular: Positive for chest pain and dyspnea on exertion. Negative for claudication, cyanosis, irregular heartbeat, leg swelling, near-syncope, orthopnea, palpitations, paroxysmal nocturnal dyspnea and syncope.   Respiratory: Negative for shortness of breath.    Endocrine: Negative for polyuria.   Hematologic/Lymphatic: Negative for bleeding problem.   Skin: Negative for itching and rash.   Musculoskeletal: Negative for joint swelling, muscle cramps and muscle weakness.    Gastrointestinal: Negative for abdominal pain, hematemesis, hematochezia, melena, nausea and vomiting.   Genitourinary: Negative for dysuria and hematuria.   Neurological: Negative for dizziness, focal weakness, headaches, light-headedness, loss of balance and weakness.   Psychiatric/Behavioral: Negative for depression. The patient is not nervous/anxious.         Objective:    Physical Exam  Constitutional:       Appearance: He is well-developed.   HENT:      Head: Normocephalic and atraumatic.   Neck:      Vascular: No JVD.   Cardiovascular:      Rate and Rhythm: Normal rate and regular rhythm.      Pulses:           Carotid pulses are 2+ on the right side and 2+ on the left side.       Radial pulses are 2+ on the right side and 2+ on the left side.        Femoral pulses are 2+ on the right side and 2+ on the left side.       Dorsalis pedis pulses are 2+ on the right side and 2+ on the left side.        Posterior tibial pulses are 2+ on the right side and 2+ on the left side.      Heart sounds: Normal heart sounds.   Pulmonary:      Effort: Pulmonary effort is normal.      Breath sounds: Rales present.   Abdominal:      General: Bowel sounds are normal.      Palpations: Abdomen is soft.   Musculoskeletal:      Cervical back: Neck supple.   Skin:     General: Skin is warm and dry.   Neurological:      Mental Status: He is alert and oriented to person, place, and time.   Psychiatric:         Behavior: Behavior normal.         Thought Content: Thought content normal.           Assessment:       1. Positive cardiac stress test    2. Stable angina pectoris    3. Coronary artery disease of native artery of native heart with stable angina pectoris    4. S/P CABG (coronary artery bypass graft)    5. PAD (peripheral artery disease)    6. Abdominal aortic atherosclerosis    7. Bilateral carotid artery disease, unspecified type    8. Essential hypertension    9. Other hyperlipidemia    10. Intermittent claudication    11.  Type 2 diabetes mellitus with stage 3a chronic kidney disease, without long-term current use of insulin    12. Rheumatoid arthritis with rheumatoid factor, unspecified      71 year old patient with hx and presentation as above. Has hx of CAD and abnormal stress test and will evaluate symptoms with LHC +/-. Start plavix. EDILMA candidate. Start isosorbide. Discussed if CP worsened or persists to present to ED. Needs to stay active. Discussed the etiology, evaluation, and management of CAD, CABG, UA, HTN, HLD, DM. Discussed the importance of med compliance, heart healthy diet, and regular exercise.      Plan:       -LHC +/- intervention  -LEFT RADIAL ACCESS with 5/6 slender, JL 3.5, JR 4, MP, GREY, pigtail  -Consents at time of cath  -Plavix 75 mg daily  -Low contrast technique

## 2022-04-08 ENCOUNTER — LAB VISIT (OUTPATIENT)
Dept: LAB | Facility: HOSPITAL | Age: 72
End: 2022-04-08
Attending: INTERNAL MEDICINE
Payer: MEDICARE

## 2022-04-08 DIAGNOSIS — Z01.810 PRE-OPERATIVE CARDIOVASCULAR EXAMINATION: ICD-10-CM

## 2022-04-08 LAB
ANION GAP SERPL CALC-SCNC: 10 MMOL/L (ref 8–16)
BASOPHILS # BLD AUTO: 0.05 K/UL (ref 0–0.2)
BASOPHILS NFR BLD: 0.7 % (ref 0–1.9)
BUN SERPL-MCNC: 20 MG/DL (ref 8–23)
CALCIUM SERPL-MCNC: 9.4 MG/DL (ref 8.7–10.5)
CHLORIDE SERPL-SCNC: 109 MMOL/L (ref 95–110)
CO2 SERPL-SCNC: 25 MMOL/L (ref 23–29)
CREAT SERPL-MCNC: 1.5 MG/DL (ref 0.5–1.4)
DIFFERENTIAL METHOD: ABNORMAL
EOSINOPHIL # BLD AUTO: 0.4 K/UL (ref 0–0.5)
EOSINOPHIL NFR BLD: 4.9 % (ref 0–8)
ERYTHROCYTE [DISTWIDTH] IN BLOOD BY AUTOMATED COUNT: 12.9 % (ref 11.5–14.5)
EST. GFR  (AFRICAN AMERICAN): 53 ML/MIN/1.73 M^2
EST. GFR  (NON AFRICAN AMERICAN): 46 ML/MIN/1.73 M^2
GLUCOSE SERPL-MCNC: 172 MG/DL (ref 70–110)
HCT VFR BLD AUTO: 43 % (ref 40–54)
HGB BLD-MCNC: 13.7 G/DL (ref 14–18)
IMM GRANULOCYTES # BLD AUTO: 0.02 K/UL (ref 0–0.04)
IMM GRANULOCYTES NFR BLD AUTO: 0.3 % (ref 0–0.5)
LYMPHOCYTES # BLD AUTO: 1.7 K/UL (ref 1–4.8)
LYMPHOCYTES NFR BLD: 22.9 % (ref 18–48)
MCH RBC QN AUTO: 28.2 PG (ref 27–31)
MCHC RBC AUTO-ENTMCNC: 31.9 G/DL (ref 32–36)
MCV RBC AUTO: 89 FL (ref 82–98)
MONOCYTES # BLD AUTO: 0.8 K/UL (ref 0.3–1)
MONOCYTES NFR BLD: 10.3 % (ref 4–15)
NEUTROPHILS # BLD AUTO: 4.5 K/UL (ref 1.8–7.7)
NEUTROPHILS NFR BLD: 60.9 % (ref 38–73)
NRBC BLD-RTO: 0 /100 WBC
PLATELET # BLD AUTO: 150 K/UL (ref 150–450)
PMV BLD AUTO: 9.9 FL (ref 9.2–12.9)
POTASSIUM SERPL-SCNC: 4.6 MMOL/L (ref 3.5–5.1)
RBC # BLD AUTO: 4.86 M/UL (ref 4.6–6.2)
SODIUM SERPL-SCNC: 144 MMOL/L (ref 136–145)
WBC # BLD AUTO: 7.3 K/UL (ref 3.9–12.7)

## 2022-04-08 PROCEDURE — 80048 BASIC METABOLIC PNL TOTAL CA: CPT | Performed by: INTERNAL MEDICINE

## 2022-04-08 PROCEDURE — 36415 COLL VENOUS BLD VENIPUNCTURE: CPT | Performed by: INTERNAL MEDICINE

## 2022-04-08 PROCEDURE — 85025 COMPLETE CBC W/AUTO DIFF WBC: CPT | Performed by: INTERNAL MEDICINE

## 2022-04-11 ENCOUNTER — HOSPITAL ENCOUNTER (OUTPATIENT)
Facility: HOSPITAL | Age: 72
Discharge: HOME | End: 2022-04-11
Attending: INTERNAL MEDICINE | Admitting: INTERNAL MEDICINE
Payer: MEDICARE

## 2022-04-11 VITALS
SYSTOLIC BLOOD PRESSURE: 118 MMHG | HEIGHT: 64 IN | BODY MASS INDEX: 24.75 KG/M2 | DIASTOLIC BLOOD PRESSURE: 61 MMHG | OXYGEN SATURATION: 100 % | TEMPERATURE: 97 F | RESPIRATION RATE: 31 BRPM | HEART RATE: 64 BPM | WEIGHT: 145 LBS

## 2022-04-11 DIAGNOSIS — I25.10 CAD (CORONARY ARTERY DISEASE): ICD-10-CM

## 2022-04-11 DIAGNOSIS — I25.118 CORONARY ARTERY DISEASE OF NATIVE ARTERY OF NATIVE HEART WITH STABLE ANGINA PECTORIS: ICD-10-CM

## 2022-04-11 DIAGNOSIS — E11.22 TYPE 2 DIABETES MELLITUS WITH STAGE 3A CHRONIC KIDNEY DISEASE, WITHOUT LONG-TERM CURRENT USE OF INSULIN: ICD-10-CM

## 2022-04-11 DIAGNOSIS — N18.31 TYPE 2 DIABETES MELLITUS WITH STAGE 3A CHRONIC KIDNEY DISEASE, WITHOUT LONG-TERM CURRENT USE OF INSULIN: ICD-10-CM

## 2022-04-11 DIAGNOSIS — R94.39 POSITIVE CARDIAC STRESS TEST: ICD-10-CM

## 2022-04-11 DIAGNOSIS — Z01.810 PRE-OPERATIVE CARDIOVASCULAR EXAMINATION: Primary | ICD-10-CM

## 2022-04-11 LAB — POCT GLUCOSE: 199 MG/DL (ref 70–110)

## 2022-04-11 PROCEDURE — 93010 EKG 12-LEAD: ICD-10-PCS | Mod: ,,, | Performed by: INTERNAL MEDICINE

## 2022-04-11 PROCEDURE — 99152 MOD SED SAME PHYS/QHP 5/>YRS: CPT | Performed by: INTERNAL MEDICINE

## 2022-04-11 PROCEDURE — 93459 L HRT ART/GRFT ANGIO: CPT | Mod: 26,,, | Performed by: INTERNAL MEDICINE

## 2022-04-11 PROCEDURE — 99153 MOD SED SAME PHYS/QHP EA: CPT | Performed by: INTERNAL MEDICINE

## 2022-04-11 PROCEDURE — 93010 ELECTROCARDIOGRAM REPORT: CPT | Mod: ,,, | Performed by: INTERNAL MEDICINE

## 2022-04-11 PROCEDURE — 25500020 PHARM REV CODE 255: Performed by: INTERNAL MEDICINE

## 2022-04-11 PROCEDURE — 25000003 PHARM REV CODE 250: Performed by: INTERNAL MEDICINE

## 2022-04-11 PROCEDURE — 93005 ELECTROCARDIOGRAM TRACING: CPT

## 2022-04-11 PROCEDURE — 63600175 PHARM REV CODE 636 W HCPCS: Performed by: INTERNAL MEDICINE

## 2022-04-11 PROCEDURE — 93459: ICD-10-PCS | Mod: 26,,, | Performed by: INTERNAL MEDICINE

## 2022-04-11 PROCEDURE — C1894 INTRO/SHEATH, NON-LASER: HCPCS | Performed by: INTERNAL MEDICINE

## 2022-04-11 PROCEDURE — 99152 MOD SED SAME PHYS/QHP 5/>YRS: CPT | Mod: ,,, | Performed by: INTERNAL MEDICINE

## 2022-04-11 PROCEDURE — 93459 L HRT ART/GRFT ANGIO: CPT | Performed by: INTERNAL MEDICINE

## 2022-04-11 PROCEDURE — C1769 GUIDE WIRE: HCPCS | Performed by: INTERNAL MEDICINE

## 2022-04-11 PROCEDURE — 99152 PR MOD CONSCIOUS SEDATION, SAME PHYS, 5+ YRS, FIRST 15 MIN: ICD-10-PCS | Mod: ,,, | Performed by: INTERNAL MEDICINE

## 2022-04-11 RX ORDER — SODIUM CHLORIDE 9 MG/ML
INJECTION, SOLUTION INTRAVENOUS
Status: DISCONTINUED | OUTPATIENT
Start: 2022-04-11 | End: 2022-04-11 | Stop reason: HOSPADM

## 2022-04-11 RX ORDER — MIDAZOLAM HYDROCHLORIDE 1 MG/ML
INJECTION, SOLUTION INTRAMUSCULAR; INTRAVENOUS
Status: DISCONTINUED | OUTPATIENT
Start: 2022-04-11 | End: 2022-04-11 | Stop reason: HOSPADM

## 2022-04-11 RX ORDER — LIDOCAINE HYDROCHLORIDE 10 MG/ML
INJECTION, SOLUTION EPIDURAL; INFILTRATION; INTRACAUDAL; PERINEURAL
Status: DISCONTINUED | OUTPATIENT
Start: 2022-04-11 | End: 2022-04-11 | Stop reason: HOSPADM

## 2022-04-11 RX ORDER — SODIUM CHLORIDE 9 MG/ML
INJECTION, SOLUTION INTRAVENOUS CONTINUOUS
Status: ACTIVE | OUTPATIENT
Start: 2022-04-11 | End: 2022-04-11

## 2022-04-11 RX ORDER — DIPHENHYDRAMINE HYDROCHLORIDE 50 MG/ML
INJECTION INTRAMUSCULAR; INTRAVENOUS
Status: DISCONTINUED | OUTPATIENT
Start: 2022-04-11 | End: 2022-04-11 | Stop reason: HOSPADM

## 2022-04-11 RX ORDER — DIPHENHYDRAMINE HCL 25 MG
50 CAPSULE ORAL ONCE
Status: DISCONTINUED | OUTPATIENT
Start: 2022-04-11 | End: 2022-04-11 | Stop reason: HOSPADM

## 2022-04-11 RX ORDER — FENTANYL CITRATE 50 UG/ML
INJECTION, SOLUTION INTRAMUSCULAR; INTRAVENOUS
Status: DISCONTINUED | OUTPATIENT
Start: 2022-04-11 | End: 2022-04-11 | Stop reason: HOSPADM

## 2022-04-11 RX ORDER — HEPARIN SODIUM 200 [USP'U]/100ML
INJECTION, SOLUTION INTRAVENOUS
Status: DISCONTINUED | OUTPATIENT
Start: 2022-04-11 | End: 2022-04-11 | Stop reason: HOSPADM

## 2022-04-11 RX ORDER — HEPARIN SODIUM 1000 [USP'U]/ML
INJECTION, SOLUTION INTRAVENOUS; SUBCUTANEOUS
Status: DISCONTINUED | OUTPATIENT
Start: 2022-04-11 | End: 2022-04-11 | Stop reason: HOSPADM

## 2022-04-11 RX ORDER — ACETAMINOPHEN 325 MG/1
650 TABLET ORAL EVERY 4 HOURS PRN
Status: DISCONTINUED | OUTPATIENT
Start: 2022-04-11 | End: 2022-04-11 | Stop reason: HOSPADM

## 2022-04-11 RX ORDER — ONDANSETRON 4 MG/1
8 TABLET, ORALLY DISINTEGRATING ORAL EVERY 8 HOURS PRN
Status: DISCONTINUED | OUTPATIENT
Start: 2022-04-11 | End: 2022-04-11 | Stop reason: HOSPADM

## 2022-04-11 RX ORDER — IODIXANOL 320 MG/ML
INJECTION, SOLUTION INTRAVASCULAR
Status: DISCONTINUED | OUTPATIENT
Start: 2022-04-11 | End: 2022-04-11 | Stop reason: HOSPADM

## 2022-04-11 RX ADMIN — SODIUM CHLORIDE 197.4 ML: 0.9 INJECTION, SOLUTION INTRAVENOUS at 10:04

## 2022-04-11 NOTE — HOSPITAL COURSE
Hospital Course:   Patient presented for outpatient coronary angiogram which went without complication. Coronary angiogram revealed Severe stenoses of left main, LCx, and RCA . S/P Diagnostic LHC. Patent LIMA to LAD, L->R collaterals filling PLB, SVG-RCA (occluded), SVG-OM (occluded),SVG-PDA sequential severe stenoses in proximal and mid SVG graft,See full cath report in Epic for details. Hemostasis of patient's L Radial access site was achieved with VascBand. Patient was monitored per post-cath protocol, and his L radial access site was c/d/i with no hematoma. Patient was able to ambulate without difficulty. He was feeling well and anticipating discharge home today.     Outpatient Plan:  - There were no medication changes  - Continue aspirin and Plavix  - Return in 1 week for staged PCI to SVG-PDA  - Goal Directed Medical Therapy   - Beta-Blocker  - ACE/ARB/ARNi  - Initiate high intensity statin

## 2022-04-11 NOTE — DISCHARGE INSTRUCTIONS
Understanding Transradial Cardiac Catheterization    Cardiac catheterization (cardiac cath) is a common, non-surgical procedure. During the procedure, your doctor will insert a long, thin tube (catheter) into an artery and move it up into your heart. Transradial means the catheter is inserted into an artery in the wrist (the radial artery). This procedure can be used to diagnose and treat certain heart problems.  Why do I need a transradial cardiac cath?  You may need a cardiac cath if signs indicate a problem with your heart. These may include:  Symptoms of chest pain, tightness, or heaviness (known as angina). This is a common symptom of blocked heart arteries, known as coronary artery disease.  Symptoms of weakness, dizziness, trouble breathing, or swollen legs or feet. These may be symptoms of a problem with a heart valve or the heart muscle.  Other test results show heart problems. Tests may include stress tests, heart scans, and echocardiography.  During a cardiac cath, your doctor can see the condition of the coronary arteries and heart valves. He or she can also check how well the heart pumps and the flow of blood through the heart. Your doctor can also measure pressures and take blood samples. And, if needed, he or she can open blocked arteries. This can help reduce symptoms of angina.  Cardiac cath is often done using a catheter inserted into an artery in the groin. During transradial cardiac cath, the catheter is inserted into an artery in the wrist. This can mean less bleeding and a faster recovery. Some people may have blockages in the groin arteries as well as in the heart arteries, making it difficult to reach the heart. The transradial approach can be used to get around this problem.   What happens during a transradial cardiac cath?  The procedure is done in the hospital or a surgery center. First, an IV line is put in your arm or hand to deliver fluids and medicines. You will likely be given  medicine to relax you and make you drowsy. When the procedure begins:  You lie on an X-ray table.  The skin over the insertion site in your wrist is numbed.  The doctor makes a tiny puncture or incision into the artery in the wrist. He or she then inserts a catheter and threads it through the blood vessel into your heart.    The doctor may inject a contrast fluid through the catheter into the arteries. This fluid makes the arteries show up better on X-rays.  Tests may be done to check the condition of your heart and arteries. If needed, the doctor can clear blockages in the arteries or do other repairs.  When the doctor is finished, he or she will remove the catheter and put direct pressure on the site to prevent bleeding.  You will stay for a time to recover, and then go home.  What are the risks of transradial cardiac cath?  These include:  Bleeding, bruising, infection, or blood clots  Damage to the radial artery that may cause injury to the hand  Allergic reaction to the contrast fluid  Abnormal heartbeat (arrhythmia)  Damage to blood vessels or tissues  Kidney damage or failure  The need for emergency heart surgery  Heart attack, stroke, or death  Date Last Reviewed: 5/1/2016  © 0519-9699 [employer]. [address] [place] [address]. All rights reserved. This information is not intended as a substitute for professional medical care. Always follow your healthcare professional's instructions. Discharge Instructions:    Do not drive a car, operate heavy equipment, care for a young child, etc for the next 12-24 hours.   Avoid drinking alcohol for 24 hours.  Do not make any important decisions for 24 hours.    Drink fluids to keep hydrated. Resume your usual diet as tolerated.     Rest for today then activity as tolerated.   Do not lift anything over 5 pounds for the first 3 days after procedure.    Remove dressing tomorrow then may shower with warm soapy water. Do not scrub site. Pat dry.    May apply bandaid for 2 days.  No tub baths.  Do not submerge wound in water for 3 days.     Call MD for any unrelieved pain, excessive nausea or vomiting, redness around site, bleeding, or pus or foul smelling drainage, or any other questions or concerns.    Go to the ER for any difficulty breathing or chest pain.      If site swells or bleeds, hold direct pressure to area for 10 full minutes.   If site continues to bleed, continue to hold pressure to site and have someone bring you to the ER.      Follow any additional instructions given to you by MD.      Call MD to schedule a follow up appointment, or follow up as instructed.

## 2022-04-11 NOTE — Clinical Note
The catheter was inserted into the ostial SVG graft. The angiography was performed via hand injection with 3 mL of contrast.

## 2022-04-11 NOTE — Clinical Note
The catheter was inserted into the ostial LIMA graft. An angiography was performed of the left coronary arteries. Multiple views were taken. The angiography was performed via hand injection with 9 mL of contrast.

## 2022-04-11 NOTE — Clinical Note
The catheter was inserted into the ostium   right coronary artery. An angiography was performed of the right coronary arteries. The angiography was performed via hand injection with 5 mL of contrast.

## 2022-04-11 NOTE — BRIEF OP NOTE
Post Cath Note  Preoperative Diagnosis: Positive cardiac stress test [R94.39]Coronary artery disease of native artery of native heart with stable angina pectoris [I25.118]   Postop Diagnosis: Positive cardiac stress test [R94.39]Coronary artery disease of native artery of native heart with stable angina pectoris [I25.118]  Referring Physician: Rey Connelly     Procedure: Left heart cath (N/A), ANGIOGRAM, CORONARY ARTERY (N/A), ANGIOGRAM, CORONARY, INCLUDING BYPASS GRAFT, WITH LEFT HEART CATHETERIZATION (N/A)       Access: Left radial  : Gordy Parmar MD   All Operators: Surgeon(s):  Gordy Parmar MD       Intervention:   - S/P Diagnostic LHC  - Severe stenoses of left main, LCx, and RCA  - Patent LIMA to LAD, L->R collaterals filling PLB   - SVG-RCA (occluded), SVG-OM (occluded)  -SVG-PDA sequential severe stenoses in proximal and mid SVG graft      LVEDP 5 mmHg    See full report for further details    Treatments/Procedures: Procedure(s) (LRB):  Left heart cath (N/A)  ANGIOGRAM, CORONARY ARTERY (N/A)  ANGIOGRAM, CORONARY, INCLUDING BYPASS GRAFT, WITH LEFT HEART CATHETERIZATION (N/A)     -Closure device: Radial band    Findings:Severe coronary disease is present. See catheterization report for full details.    Estimated Blood loss: 20 cc    Specimens removed: No  Post Cath Exam:     Vitals:    04/11/22 0759   BP: (!) 108/58   Pulse:    Resp:    Temp:      No unusual pain, hematoma, thrill or bruit at vascular access site.  Distal pulse present without signs of ischemia.    Recommendations:   - Patient tolerated procedure well. No immediate complications  - Routine post-cath care  - Continue aspirin and Plavix   - Beta-Blocker:  - ACE/ARB/ARNi:   - Initiate high intensity statin   - Routine post cath protocol  - Maximize medical management  - IVF at 3 ml/kg/hr for 3 hours  - Continue medical management, Risk factor reduction   - Return in 1 week for staged SVG-PDA PCI    Paulo REYNOLDS  Baldev - Pager# [phone]  4/11/2022  10:19 AM  Cardiovascular Fellow  [facility]

## 2022-04-11 NOTE — Clinical Note
The catheter insertion attempt was made into the ostial SVG graft. An angiography was performed of the right coronary arteries. The angiography was performed via hand injection with 3 mL of contrast.

## 2022-04-11 NOTE — INTERVAL H&P NOTE
The patient has been examined and the H&P has been reviewed:    I concur with the findings and no changes have occurred since H&P was written.    Anesthesia/Surgery risks, benefits and alternative options discussed and understood by patient/family.    CABG-1996 [facility]- LIMA-LAD, SVG-OM (occluded), SVG-RCA, SVG-PDA-PDA PCI 2.5x12.     asa, plavix. L radial-5/6 slender. JL3.5, JR4, MP, GREY, Pigtail.       Active Hospital Problems    Diagnosis  POA    Chronic obstructive pulmonary disease, unspecified COPD type [J44.9]  Yes    Pulmonary hypertension [I27.20]  Yes    Intermittent claudication [I73.9]  Yes    PAD (peripheral artery disease) [I73.9]  Yes    Bilateral carotid artery disease [I77.9]  Yes    Coronary artery disease of native artery of native heart with stable angina pectoris [I25.118]  Yes    S/P CABG (coronary artery bypass graft) [Z95.1]  Not Applicable    Essential hypertension [I10]  Yes    Hyperlipidemia [E78.5]  Yes    CKD (chronic kidney disease) stage 3, GFR 30-59 ml/min [N18.30]  Yes      Resolved Hospital Problems   No resolved problems to display.

## 2022-04-11 NOTE — DISCHARGE SUMMARY
[facility] - [facility] ([facility])  Cardiology  Discharge Summary      Patient Name: Jose Antonio Allen  MRN: [id number]  Admission Date: 4/11/2022  Hospital Length of Stay: 0 days  Discharge Date and Time:  04/11/2022 1:40 PM  Attending Physician: No att. providers found    Discharging Provider: Paulo De Paz MD  Primary Care Physician: Abilio Weber MD    HPI:   No notes on file    Procedure(s) (LRB):  Left heart cath (N/A)  ANGIOGRAM, CORONARY ARTERY (N/A)  ANGIOGRAM, CORONARY, INCLUDING BYPASS GRAFT, WITH LEFT HEART CATHETERIZATION (N/A)     Indwelling Lines/Drains at time of discharge:  Lines/Drains/Airways     None                 Hospital Course:  Hospital Course:   Patient presented for outpatient coronary angiogram which went without complication. Coronary angiogram revealed Severe stenoses of left main, LCx, and RCA . S/P Diagnostic LHC. Patent LIMA to LAD, L->R collaterals filling PLB, SVG-RCA (occluded), SVG-OM (occluded),SVG-PDA sequential severe stenoses in proximal and mid SVG graft,See full cath report in Epic for details. Hemostasis of patient's L Radial access site was achieved with VascBand. Patient was monitored per post-cath protocol, and his L radial access site was c/d/i with no hematoma. Patient was able to ambulate without difficulty. He was feeling well and anticipating discharge home today.     Outpatient Plan:  - There were no medication changes  - Continue aspirin and Plavix  - Return in 1 week for staged PCI to SVG-PDA  - Goal Directed Medical Therapy   - Beta-Blocker  - ACE/ARB/ARNi  - Initiate high intensity statin          Goals of Care Treatment Preferences:  Code Status: Full Code      Consults:     Significant Diagnostic Studies: Labs:   BMP: No results for input(s): GLU, NA, K, CL, CO2, BUN, CREATININE, CALCIUM, MG in the last 48 hours., CMP No results for input(s): NA, K, CL, CO2, GLU, BUN, CREATININE, CALCIUM, PROT, ALBUMIN, BILITOT, ALKPHOS, AST, ALT, ANIONGAP, ESTGFRAFRICA,  EGFRNONAA in the last 48 hours., CBC No results for input(s): WBC, HGB, HCT, PLT in the last 48 hours., INR   Lab Results   Component Value Date    INR 1.0 07/10/2009    INR 1.3 (H) 07/09/2009    INR 1.0 07/09/2009   , Lipid Panel   Lab Results   Component Value Date    CHOL 131 11/12/2021    HDL 41 11/12/2021    LDLCALC 68.4 11/12/2021    TRIG 108 11/12/2021    CHOLHDL 31.3 11/12/2021   , Troponin No results for input(s): TROPONINI in the last 168 hours., A1C:   Recent Labs   Lab 11/12/21  0733 02/10/22  0804   HGBA1C 7.1* 7.9*    and All labs within the past 24 hours have been reviewed  Cardiac Graphics: Echocardiogram: 2D echo with color flow doppler: No results found for this or any previous visit.    Pending Diagnostic Studies:     None          Final Active Diagnoses:    Diagnosis Date Noted POA    Chronic obstructive pulmonary disease, unspecified COPD type [J44.9] 02/14/2022 Yes    Pulmonary hypertension [I27.20] 01/25/2022 Yes    Intermittent claudication [I73.9] 06/12/2020 Yes    PAD (peripheral artery disease) [I73.9] 07/09/2018 Yes    Bilateral carotid artery disease [I77.9] 02/07/2017 Yes    Coronary artery disease of native artery of native heart with stable angina pectoris [I25.118]  Yes    S/P CABG (coronary artery bypass graft) [Z95.1]  Not Applicable    Essential hypertension [I10]  Yes    Hyperlipidemia [E78.5]  Yes    CKD (chronic kidney disease) stage 3, GFR 30-59 ml/min [N18.30]  Yes      Problems Resolved During this Admission:     No new Assessment & Plan notes have been filed under this hospital service since the last note was generated.  Service: Cardiology      Discharged Condition: good    Disposition: Home or Self Care    Follow Up:    Patient Instructions:      CBC W/ AUTO DIFFERENTIAL   Standing Status: Future Number of Occurrences: 1 Standing Exp. Date: 06/05/23     BASIC METABOLIC PANEL   Standing Status: Future Number of Occurrences: 1 Standing Exp. Date: 06/05/23     Diet  "Cardiac     Medications:  Reconciled Home Medications:      Medication List      CONTINUE taking these medications    ACCU-CHEK ADRIEN PLUS METER Misc  Generic drug: blood-glucose meter  1 Product by Other route daily as needed.     ACCU-CHEK SMARTVIEW TEST STRIP Strp  Generic drug: blood sugar diagnostic     ACCU-CHEK SOFTCLIX LANCETS Misc  Generic drug: lancets  Inject 100 lancets into the skin daily as needed.     amLODIPine 10 MG tablet  Commonly known as: NORVASC  Take 10 mg by mouth once daily.     aspirin 81 MG Chew  81 mg once daily.     atorvastatin 80 MG tablet  Commonly known as: LIPITOR  Take 1 tablet (80 mg total) by mouth once daily.     b complex vitamins tablet  Take 1 tablet by mouth once daily.     clopidogreL 75 mg tablet  Commonly known as: PLAVIX  Take 1 tablet (75 mg total) by mouth once daily.     DROPLET PEN NEEDLE 32 gauge x 5/32" Ndle  Generic drug: pen needle, diabetic  Inject 1 Product into the skin daily as needed.     ESBRIET 801 mg Tab  Generic drug: pirfenidone  3 (three) times daily.     ezetimibe 10 mg tablet  Commonly known as: ZETIA  Take 1 tablet (10 mg total) by mouth once daily.     isosorbide mononitrate 30 MG 24 hr tablet  Commonly known as: IMDUR  Take 1 tablet (30 mg total) by mouth once daily.     JARDIANCE 10 mg tablet  Generic drug: empagliflozin  Take 10 mg by mouth.     KERENDIA 10 mg Tab  Generic drug: finerenone  Take 1 tablet by mouth once daily.     levothyroxine 100 MCG tablet  Commonly known as: SYNTHROID  Take 1 tablet (100 mcg total) by mouth before breakfast.     lisinopriL 2.5 MG tablet  Commonly known as: PRINIVIL,ZESTRIL  Take 1 tablet (2.5 mg total) by mouth once daily.     LUBRICANT EYE DROPS 0.5 % Dpet  Generic drug: carboxymethylcellulose     metoprolol succinate 100 MG 24 hr tablet  Commonly known as: TOPROL-XL  TAKE 1 TABLET ONE TIME DAILY     nitroGLYCERIN 0.4 MG SL tablet  Commonly known as: NITROSTAT  Place 1 tablet (0.4 mg total) under the tongue " every 5 (five) minutes as needed for Chest pain.     NovoLOG U-100 Insulin aspart 100 unit/mL injection  Generic drug: insulin aspart U-100  Sliding scale     ORENCIA 125 mg/mL Syrg  Generic drug: abatacept  INJECT CONTENTS OF ONE SYRINGE UNDER THE SKIN EVERY 7 DAYS     pantoprazole 40 MG tablet  Commonly known as: PROTONIX  Take 1 tablet (40 mg total) by mouth once daily.     TRESIBA FLEXTOUCH U-100 100 unit/mL (3 mL) insulin pen  Generic drug: insulin degludec  Inject 50 Units into the skin every evening.     vitamin D 1000 units Tab  Commonly known as: VITAMIN D3  Take 1,000 Units by mouth once daily.            Time spent on the discharge of patient: 25 minutes    Paulo De Paz MD  Cardiology  [facility] - Lab ([facility])

## 2022-04-11 NOTE — Clinical Note
The catheter insertion attempt was made into the right coronary artery ostial SVG graft. An angiography was performed of the right coronary arteries. The angiography was performed via hand injection with 3 mL of contrast.

## 2022-04-11 NOTE — PLAN OF CARE
2 mL of air removed from radial vasc band.  No hematoma or bleeding noted.  +2 khushbu radial pulses palpated. Skin normal in color, warm to touch, < 3 sec cap refill.   Will continue to monitor pt.

## 2022-04-11 NOTE — PLAN OF CARE
Patient transferred to recovery cath lab slot 5 via stretcher with side rails up x2 .  Pt drowsy but able to follow commands. Pt is stable when connecting to cardiac monitors.  VSS. Left radial vasc band in place with 12ml of air in band c.d.i. no bleeding or hematoma noted. +2 khushbu radial pulses palpated. +2 khushbu pedal and post tib pulses.  Skin normal in color and warm to touch, <3 sec cap refill.  Fall risk precautions given and patient acknowledges.  AIDET completed to pt.  Will continue to monitor patient.  Updated pt's spouse in cath lab waiting room.

## 2022-04-11 NOTE — Clinical Note
The site was marked. The right groin and left radial was prepped. The site was prepped with ChloraPrep. The site was clipped. The patient was draped. The patient was positioned supine.

## 2022-04-11 NOTE — PLAN OF CARE
Remaining air removed from left radial vasc band. Gauze and tegaderm dressing applied c.d.i.   No drainage or shadowing noted. No bleeding or hematoma noted around site. +2 khushbu radial pulses palpated. Skin normal in color, warm to touch, < 3 sec cap refill.   Pt tolerated well.  Will continue to monitor pt.

## 2022-04-11 NOTE — PLAN OF CARE
Patient discharged to home as ordered after recovery per Dr. Parmar.  All discharge instructions, printed materials given.    Patient instructed on follow-up appointment as ordered.  Patient verbalized understanding and agreement with all discharge instructions given. Dr. Parmar at patient's bedside speaking to pt.   Pt is AAOx3, VSS, denies any pain.    Left radial gauze and tegaderm dressing c.d.i. No bleeding or hematoma noted. Stressed importance of not using left hand to patient after he attempted to use it multiple times to put pressure on.  Skin normal in color and warm to touch. Palpated bilateral radial, pedal and post tib pulses.  Pt assisted up to bathroom and voided without difficulty.   Pt got dressed and moving around without difficulty and no distress noted.  Pt in w/c.  Left AC 20 gauge iv dc'd as ordered with tip intact. avni and koban dressing applied c.d.i.  Pt discharged home via wheelchair to private vehicle by pt's spouse.

## 2022-04-12 DIAGNOSIS — I25.110 CORONARY ARTERY DISEASE INVOLVING NATIVE CORONARY ARTERY OF NATIVE HEART WITH UNSTABLE ANGINA PECTORIS: ICD-10-CM

## 2022-04-12 DIAGNOSIS — R94.39 ABNORMAL STRESS TEST: Primary | ICD-10-CM

## 2022-04-12 RX ORDER — SODIUM CHLORIDE 9 MG/ML
INJECTION, SOLUTION INTRAVENOUS CONTINUOUS
Status: CANCELLED | OUTPATIENT
Start: 2022-04-12 | End: 2022-04-12

## 2022-04-12 RX ORDER — DIPHENHYDRAMINE HCL 50 MG
50 CAPSULE ORAL ONCE
Status: CANCELLED | OUTPATIENT
Start: 2022-04-12 | End: 2022-04-12

## 2022-04-18 ENCOUNTER — LAB VISIT (OUTPATIENT)
Dept: LAB | Facility: HOSPITAL | Age: 72
End: 2022-04-18
Attending: INTERNAL MEDICINE
Payer: MEDICARE

## 2022-04-18 DIAGNOSIS — Z01.810 PRE-OPERATIVE CARDIOVASCULAR EXAMINATION: ICD-10-CM

## 2022-04-18 LAB
ANION GAP SERPL CALC-SCNC: 11 MMOL/L (ref 8–16)
BASOPHILS # BLD AUTO: 0.05 K/UL (ref 0–0.2)
BASOPHILS NFR BLD: 0.7 % (ref 0–1.9)
BUN SERPL-MCNC: 17 MG/DL (ref 8–23)
CALCIUM SERPL-MCNC: 9.3 MG/DL (ref 8.7–10.5)
CHLORIDE SERPL-SCNC: 107 MMOL/L (ref 95–110)
CO2 SERPL-SCNC: 25 MMOL/L (ref 23–29)
CREAT SERPL-MCNC: 1.7 MG/DL (ref 0.5–1.4)
DIFFERENTIAL METHOD: ABNORMAL
EOSINOPHIL # BLD AUTO: 0.4 K/UL (ref 0–0.5)
EOSINOPHIL NFR BLD: 5.4 % (ref 0–8)
ERYTHROCYTE [DISTWIDTH] IN BLOOD BY AUTOMATED COUNT: 12.3 % (ref 11.5–14.5)
EST. GFR  (AFRICAN AMERICAN): 46 ML/MIN/1.73 M^2
EST. GFR  (NON AFRICAN AMERICAN): 40 ML/MIN/1.73 M^2
GLUCOSE SERPL-MCNC: 235 MG/DL (ref 70–110)
HCT VFR BLD AUTO: 42.2 % (ref 40–54)
HGB BLD-MCNC: 13.8 G/DL (ref 14–18)
IMM GRANULOCYTES # BLD AUTO: 0.03 K/UL (ref 0–0.04)
IMM GRANULOCYTES NFR BLD AUTO: 0.4 % (ref 0–0.5)
LYMPHOCYTES # BLD AUTO: 1.6 K/UL (ref 1–4.8)
LYMPHOCYTES NFR BLD: 22.1 % (ref 18–48)
MCH RBC QN AUTO: 28.5 PG (ref 27–31)
MCHC RBC AUTO-ENTMCNC: 32.7 G/DL (ref 32–36)
MCV RBC AUTO: 87 FL (ref 82–98)
MONOCYTES # BLD AUTO: 0.7 K/UL (ref 0.3–1)
MONOCYTES NFR BLD: 10.1 % (ref 4–15)
NEUTROPHILS # BLD AUTO: 4.3 K/UL (ref 1.8–7.7)
NEUTROPHILS NFR BLD: 61.3 % (ref 38–73)
NRBC BLD-RTO: 0 /100 WBC
PLATELET # BLD AUTO: 178 K/UL (ref 150–450)
PMV BLD AUTO: 10.1 FL (ref 9.2–12.9)
POTASSIUM SERPL-SCNC: 4.3 MMOL/L (ref 3.5–5.1)
RBC # BLD AUTO: 4.85 M/UL (ref 4.6–6.2)
SODIUM SERPL-SCNC: 143 MMOL/L (ref 136–145)
WBC # BLD AUTO: 7.06 K/UL (ref 3.9–12.7)

## 2022-04-18 PROCEDURE — 36415 COLL VENOUS BLD VENIPUNCTURE: CPT | Performed by: INTERNAL MEDICINE

## 2022-04-18 PROCEDURE — 80048 BASIC METABOLIC PNL TOTAL CA: CPT | Performed by: INTERNAL MEDICINE

## 2022-04-18 PROCEDURE — 85025 COMPLETE CBC W/AUTO DIFF WBC: CPT | Performed by: INTERNAL MEDICINE

## 2022-04-20 ENCOUNTER — HOSPITAL ENCOUNTER (OUTPATIENT)
Facility: HOSPITAL | Age: 72
Discharge: HOME | End: 2022-04-20
Attending: INTERNAL MEDICINE | Admitting: INTERNAL MEDICINE
Payer: MEDICARE

## 2022-04-20 VITALS
BODY MASS INDEX: 24.75 KG/M2 | HEART RATE: 69 BPM | TEMPERATURE: 98 F | DIASTOLIC BLOOD PRESSURE: 55 MMHG | HEIGHT: 64 IN | OXYGEN SATURATION: 100 % | WEIGHT: 145 LBS | SYSTOLIC BLOOD PRESSURE: 124 MMHG | RESPIRATION RATE: 16 BRPM

## 2022-04-20 DIAGNOSIS — I25.110 CORONARY ARTERY DISEASE INVOLVING NATIVE CORONARY ARTERY OF NATIVE HEART WITH UNSTABLE ANGINA PECTORIS: ICD-10-CM

## 2022-04-20 DIAGNOSIS — Z01.810 PRE-OPERATIVE CARDIOVASCULAR EXAMINATION: Primary | ICD-10-CM

## 2022-04-20 DIAGNOSIS — E11.22 TYPE 2 DIABETES MELLITUS WITH STAGE 3A CHRONIC KIDNEY DISEASE, WITHOUT LONG-TERM CURRENT USE OF INSULIN: ICD-10-CM

## 2022-04-20 DIAGNOSIS — I25.10 CAD (CORONARY ARTERY DISEASE): ICD-10-CM

## 2022-04-20 DIAGNOSIS — R94.39 ABNORMAL STRESS TEST: ICD-10-CM

## 2022-04-20 DIAGNOSIS — N18.31 TYPE 2 DIABETES MELLITUS WITH STAGE 3A CHRONIC KIDNEY DISEASE, WITHOUT LONG-TERM CURRENT USE OF INSULIN: ICD-10-CM

## 2022-04-20 LAB
ANION GAP SERPL CALC-SCNC: 11 MMOL/L (ref 8–16)
BUN SERPL-MCNC: 16 MG/DL (ref 8–23)
CALCIUM SERPL-MCNC: 9.1 MG/DL (ref 8.7–10.5)
CHLORIDE SERPL-SCNC: 111 MMOL/L (ref 95–110)
CO2 SERPL-SCNC: 18 MMOL/L (ref 23–29)
CREAT SERPL-MCNC: 1.3 MG/DL (ref 0.5–1.4)
EST. GFR  (AFRICAN AMERICAN): >60 ML/MIN/1.73 M^2
EST. GFR  (NON AFRICAN AMERICAN): 55 ML/MIN/1.73 M^2
GLUCOSE SERPL-MCNC: 173 MG/DL (ref 70–110)
POC ACTIVATED CLOTTING TIME K: 190 SEC (ref 74–137)
POC ACTIVATED CLOTTING TIME K: 196 SEC (ref 74–137)
POC ACTIVATED CLOTTING TIME K: 214 SEC (ref 74–137)
POCT GLUCOSE: 127 MG/DL (ref 70–110)
POTASSIUM SERPL-SCNC: 4.5 MMOL/L (ref 3.5–5.1)
SAMPLE: ABNORMAL
SODIUM SERPL-SCNC: 140 MMOL/L (ref 136–145)

## 2022-04-20 PROCEDURE — C1753 CATH, INTRAVAS ULTRASOUND: HCPCS | Performed by: INTERNAL MEDICINE

## 2022-04-20 PROCEDURE — C1887 CATHETER, GUIDING: HCPCS | Performed by: INTERNAL MEDICINE

## 2022-04-20 PROCEDURE — 85347 COAGULATION TIME ACTIVATED: CPT | Mod: 91 | Performed by: INTERNAL MEDICINE

## 2022-04-20 PROCEDURE — C1769 GUIDE WIRE: HCPCS | Performed by: INTERNAL MEDICINE

## 2022-04-20 PROCEDURE — 25500020 PHARM REV CODE 255: Performed by: INTERNAL MEDICINE

## 2022-04-20 PROCEDURE — 36415 COLL VENOUS BLD VENIPUNCTURE: CPT | Performed by: INTERNAL MEDICINE

## 2022-04-20 PROCEDURE — 92978 ENDOLUMINL IVUS OCT C 1ST: CPT | Mod: 26,RC,, | Performed by: INTERNAL MEDICINE

## 2022-04-20 PROCEDURE — C1874 STENT, COATED/COV W/DEL SYS: HCPCS | Performed by: INTERNAL MEDICINE

## 2022-04-20 PROCEDURE — 99152 MOD SED SAME PHYS/QHP 5/>YRS: CPT | Performed by: INTERNAL MEDICINE

## 2022-04-20 PROCEDURE — 93005 ELECTROCARDIOGRAM TRACING: CPT | Mod: 59

## 2022-04-20 PROCEDURE — 92978 PR IVUS, CORONARY, 1ST VESSEL: ICD-10-PCS | Mod: 26,RC,, | Performed by: INTERNAL MEDICINE

## 2022-04-20 PROCEDURE — 93010 ELECTROCARDIOGRAM REPORT: CPT | Mod: ,,, | Performed by: INTERNAL MEDICINE

## 2022-04-20 PROCEDURE — 99152 MOD SED SAME PHYS/QHP 5/>YRS: CPT | Mod: ,,, | Performed by: INTERNAL MEDICINE

## 2022-04-20 PROCEDURE — C9604 PERC D-E COR REVASC T CABG S: HCPCS | Performed by: INTERNAL MEDICINE

## 2022-04-20 PROCEDURE — 99153 MOD SED SAME PHYS/QHP EA: CPT | Performed by: INTERNAL MEDICINE

## 2022-04-20 PROCEDURE — 27201423 OPTIME MED/SURG SUP & DEVICES STERILE SUPPLY: Performed by: INTERNAL MEDICINE

## 2022-04-20 PROCEDURE — 92937 PR BYPASS (IN OR THROUGH): ICD-10-PCS | Mod: RC,,, | Performed by: INTERNAL MEDICINE

## 2022-04-20 PROCEDURE — 25000003 PHARM REV CODE 250: Performed by: INTERNAL MEDICINE

## 2022-04-20 PROCEDURE — 80048 BASIC METABOLIC PNL TOTAL CA: CPT | Performed by: INTERNAL MEDICINE

## 2022-04-20 PROCEDURE — C1725 CATH, TRANSLUMIN NON-LASER: HCPCS | Performed by: INTERNAL MEDICINE

## 2022-04-20 PROCEDURE — 93010 EKG 12-LEAD: ICD-10-PCS | Mod: ,,, | Performed by: INTERNAL MEDICINE

## 2022-04-20 PROCEDURE — C1894 INTRO/SHEATH, NON-LASER: HCPCS | Performed by: INTERNAL MEDICINE

## 2022-04-20 PROCEDURE — 99152 PR MOD CONSCIOUS SEDATION, SAME PHYS, 5+ YRS, FIRST 15 MIN: ICD-10-PCS | Mod: ,,, | Performed by: INTERNAL MEDICINE

## 2022-04-20 PROCEDURE — 92978 ENDOLUMINL IVUS OCT C 1ST: CPT | Performed by: INTERNAL MEDICINE

## 2022-04-20 PROCEDURE — C1884 EMBOLIZATION PROTECT SYST: HCPCS | Performed by: INTERNAL MEDICINE

## 2022-04-20 PROCEDURE — 92937 PRQ TRLUML REVSC CAB GRF 1: CPT | Mod: RC,,, | Performed by: INTERNAL MEDICINE

## 2022-04-20 PROCEDURE — 63600175 PHARM REV CODE 636 W HCPCS: Performed by: INTERNAL MEDICINE

## 2022-04-20 PROCEDURE — 93005 ELECTROCARDIOGRAM TRACING: CPT

## 2022-04-20 DEVICE — STENT RONYX35026UX RESOLUTE ONYX 3.50X26
Type: IMPLANTABLE DEVICE | Site: HEART | Status: FUNCTIONAL
Brand: RESOLUTE ONYX™

## 2022-04-20 DEVICE — STENT RONYX35038UX RESOLUTE ONYX 3.50X38
Type: IMPLANTABLE DEVICE | Site: HEART | Status: FUNCTIONAL
Brand: RESOLUTE ONYX™

## 2022-04-20 RX ORDER — HEPARIN SODIUM 200 [USP'U]/100ML
INJECTION, SOLUTION INTRAVENOUS
Status: DISCONTINUED | OUTPATIENT
Start: 2022-04-20 | End: 2022-04-20 | Stop reason: HOSPADM

## 2022-04-20 RX ORDER — SODIUM CHLORIDE 9 MG/ML
INJECTION, SOLUTION INTRAVENOUS CONTINUOUS
Status: ACTIVE | OUTPATIENT
Start: 2022-04-20 | End: 2022-04-20

## 2022-04-20 RX ORDER — LIDOCAINE HYDROCHLORIDE 20 MG/ML
INJECTION, SOLUTION EPIDURAL; INFILTRATION; INTRACAUDAL; PERINEURAL
Status: DISCONTINUED | OUTPATIENT
Start: 2022-04-20 | End: 2022-04-20 | Stop reason: HOSPADM

## 2022-04-20 RX ORDER — IODIXANOL 320 MG/ML
INJECTION, SOLUTION INTRAVASCULAR
Status: DISCONTINUED | OUTPATIENT
Start: 2022-04-20 | End: 2022-04-20 | Stop reason: HOSPADM

## 2022-04-20 RX ORDER — DIPHENHYDRAMINE HCL 25 MG
50 CAPSULE ORAL ONCE
Status: DISCONTINUED | OUTPATIENT
Start: 2022-04-20 | End: 2022-04-20 | Stop reason: HOSPADM

## 2022-04-20 RX ORDER — ONDANSETRON 4 MG/1
8 TABLET, ORALLY DISINTEGRATING ORAL EVERY 8 HOURS PRN
Status: DISCONTINUED | OUTPATIENT
Start: 2022-04-20 | End: 2022-04-20 | Stop reason: HOSPADM

## 2022-04-20 RX ORDER — DIPHENHYDRAMINE HYDROCHLORIDE 50 MG/ML
INJECTION INTRAMUSCULAR; INTRAVENOUS
Status: DISCONTINUED | OUTPATIENT
Start: 2022-04-20 | End: 2022-04-20 | Stop reason: HOSPADM

## 2022-04-20 RX ORDER — FENTANYL CITRATE 50 UG/ML
INJECTION, SOLUTION INTRAMUSCULAR; INTRAVENOUS
Status: DISCONTINUED | OUTPATIENT
Start: 2022-04-20 | End: 2022-04-20 | Stop reason: HOSPADM

## 2022-04-20 RX ORDER — ADENOSINE 3 MG/ML
INJECTION, SOLUTION INTRAVENOUS
Status: DISCONTINUED | OUTPATIENT
Start: 2022-04-20 | End: 2022-04-20 | Stop reason: HOSPADM

## 2022-04-20 RX ORDER — HEPARIN SODIUM 1000 [USP'U]/ML
INJECTION, SOLUTION INTRAVENOUS; SUBCUTANEOUS
Status: DISCONTINUED | OUTPATIENT
Start: 2022-04-20 | End: 2022-04-20 | Stop reason: HOSPADM

## 2022-04-20 RX ORDER — ACETAMINOPHEN 325 MG/1
650 TABLET ORAL EVERY 4 HOURS PRN
Status: DISCONTINUED | OUTPATIENT
Start: 2022-04-20 | End: 2022-04-20 | Stop reason: HOSPADM

## 2022-04-20 RX ORDER — MIDAZOLAM HYDROCHLORIDE 1 MG/ML
INJECTION INTRAMUSCULAR; INTRAVENOUS
Status: DISCONTINUED | OUTPATIENT
Start: 2022-04-20 | End: 2022-04-20 | Stop reason: HOSPADM

## 2022-04-20 RX ADMIN — SODIUM CHLORIDE: 0.9 INJECTION, SOLUTION INTRAVENOUS at 08:04

## 2022-04-20 RX ADMIN — SODIUM CHLORIDE 197.4 ML: 0.9 INJECTION, SOLUTION INTRAVENOUS at 12:04

## 2022-04-20 NOTE — Clinical Note
The catheter was inserted into the ostial SVG graft. An angiography was performed of the right coronary arteries. The angiography was performed via hand injection with 10 mL of contrast.

## 2022-04-20 NOTE — BRIEF OP NOTE
Post Cath Note  Preoperative Diagnosis: Abnormal stress test [R94.39]Coronary artery disease involving native coronary artery of native heart with unstable angina pectoris [I25.110]   Postop Diagnosis: Abnormal stress test [R94.39]Coronary artery disease involving native coronary artery of native heart with unstable angina pectoris [I25.110]  Referring Physician: Gordy Parmar     Procedure: Percutaneous coronary intervention (N/A), Bypass graft study, Angioplasty-coronary, IVUS, Coronary, Stent EDILMA bypass graft       Access: Right CFA  : Gordy Parmar MD   All Operators: Surgeon(s):  Gordy Parmar MD       Intervention:   - S/P SVG-PLB PCI, 3.5x38 EDILMA distal SVG, 3.5x26 proximal SVG, IVUS  please see full report.  - R common iliac stenosis    See full report for further details    Treatments/Procedures: Procedure(s) (LRB):  Percutaneous coronary intervention (N/A)  Bypass graft study  Angioplasty-coronary  IVUS, Coronary  Stent EDILMA bypass graft     -Closure device: Manual pressure    Findings:Severe coronary disease is present. See catheterization report for full details.    Estimated Blood loss: 20 cc    Specimens removed: No  Post Cath Exam:     Vitals:    04/20/22 0818   BP: 137/73   Pulse:    Resp:    Temp:      No unusual pain, hematoma, thrill or bruit at vascular access site.  Distal pulse present without signs of ischemia.    Recommendations:   - Patient tolerated procedure well. No immediate complications  - Routine post-cath care  - Continue aspirin and Plavix for 1 year, then aspirin for life  - Beta-Blocker:   - high intensity statin   - Cardiac rehab  - BMP today  - EKG when arrives to floor  - Routine post cath protocol  - Maximize medical management  - IVF at 3 ml/kg/hr for 3 hours  - Cardiac rehab referral, Continue medical management, Risk factor reduction, Follow-up with outpatient cardiologist    Paulo De Paz - Pager# [phone]  4/20/2022  12:04  PM  Cardiovascular Fellow  [facility]

## 2022-04-20 NOTE — DISCHARGE SUMMARY
[facility] - Lab ([facility])  Cardiology  Discharge Summary      Patient Name: Jose Antonio Allen  MRN: [id number]  Admission Date: 4/20/2022  Hospital Length of Stay: 0 days  Discharge Date and Time:  04/20/2022 3:30 PM  Attending Physician: Gordy Parmar MD    Discharging Provider: Paulo De Paz MD  Primary Care Physician: Abilio Weber MD    HPI:   No notes on file    Procedure(s) (LRB):  Percutaneous coronary intervention (N/A)  Bypass graft study  Angioplasty-coronary  IVUS, Coronary  Stent EDILMA bypass graft     Indwelling Lines/Drains at time of discharge:  Lines/Drains/Airways     None                 Hospital Course:  Hospital Course:   Patient presented for outpatient coronary angiogram which went without complication. Coronary angiogram revealed severe SVG-PLB PCI . See full cath report in Epic for details. Hemostasis of patient's R CFA access site was achieved with manual pressure. Patient was monitored per post-cath protocol, and his R groin access site was c/d/i with no hematoma. Patient was able to ambulate without difficulty. He was feeling well and anticipating discharge home today.     Outpatient Plan:  - There were no medication changes  - Imdur for 1 week. Continue Norvasc  - Follow up with Dr. Parmar in 2-3 weeks  - Continue aspirin and Plavix for 1 year, then aspirin for life  - Beta-Blocker  - Initiate high intensity statin  - Cardiac rehab        Goals of Care Treatment Preferences:  Code Status: Full Code      Consults:     Significant Diagnostic Studies: Labs:   BMP: No results for input(s): GLU, NA, K, CL, CO2, BUN, CREATININE, CALCIUM, MG in the last 48 hours., CMP No results for input(s): NA, K, CL, CO2, GLU, BUN, CREATININE, CALCIUM, PROT, ALBUMIN, BILITOT, ALKPHOS, AST, ALT, ANIONGAP, ESTGFRAFRICA, EGFRNONAA in the last 48 hours., CBC No results for input(s): WBC, HGB, HCT, PLT in the last 48 hours., INR   Lab Results   Component Value Date    INR 1.0 07/10/2009    INR 1.3 (H)  07/09/2009    INR 1.0 07/09/2009   , Lipid Panel   Lab Results   Component Value Date    CHOL 131 11/12/2021    HDL 41 11/12/2021    LDLCALC 68.4 11/12/2021    TRIG 108 11/12/2021    CHOLHDL 31.3 11/12/2021   , Troponin No results for input(s): TROPONINI in the last 168 hours., A1C:   Recent Labs   Lab 11/12/21  0733 02/10/22  0804   HGBA1C 7.1* 7.9*    and All labs within the past 24 hours have been reviewed  Cardiac Graphics: Echocardiogram: 2D echo with color flow doppler: No results found for this or any previous visit.    Pending Diagnostic Studies:     Procedure Component Value Units Date/Time    Basic metabolic panel [[id number]] Collected: 04/20/22 1442    Order Status: Sent Lab Status: In process Updated: 04/20/22 1456    Specimen: Blood           Final Active Diagnoses:    Diagnosis Date Noted POA    PAD (peripheral artery disease) [I73.9] 07/09/2018 Yes    Coronary artery disease of native artery of native heart with stable angina pectoris [I25.118]  Yes    S/P CABG (coronary artery bypass graft) [Z95.1]  Not Applicable    Angina pectoris [I20.9]  Yes    Essential hypertension [I10]  Yes    Hyperlipidemia [E78.5]  Yes    CKD (chronic kidney disease) stage 3, GFR 30-59 ml/min [N18.30]  Yes      Problems Resolved During this Admission:     No new Assessment & Plan notes have been filed under this hospital service since the last note was generated.  Service: Cardiology      Discharged Condition: good    Disposition: Home or Self Care    Follow Up:    Patient Instructions:      CBC W/ AUTO DIFFERENTIAL   Standing Status: Future Number of Occurrences: 1 Standing Exp. Date: 06/17/23     BASIC METABOLIC PANEL   Standing Status: Future Number of Occurrences: 1 Standing Exp. Date: 06/17/23     Diet Cardiac     Medications:  Reconciled Home Medications:      Medication List      CONTINUE taking these medications    ACCU-CHEK ADRIEN PLUS METER Misc  Generic drug: blood-glucose meter  1 Product by Other route  "daily as needed.     ACCU-CHEK SMARTVIEW TEST STRIP Strp  Generic drug: blood sugar diagnostic     ACCU-CHEK SOFTCLIX LANCETS Misc  Generic drug: lancets  Inject 100 lancets into the skin daily as needed.     amLODIPine 10 MG tablet  Commonly known as: NORVASC  Take 10 mg by mouth once daily.     aspirin 81 MG Chew  81 mg once daily.     atorvastatin 80 MG tablet  Commonly known as: LIPITOR  Take 1 tablet (80 mg total) by mouth once daily.     b complex vitamins tablet  Take 1 tablet by mouth once daily.     clopidogreL 75 mg tablet  Commonly known as: PLAVIX  Take 1 tablet (75 mg total) by mouth once daily.     DROPLET PEN NEEDLE 32 gauge x 5/32" Ndle  Generic drug: pen needle, diabetic  Inject 1 Product into the skin daily as needed.     ESBRIET 801 mg Tab  Generic drug: pirfenidone  3 (three) times daily.     ezetimibe 10 mg tablet  Commonly known as: ZETIA  Take 1 tablet (10 mg total) by mouth once daily.     isosorbide mononitrate 30 MG 24 hr tablet  Commonly known as: IMDUR  Take 1 tablet (30 mg total) by mouth once daily.     JARDIANCE 10 mg tablet  Generic drug: empagliflozin  Take 10 mg by mouth.     KERENDIA 10 mg Tab  Generic drug: finerenone  Take 1 tablet by mouth once daily.     levothyroxine 100 MCG tablet  Commonly known as: SYNTHROID  Take 1 tablet (100 mcg total) by mouth before breakfast.     lisinopriL 2.5 MG tablet  Commonly known as: PRINIVIL,ZESTRIL  Take 1 tablet (2.5 mg total) by mouth once daily.     LUBRICANT EYE DROPS 0.5 % Dpet  Generic drug: carboxymethylcellulose     metoprolol succinate 100 MG 24 hr tablet  Commonly known as: TOPROL-XL  TAKE 1 TABLET ONE TIME DAILY     nitroGLYCERIN 0.4 MG SL tablet  Commonly known as: NITROSTAT  Place 1 tablet (0.4 mg total) under the tongue every 5 (five) minutes as needed for Chest pain.     NovoLOG U-100 Insulin aspart 100 unit/mL injection  Generic drug: insulin aspart U-100  Sliding scale     ORENCIA 125 mg/mL Syrg  Generic drug: " abatacept  INJECT CONTENTS OF ONE SYRINGE UNDER THE SKIN EVERY 7 DAYS     pantoprazole 40 MG tablet  Commonly known as: PROTONIX  Take 1 tablet (40 mg total) by mouth once daily.     TRESIBA FLEXTOUCH U-100 100 unit/mL (3 mL) insulin pen  Generic drug: insulin degludec  Inject 50 Units into the skin every evening.     vitamin D 1000 units Tab  Commonly known as: VITAMIN D3  Take 1,000 Units by mouth once daily.            Time spent on the discharge of patient: 25 minutes    Paulo De Paz MD  Cardiology  [facility] - [facility] ([facility])

## 2022-04-20 NOTE — DISCHARGE INSTRUCTIONS
Discharge Instructions for Cardiac Catheterization  Cardiac catheterization is a procedure to look for blocked areas in the blood vessels that send blood to the heart. A thin, flexible tube (catheter) is put in a blood vessel in your groin or arm. The healthcare provider injects contrast fluid into your blood, which then flows to your heart. X-rays pictures are taken of your heart. Your provider will review the results with you. Be sure to ask any questions you have before you leave. This sheet will help you take care of yourself at home.  Home care  Only do light and easy activities for the next 2 to 3 days. Ask for help with chores and errands while you recover. Have someone drive you to your appointments.  Don't lift anything heavy for a while. Your healthcare team will tell you when it's safe to lift again.  Ask your healthcare team when you can expect to return to work. Unless your job involves lifting, you may be able to return to your normal activities within a couple of days.  Take your medicines as directed. Don't skip doses.  Drink 6 to 8 glasses of water a day. This is to help flush the contrast dye out of your body. Call your healthcare team if your urine has any change in color.  Take your temperature each day for 7 days. If you feel cold and clammy or start sweating, take your temperature right away and call your healthcare team.  Check your incisions every day for signs of infection. These include redness, swelling, and drainage. It is normal to have a small bruise or bump where the catheter was inserted. A bruise that is getting larger is not normal and should be reported to your healthcare team. If you see blood forming in the incision, call your healthcare team. Go to the emergency department if you have uncontrolled bleeding from the artery site. This is especially true if you take medicines that make it difficult for your blood to clot. Examples are aspirin, clopidogrel, and warfarin.  Eat a  healthy diet. Make sure it is low in fat, salt, and cholesterol. Ask your healthcare team for diet information.  Stop smoking. Enroll in a stop-smoking program or ask your healthcare team for help. Stop-smoking programs can be life saving.  Exercise as your healthcare team tells you to. Your healthcare team may recommend you start a cardiac rehabilitation program. Cardiac rehab is an exercise program in which trained healthcare staff watch your progress and stress on your heart while you exercise. Ask your team how to enroll.  Don't swim or take baths until your healthcare team says its OK. You can shower the day after the procedure. Keep the site clean and dry. This keeps the incision from getting wet and infected until the skin and artery can heal.  Follow-up care  Make a follow-up appointment as advised by our staff. It's common to have a follow-up appointment 2 to 4 weeks after an angioplasty or coronary stent procedure.  Make a yearly appointment, too. This is to make sure you are still doing well and not having any new symptoms.  Don't wait for a follow-up appointment if your medicines aren't working or you are having heart-related symptoms.  When to seek medical care  Call your healthcare provider right away if you have any of the following:  Chest pain  Constant or increasing pain or numbness in your leg  Fever of 100.4°F (38.0°C) or higher, or as directed by your healthcare provider  Symptoms of infection. These include redness, swelling, drainage, or warmth at the incision site.  Shortness of breath  A leg that feels cold or appears blue  Bleeding, bruising, or a lot of swelling where the catheter was inserted  Blood in your urine  Black or tarry stools  Any unusual bleeding   Date Last Reviewed: 10/1/2016  © 6076-0542 [employer]. [address] [place] [address], [place] [address]. All rights reserved. This information is not intended as a substitute for professional medical care. Always follow your  healthcare professional's instructions. Discharge Instructions:    Do not drive a car, operate heavy equipment, care for a young child, etc for the next 12-24 hours.   Avoid drinking alcohol for 24 hours.  Do not make any important decisions for 24 hours.    Drink fluids to keep hydrated. Resume your usual diet as tolerated.     Rest for today then activity as tolerated.   Do not lift anything over 5 pounds for the first 3 days after procedure.    Remove dressing tomorrow then may shower with warm soapy water. Do not scrub site. Pat dry.   May apply bandaid for 2 days.  No tub baths.  Do not submerge wound in water for 3 days.     Call MD for any unrelieved pain, excessive nausea or vomiting, redness around site, bleeding, or pus or foul smelling drainage, or any other questions or concerns.    Go to the ER for any difficulty breathing or chest pain.      If site swells or bleeds, hold direct pressure to area for 10 full minutes.   If site continues to bleed, continue to hold pressure to site and have someone bring you to the ER.      Follow any additional instructions given to you by MD.      Call MD to schedule a follow up appointment, or follow up as instructed.

## 2022-04-20 NOTE — PLAN OF CARE
Pt resting quietly with NAD noted.  Small amount of shadowing noted.  Dsg removed, manual pressure held and DSTAT applied.

## 2022-04-20 NOTE — HOSPITAL COURSE
Hospital Course:   Patient presented for outpatient coronary angiogram which went without complication. Coronary angiogram revealed severe SVG-PLB PCI . See full cath report in Epic for details. Hemostasis of patient's R CFA access site was achieved with manual pressure. Patient was monitored per post-cath protocol, and his R groin access site was c/d/i with no hematoma. Patient was able to ambulate without difficulty. He was feeling well and anticipating discharge home today.     Outpatient Plan:  - There were no medication changes  - Imdur for 1 week. Continue Norvasc  - Follow up with Dr. Parmar in 2-3 weeks  - Continue aspirin and Plavix for 1 year, then aspirin for life  - Beta-Blocker  - Initiate high intensity statin  - Cardiac rehab

## 2022-04-20 NOTE — INTERVAL H&P NOTE
The patient has been examined and the H&P has been reviewed:    I concur with the findings and no changes have occurred since H&P was written.    Anesthesia/Surgery risks, benefits and alternative options discussed and understood by patient/family.    Asa, plavix.     - R CFA access. Staged PCI SVG-PDA     Active Hospital Problems    Diagnosis  POA    PAD (peripheral artery disease) [I73.9]  Yes    Coronary artery disease of native artery of native heart with stable angina pectoris [I25.118]  Yes    S/P CABG (coronary artery bypass graft) [Z95.1]  Not Applicable    Angina pectoris [I20.9]  Yes    Essential hypertension [I10]  Yes    Hyperlipidemia [E78.5]  Yes    CKD (chronic kidney disease) stage 3, GFR 30-59 ml/min [N18.30]  Yes      Resolved Hospital Problems   No resolved problems to display.

## 2022-04-20 NOTE — PLAN OF CARE
Patient discharged to home as ordered after  recovery per Dr. Parmar.     All discharge instructions, printed materials given.    Patient instructed on follow-up appointment as ordered.  Patient verbalized understanding and agreement with all discharge instructions given.   Pt is AAOx3, VSS, denies any pain.    Left groin with gauze and tegaderm dressing c.d.i. No bleeding or hematoma noted.  Skin normal in color and warm to touch. Palpated bilateral radial, pedal and post tib pulses 2+.  Pt voided without difficulty 600ml clear yellow urine per urinal.   Pt got dressed and moving around without difficulty and no distress noted.  Pt in w/c.  Left AC 20 gauge iv dc'd as ordered with tip intact. avni and koban dressing applied c.d.i.  Pt discharged home via wheelchair to private vehicle by pt's spouse.

## 2022-04-20 NOTE — Clinical Note
An angiography was performed of the graft. The angiography was performed via hand injection with 15 mL of contrast.

## 2022-04-20 NOTE — Clinical Note
An angiography was performed of the proximal left common femoral artery via hand injection with 7 mL of contrast

## 2022-04-20 NOTE — PLAN OF CARE
Patient transferred to recovery cath lab slot 3 via stretcher with side rails up x2 .  Pt AAO X3 and able to follow commands. Pt is stable when connecting to cardiac monitors.  VSS. Right groin with dsg c.d.i. no bleeding or hematoma noted. +2 khushbu radial, pedal and post tib  pulses palpated.  Skin normal in color and warm to touch, <3 sec cap refill.  Fall risk precautions given and patient acknowledges. AIDET completed to pt.  Will continue to monitor patient.  Updated pt's spouse in cath lab waiting room.

## 2022-04-20 NOTE — Clinical Note
An angiography was performed of the proximal left common iliac arteries via hand injection with 7 mL of contrast

## 2022-04-21 ENCOUNTER — TELEPHONE (OUTPATIENT)
Dept: CARDIOLOGY | Facility: CLINIC | Age: 72
End: 2022-04-21
Payer: MEDICARE

## 2022-04-21 NOTE — TELEPHONE ENCOUNTER
----- Message from Monik Rodriguez sent at 4/21/2022  8:05 AM CDT -----  Contact: [phone]/ Self  Type:  Sooner Appointment Request     Caller is requesting a sooner appointment.  Caller declined first available appointment listed below.  Caller will not accept being placed on the waitlist and is requesting a message be sent to doctor.  Name of Caller: pt  When is the first available appointment? 06/07/2022  Symptoms or Reason: 2 weeks f/u   Would the patient rather a call back or a response via [facility]? Call back  Best Call Back Number: [phone]  Additional Information: n/a   '

## 2022-04-26 LAB
POC ACTIVATED CLOTTING TIME K: 243 SEC (ref 74–137)
POC ACTIVATED CLOTTING TIME K: 303 SEC (ref 74–137)
POC ACTIVATED CLOTTING TIME K: 321 SEC (ref 74–137)
SAMPLE: ABNORMAL

## 2022-05-02 ENCOUNTER — PATIENT OUTREACH (OUTPATIENT)
Dept: ADMINISTRATIVE | Facility: OTHER | Age: 72
End: 2022-05-02
Payer: MEDICARE

## 2022-05-02 NOTE — PROGRESS NOTES
Health Maintenance Due   Topic Date Due    COVID-19 Vaccine (4 - Booster for Pfizer series) 12/09/2021    Eye Exam  02/19/2022    Foot Exam  05/05/2022     Updates were requested from care everywhere.  Chart was reviewed for overdue Proactive [facility] Encounters ([facility]) topics (CRS, Breast Cancer Screening, Eye exam)  Health Maintenance has been updated.  LINKS immunization registry triggered.  Immunizations were reconciled.

## 2022-05-03 ENCOUNTER — OFFICE VISIT (OUTPATIENT)
Dept: CARDIOLOGY | Facility: CLINIC | Age: 72
End: 2022-05-03
Payer: MEDICARE

## 2022-05-03 VITALS
BODY MASS INDEX: 24.75 KG/M2 | OXYGEN SATURATION: 99 % | DIASTOLIC BLOOD PRESSURE: 68 MMHG | SYSTOLIC BLOOD PRESSURE: 121 MMHG | WEIGHT: 145 LBS | HEART RATE: 72 BPM | HEIGHT: 64 IN

## 2022-05-03 DIAGNOSIS — N18.30 STAGE 3 CHRONIC KIDNEY DISEASE, UNSPECIFIED WHETHER STAGE 3A OR 3B CKD: ICD-10-CM

## 2022-05-03 DIAGNOSIS — R01.1 HEART MURMUR: ICD-10-CM

## 2022-05-03 DIAGNOSIS — R09.89 RIGHT CAROTID BRUIT: ICD-10-CM

## 2022-05-03 DIAGNOSIS — I25.118 CORONARY ARTERY DISEASE OF NATIVE ARTERY OF NATIVE HEART WITH STABLE ANGINA PECTORIS: Primary | ICD-10-CM

## 2022-05-03 DIAGNOSIS — E78.49 OTHER HYPERLIPIDEMIA: ICD-10-CM

## 2022-05-03 DIAGNOSIS — J84.10 FIBROSIS OF LUNG: ICD-10-CM

## 2022-05-03 DIAGNOSIS — I27.20 PULMONARY HYPERTENSION: ICD-10-CM

## 2022-05-03 DIAGNOSIS — I65.21 INTERNAL CAROTID ARTERY OCCLUSION, RIGHT: ICD-10-CM

## 2022-05-03 DIAGNOSIS — E11.9 TYPE 2 DIABETES MELLITUS WITHOUT COMPLICATION, UNSPECIFIED WHETHER LONG TERM INSULIN USE: ICD-10-CM

## 2022-05-03 DIAGNOSIS — J44.9 CHRONIC OBSTRUCTIVE PULMONARY DISEASE, UNSPECIFIED COPD TYPE: ICD-10-CM

## 2022-05-03 DIAGNOSIS — I10 ESSENTIAL HYPERTENSION: ICD-10-CM

## 2022-05-03 DIAGNOSIS — Z95.1 S/P CABG (CORONARY ARTERY BYPASS GRAFT): ICD-10-CM

## 2022-05-03 DIAGNOSIS — I77.9 BILATERAL CAROTID ARTERY DISEASE, UNSPECIFIED TYPE: ICD-10-CM

## 2022-05-03 DIAGNOSIS — I73.9 PAD (PERIPHERAL ARTERY DISEASE): ICD-10-CM

## 2022-05-03 DIAGNOSIS — I20.9 ANGINA PECTORIS: ICD-10-CM

## 2022-05-03 DIAGNOSIS — I73.9 INTERMITTENT CLAUDICATION: ICD-10-CM

## 2022-05-03 DIAGNOSIS — I70.0 ABDOMINAL AORTIC ATHEROSCLEROSIS: ICD-10-CM

## 2022-05-03 PROCEDURE — 4010F ACE/ARB THERAPY RXD/TAKEN: CPT | Mod: CPTII,S$GLB,, | Performed by: NURSE PRACTITIONER

## 2022-05-03 PROCEDURE — 1159F PR MEDICATION LIST DOCUMENTED IN MEDICAL RECORD: ICD-10-PCS | Mod: CPTII,S$GLB,, | Performed by: NURSE PRACTITIONER

## 2022-05-03 PROCEDURE — 3288F PR FALLS RISK ASSESSMENT DOCUMENTED: ICD-10-PCS | Mod: CPTII,S$GLB,, | Performed by: NURSE PRACTITIONER

## 2022-05-03 PROCEDURE — 3078F DIAST BP <80 MM HG: CPT | Mod: CPTII,S$GLB,, | Performed by: NURSE PRACTITIONER

## 2022-05-03 PROCEDURE — 4010F PR ACE/ARB THEARPY RXD/TAKEN: ICD-10-PCS | Mod: CPTII,S$GLB,, | Performed by: NURSE PRACTITIONER

## 2022-05-03 PROCEDURE — 1160F PR REVIEW ALL MEDS BY PRESCRIBER/CLIN PHARMACIST DOCUMENTED: ICD-10-PCS | Mod: CPTII,S$GLB,, | Performed by: NURSE PRACTITIONER

## 2022-05-03 PROCEDURE — 3051F PR MOST RECENT HEMOGLOBIN A1C LEVEL 7.0 - < 8.0%: ICD-10-PCS | Mod: CPTII,S$GLB,, | Performed by: NURSE PRACTITIONER

## 2022-05-03 PROCEDURE — 3288F FALL RISK ASSESSMENT DOCD: CPT | Mod: CPTII,S$GLB,, | Performed by: NURSE PRACTITIONER

## 2022-05-03 PROCEDURE — 1101F PT FALLS ASSESS-DOCD LE1/YR: CPT | Mod: CPTII,S$GLB,, | Performed by: NURSE PRACTITIONER

## 2022-05-03 PROCEDURE — 3008F BODY MASS INDEX DOCD: CPT | Mod: CPTII,S$GLB,, | Performed by: NURSE PRACTITIONER

## 2022-05-03 PROCEDURE — 1160F RVW MEDS BY RX/DR IN RCRD: CPT | Mod: CPTII,S$GLB,, | Performed by: NURSE PRACTITIONER

## 2022-05-03 PROCEDURE — 99999 PR PBB SHADOW E&M-EST. PATIENT-LVL IV: CPT | Mod: PBBFAC,,, | Performed by: NURSE PRACTITIONER

## 2022-05-03 PROCEDURE — 99214 PR OFFICE/OUTPT VISIT, EST, LEVL IV, 30-39 MIN: ICD-10-PCS | Mod: S$GLB,,, | Performed by: NURSE PRACTITIONER

## 2022-05-03 PROCEDURE — 3051F HG A1C>EQUAL 7.0%<8.0%: CPT | Mod: CPTII,S$GLB,, | Performed by: NURSE PRACTITIONER

## 2022-05-03 PROCEDURE — 3074F PR MOST RECENT SYSTOLIC BLOOD PRESSURE < 130 MM HG: ICD-10-PCS | Mod: CPTII,S$GLB,, | Performed by: NURSE PRACTITIONER

## 2022-05-03 PROCEDURE — 1159F MED LIST DOCD IN RCRD: CPT | Mod: CPTII,S$GLB,, | Performed by: NURSE PRACTITIONER

## 2022-05-03 PROCEDURE — 3008F PR BODY MASS INDEX (BMI) DOCUMENTED: ICD-10-PCS | Mod: CPTII,S$GLB,, | Performed by: NURSE PRACTITIONER

## 2022-05-03 PROCEDURE — 99999 PR PBB SHADOW E&M-EST. PATIENT-LVL IV: ICD-10-PCS | Mod: PBBFAC,,, | Performed by: NURSE PRACTITIONER

## 2022-05-03 PROCEDURE — 99214 OFFICE O/P EST MOD 30 MIN: CPT | Mod: S$GLB,,, | Performed by: NURSE PRACTITIONER

## 2022-05-03 PROCEDURE — 1126F AMNT PAIN NOTED NONE PRSNT: CPT | Mod: CPTII,S$GLB,, | Performed by: NURSE PRACTITIONER

## 2022-05-03 PROCEDURE — 1101F PR PT FALLS ASSESS DOC 0-1 FALLS W/OUT INJ PAST YR: ICD-10-PCS | Mod: CPTII,S$GLB,, | Performed by: NURSE PRACTITIONER

## 2022-05-03 PROCEDURE — 3074F SYST BP LT 130 MM HG: CPT | Mod: CPTII,S$GLB,, | Performed by: NURSE PRACTITIONER

## 2022-05-03 PROCEDURE — 1126F PR PAIN SEVERITY QUANTIFIED, NO PAIN PRESENT: ICD-10-PCS | Mod: CPTII,S$GLB,, | Performed by: NURSE PRACTITIONER

## 2022-05-03 PROCEDURE — 3078F PR MOST RECENT DIASTOLIC BLOOD PRESSURE < 80 MM HG: ICD-10-PCS | Mod: CPTII,S$GLB,, | Performed by: NURSE PRACTITIONER

## 2022-05-03 RX ORDER — AMLODIPINE BESYLATE 5 MG/1
5 TABLET ORAL DAILY
Qty: 30 TABLET | Refills: 11 | Status: SHIPPED | OUTPATIENT
Start: 2022-05-03 | End: 2022-09-15

## 2022-05-03 NOTE — PROGRESS NOTES
Cardiology Clinic note    Subjective:   Patient ID:  Jose Antoino Allen is a 71 y.o. male who presents for follow-up of   CAD.  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    71-year-old male, referred by Dr. Connelly for abnormal stress test, presents for follow up after angiogram 4/20/2022. Results revealed 3 vessel disease; severe SVG-PLB stenosis, patent LIMA-LAD, occluded SVG-OM. Had c/o class III angina and chronic [place]. Former smoker.     Since left heart catheterization patient reports doing well overall; no further c/o chest pain, however reports dizziness after taking BP medication in am. He takes amlodipine in morning and both lisinopril and Toprol in evening. He tells me that he eats with his morning medication but for weeks, shortly after (within 30 minutes) of taking amlodipine gets dizzy, which eventually gets better as day goes on. Dizziness has not resulted in falls or limited his daily activities. He does not take BP at home. He remains active and exercises regularly.  He reports medication compliance.    Labs reviewed. Lipids controlled. He denies orthopnea, PND, syncope, palpitations, lower extremity edema.      HPI:   Jose Antonio Allen  has a past medical history of Angina pectoris, Arthritis, CKD (chronic kidney disease), Colon polyps (09/14/2018), Coronary artery disease, Diabetes mellitus, Diabetes mellitus, type 2, GERD (gastroesophageal reflux disease), History of tobacco use, Hyperlipidemia, Hypertension, S/P CABG (coronary artery bypass graft) (1996), and Thyroid disease.          Patient Active Problem List    Diagnosis Date Noted    Rheumatoid arthritis with rheumatoid factor, unspecified 02/14/2022    Chronic obstructive pulmonary  "disease, unspecified COPD type 02/14/2022    Pulmonary hypertension 01/25/2022    Abdominal aortic atherosclerosis 06/22/2021    Internal carotid artery occlusion, right 06/22/2021    Proteinuria 12/14/2020    Calculus of kidney and ureter 12/14/2020    Intermittent claudication 06/12/2020    Type 2 diabetes mellitus with stage 3a chronic kidney disease, without long-term current use of insulin 06/12/2020    GI bleeding 06/12/2020    Diverticular disease 06/12/2020    Overweight (BMI 25.0-29.9) 06/12/2020    Acquired hypothyroidism 11/21/2019    PAD (peripheral artery disease) 07/09/2018    Bilateral carotid artery disease 02/07/2017    Right carotid bruit 02/07/2017    Heart murmur 02/07/2017    Fibrosis of lung 01/22/2016    History of GI diverticular bleed 01/22/2016    Coronary artery disease of native artery of native heart with stable angina pectoris     S/P CABG (coronary artery bypass graft)     Angina pectoris     Essential hypertension     Hyperlipidemia     CKD (chronic kidney disease) stage 3, GFR 30-59 ml/min     Blepharoptosis 01/30/2013    Dermatochalasis 01/30/2013       Patient's Medications   New Prescriptions    AMLODIPINE (NORVASC) 5 MG TABLET    Take 1 tablet (5 mg total) by mouth once daily.   Previous Medications    ACCU-CHEK ADRIEN PLUS METER MISC    1 Product by Other route daily as needed.    ACCU-CHEK SMARTVIEW TEST STRIP STRP        ACCU-CHEK SOFTCLIX LANCETS MISC    Inject 100 lancets into the skin daily as needed.    ASPIRIN 81 MG CHEW    81 mg once daily.     ATORVASTATIN (LIPITOR) 80 MG TABLET    Take 1 tablet (80 mg total) by mouth once daily.    B COMPLEX VITAMINS TABLET    Take 1 tablet by mouth once daily.    CLOPIDOGREL (PLAVIX) 75 MG TABLET    Take 1 tablet (75 mg total) by mouth once daily.    DROPLET PEN NEEDLE 32 GAUGE X 5/32" NDLE    Inject 1 Product into the skin daily as needed.    EMPAGLIFLOZIN (JARDIANCE) 10 MG TABLET    Take 10 mg by mouth.    " "ESBRIET 801 MG TAB    3 (three) times daily.    EZETIMIBE (ZETIA) 10 MG TABLET    Take 1 tablet (10 mg total) by mouth once daily.    INSULIN DEGLUDEC (TRESIBA FLEXTOUCH U-100) 100 UNIT/ML (3 ML) INSULIN PEN    Inject 50 Units into the skin every evening.    ISOSORBIDE MONONITRATE (IMDUR) 30 MG 24 HR TABLET    Take 1 tablet (30 mg total) by mouth once daily.    KERENDIA 10 MG TAB    Take 1 tablet by mouth once daily.    LEVOTHYROXINE (SYNTHROID) 100 MCG TABLET    Take 1 tablet (100 mcg total) by mouth before breakfast.    LISINOPRIL (PRINIVIL,ZESTRIL) 2.5 MG TABLET    Take 1 tablet (2.5 mg total) by mouth once daily.    LUBRICANT EYE DROPS 0.5 % DPET        METOPROLOL SUCCINATE (TOPROL-XL) 100 MG 24 HR TABLET    TAKE 1 TABLET ONE TIME DAILY    NITROGLYCERIN (NITROSTAT) 0.4 MG SL TABLET    Place 1 tablet (0.4 mg total) under the tongue every 5 (five) minutes as needed for Chest pain.    NOVOLOG 100 UNIT/ML INJECTION    Sliding scale    ORENCIA 125 MG/ML SYRG    INJECT CONTENTS OF ONE SYRINGE UNDER THE SKIN EVERY 7 DAYS    PANTOPRAZOLE (PROTONIX) 40 MG TABLET    Take 1 tablet (40 mg total) by mouth once daily.    VITAMIN D (VITAMIN D3) 1000 UNITS TAB    Take 1,000 Units by mouth once daily.   Modified Medications    No medications on file   Discontinued Medications    AMLODIPINE (NORVASC) 10 MG TABLET    Take 10 mg by mouth once daily.        Review of Systems   Constitutional: Negative.   HENT: Negative.    Cardiovascular: Positive for dyspnea on exertion.   Respiratory: Positive for shortness of breath.         Uses portable o2 tank   Skin: Negative.    Musculoskeletal: Negative.    Gastrointestinal: Negative.    Genitourinary: Negative.          Objective:   Vitals  Vitals:    22 0952   BP: 121/68   Pulse: 72   SpO2: 99%   Weight: 65.8 kg (145 lb)   Height: 5' 4" (1.626 m)     Right Arm BP - Sittin/68  Left Arm BP - Sittin/72    Physical Exam  Constitutional:       Appearance: He is obese.   HENT: "      Head: Normocephalic.   Cardiovascular:      Rate and Rhythm: Normal rate and regular rhythm.      Pulses: Normal pulses.      Heart sounds: Normal heart sounds.   Pulmonary:      Effort: Pulmonary effort is normal.      Comments: Crackles noted to bilateral lung bases  Musculoskeletal:         General: Normal range of motion.   Skin:     General: Skin is warm and dry.   Neurological:      Mental Status: He is alert and oriented to person, place, and time.   Psychiatric:         Mood and Affect: Mood normal.           Lab Results    Lab Results   Component Value Date    WBC 7.06 04/18/2022    HGB 13.8 (L) 04/18/2022    HCT 42.2 04/18/2022    MCV 87 04/18/2022       Lab Results   Component Value Date     04/18/2022    INR 1.0 07/10/2009       Lab Results   Component Value Date    K 4.5 04/20/2022    MG 1.8 10/05/2017    BUN 16 04/20/2022    CREATININE 1.3 04/20/2022       Lab Results   Component Value Date     (H) 04/20/2022    HGBA1C 7.9 (H) 02/10/2022       Lab Results   Component Value Date    AST 34 02/10/2022    ALT 54 (H) 02/10/2022    ALBUMIN 4.0 02/10/2022    PROT 7.7 02/10/2022       Lab Results   Component Value Date    CHOL 131 11/12/2021    HDL 41 11/12/2021    LDLCALC 68.4 11/12/2021    TRIG 108 11/12/2021       No results found for: CRP, BNP    Cardiac Studies  Significant Imaging: Echocardiogram:   Transthoracic echo (TTE) complete (Cupid Only):   Results for orders placed or performed during the hospital encounter of 03/14/19   Transthoracic echo (TTE) complete (Cupid Only)   Result Value Ref Range    AV mean gradient 5.56 mmHg    Ao peak alberto 1.65 m/s    Ao VTI 35.85 cm    IVRT 0.06 msec    IVS 1.30 0.6 - 1.1 cm    LA size 4.10 cm    Left Atrium Major Axis 4.34 cm    LVIDd 4.81 3.5 - 6.0 cm    LVIDs 3.32 2.1 - 4.0 cm    LVOT diameter 2.00 cm    LVOT peak VTI 23.49 cm    Posterior Wall 1.20 (A) 0.6 - 1.1 cm    MV Peak A Alberto 1.01 m/s    E wave deceleration time 136.00 msec    MV Peak E  Alberto 0.97 m/s    PV Peak D Alberto 0.37 m/s    PV Peak S Alberto 0.40 m/s    RA Major Axis 4.22 cm    RVDD 2.90 cm    TR Max Alberto 1.93 m/s    LA WIDTH 2.98 cm    Ao root annulus 2.93 cm    AORTIC VALVE CUSP SEPERATION 1.27 cm    PV PEAK VELOCITY 0.99 cm/s    LV Diastolic Volume 108.17 mL    LV Systolic Volume 44.86 mL    LVOT peak alberto 7.8443736651 m/s    FS 31 %    LV mass 233.01 g    Left Ventricle Relative Wall Thickness 0.50 cm    AV valve area 2.06 cm2    AV Velocity Ratio 0.68     AV index (prosthetic) 0.66     E/A ratio 0.96     Pulm vein S/D ratio 1.08     LVOT area 3.14 cm2    LVOT stroke volume 73.76 cm3    AV peak gradient 10.89 mmHg    Triscuspid Valve Regurgitation Peak Gradient 14.90 mmHg    Right Atrial Pressure (from IVC) 3 mmHg    TV rest pulmonary artery pressure 18 mmHg    Narrative    · Normal right ventricular systolic function.  · Concentric left ventricular hypertrophy.  · Normal left ventricular systolic function. The estimated ejection   fraction is 55%  · Grade I (mild) left ventricular diastolic dysfunction consistent with   impaired relaxation.  · Normal left atrial pressure.  · Normal central venous pressure (3 mm Hg).  · The estimated PA systolic pressure is 18 mm Hg  · Mild left atrial enlargement.  · There is marked thickening and calcification and reduced mobility of the   non-coronary cusp of the aortic valve with no aortic stenosis and no   aortic regurgitation        ECG:  normal EKG, normal sinus rhythm.    Stress Test: ischemia noted at anterolateral wall  Cath study: 4/11/2022    · There was three vessel coronary artery disease.  · Patent LIMA-LAD.  · Occluded SVG-OM (known). Occluded SVG-RCA  · Patent, calcified SVG-PDA with severe proximal stenosis (calcified) and moderate to severe distal stenosis  · Will plan for staged intervention of SVG-PDA due to CKD and in an effort to spare contrast    Repeat 4/20/22    · Severe SVG-PLB stenosis proximally severely calcified with what appears to  be a posterior take off  · MP guide used and IVUS guided PCI with 3.5 EDILMA x 2 (mid/distally and proximally) post dilated with 3.5 NCB with excellent results  · Post intervention retrograde filling of PDA and distal RCA noted (previously from left sided collaterals)        Assessment:     1. Coronary artery disease of native artery of native heart with stable angina pectoris    2. S/P CABG (coronary artery bypass graft)    3. Angina pectoris    4. Essential hypertension    5. Bilateral carotid artery disease, unspecified type    6. Stage 3 chronic kidney disease, unspecified whether stage 3a or 3b CKD    7. Other hyperlipidemia    8. Right carotid bruit    9. Heart murmur    10. PAD (peripheral artery disease)    11. Intermittent claudication    12. Abdominal aortic atherosclerosis    13. Internal carotid artery occlusion, right    14. Fibrosis of lung    15. Pulmonary hypertension    16. Chronic obstructive pulmonary disease, unspecified COPD type        Plan:     Monitor BP at home twice daily; report readings to me later this week via Patient Portal for reassessment of BP management    Recent BP readings at previous visit reviewed; BP low 100s during visits; am dizziness likely related to low BP  Decrease amlodipine to 5mg po qd    Otherwise, patient doing well from cardiac standpoint and heart failure perspective. Continue DAPT, statin therapy, ACE, BB for recommended GDMT    Referred to Cardiac Rehab    Orders Placed This Encounter   Procedures    Ambulatory referral/consult to Cardiac Rehab     Standing Status:   Future     Standing Expiration Date:   6/3/2023     Referral Priority:   Routine     Referral Type:   Consultation     Referral Reason:   Specialty Services Required     Requested Specialty:   Cardiac Rehabilitation     Number of Visits Requested:   1       Follow up in 6 weeks  Return sooner for concerns or questions. If symptoms persist go to the ED    He expressed verbal understanding and agreed  with the plan    Thank you for the opportunity to care for this patient. Will be available for questions if needed.     Total duration of face to face visit time 30 minutes.  Total time spent counseling greater than fifty percent of total visit time.  Counseling included discussion regarding imaging findings, diagnosis, possibilities, treatment options, risks and benefits.    Lizzy Hartmann, TOREY-C  Cardiology Clinic  [facility]

## 2022-05-04 ENCOUNTER — TELEPHONE (OUTPATIENT)
Dept: CARDIAC REHAB | Facility: CLINIC | Age: 72
End: 2022-05-04
Payer: MEDICARE

## 2022-05-04 NOTE — TELEPHONE ENCOUNTER
"Information packet sent to patient, which includes "Your Guide to Living with Heart Disease".  Letter was also sent to patient.    Yoly Isaacs RN  Cardiac Rehab Nurse    "

## 2022-05-05 ENCOUNTER — IMMUNIZATION (OUTPATIENT)
Dept: INTERNAL MEDICINE | Facility: CLINIC | Age: 72
End: 2022-05-05
Payer: MEDICARE

## 2022-05-05 DIAGNOSIS — Z23 NEED FOR VACCINATION: Primary | ICD-10-CM

## 2022-05-05 PROCEDURE — 91305 COVID-19, MRNA, LNP-S, PF, 30 MCG/0.3 ML DOSE VACCINE (PFIZER): CPT | Mod: PBBFAC | Performed by: FAMILY MEDICINE

## 2022-05-11 ENCOUNTER — PATIENT MESSAGE (OUTPATIENT)
Dept: ADMINISTRATIVE | Facility: HOSPITAL | Age: 72
End: 2022-05-11
Payer: MEDICARE

## 2022-05-16 ENCOUNTER — TELEPHONE (OUTPATIENT)
Dept: CARDIAC REHAB | Facility: CLINIC | Age: 72
End: 2022-05-16
Payer: MEDICARE

## 2022-05-24 ENCOUNTER — OFFICE VISIT (OUTPATIENT)
Dept: CARDIOLOGY | Facility: CLINIC | Age: 72
End: 2022-05-24
Payer: MEDICARE

## 2022-05-24 VITALS
WEIGHT: 146.25 LBS | SYSTOLIC BLOOD PRESSURE: 123 MMHG | OXYGEN SATURATION: 99 % | HEART RATE: 71 BPM | HEIGHT: 64 IN | BODY MASS INDEX: 24.97 KG/M2 | DIASTOLIC BLOOD PRESSURE: 79 MMHG

## 2022-05-24 DIAGNOSIS — I25.118 CORONARY ARTERY DISEASE OF NATIVE ARTERY OF NATIVE HEART WITH STABLE ANGINA PECTORIS: Primary | ICD-10-CM

## 2022-05-24 DIAGNOSIS — I73.9 PAD (PERIPHERAL ARTERY DISEASE): ICD-10-CM

## 2022-05-24 DIAGNOSIS — I73.9 INTERMITTENT CLAUDICATION: ICD-10-CM

## 2022-05-24 DIAGNOSIS — N18.30 STAGE 3 CHRONIC KIDNEY DISEASE, UNSPECIFIED WHETHER STAGE 3A OR 3B CKD: ICD-10-CM

## 2022-05-24 DIAGNOSIS — J84.10 FIBROSIS OF LUNG: ICD-10-CM

## 2022-05-24 DIAGNOSIS — I65.23 BILATERAL CAROTID ARTERY STENOSIS: ICD-10-CM

## 2022-05-24 DIAGNOSIS — E78.5 HYPERLIPIDEMIA, UNSPECIFIED HYPERLIPIDEMIA TYPE: ICD-10-CM

## 2022-05-24 DIAGNOSIS — I10 ESSENTIAL HYPERTENSION: ICD-10-CM

## 2022-05-24 DIAGNOSIS — I65.21 INTERNAL CAROTID ARTERY OCCLUSION, RIGHT: ICD-10-CM

## 2022-05-24 PROCEDURE — 3288F FALL RISK ASSESSMENT DOCD: CPT | Mod: CPTII,S$GLB,, | Performed by: INTERNAL MEDICINE

## 2022-05-24 PROCEDURE — 3078F PR MOST RECENT DIASTOLIC BLOOD PRESSURE < 80 MM HG: ICD-10-PCS | Mod: CPTII,S$GLB,, | Performed by: INTERNAL MEDICINE

## 2022-05-24 PROCEDURE — 1101F PT FALLS ASSESS-DOCD LE1/YR: CPT | Mod: CPTII,S$GLB,, | Performed by: INTERNAL MEDICINE

## 2022-05-24 PROCEDURE — 1126F PR PAIN SEVERITY QUANTIFIED, NO PAIN PRESENT: ICD-10-PCS | Mod: CPTII,S$GLB,, | Performed by: INTERNAL MEDICINE

## 2022-05-24 PROCEDURE — 99499 RISK ADDL DX/OHS AUDIT: ICD-10-PCS | Mod: HCNC,S$GLB,, | Performed by: INTERNAL MEDICINE

## 2022-05-24 PROCEDURE — 1159F PR MEDICATION LIST DOCUMENTED IN MEDICAL RECORD: ICD-10-PCS | Mod: CPTII,S$GLB,, | Performed by: INTERNAL MEDICINE

## 2022-05-24 PROCEDURE — 93000 EKG 12-LEAD: ICD-10-PCS | Mod: S$GLB,,, | Performed by: INTERNAL MEDICINE

## 2022-05-24 PROCEDURE — 1160F RVW MEDS BY RX/DR IN RCRD: CPT | Mod: CPTII,S$GLB,, | Performed by: INTERNAL MEDICINE

## 2022-05-24 PROCEDURE — 99999 PR PBB SHADOW E&M-EST. PATIENT-LVL IV: CPT | Mod: PBBFAC,,, | Performed by: INTERNAL MEDICINE

## 2022-05-24 PROCEDURE — 3008F PR BODY MASS INDEX (BMI) DOCUMENTED: ICD-10-PCS | Mod: CPTII,S$GLB,, | Performed by: INTERNAL MEDICINE

## 2022-05-24 PROCEDURE — 99214 PR OFFICE/OUTPT VISIT, EST, LEVL IV, 30-39 MIN: ICD-10-PCS | Mod: 25,S$GLB,, | Performed by: INTERNAL MEDICINE

## 2022-05-24 PROCEDURE — 3288F PR FALLS RISK ASSESSMENT DOCUMENTED: ICD-10-PCS | Mod: CPTII,S$GLB,, | Performed by: INTERNAL MEDICINE

## 2022-05-24 PROCEDURE — 4010F ACE/ARB THERAPY RXD/TAKEN: CPT | Mod: CPTII,S$GLB,, | Performed by: INTERNAL MEDICINE

## 2022-05-24 PROCEDURE — 3008F BODY MASS INDEX DOCD: CPT | Mod: CPTII,S$GLB,, | Performed by: INTERNAL MEDICINE

## 2022-05-24 PROCEDURE — 3051F HG A1C>EQUAL 7.0%<8.0%: CPT | Mod: CPTII,S$GLB,, | Performed by: INTERNAL MEDICINE

## 2022-05-24 PROCEDURE — 99499 UNLISTED E&M SERVICE: CPT | Mod: HCNC,S$GLB,, | Performed by: INTERNAL MEDICINE

## 2022-05-24 PROCEDURE — 1160F PR REVIEW ALL MEDS BY PRESCRIBER/CLIN PHARMACIST DOCUMENTED: ICD-10-PCS | Mod: CPTII,S$GLB,, | Performed by: INTERNAL MEDICINE

## 2022-05-24 PROCEDURE — 4010F PR ACE/ARB THEARPY RXD/TAKEN: ICD-10-PCS | Mod: CPTII,S$GLB,, | Performed by: INTERNAL MEDICINE

## 2022-05-24 PROCEDURE — 3074F PR MOST RECENT SYSTOLIC BLOOD PRESSURE < 130 MM HG: ICD-10-PCS | Mod: CPTII,S$GLB,, | Performed by: INTERNAL MEDICINE

## 2022-05-24 PROCEDURE — 3074F SYST BP LT 130 MM HG: CPT | Mod: CPTII,S$GLB,, | Performed by: INTERNAL MEDICINE

## 2022-05-24 PROCEDURE — 3051F PR MOST RECENT HEMOGLOBIN A1C LEVEL 7.0 - < 8.0%: ICD-10-PCS | Mod: CPTII,S$GLB,, | Performed by: INTERNAL MEDICINE

## 2022-05-24 PROCEDURE — 1126F AMNT PAIN NOTED NONE PRSNT: CPT | Mod: CPTII,S$GLB,, | Performed by: INTERNAL MEDICINE

## 2022-05-24 PROCEDURE — 93000 ELECTROCARDIOGRAM COMPLETE: CPT | Mod: S$GLB,,, | Performed by: INTERNAL MEDICINE

## 2022-05-24 PROCEDURE — 99214 OFFICE O/P EST MOD 30 MIN: CPT | Mod: 25,S$GLB,, | Performed by: INTERNAL MEDICINE

## 2022-05-24 PROCEDURE — 1101F PR PT FALLS ASSESS DOC 0-1 FALLS W/OUT INJ PAST YR: ICD-10-PCS | Mod: CPTII,S$GLB,, | Performed by: INTERNAL MEDICINE

## 2022-05-24 PROCEDURE — 99999 PR PBB SHADOW E&M-EST. PATIENT-LVL IV: ICD-10-PCS | Mod: PBBFAC,,, | Performed by: INTERNAL MEDICINE

## 2022-05-24 PROCEDURE — 3078F DIAST BP <80 MM HG: CPT | Mod: CPTII,S$GLB,, | Performed by: INTERNAL MEDICINE

## 2022-05-24 PROCEDURE — 1159F MED LIST DOCD IN RCRD: CPT | Mod: CPTII,S$GLB,, | Performed by: INTERNAL MEDICINE

## 2022-05-24 RX ORDER — CLOPIDOGREL BISULFATE 75 MG/1
75 TABLET ORAL DAILY
Qty: 90 TABLET | Refills: 3 | Status: ON HOLD | OUTPATIENT
Start: 2022-05-24 | End: 2023-01-01 | Stop reason: SDUPTHER

## 2022-05-24 NOTE — PROGRESS NOTES
Subjective:      Patient ID: Jose Antonio Allen is a 71 y.o. male.    Chief Complaint: Follow-up    HPI:   Pt feels better and is no longer having chest pains when he walks on the treadmill since PTCA with stents SVBG by Dr Parmar.    Pt still has shortness of breath and some chest tightness with exertion.        Review of Systems   Cardiovascular: Positive for dyspnea on exertion. Negative for chest pain, claudication, irregular heartbeat, leg swelling, near-syncope, orthopnea, palpitations and syncope.        Past Medical History:   Diagnosis Date    Angina pectoris     Arthritis     CKD (chronic kidney disease)     Colon polyps 09/14/2018    Coronary artery disease     Diabetes mellitus     Diabetes mellitus, type 2     GERD (gastroesophageal reflux disease)     History of tobacco use     Hyperlipidemia     Hypertension     S/P CABG (coronary artery bypass graft) 1996    Thyroid disease         Past Surgical History:   Procedure Laterality Date    CATARACT EXTRACTION Bilateral 3YRS    COLONOSCOPY W/ POLYPECTOMY  09/14/2018    CORONARY ANGIOGRAPHY N/A 4/11/2022    Procedure: ANGIOGRAM, CORONARY ARTERY;  Surgeon: Goryd Parmar MD;  Location: [facility] CATH LAB/EP;  Service: Cardiology;  Laterality: N/A;    CORONARY ANGIOGRAPHY INCLUDING BYPASS GRAFTS WITH CATHETERIZATION OF LEFT HEART N/A 4/11/2022    Procedure: ANGIOGRAM, CORONARY, INCLUDING BYPASS GRAFT, WITH LEFT HEART CATHETERIZATION;  Surgeon: Gordy Parmar MD;  Location: [facility] CATH LAB/EP;  Service: Cardiology;  Laterality: N/A;    CORONARY ARTERY BYPASS GRAFT  1996    CORONARY BYPASS GRAFT ANGIOGRAPHY  4/20/2022    Procedure: Bypass graft study;  Surgeon: Gordy Parmar MD;  Location: [facility] CATH LAB/EP;  Service: Cardiology;;    EYELID SURGERY  03/2012    LEFT HEART CATHETERIZATION N/A 4/11/2022    Procedure: Left heart cath;  Surgeon: Gordy Parmar MD;  Location: [facility] CATH LAB/EP;  Service: Cardiology;  Laterality: N/A;     PERCUTANEOUS TRANSLUMINAL BALLOON ANGIOPLASTY OF CORONARY ARTERY  2022    Procedure: Angioplasty-coronary;  Surgeon: Gordy Parmar MD;  Location: [facility] CATH LAB/EP;  Service: Cardiology;;    RIGHT HEART CATHETERIZATION Right 11/3/2021    Procedure: INSERTION, CATHETER, RIGHT HEART;  Surgeon: Temitope Rahman MD;  Location: [facility] CATH LAB/EP;  Service: Cardiology;  Laterality: Right;    VEIN BYPASS SURGERY  1996    VEIN SURGERY Left 2017    PAD    VEIN SURGERY Right 2018    PAD       Family History   Problem Relation Age of Onset    Diabetes Mother     Diabetes Father     Diabetes Sister     Kidney disease Sister     Diabetes Brother     Diabetes Sister     Blindness Neg Hx     Glaucoma Neg Hx     Macular degeneration Neg Hx     Retinal detachment Neg Hx     Strabismus Neg Hx     Stroke Neg Hx     Thyroid disease Neg Hx     Cancer Neg Hx     Cataracts Neg Hx     Hypertension Neg Hx        Social History     Socioeconomic History    Marital status:    Tobacco Use    Smoking status: Former Smoker     Packs/day: 2.00     Years: 27.00     Pack years: 54.00     Types: Cigarettes     Quit date:      Years since quittin.4    Smokeless tobacco: Never Used   Substance and Sexual Activity    Alcohol use: Yes     Comment: Occasionally has a drink    Drug use: No    Sexual activity: Yes     Partners: Female       Current Outpatient Medications on File Prior to Visit   Medication Sig Dispense Refill    ACCU-CHEK ADRIEN PLUS METER Misc 1 Product by Other route daily as needed.      ACCU-CHEK SMARTVIEW TEST STRIP Strp       ACCU-CHEK SOFTCLIX LANCETS Misc Inject 100 lancets into the skin daily as needed.      amLODIPine (NORVASC) 5 MG tablet Take 1 tablet (5 mg total) by mouth once daily. 30 tablet 11    aspirin 81 MG Chew 81 mg once daily.       atorvastatin (LIPITOR) 80 MG tablet Take 1 tablet (80 mg total) by mouth once daily. 90 tablet 3    b complex vitamins tablet Take 1  "tablet by mouth once daily.      clopidogreL (PLAVIX) 75 mg tablet Take 1 tablet (75 mg total) by mouth once daily. 90 tablet 3    DROPLET PEN NEEDLE 32 gauge x 5/32" Ndle Inject 1 Product into the skin daily as needed.      empagliflozin (JARDIANCE) 10 mg tablet Take 10 mg by mouth.      ESBRIET 801 mg Tab 3 (three) times daily.      ezetimibe (ZETIA) 10 mg tablet Take 1 tablet (10 mg total) by mouth once daily. 90 tablet 3    insulin degludec (TRESIBA FLEXTOUCH U-100) 100 unit/mL (3 mL) insulin pen Inject 50 Units into the skin every evening. 15 pen 3    KERENDIA 10 mg Tab Take 1 tablet by mouth once daily.      levothyroxine (SYNTHROID) 100 MCG tablet Take 1 tablet (100 mcg total) by mouth before breakfast. 90 tablet 3    lisinopriL (PRINIVIL,ZESTRIL) 2.5 MG tablet Take 1 tablet (2.5 mg total) by mouth once daily. 90 tablet 3    LUBRICANT EYE DROPS 0.5 % Dpet       metoprolol succinate (TOPROL-XL) 100 MG 24 hr tablet TAKE 1 TABLET ONE TIME DAILY 90 tablet 3    nitroGLYCERIN (NITROSTAT) 0.4 MG SL tablet Place 1 tablet (0.4 mg total) under the tongue every 5 (five) minutes as needed for Chest pain. 25 tablet 11    NOVOLOG 100 unit/mL injection Sliding scale      ORENCIA 125 mg/mL Syrg INJECT CONTENTS OF ONE SYRINGE UNDER THE SKIN EVERY 7 DAYS      pantoprazole (PROTONIX) 40 MG tablet Take 1 tablet (40 mg total) by mouth once daily. 90 tablet 1    vitamin D (VITAMIN D3) 1000 units Tab Take 1,000 Units by mouth once daily.      [DISCONTINUED] clopidogreL (PLAVIX) 75 mg tablet Take 1 tablet (75 mg total) by mouth once daily. 90 tablet 3     No current facility-administered medications on file prior to visit.       Review of patient's allergies indicates:  No Known Allergies  Objective:     Vitals:    05/24/22 1135   BP: 123/79   BP Location: Right arm   Patient Position: Sitting   BP Method: Large (Automatic)   Pulse: 71   SpO2: 99%   Weight: 66.3 kg (146 lb 4.4 oz)   Height: 5' 4" (1.626 m)    "     Physical Exam  Vitals reviewed.   Constitutional:       General: He is not in acute distress.     Appearance: He is well-developed. He is not diaphoretic.   Eyes:      General: No scleral icterus.  Neck:      Vascular: No carotid bruit or JVD.   Cardiovascular:      Rate and Rhythm: Regular rhythm.      Heart sounds: Normal heart sounds. No murmur heard.    No friction rub. No gallop.   Pulmonary:      Effort: Pulmonary effort is normal. No respiratory distress.      Breath sounds: Rales (bibasilar crackles) present.   Musculoskeletal:      Right lower leg: No edema.      Left lower leg: No edema.   Skin:     General: Skin is warm and dry.   Neurological:      Mental Status: He is alert and oriented to person, place, and time.   Psychiatric:         Behavior: Behavior normal.         Thought Content: Thought content normal.         Judgment: Judgment normal.          ECG today reviewed by me: NSR , rightward axis, low T waves          Patient Information    Name MRN Description   Jose Antonio Allen [id number] 71 y.o. male       Physicians    Panel Physicians Referring Physician Case Authorizing Physician   Gordy Parmar MD (Primary)  Gordy Parmar MD     Indications    Abnormal stress test [R94.39 (ICD-10-CM)]   Coronary artery disease involving native coronary artery of native heart with unstable angina pectoris [I25.110 (ICD-10-CM)]     Summary       · Severe SVG-PLB stenosis proximally severely calcified with what appears to be a posterior take off  · MP guide used and IVUS guided PCI with 3.5 EDILMA x 2 (mid/distally and proximally) post dilated with 3.5 NCB with excellent results  · Post intervention retrograde filling of PDA and distal RCA noted (previously from left sided collaterals)  · Procedure performed for abnormal stress test and progressive angina  · Continue Imdur for another week and then D/C and observe for response  · Cardiac rehab     The procedure log was documented by Documenter: Audelia  RT Dirk and verified by Gordy Parmar MD.     Date: 4/21/2022  Time: 1:01 PM         Procedures    Angioplasty-coronary   Stent EDILMA bypass graft   Bypass graft study   Percutaneous coronary intervention   Protection, Coronary, Filter   IVUS, Coronary     · )    · Next appt: 06/14/2022 at 10:00 AM in Cardiology (Lizzy Hartmann NP)    · 0 Result Notes    Component Ref Range & Units 1 mo ago   (4/20/22) 1 mo ago   (4/18/22) 1 mo ago   (4/8/22) 3 mo ago   (2/10/22) 6 mo ago   (11/12/21) 6 mo ago   (11/2/21) 9 mo ago   (8/2/21)   Sodium 136 - 145 mmol/L 140  143  144  145  147 High   142  145    Potassium 3.5 - 5.1 mmol/L 4.5  4.3  4.6  4.6  3.7  3.8  3.9    Chloride 95 - 110 mmol/L 111 High   107  109  106  109  108  109    CO2 23 - 29 mmol/L 18 Low   25  25  24  27  22 Low   25    Glucose 70 - 110 mg/dL 173 High   235 High   172 High   169 High   75  245 High   128 High     BUN 8 - 23 mg/dL 16  17  20  31 High   12  12  20    Creatinine 0.5 - 1.4 mg/dL 1.3  1.7 High   1.5 High   1.8 High   1.3  1.3  1.4    Calcium 8.7 - 10.5 mg/dL 9.1  9.3  9.4  10.2  9.8  9.2  10.0    Anion Gap 8 - 16 mmol/L 11  11  10  15  11  12  11    eGFR if African American >60 mL/min/1.73 m^2 >60  46 Abnormal   53 Abnormal   42.8 Abnormal   >60.0  >60  58.0 Abnormal     eGFR if non African American >60 mL/min/1.73 m^2 55 Abnormal   40 Abnormal  CM  46 Abnormal  CM  37.0 Abnormal  CM  54.9 Abnormal  CM  55 Abnormal  CM  50.2 Abnormal  CM    Comment: Calculation used to obtain the estimated glomerular filtration   rate (eGFR) is the CKD-EPI equation.    Resulting Agency  [facility]              Specimen Collected: 04/20/22 14:42           Order: [id number]  · Status: Final result   · Visible to patient: Yes (not seen)    · Next appt: 06/14/2022 at 10:00 AM in Cardiology (Lizzy Hartmann NP)    · Dx: Pre-operative cardiovascular examination   · 0 Result Notes    Component Ref Range & Units 1 mo ago   (4/18/22) 1 mo  ago   (4/8/22) 6 mo ago   (11/2/21) 4 yr ago   (4/18/18) 4 yr ago   (12/1/17) 4 yr ago   (10/5/17) 6 yr ago   (1/15/16)   WBC 3.90 - 12.70 K/uL 7.06  7.30  7.60  6.95  7.1 R  6.0 R  7.1 R    RBC 4.60 - 6.20 M/uL 4.85  4.86  4.68  4.54 Low   5.57 R  5.14 R  5.80 R    Hemoglobin 14.0 - 18.0 g/dL 13.8 Low   13.7 Low   13.3 Low   9.7 Low   15.1 R  14.2 R  15.8 R    Hematocrit 40.0 - 54.0 % 42.2  43.0  39.8 Low   34.6 Low   47.0 R  43.9 R  49.3 R    MCV 82 - 98 fL 87  89  85  76 Low   84.4 R  85.4 R  85.1 R    MCH 27.0 - 31.0 pg 28.5  28.2  28.4  21.4 Low   27.1 R  27.6 R  27.3 R    MCHC 32.0 - 36.0 g/dL 32.7  31.9 Low   33.4  28.0 Low   32.1  32.3  32.0    RDW 11.5 - 14.5 % 12.3  12.9  12.8  16.9 High   12.9 R  12.8 R  14.8 R    Platelets 150 - 450 K/uL 178  150  187  212 R  173 R  161 R  168 R    MPV 9.2 - 12.9 fL 10.1  9.9  9.6  11.0  11.1 R  11.2 R  9.4 R    Immature Granulocytes 0.0 - 0.5 % 0.4  0.3  0.3  0.4       Gran # (ANC) 1.8 - 7.7 K/uL 4.3  4.5  5.3  4.7       Immature Grans (Abs) 0.00 - 0.04 K/uL 0.03  0.02 CM  0.02 CM  0.03 CM       Comment: Mild elevation in immature granulocytes is non specific and   can be seen in a variety of conditions including stress response,   acute inflammation, trauma and pregnancy. Correlation with other   laboratory and clinical findings is essential.         Lipid Panel (Acc# [id number]) (Order [id number])    Reviewed By    Abilio Weber MD on 11/13/2021 10:39      MyChart Results Release    MyChart Status: Active  Results Release        Lipid Panel  Order: [id number]  · Status: Final result   · Visible to patient: Yes (seen)    · Next appt: 06/14/2022 at 10:00 AM in Cardiology (Lizzy Hartmann NP)    · Dx: Dyslipidemia; Essential hypertension;...   · 0 Result Notes    · 1 Patient Communication    · 1  Topic    Component Ref Range & Units 6 mo ago   (11/12/21) 9 mo ago   (8/2/21) 9 mo ago   (8/2/21) 4 yr ago   (4/18/18) 4 yr ago   (12/1/17) 6 yr ago   (1/15/16) 12  yr ago   (10/12/09)   Cholesterol 120 - 199 mg/dL 131  199 CM  199 CM  126 CM  158 R  185 R  166 CM    Comment: The National Cholesterol Education Program (NCEP) has set the   following guidelines (reference ranges) for Cholesterol:   Optimal.....................<200 mg/dL   Borderline High.............200-239 mg/dL   High........................> or = 240 mg/dL    Triglycerides 30 - 150 mg/dL 108  119 CM  119 CM  45 CM  80 R  254 High  R  124 CM    Comment: The National Cholesterol Education Program (NCEP) has set the   following guidelines (reference values) for triglycerides:   Normal......................<150 mg/dL   Borderline High.............150-199 mg/dL   High........................200-499 mg/dL    HDL 40 - 75 mg/dL 41  60 CM  60 CM  54 CM  49 R  43 R  39 Low  CM    Comment: The National Cholesterol Education Program (NCEP) has set the   following guidelines (reference values) for HDL Cholesterol:   Low...............<40 mg/dL   Optimal...........>60 mg/dL    LDL Cholesterol 63.0 - 159.0 mg/dL 68.4  115.2 CM  115.2 CM  63.0 CM  92 R, CM  91 R, CM  102.2 R, CM    Comment: The National Cholesterol Education Program (NCEP) has set the   following guidelines (reference values) for LDL Cholesterol:   Optimal.......................<130 mg/dL           Assessment:     1. Coronary artery disease of native artery of native heart with stable angina pectoris    2. Essential hypertension    3. Hyperlipidemia, unspecified hyperlipidemia type    4. Intermittent claudication    5. Internal carotid artery occlusion, right    6. PAD (peripheral artery disease)    7. Stage 3 chronic kidney disease, unspecified whether stage 3a or 3b CKD    8. Fibrosis of lung    9. Bilateral carotid artery stenosis      Plan:   Jose Antonio was seen today for follow-up.    Diagnoses and all orders for this visit:    Coronary artery disease of native artery of native heart with stable angina pectoris  -     IN OFFICE EKG 12-LEAD (to  [facility])    Essential hypertension  -     IN OFFICE EKG 12-LEAD (to [facility])    Hyperlipidemia, unspecified hyperlipidemia type  -     IN OFFICE EKG 12-LEAD (to [facility])    Intermittent claudication  -     IN OFFICE EKG 12-LEAD (to [facility])    Internal carotid artery occlusion, right  -     IN OFFICE EKG 12-LEAD (to [facility])    PAD (peripheral artery disease)  -     IN OFFICE EKG 12-LEAD (to [facility])    Stage 3 chronic kidney disease, unspecified whether stage 3a or 3b CKD  -     IN OFFICE EKG 12-LEAD (to [facility])    Fibrosis of lung  -     IN OFFICE EKG 12-LEAD (to [facility])    Bilateral carotid artery stenosis  -     IN OFFICE EKG 12-LEAD (to [facility])       Angina is now resolved since PTCA and stents SVBG to LCA which feeds RCA via collaterals    Same meds    F/u with pulmonary    RTC 3 months with lab  Follow up in about 3 months (around 8/24/2022).

## 2022-07-30 NOTE — TELEPHONE ENCOUNTER
I reviewed the results of the Cardiolite stress test with pt showing reversible lateral wall ischemia (a new finding compared with the last study).  Pt is taking NTG SL daily for angina.  Will add Imdur 30 mg q AM to pt's regimen.  Discussed that pt's CKD and pt's severe, oxygen dependent interstitial lung disease increases the risks of cor angiography and possible PTCA even with extra hydration with IV fluids.  Will consult Dr Parmar for an opinion.   100

## 2022-08-08 ENCOUNTER — PES CALL (OUTPATIENT)
Dept: ADMINISTRATIVE | Facility: CLINIC | Age: 72
End: 2022-08-08
Payer: MEDICARE

## 2022-09-15 ENCOUNTER — LAB VISIT (OUTPATIENT)
Dept: LAB | Facility: HOSPITAL | Age: 72
End: 2022-09-15
Attending: INTERNAL MEDICINE
Payer: MEDICARE

## 2022-09-15 DIAGNOSIS — I10 ESSENTIAL HYPERTENSION: ICD-10-CM

## 2022-09-15 DIAGNOSIS — I65.21 INTERNAL CAROTID ARTERY OCCLUSION, RIGHT: ICD-10-CM

## 2022-09-15 DIAGNOSIS — N18.30 STAGE 3 CHRONIC KIDNEY DISEASE, UNSPECIFIED WHETHER STAGE 3A OR 3B CKD: ICD-10-CM

## 2022-09-15 DIAGNOSIS — I65.23 BILATERAL CAROTID ARTERY STENOSIS: ICD-10-CM

## 2022-09-15 DIAGNOSIS — I73.9 INTERMITTENT CLAUDICATION: ICD-10-CM

## 2022-09-15 DIAGNOSIS — I25.118 CORONARY ARTERY DISEASE OF NATIVE ARTERY OF NATIVE HEART WITH STABLE ANGINA PECTORIS: ICD-10-CM

## 2022-09-15 DIAGNOSIS — E78.5 HYPERLIPIDEMIA, UNSPECIFIED HYPERLIPIDEMIA TYPE: ICD-10-CM

## 2022-09-15 DIAGNOSIS — I73.9 PAD (PERIPHERAL ARTERY DISEASE): ICD-10-CM

## 2022-09-15 DIAGNOSIS — J84.10 FIBROSIS OF LUNG: ICD-10-CM

## 2022-09-15 LAB
ALBUMIN SERPL BCP-MCNC: 4.1 G/DL (ref 3.5–5.2)
ALP SERPL-CCNC: 99 U/L (ref 55–135)
ALT SERPL W/O P-5'-P-CCNC: 81 U/L (ref 10–44)
ANION GAP SERPL CALC-SCNC: 9 MMOL/L (ref 8–16)
AST SERPL-CCNC: 65 U/L (ref 10–40)
BASOPHILS # BLD AUTO: 0.06 K/UL (ref 0–0.2)
BASOPHILS NFR BLD: 0.7 % (ref 0–1.9)
BILIRUB SERPL-MCNC: 0.4 MG/DL (ref 0.1–1)
BUN SERPL-MCNC: 30 MG/DL (ref 8–23)
CALCIUM SERPL-MCNC: 9.9 MG/DL (ref 8.7–10.5)
CHLORIDE SERPL-SCNC: 109 MMOL/L (ref 95–110)
CHOLEST SERPL-MCNC: 131 MG/DL (ref 120–199)
CHOLEST/HDLC SERPL: 3 {RATIO} (ref 2–5)
CO2 SERPL-SCNC: 23 MMOL/L (ref 23–29)
CREAT SERPL-MCNC: 1.8 MG/DL (ref 0.5–1.4)
DIFFERENTIAL METHOD: ABNORMAL
EOSINOPHIL # BLD AUTO: 0.3 K/UL (ref 0–0.5)
EOSINOPHIL NFR BLD: 3.3 % (ref 0–8)
ERYTHROCYTE [DISTWIDTH] IN BLOOD BY AUTOMATED COUNT: 13.5 % (ref 11.5–14.5)
EST. GFR  (NO RACE VARIABLE): 39.5 ML/MIN/1.73 M^2
GLUCOSE SERPL-MCNC: 115 MG/DL (ref 70–110)
HCT VFR BLD AUTO: 44.7 % (ref 40–54)
HDLC SERPL-MCNC: 44 MG/DL (ref 40–75)
HDLC SERPL: 33.6 % (ref 20–50)
HGB BLD-MCNC: 14.2 G/DL (ref 14–18)
IMM GRANULOCYTES # BLD AUTO: 0.03 K/UL (ref 0–0.04)
IMM GRANULOCYTES NFR BLD AUTO: 0.3 % (ref 0–0.5)
LDLC SERPL CALC-MCNC: 64.2 MG/DL (ref 63–159)
LYMPHOCYTES # BLD AUTO: 2.3 K/UL (ref 1–4.8)
LYMPHOCYTES NFR BLD: 26.5 % (ref 18–48)
MCH RBC QN AUTO: 27.7 PG (ref 27–31)
MCHC RBC AUTO-ENTMCNC: 31.8 G/DL (ref 32–36)
MCV RBC AUTO: 87 FL (ref 82–98)
MONOCYTES # BLD AUTO: 0.8 K/UL (ref 0.3–1)
MONOCYTES NFR BLD: 8.9 % (ref 4–15)
NEUTROPHILS # BLD AUTO: 5.3 K/UL (ref 1.8–7.7)
NEUTROPHILS NFR BLD: 60.3 % (ref 38–73)
NONHDLC SERPL-MCNC: 87 MG/DL
NRBC BLD-RTO: 0 /100 WBC
PLATELET # BLD AUTO: 181 K/UL (ref 150–450)
PMV BLD AUTO: 9.7 FL (ref 9.2–12.9)
POTASSIUM SERPL-SCNC: 4.5 MMOL/L (ref 3.5–5.1)
PROT SERPL-MCNC: 7.5 G/DL (ref 6–8.4)
RBC # BLD AUTO: 5.12 M/UL (ref 4.6–6.2)
SODIUM SERPL-SCNC: 141 MMOL/L (ref 136–145)
TRIGL SERPL-MCNC: 114 MG/DL (ref 30–150)
TSH SERPL DL<=0.005 MIU/L-ACNC: 1.35 UIU/ML (ref 0.4–4)
WBC # BLD AUTO: 8.76 K/UL (ref 3.9–12.7)

## 2022-09-15 PROCEDURE — 84443 ASSAY THYROID STIM HORMONE: CPT | Performed by: INTERNAL MEDICINE

## 2022-09-15 PROCEDURE — 36415 COLL VENOUS BLD VENIPUNCTURE: CPT | Mod: PO | Performed by: INTERNAL MEDICINE

## 2022-09-15 PROCEDURE — 80061 LIPID PANEL: CPT | Performed by: INTERNAL MEDICINE

## 2022-09-15 PROCEDURE — 80053 COMPREHEN METABOLIC PANEL: CPT | Performed by: INTERNAL MEDICINE

## 2022-09-15 PROCEDURE — 85025 COMPLETE CBC W/AUTO DIFF WBC: CPT | Performed by: INTERNAL MEDICINE

## 2022-09-20 ENCOUNTER — OFFICE VISIT (OUTPATIENT)
Dept: CARDIOLOGY | Facility: CLINIC | Age: 72
End: 2022-09-20
Payer: MEDICARE

## 2022-09-20 VITALS
HEIGHT: 64 IN | SYSTOLIC BLOOD PRESSURE: 123 MMHG | DIASTOLIC BLOOD PRESSURE: 72 MMHG | HEART RATE: 80 BPM | WEIGHT: 144.31 LBS | BODY MASS INDEX: 24.64 KG/M2

## 2022-09-20 DIAGNOSIS — J84.10 FIBROSIS OF LUNG: ICD-10-CM

## 2022-09-20 DIAGNOSIS — N18.30 STAGE 3 CHRONIC KIDNEY DISEASE, UNSPECIFIED WHETHER STAGE 3A OR 3B CKD: ICD-10-CM

## 2022-09-20 DIAGNOSIS — Z95.1 S/P CABG (CORONARY ARTERY BYPASS GRAFT): ICD-10-CM

## 2022-09-20 DIAGNOSIS — I70.0 ABDOMINAL AORTIC ATHEROSCLEROSIS: ICD-10-CM

## 2022-09-20 DIAGNOSIS — I10 ESSENTIAL HYPERTENSION: ICD-10-CM

## 2022-09-20 DIAGNOSIS — I73.9 INTERMITTENT CLAUDICATION: ICD-10-CM

## 2022-09-20 DIAGNOSIS — Z98.61 S/P PTCA (PERCUTANEOUS TRANSLUMINAL CORONARY ANGIOPLASTY): ICD-10-CM

## 2022-09-20 DIAGNOSIS — E03.9 ACQUIRED HYPOTHYROIDISM: Primary | ICD-10-CM

## 2022-09-20 DIAGNOSIS — J44.9 CHRONIC OBSTRUCTIVE PULMONARY DISEASE, UNSPECIFIED COPD TYPE: ICD-10-CM

## 2022-09-20 DIAGNOSIS — I25.10 CORONARY ARTERY DISEASE INVOLVING NATIVE CORONARY ARTERY OF NATIVE HEART WITHOUT ANGINA PECTORIS: ICD-10-CM

## 2022-09-20 DIAGNOSIS — I77.9 BILATERAL CAROTID ARTERY DISEASE, UNSPECIFIED TYPE: ICD-10-CM

## 2022-09-20 DIAGNOSIS — E78.00 PURE HYPERCHOLESTEROLEMIA: ICD-10-CM

## 2022-09-20 DIAGNOSIS — N18.31 TYPE 2 DIABETES MELLITUS WITH STAGE 3A CHRONIC KIDNEY DISEASE, WITHOUT LONG-TERM CURRENT USE OF INSULIN: ICD-10-CM

## 2022-09-20 DIAGNOSIS — I27.20 PULMONARY HYPERTENSION: ICD-10-CM

## 2022-09-20 DIAGNOSIS — E11.22 TYPE 2 DIABETES MELLITUS WITH STAGE 3A CHRONIC KIDNEY DISEASE, WITHOUT LONG-TERM CURRENT USE OF INSULIN: ICD-10-CM

## 2022-09-20 PROCEDURE — 1159F MED LIST DOCD IN RCRD: CPT | Mod: CPTII,S$GLB,, | Performed by: INTERNAL MEDICINE

## 2022-09-20 PROCEDURE — 3051F HG A1C>EQUAL 7.0%<8.0%: CPT | Mod: CPTII,S$GLB,, | Performed by: INTERNAL MEDICINE

## 2022-09-20 PROCEDURE — 3074F SYST BP LT 130 MM HG: CPT | Mod: CPTII,S$GLB,, | Performed by: INTERNAL MEDICINE

## 2022-09-20 PROCEDURE — 1126F PR PAIN SEVERITY QUANTIFIED, NO PAIN PRESENT: ICD-10-PCS | Mod: CPTII,S$GLB,, | Performed by: INTERNAL MEDICINE

## 2022-09-20 PROCEDURE — 99499 RISK ADDL DX/OHS AUDIT: ICD-10-PCS | Mod: S$GLB,,, | Performed by: INTERNAL MEDICINE

## 2022-09-20 PROCEDURE — 4010F ACE/ARB THERAPY RXD/TAKEN: CPT | Mod: CPTII,S$GLB,, | Performed by: INTERNAL MEDICINE

## 2022-09-20 PROCEDURE — 93000 EKG 12-LEAD: ICD-10-PCS | Mod: S$GLB,,, | Performed by: INTERNAL MEDICINE

## 2022-09-20 PROCEDURE — 93000 ELECTROCARDIOGRAM COMPLETE: CPT | Mod: S$GLB,,, | Performed by: INTERNAL MEDICINE

## 2022-09-20 PROCEDURE — 99214 PR OFFICE/OUTPT VISIT, EST, LEVL IV, 30-39 MIN: ICD-10-PCS | Mod: S$GLB,,, | Performed by: INTERNAL MEDICINE

## 2022-09-20 PROCEDURE — 1160F RVW MEDS BY RX/DR IN RCRD: CPT | Mod: CPTII,S$GLB,, | Performed by: INTERNAL MEDICINE

## 2022-09-20 PROCEDURE — 1126F AMNT PAIN NOTED NONE PRSNT: CPT | Mod: CPTII,S$GLB,, | Performed by: INTERNAL MEDICINE

## 2022-09-20 PROCEDURE — 99499 UNLISTED E&M SERVICE: CPT | Mod: S$GLB,,, | Performed by: INTERNAL MEDICINE

## 2022-09-20 PROCEDURE — 1101F PR PT FALLS ASSESS DOC 0-1 FALLS W/OUT INJ PAST YR: ICD-10-PCS | Mod: CPTII,S$GLB,, | Performed by: INTERNAL MEDICINE

## 2022-09-20 PROCEDURE — 99214 OFFICE O/P EST MOD 30 MIN: CPT | Mod: S$GLB,,, | Performed by: INTERNAL MEDICINE

## 2022-09-20 PROCEDURE — 3008F BODY MASS INDEX DOCD: CPT | Mod: CPTII,S$GLB,, | Performed by: INTERNAL MEDICINE

## 2022-09-20 PROCEDURE — 3078F PR MOST RECENT DIASTOLIC BLOOD PRESSURE < 80 MM HG: ICD-10-PCS | Mod: CPTII,S$GLB,, | Performed by: INTERNAL MEDICINE

## 2022-09-20 PROCEDURE — 3078F DIAST BP <80 MM HG: CPT | Mod: CPTII,S$GLB,, | Performed by: INTERNAL MEDICINE

## 2022-09-20 PROCEDURE — 99999 PR PBB SHADOW E&M-EST. PATIENT-LVL II: ICD-10-PCS | Mod: PBBFAC,,, | Performed by: INTERNAL MEDICINE

## 2022-09-20 PROCEDURE — 3288F PR FALLS RISK ASSESSMENT DOCUMENTED: ICD-10-PCS | Mod: CPTII,S$GLB,, | Performed by: INTERNAL MEDICINE

## 2022-09-20 PROCEDURE — 1101F PT FALLS ASSESS-DOCD LE1/YR: CPT | Mod: CPTII,S$GLB,, | Performed by: INTERNAL MEDICINE

## 2022-09-20 PROCEDURE — 3288F FALL RISK ASSESSMENT DOCD: CPT | Mod: CPTII,S$GLB,, | Performed by: INTERNAL MEDICINE

## 2022-09-20 PROCEDURE — 99999 PR PBB SHADOW E&M-EST. PATIENT-LVL II: CPT | Mod: PBBFAC,,, | Performed by: INTERNAL MEDICINE

## 2022-09-20 PROCEDURE — 3051F PR MOST RECENT HEMOGLOBIN A1C LEVEL 7.0 - < 8.0%: ICD-10-PCS | Mod: CPTII,S$GLB,, | Performed by: INTERNAL MEDICINE

## 2022-09-20 PROCEDURE — 4010F PR ACE/ARB THEARPY RXD/TAKEN: ICD-10-PCS | Mod: CPTII,S$GLB,, | Performed by: INTERNAL MEDICINE

## 2022-09-20 PROCEDURE — 1160F PR REVIEW ALL MEDS BY PRESCRIBER/CLIN PHARMACIST DOCUMENTED: ICD-10-PCS | Mod: CPTII,S$GLB,, | Performed by: INTERNAL MEDICINE

## 2022-09-20 PROCEDURE — 3074F PR MOST RECENT SYSTOLIC BLOOD PRESSURE < 130 MM HG: ICD-10-PCS | Mod: CPTII,S$GLB,, | Performed by: INTERNAL MEDICINE

## 2022-09-20 PROCEDURE — 3008F PR BODY MASS INDEX (BMI) DOCUMENTED: ICD-10-PCS | Mod: CPTII,S$GLB,, | Performed by: INTERNAL MEDICINE

## 2022-09-20 PROCEDURE — 1159F PR MEDICATION LIST DOCUMENTED IN MEDICAL RECORD: ICD-10-PCS | Mod: CPTII,S$GLB,, | Performed by: INTERNAL MEDICINE

## 2022-09-20 NOTE — PROGRESS NOTES
Subjective:      Patient ID: Jose Antonio Allen is a 72 y.o. male.    Chief Complaint: Follow-up and Coronary Artery Disease    HPI:  Pt has had no recurrent chest pain since PTCA and stents to SVBG by Dr Parmar    Watches diet.    Exercises regularly.    Wears portable oxygen.    Stopped Tyvasa since it was not helping his breathing.    [place] [facility] [place] Systems   Cardiovascular:  Positive for dyspnea on exertion (chronic). Negative for chest pain, claudication, irregular heartbeat, leg swelling, near-syncope, orthopnea, palpitations and syncope.      Past Medical History:   Diagnosis Date    Angina pectoris     Arthritis     CKD (chronic kidney disease)     Colon polyps 09/14/2018    Coronary artery disease     Diabetes mellitus     Diabetes mellitus, type 2     GERD (gastroesophageal reflux disease)     History of tobacco use     Hyperlipidemia     Hypertension     S/P CABG (coronary artery bypass graft) 1996    Thyroid disease         Past Surgical History:   Procedure Laterality Date    CATARACT EXTRACTION Bilateral 3YRS    COLONOSCOPY W/ POLYPECTOMY  09/14/2018    CORONARY ANGIOGRAPHY N/A 4/11/2022    Procedure: ANGIOGRAM, CORONARY ARTERY;  Surgeon: Gordy Parmar MD;  Location: [facility] CATH LAB/EP;  Service: Cardiology;  Laterality: N/A;    CORONARY ANGIOGRAPHY INCLUDING BYPASS GRAFTS WITH CATHETERIZATION OF LEFT HEART N/A 4/11/2022    Procedure: ANGIOGRAM, CORONARY, INCLUDING BYPASS GRAFT, WITH LEFT HEART CATHETERIZATION;  Surgeon: Gordy Parmar MD;  Location: [facility] CATH LAB/EP;  Service: Cardiology;  Laterality: N/A;    CORONARY ARTERY BYPASS GRAFT  1996    CORONARY BYPASS GRAFT ANGIOGRAPHY  4/20/2022    Procedure: Bypass graft study;  Surgeon: Gordy Parmar MD;  Location: [facility] CATH LAB/EP;  Service: Cardiology;;    EYELID SURGERY  03/2012    LEFT HEART CATHETERIZATION N/A 4/11/2022    Procedure: Left heart cath;  Surgeon: Gordy Parmar MD;  Location: [facility] CATH LAB/EP;   Service: Cardiology;  Laterality: N/A;    PERCUTANEOUS TRANSLUMINAL BALLOON ANGIOPLASTY OF CORONARY ARTERY  2022    Procedure: Angioplasty-coronary;  Surgeon: Gordy Parmar MD;  Location: [facility] CATH LAB/EP;  Service: Cardiology;;    RIGHT HEART CATHETERIZATION Right 11/3/2021    Procedure: INSERTION, CATHETER, RIGHT HEART;  Surgeon: Temitope Rahman MD;  Location: [facility] CATH LAB/EP;  Service: Cardiology;  Laterality: Right;    VEIN BYPASS SURGERY  1996    VEIN SURGERY Left 2017    PAD    VEIN SURGERY Right 2018    PAD       Family History   Problem Relation Age of Onset    Diabetes Mother     Diabetes Father     Diabetes Sister     Kidney disease Sister     Diabetes Brother     Diabetes Sister     Blindness Neg Hx     Glaucoma Neg Hx     Macular degeneration Neg Hx     Retinal detachment Neg Hx     Strabismus Neg Hx     Stroke Neg Hx     Thyroid disease Neg Hx     Cancer Neg Hx     Cataracts Neg Hx     Hypertension Neg Hx        Social History     Socioeconomic History    Marital status:    Tobacco Use    Smoking status: Former     Packs/day: 2.00     Years: 27.00     Pack years: 54.00     Types: Cigarettes     Quit date:      Years since quittin.7    Smokeless tobacco: Never   Substance and Sexual Activity    Alcohol use: Yes     Comment: Occasionally has a drink    Drug use: No    Sexual activity: Yes     Partners: Female       Current Outpatient Medications on File Prior to Visit   Medication Sig Dispense Refill    ACCU-CHEK ADRIEN PLUS METER Misc 1 Product by Other route daily as needed.      ACCU-CHEK SMARTVIEW TEST STRIP Strp       ACCU-CHEK SOFTCLIX LANCETS Misc Inject 100 lancets into the skin daily as needed.      amLODIPine (NORVASC) 5 MG tablet Take 1 tablet (5 mg total) by mouth once daily. 90 tablet 3    aspirin 81 MG Chew 81 mg once daily.       atorvastatin (LIPITOR) 80 MG tablet TAKE 1 TABLET EVERY DAY 90 tablet 3    b complex vitamins tablet Take 1 tablet by mouth once daily.    "   clopidogreL (PLAVIX) 75 mg tablet Take 1 tablet (75 mg total) by mouth once daily. 90 tablet 3    DROPLET PEN NEEDLE 32 gauge x 5/32" Ndle Inject 1 Product into the skin daily as needed.      empagliflozin (JARDIANCE) 10 mg tablet Take 10 mg by mouth.      ESBRIET 801 mg Tab 3 (three) times daily.      ezetimibe (ZETIA) 10 mg tablet TAKE 1 TABLET EVERY DAY 90 tablet 3    insulin degludec (TRESIBA FLEXTOUCH U-100) 100 unit/mL (3 mL) insulin pen Inject 50 Units into the skin every evening. 15 pen 3    levothyroxine (SYNTHROID) 100 MCG tablet Take 1 tablet (100 mcg total) by mouth before breakfast. 90 tablet 3    lisinopriL (PRINIVIL,ZESTRIL) 2.5 MG tablet Take 1 tablet (2.5 mg total) by mouth once daily. 90 tablet 3    LUBRICANT EYE DROPS 0.5 % Dpet       metoprolol succinate (TOPROL-XL) 100 MG 24 hr tablet TAKE 1 TABLET ONE TIME DAILY 90 tablet 3    nitroGLYCERIN (NITROSTAT) 0.4 MG SL tablet Place 1 tablet (0.4 mg total) under the tongue every 5 (five) minutes as needed for Chest pain. 25 tablet 11    NOVOLOG 100 unit/mL injection Sliding scale      ORENCIA 125 mg/mL Syrg INJECT CONTENTS OF ONE SYRINGE UNDER THE SKIN EVERY 7 DAYS      vitamin D (VITAMIN D3) 1000 units Tab Take 1,000 Units by mouth once daily.      [DISCONTINUED] KERENDIA 10 mg Tab Take 1 tablet by mouth once daily.      [DISCONTINUED] pantoprazole (PROTONIX) 40 MG tablet Take 1 tablet (40 mg total) by mouth once daily. (Patient not taking: Reported on 9/20/2022) 90 tablet 1     No current facility-administered medications on file prior to visit.       Review of patient's allergies indicates:  No Known Allergies  Objective:     Vitals:    09/20/22 1438   BP: 123/72   BP Location: Left arm   Patient Position: Sitting   BP Method: Large (Automatic)   Pulse: 80   Weight: 65.5 kg (144 lb 4.7 oz)   Height: 5' 4" (1.626 m)        Physical Exam  Vitals reviewed.   Constitutional:       General: He is not in acute distress.     Appearance: He is " well-developed. He is not diaphoretic.   Eyes:      General: No scleral icterus.  Neck:      Vascular: No carotid bruit or JVD.   Cardiovascular:      Rate and Rhythm: Regular rhythm.      Heart sounds: Normal heart sounds. No murmur heard.    No friction rub. No gallop.   Pulmonary:      Effort: Pulmonary effort is normal. No respiratory distress.      Breath sounds: Rales (bilateral crackles posteriorly) present.   Musculoskeletal:      Right lower leg: No edema.      Left lower leg: No edema.   Skin:     General: Skin is warm and dry.   Neurological:      Mental Status: He is alert and oriented to person, place, and time.   Psychiatric:         Behavior: Behavior normal.         Thought Content: Thought content normal.         Judgment: Judgment normal.          ECG today reviewed by me: NSR, low T waves, unchanged    Lab Visit on 09/15/2022   Component Date Value Ref Range Status    WBC 09/15/2022 8.76  3.90 - 12.70 K/uL Final    RBC 09/15/2022 5.12  4.60 - 6.20 M/uL Final    Hemoglobin 09/15/2022 14.2  14.0 - 18.0 g/dL Final    Hematocrit 09/15/2022 44.7  40.0 - 54.0 % Final    MCV 09/15/2022 87  82 - 98 fL Final    MCH 09/15/2022 27.7  27.0 - 31.0 pg Final    MCHC 09/15/2022 31.8 (L)  32.0 - 36.0 g/dL Final    RDW 09/15/2022 13.5  11.5 - 14.5 % Final    Platelets 09/15/2022 181  150 - 450 K/uL Final    MPV 09/15/2022 9.7  9.2 - 12.9 fL Final    Immature Granulocytes 09/15/2022 0.3  0.0 - 0.5 % Final    Gran # (ANC) 09/15/2022 5.3  1.8 - 7.7 K/uL Final    Immature Grans (Abs) 09/15/2022 0.03  0.00 - 0.04 K/uL Final    Lymph # 09/15/2022 2.3  1.0 - 4.8 K/uL Final    Mono # 09/15/2022 0.8  0.3 - 1.0 K/uL Final    Eos # 09/15/2022 0.3  0.0 - 0.5 K/uL Final    Baso # 09/15/2022 0.06  0.00 - 0.20 K/uL Final    nRBC 09/15/2022 0  0 /100 WBC Final    Gran % 09/15/2022 60.3  38.0 - 73.0 % Final    Lymph % 09/15/2022 26.5  18.0 - 48.0 % Final    Mono % 09/15/2022 8.9  4.0 - 15.0 % Final    Eosinophil % 09/15/2022 3.3   0.0 - 8.0 % Final    Basophil % 09/15/2022 0.7  0.0 - 1.9 % Final    Differential Method 09/15/2022 Automated   Final    Sodium 09/15/2022 141  136 - 145 mmol/L Final    Potassium 09/15/2022 4.5  3.5 - 5.1 mmol/L Final    Chloride 09/15/2022 109  95 - 110 mmol/L Final    CO2 09/15/2022 23  23 - 29 mmol/L Final    Glucose 09/15/2022 115 (H)  70 - 110 mg/dL Final    BUN 09/15/2022 30 (H)  8 - 23 mg/dL Final    Creatinine 09/15/2022 1.8 (H)  0.5 - 1.4 mg/dL Final    Calcium 09/15/2022 9.9  8.7 - 10.5 mg/dL Final    Total Protein 09/15/2022 7.5  6.0 - 8.4 g/dL Final    Albumin 09/15/2022 4.1  3.5 - 5.2 g/dL Final    Total Bilirubin 09/15/2022 0.4  0.1 - 1.0 mg/dL Final    Alkaline Phosphatase 09/15/2022 99  55 - 135 U/L Final    AST 09/15/2022 65 (H)  10 - 40 U/L Final    ALT 09/15/2022 81 (H)  10 - 44 U/L Final    Anion Gap 09/15/2022 9  8 - 16 mmol/L Final    eGFR 09/15/2022 39.5 (A)  >60 mL/min/1.73 m^2 Final    Cholesterol 09/15/2022 131  120 - 199 mg/dL Final    Triglycerides 09/15/2022 114  30 - 150 mg/dL Final    HDL 09/15/2022 44  40 - 75 mg/dL Final    LDL Cholesterol 09/15/2022 64.2  63.0 - 159.0 mg/dL Final    HDL/Cholesterol Ratio 09/15/2022 33.6  20.0 - 50.0 % Final    Total Cholesterol/HDL Ratio 09/15/2022 3.0  2.0 - 5.0 Final    Non-HDL Cholesterol 09/15/2022 87  mg/dL Final    TSH 09/15/2022 1.353  0.400 - 4.000 uIU/mL Final   Admission on 04/20/2022, Discharged on 04/20/2022   Component Date Value Ref Range Status    POCT Glucose 04/20/2022 127 (H)  70 - 110 mg/dL Final    Sodium 04/20/2022 140  136 - 145 mmol/L Final    Potassium 04/20/2022 4.5  3.5 - 5.1 mmol/L Final    Chloride 04/20/2022 111 (H)  95 - 110 mmol/L Final    CO2 04/20/2022 18 (L)  23 - 29 mmol/L Final    Glucose 04/20/2022 173 (H)  70 - 110 mg/dL Final    BUN 04/20/2022 16  8 - 23 mg/dL Final    Creatinine 04/20/2022 1.3  0.5 - 1.4 mg/dL Final    Calcium 04/20/2022 9.1  8.7 - 10.5 mg/dL Final    Anion Gap 04/20/2022 11  8 - 16 mmol/L  Final    eGFR if African American 04/20/2022 >60  >60 mL/min/1.73 m^2 Final    eGFR if non African American 04/20/2022 55 (A)  >60 mL/min/1.73 m^2 Final    POC ACTIVATED CLOTTING TIME K 04/20/2022 214 (H)  74 - 137 sec Final    Sample 04/20/2022 ARTERIAL   Final    POC ACTIVATED CLOTTING TIME K 04/20/2022 196 (H)  74 - 137 sec Final    Sample 04/20/2022 ARTERIAL   Final    POC ACTIVATED CLOTTING TIME K 04/20/2022 190 (H)  74 - 137 sec Final    Sample 04/20/2022 ARTERIAL   Final    POC ACTIVATED CLOTTING TIME K 04/20/2022 321 (H)  74 - 137 sec Final    Sample 04/20/2022 ARTERIAL   Final    POC ACTIVATED CLOTTING TIME K 04/20/2022 303 (H)  74 - 137 sec Final    Sample 04/20/2022 ARTERIAL   Final    POC ACTIVATED CLOTTING TIME K 04/20/2022 243 (H)  74 - 137 sec Final    Sample 04/20/2022 ARTERIAL   Final   Lab Visit on 04/18/2022   Component Date Value Ref Range Status    WBC 04/18/2022 7.06  3.90 - 12.70 K/uL Final    RBC 04/18/2022 4.85  4.60 - 6.20 M/uL Final    Hemoglobin 04/18/2022 13.8 (L)  14.0 - 18.0 g/dL Final    Hematocrit 04/18/2022 42.2  40.0 - 54.0 % Final    MCV 04/18/2022 87  82 - 98 fL Final    MCH 04/18/2022 28.5  27.0 - 31.0 pg Final    MCHC 04/18/2022 32.7  32.0 - 36.0 g/dL Final    RDW 04/18/2022 12.3  11.5 - 14.5 % Final    Platelets 04/18/2022 178  150 - 450 K/uL Final    MPV 04/18/2022 10.1  9.2 - 12.9 fL Final    Immature Granulocytes 04/18/2022 0.4  0.0 - 0.5 % Final    Gran # (ANC) 04/18/2022 4.3  1.8 - 7.7 K/uL Final    Immature Grans (Abs) 04/18/2022 0.03  0.00 - 0.04 K/uL Final    Lymph # 04/18/2022 1.6  1.0 - 4.8 K/uL Final    Mono # 04/18/2022 0.7  0.3 - 1.0 K/uL Final    Eos # 04/18/2022 0.4  0.0 - 0.5 K/uL Final    Baso # 04/18/2022 0.05  0.00 - 0.20 K/uL Final    nRBC 04/18/2022 0  0 /100 WBC Final    Gran % 04/18/2022 61.3  38.0 - 73.0 % Final    Lymph % 04/18/2022 22.1  18.0 - 48.0 % Final    Mono % 04/18/2022 10.1  4.0 - 15.0 % Final    Eosinophil % 04/18/2022 5.4  0.0 - 8.0 % Final     Basophil % 04/18/2022 0.7  0.0 - 1.9 % Final    Differential Method 04/18/2022 Automated   Final    Sodium 04/18/2022 143  136 - 145 mmol/L Final    Potassium 04/18/2022 4.3  3.5 - 5.1 mmol/L Final    Chloride 04/18/2022 107  95 - 110 mmol/L Final    CO2 04/18/2022 25  23 - 29 mmol/L Final    Glucose 04/18/2022 235 (H)  70 - 110 mg/dL Final    BUN 04/18/2022 17  8 - 23 mg/dL Final    Creatinine 04/18/2022 1.7 (H)  0.5 - 1.4 mg/dL Final    Calcium 04/18/2022 9.3  8.7 - 10.5 mg/dL Final    Anion Gap 04/18/2022 11  8 - 16 mmol/L Final    eGFR if  04/18/2022 46 (A)  >60 mL/min/1.73 m^2 Final    eGFR if non African American 04/18/2022 40 (A)  >60 mL/min/1.73 m^2 Final   Admission on 04/11/2022, Discharged on 04/11/2022   Component Date Value Ref Range Status    POCT Glucose 04/11/2022 199 (H)  70 - 110 mg/dL Final   Lab Visit on 04/08/2022   Component Date Value Ref Range Status    WBC 04/08/2022 7.30  3.90 - 12.70 K/uL Final    RBC 04/08/2022 4.86  4.60 - 6.20 M/uL Final    Hemoglobin 04/08/2022 13.7 (L)  14.0 - 18.0 g/dL Final    Hematocrit 04/08/2022 43.0  40.0 - 54.0 % Final    MCV 04/08/2022 89  82 - 98 fL Final    MCH 04/08/2022 28.2  27.0 - 31.0 pg Final    MCHC 04/08/2022 31.9 (L)  32.0 - 36.0 g/dL Final    RDW 04/08/2022 12.9  11.5 - 14.5 % Final    Platelets 04/08/2022 150  150 - 450 K/uL Final    MPV 04/08/2022 9.9  9.2 - 12.9 fL Final    Immature Granulocytes 04/08/2022 0.3  0.0 - 0.5 % Final    Gran # (ANC) 04/08/2022 4.5  1.8 - 7.7 K/uL Final    Immature Grans (Abs) 04/08/2022 0.02  0.00 - 0.04 K/uL Final    Lymph # 04/08/2022 1.7  1.0 - 4.8 K/uL Final    Mono # 04/08/2022 0.8  0.3 - 1.0 K/uL Final    Eos # 04/08/2022 0.4  0.0 - 0.5 K/uL Final    Baso # 04/08/2022 0.05  0.00 - 0.20 K/uL Final    nRBC 04/08/2022 0  0 /100 WBC Final    Gran % 04/08/2022 60.9  38.0 - 73.0 % Final    Lymph % 04/08/2022 22.9  18.0 - 48.0 % Final    Mono % 04/08/2022 10.3  4.0 - 15.0 % Final    Eosinophil %  04/08/2022 4.9  0.0 - 8.0 % Final    Basophil % 04/08/2022 0.7  0.0 - 1.9 % Final    Differential Method 04/08/2022 Automated   Final    Sodium 04/08/2022 144  136 - 145 mmol/L Final    Potassium 04/08/2022 4.6  3.5 - 5.1 mmol/L Final    Chloride 04/08/2022 109  95 - 110 mmol/L Final    CO2 04/08/2022 25  23 - 29 mmol/L Final    Glucose 04/08/2022 172 (H)  70 - 110 mg/dL Final    BUN 04/08/2022 20  8 - 23 mg/dL Final    Creatinine 04/08/2022 1.5 (H)  0.5 - 1.4 mg/dL Final    Calcium 04/08/2022 9.4  8.7 - 10.5 mg/dL Final    Anion Gap 04/08/2022 10  8 - 16 mmol/L Final    eGFR if African American 04/08/2022 53 (A)  >60 mL/min/1.73 m^2 Final    eGFR if non  04/08/2022 46 (A)  >60 mL/min/1.73 m^2 Final   Hospital Outpatient Visit on 03/29/2022   Component Date Value Ref Range Status    85% Max Predicted HR 03/29/2022 127   Final    Max Predicted HR 03/29/2022 149   Final    [facility] CV CPX PATIENT IS MALE 03/29/2022 1.0   Final    [facility] CV CPX PATIENT IS FEMALE 03/29/2022 0.0   Final    HR at rest 03/29/2022 75  bpm Final    Systolic blood pressure 03/29/2022 154  mmHg Final    Diastolic blood pressure 03/29/2022 75  mmHg Final    RPP 03/29/2022 11,550   Final    Peak HR 03/29/2022 90  bpm Final    Peak Systolic BP 03/29/2022 154  mmHg Final    Peak Diatolic BP 03/29/2022 75  mmHg Final    Peak RPP 03/29/2022 13,860   Final    % Max HR Achieved 03/29/2022 60   Final    dose 03/29/2022 0.4  mg Final    ST Depression (mm) 03/29/2022 0.7  mm Final   (  Accession #: [id number]  Jose Antonio Allen  Cardiac catheterization  Order# [id number]  Reading physician: Gordy Parmar MD Ordering physician: Gordy Parmar MD Study date: 4/20/22     Patient Information    Name MRN Description   Jose Antonio Allen [id number] 71 y.o. male     Physicians    Panel Physicians Referring Physician Case Authorizing Physician   Gordy Parmar MD (Primary)  Gordy Parmar MD     Indications    Abnormal stress test [R94.39 (ICD-10-CM)]    Coronary artery disease involving native coronary artery of native heart with unstable angina pectoris [I25.110 (ICD-10-CM)]     Summary       Severe SVG-PLB stenosis proximally severely calcified with what appears to be a posterior take off  MP guide used and IVUS guided PCI with 3.5 EDILMA x 2 (mid/distally and proximally) post dilated with 3.5 NCB with excellent results  Post intervention retrograde filling of PDA and distal RCA noted (previously from left sided collaterals)  Procedure performed for abnormal stress test and progressive angina  Continue Imdur for another week and then D/C and observe for response  Cardiac rehab     The procedure log was documented by Documenter: RT Rosemary and verified by Gordy Parmar MD.     Date: 4/21/2022  Time: 1:01 PM     Accession #: [id number]  Transthoracic echo (TTE) complete  Order# [id number]  Reading physician: Rey Connelly MD Ordering physician: Rey Connelly MD Study date: 3/14/19     Reason for Exam  Priority: Routine  Dx: Hx of CABG [Z95.1 (ICD-10-CM)]; Near syncope [R55 (ICD-10-CM)]     Result Image Hyperlink     Show images for Transthoracic echo (TTE) complete (Cupid Only)  Summary    Normal right ventricular systolic function.  Concentric left ventricular hypertrophy.  Normal left ventricular systolic function. The estimated ejection fraction is 55%  Grade I (mild) left ventricular diastolic dysfunction consistent with impaired relaxation.  Normal left atrial pressure.  Normal central venous pressure (3 mm Hg).  The estimated PA systolic pressure is 18 mm Hg  Mild left atrial enlargement.  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     Date: 11/3/2021  Time: 9:09 AM      01/04/2021     FINDINGS:  The quality of the study is good.     Stress SPECT images demonstrate a small to moderate-sized perfusion defect involving the anterolateral wall.  On the resting images, there is reversibility of the anterolateral wall defect.  Correction of rest and stress inferior wall defect on attenuation correction images suggestive for attenuation artifact.     The gated post-stress images reveal decreased wall motion within the lateral wall.  An estimated LVEF of 63 %. The LV cavity is not dilated with an end-diastolic volume of 126 ml and an end-systolic volume of 46 ml.     CT images demonstrate calcific atherosclerosis of the thoracic aorta and coronary arteries.  Sternotomy wires noted.  Respiratory motion limits assessment of the pulmonary parenchyma.  Limited views of the lungs demonstrate peripheral interstitial reticulation and ground-glass density which may represent a chronic interstitial lung disease.  Cholelithiasis.     Impression:     1. Scintigraphic evidence of ischemia within the anterolateral wall.  2. The global left ventricular systolic function is within normal limits with an LV ejection fraction of 63 % and no evidence of LV dilatation.  Lateral wall hypokinesis.  3. Additional findings as above.  This report was flagged in Epic as abnormal.        Electronically signed by: Berny Gipson  Date:                                            03/29/2022  Time:                                        "    14:07           Exam Ended: 03/29/22 12:57             Assessment:     1. Acquired hypothyroidism    2. Type 2 diabetes mellitus with stage 3a chronic kidney disease, without long-term current use of insulin    3. Stage 3 chronic kidney disease, unspecified whether stage 3a or 3b CKD    4. Coronary artery disease involving native coronary artery of native heart without angina pectoris    5. Fibrosis of lung    6. Chronic obstructive pulmonary disease, unspecified COPD type    7. Pulmonary hypertension    8. Abdominal aortic atherosclerosis    9. Bilateral carotid artery disease, unspecified type    10. Essential hypertension    11. Pure hypercholesterolemia    12. Intermittent claudication    13. S/P CABG (coronary artery bypass graft)    14. S/P PTCA (percutaneous transluminal coronary angioplasty)      Plan:   Jose Antonio was seen today for follow-up and coronary artery disease.    Diagnoses and all orders for this visit:    Acquired hypothyroidism  -     IN OFFICE EKG 12-LEAD (to Muse)  -     CBC Auto Differential; Future  -     Comprehensive Metabolic Panel; Future  -     Lipid Panel; Future  -     TSH; Future    Type 2 diabetes mellitus with stage 3a chronic kidney disease, without long-term current use of insulin  -     IN OFFICE EKG 12-LEAD (to Muse)  -     CBC Auto Differential; Future  -     Comprehensive Metabolic Panel; Future  -     Lipid Panel; Future  -     TSH; Future    Stage 3 chronic kidney disease, unspecified whether stage 3a or 3b CKD  -     IN OFFICE EKG 12-LEAD (to Muse)  -     CBC Auto Differential; Future  -     Comprehensive Metabolic Panel; Future  -     Lipid Panel; Future  -     TSH; Future    Coronary artery disease involving native coronary artery of native heart without angina pectoris  -     IN OFFICE EKG 12-LEAD (to Muse)  -     CBC Auto Differential; Future  -     Comprehensive Metabolic Panel; Future  -     Lipid Panel; Future  -     TSH; Future    Fibrosis of lung  -     IN OFFICE  EKG 12-LEAD (to Muse)  -     CBC Auto Differential; Future  -     Comprehensive Metabolic Panel; Future  -     Lipid Panel; Future  -     TSH; Future    Chronic obstructive pulmonary disease, unspecified COPD type  -     IN OFFICE EKG 12-LEAD (to Muse)  -     CBC Auto Differential; Future  -     Comprehensive Metabolic Panel; Future  -     Lipid Panel; Future  -     TSH; Future    Pulmonary hypertension  -     IN OFFICE EKG 12-LEAD (to Muse)  -     CBC Auto Differential; Future  -     Comprehensive Metabolic Panel; Future  -     Lipid Panel; Future  -     TSH; Future    Abdominal aortic atherosclerosis  -     IN OFFICE EKG 12-LEAD (to Muse)  -     CBC Auto Differential; Future  -     Comprehensive Metabolic Panel; Future  -     Lipid Panel; Future  -     TSH; Future    Bilateral carotid artery disease, unspecified type  -     IN OFFICE EKG 12-LEAD (to Muse)  -     CBC Auto Differential; Future  -     Comprehensive Metabolic Panel; Future  -     Lipid Panel; Future  -     TSH; Future    Essential hypertension  -     IN OFFICE EKG 12-LEAD (to Muse)  -     CBC Auto Differential; Future  -     Comprehensive Metabolic Panel; Future  -     Lipid Panel; Future  -     TSH; Future    Pure hypercholesterolemia  -     IN OFFICE EKG 12-LEAD (to Muse)  -     CBC Auto Differential; Future  -     Comprehensive Metabolic Panel; Future  -     Lipid Panel; Future  -     TSH; Future    Intermittent claudication  -     IN OFFICE EKG 12-LEAD (to Muse)  -     CBC Auto Differential; Future  -     Comprehensive Metabolic Panel; Future  -     Lipid Panel; Future  -     TSH; Future    S/P CABG (coronary artery bypass graft)  -     IN OFFICE EKG 12-LEAD (to Muse)  -     CBC Auto Differential; Future  -     Comprehensive Metabolic Panel; Future  -     Lipid Panel; Future  -     TSH; Future    S/P PTCA (percutaneous transluminal coronary angioplasty)  -     IN OFFICE EKG 12-LEAD (to Muse)  -     CBC Auto Differential; Future  -      Comprehensive Metabolic Panel; Future  -     Lipid Panel; Future  -     TSH; Future       Pt is doing well from cardiac standpoint.     Main problem is pulmonary fibrosis--f/u [facility] 12/21    Same meds    Reviewed ADA diet    Reviewed need for DAPT for first year after PTCA    RTC 6 months with repeat lab     Pt reminded to avoid NSAID's.      Follow up in about 6 months (around 3/20/2023).

## 2022-10-20 ENCOUNTER — IMMUNIZATION (OUTPATIENT)
Dept: INTERNAL MEDICINE | Facility: CLINIC | Age: 72
End: 2022-10-20
Payer: MEDICARE

## 2022-10-20 DIAGNOSIS — Z23 NEED FOR VACCINATION: Primary | ICD-10-CM

## 2022-10-20 PROCEDURE — 91312 COVID-19, MRNA, LNP-S, BIVALENT BOOSTER, PF, 30 MCG/0.3 ML DOSE: ICD-10-PCS | Mod: S$GLB,,, | Performed by: FAMILY MEDICINE

## 2022-10-20 PROCEDURE — 0124A COVID-19, MRNA, LNP-S, BIVALENT BOOSTER, PF, 30 MCG/0.3 ML DOSE: CPT | Mod: PBBFAC | Performed by: FAMILY MEDICINE

## 2022-10-20 PROCEDURE — 91312 COVID-19, MRNA, LNP-S, BIVALENT BOOSTER, PF, 30 MCG/0.3 ML DOSE: CPT | Mod: S$GLB,,, | Performed by: FAMILY MEDICINE

## 2022-10-29 ENCOUNTER — IMMUNIZATION (OUTPATIENT)
Dept: INTERNAL MEDICINE | Facility: CLINIC | Age: 72
End: 2022-10-29
Payer: MEDICARE

## 2022-10-29 PROCEDURE — 90694 FLU VACCINE - QUADRIVALENT - ADJUVANTED: ICD-10-PCS | Mod: S$GLB,,, | Performed by: INTERNAL MEDICINE

## 2022-10-29 PROCEDURE — G0008 FLU VACCINE - QUADRIVALENT - ADJUVANTED: ICD-10-PCS | Mod: S$GLB,,, | Performed by: INTERNAL MEDICINE

## 2022-10-29 PROCEDURE — 90694 VACC AIIV4 NO PRSRV 0.5ML IM: CPT | Mod: S$GLB,,, | Performed by: INTERNAL MEDICINE

## 2022-10-29 PROCEDURE — G0008 ADMIN INFLUENZA VIRUS VAC: HCPCS | Mod: S$GLB,,, | Performed by: INTERNAL MEDICINE

## 2022-12-05 ENCOUNTER — OFFICE VISIT (OUTPATIENT)
Dept: URGENT CARE | Facility: CLINIC | Age: 72
End: 2022-12-05
Payer: MEDICARE

## 2022-12-05 VITALS
BODY MASS INDEX: 24.59 KG/M2 | WEIGHT: 144 LBS | OXYGEN SATURATION: 90 % | SYSTOLIC BLOOD PRESSURE: 153 MMHG | HEIGHT: 64 IN | TEMPERATURE: 98 F | HEART RATE: 91 BPM | DIASTOLIC BLOOD PRESSURE: 77 MMHG

## 2022-12-05 DIAGNOSIS — U07.1 COVID-19 VIRUS INFECTION: Primary | ICD-10-CM

## 2022-12-05 DIAGNOSIS — U07.1 COVID-19 VIRUS DETECTED: ICD-10-CM

## 2022-12-05 LAB
CTP QC/QA: YES
SARS-COV-2 AG RESP QL IA.RAPID: POSITIVE

## 2022-12-05 PROCEDURE — 3008F PR BODY MASS INDEX (BMI) DOCUMENTED: ICD-10-PCS | Mod: CPTII,S$GLB,, | Performed by: FAMILY MEDICINE

## 2022-12-05 PROCEDURE — 3051F HG A1C>EQUAL 7.0%<8.0%: CPT | Mod: CPTII,S$GLB,, | Performed by: FAMILY MEDICINE

## 2022-12-05 PROCEDURE — 3008F BODY MASS INDEX DOCD: CPT | Mod: CPTII,S$GLB,, | Performed by: FAMILY MEDICINE

## 2022-12-05 PROCEDURE — 71046 XR CHEST PA AND LATERAL: ICD-10-PCS | Mod: FY,S$GLB,, | Performed by: RADIOLOGY

## 2022-12-05 PROCEDURE — U0002 SARS CORONAVIRUS 2 ANTIGEN POCT, MANUAL READ: ICD-10-PCS | Mod: QW,S$GLB,, | Performed by: FAMILY MEDICINE

## 2022-12-05 PROCEDURE — U0002 COVID-19 LAB TEST NON-CDC: HCPCS | Mod: QW,S$GLB,, | Performed by: FAMILY MEDICINE

## 2022-12-05 PROCEDURE — 4010F ACE/ARB THERAPY RXD/TAKEN: CPT | Mod: CPTII,S$GLB,, | Performed by: FAMILY MEDICINE

## 2022-12-05 PROCEDURE — 3051F PR MOST RECENT HEMOGLOBIN A1C LEVEL 7.0 - < 8.0%: ICD-10-PCS | Mod: CPTII,S$GLB,, | Performed by: FAMILY MEDICINE

## 2022-12-05 PROCEDURE — 3078F DIAST BP <80 MM HG: CPT | Mod: CPTII,S$GLB,, | Performed by: FAMILY MEDICINE

## 2022-12-05 PROCEDURE — 3077F SYST BP >= 140 MM HG: CPT | Mod: CPTII,S$GLB,, | Performed by: FAMILY MEDICINE

## 2022-12-05 PROCEDURE — 3078F PR MOST RECENT DIASTOLIC BLOOD PRESSURE < 80 MM HG: ICD-10-PCS | Mod: CPTII,S$GLB,, | Performed by: FAMILY MEDICINE

## 2022-12-05 PROCEDURE — 71046 X-RAY EXAM CHEST 2 VIEWS: CPT | Mod: FY,S$GLB,, | Performed by: RADIOLOGY

## 2022-12-05 PROCEDURE — 99214 PR OFFICE/OUTPT VISIT, EST, LEVL IV, 30-39 MIN: ICD-10-PCS | Mod: S$GLB,,, | Performed by: FAMILY MEDICINE

## 2022-12-05 PROCEDURE — 3077F PR MOST RECENT SYSTOLIC BLOOD PRESSURE >= 140 MM HG: ICD-10-PCS | Mod: CPTII,S$GLB,, | Performed by: FAMILY MEDICINE

## 2022-12-05 PROCEDURE — 4010F PR ACE/ARB THEARPY RXD/TAKEN: ICD-10-PCS | Mod: CPTII,S$GLB,, | Performed by: FAMILY MEDICINE

## 2022-12-05 PROCEDURE — 99214 OFFICE O/P EST MOD 30 MIN: CPT | Mod: S$GLB,,, | Performed by: FAMILY MEDICINE

## 2022-12-05 NOTE — PROGRESS NOTES
"Subjective:       Patient ID: Jose Antonio Allen is a 72 y.o. male.    Vitals:  height is 5' 4" (1.626 m) and weight is 65.3 kg (144 lb). His temperature is 97.5 °F (36.4 °C). His blood pressure is 153/77 (abnormal) and his pulse is 91. His oxygen saturation is 90% (abnormal).     Chief Complaint: Cough    72 year old presents cough with history of chronic lung disease and immunosupression presents c/o cough, malaise and decreased exercise tolerance progressive worse over the past 3 days. No known ill exposures reported.  Patient stated symptoms started Tuesday.    Cough  This is a new problem. The current episode started in the past 7 days. The problem has been unchanged. The problem occurs constantly. He has tried OTC cough suppressant for the symptoms. The treatment provided no relief.     Respiratory:  Positive for cough.      Objective:      Physical Exam   Constitutional: No distress. normal  HENT:   Head: Normocephalic and atraumatic.   Nose: Congestion present.   Mouth/Throat: Mucous membranes are moist. Posterior oropharyngeal erythema present.   Eyes: Pupils are equal, round, and reactive to light. Extraocular movement intact   Neck: Neck supple.   Cardiovascular: Normal rate, regular rhythm, normal heart sounds and normal pulses.   Pulmonary/Chest: He is in respiratory distress (mild respiratory distress). He has rhonchi. He has rales.   Abdominal: Normal appearance.   Neurological: He is alert.   Nursing note and vitals reviewed.      Results for orders placed or performed in visit on 12/05/22   SARS Coronavirus 2 Antigen, POCT Manual Read   Result Value Ref Range    SARS Coronavirus 2 Antigen Positive (A) Negative     Acceptable Yes       Assessment:       1. COVID-19 virus infection        Patient deemed high risk given oxygen dependency and underlying lung disease now with COVID infection. Patient declined ER referral. Notified pulmonary of patient's current infection and need for urgent " followup. ER precautions discussed with patient and wife who verbalized understanding. Discussed symptom monitoring, contact notification and isolation period.   Plan:         COVID-19 virus infection  -     SARS Coronavirus 2 Antigen, POCT Manual Read  -     X-Ray Chest PA And Lateral  -     molnupiravir 200 mg capsule (EUA); Take 4 capsules (800 mg total) by mouth every 12 (twelve) hours. for 5 days  Dispense: 40 capsule; Refill: 0

## 2022-12-16 ENCOUNTER — OFFICE VISIT (OUTPATIENT)
Dept: FAMILY MEDICINE | Facility: CLINIC | Age: 72
End: 2022-12-16
Payer: MEDICARE

## 2022-12-16 VITALS
WEIGHT: 138 LBS | DIASTOLIC BLOOD PRESSURE: 78 MMHG | HEIGHT: 64 IN | BODY MASS INDEX: 23.56 KG/M2 | HEART RATE: 77 BPM | OXYGEN SATURATION: 95 % | SYSTOLIC BLOOD PRESSURE: 134 MMHG

## 2022-12-16 DIAGNOSIS — R21 RASH: ICD-10-CM

## 2022-12-16 DIAGNOSIS — N61.1 ABSCESS OF SKIN OF BREAST: Primary | ICD-10-CM

## 2022-12-16 PROCEDURE — 3078F PR MOST RECENT DIASTOLIC BLOOD PRESSURE < 80 MM HG: ICD-10-PCS | Mod: CPTII,S$GLB,,

## 2022-12-16 PROCEDURE — 3008F PR BODY MASS INDEX (BMI) DOCUMENTED: ICD-10-PCS | Mod: CPTII,S$GLB,,

## 2022-12-16 PROCEDURE — 1160F RVW MEDS BY RX/DR IN RCRD: CPT | Mod: CPTII,S$GLB,,

## 2022-12-16 PROCEDURE — 99214 PR OFFICE/OUTPT VISIT, EST, LEVL IV, 30-39 MIN: ICD-10-PCS | Mod: S$GLB,,,

## 2022-12-16 PROCEDURE — 99214 OFFICE O/P EST MOD 30 MIN: CPT | Mod: S$GLB,,,

## 2022-12-16 PROCEDURE — 3288F FALL RISK ASSESSMENT DOCD: CPT | Mod: CPTII,S$GLB,,

## 2022-12-16 PROCEDURE — 3051F PR MOST RECENT HEMOGLOBIN A1C LEVEL 7.0 - < 8.0%: ICD-10-PCS | Mod: CPTII,S$GLB,,

## 2022-12-16 PROCEDURE — 1101F PR PT FALLS ASSESS DOC 0-1 FALLS W/OUT INJ PAST YR: ICD-10-PCS | Mod: CPTII,S$GLB,,

## 2022-12-16 PROCEDURE — 99999 PR PBB SHADOW E&M-EST. PATIENT-LVL V: ICD-10-PCS | Mod: PBBFAC,,,

## 2022-12-16 PROCEDURE — 3075F SYST BP GE 130 - 139MM HG: CPT | Mod: CPTII,S$GLB,,

## 2022-12-16 PROCEDURE — 1160F PR REVIEW ALL MEDS BY PRESCRIBER/CLIN PHARMACIST DOCUMENTED: ICD-10-PCS | Mod: CPTII,S$GLB,,

## 2022-12-16 PROCEDURE — 1126F AMNT PAIN NOTED NONE PRSNT: CPT | Mod: CPTII,S$GLB,,

## 2022-12-16 PROCEDURE — 4010F PR ACE/ARB THEARPY RXD/TAKEN: ICD-10-PCS | Mod: CPTII,S$GLB,,

## 2022-12-16 PROCEDURE — 1126F PR PAIN SEVERITY QUANTIFIED, NO PAIN PRESENT: ICD-10-PCS | Mod: CPTII,S$GLB,,

## 2022-12-16 PROCEDURE — 3008F BODY MASS INDEX DOCD: CPT | Mod: CPTII,S$GLB,,

## 2022-12-16 PROCEDURE — 4010F ACE/ARB THERAPY RXD/TAKEN: CPT | Mod: CPTII,S$GLB,,

## 2022-12-16 PROCEDURE — 3078F DIAST BP <80 MM HG: CPT | Mod: CPTII,S$GLB,,

## 2022-12-16 PROCEDURE — 1159F MED LIST DOCD IN RCRD: CPT | Mod: CPTII,S$GLB,,

## 2022-12-16 PROCEDURE — 3051F HG A1C>EQUAL 7.0%<8.0%: CPT | Mod: CPTII,S$GLB,,

## 2022-12-16 PROCEDURE — 3075F PR MOST RECENT SYSTOLIC BLOOD PRESS GE 130-139MM HG: ICD-10-PCS | Mod: CPTII,S$GLB,,

## 2022-12-16 PROCEDURE — 1101F PT FALLS ASSESS-DOCD LE1/YR: CPT | Mod: CPTII,S$GLB,,

## 2022-12-16 PROCEDURE — 1159F PR MEDICATION LIST DOCUMENTED IN MEDICAL RECORD: ICD-10-PCS | Mod: CPTII,S$GLB,,

## 2022-12-16 PROCEDURE — 99999 PR PBB SHADOW E&M-EST. PATIENT-LVL V: CPT | Mod: PBBFAC,,,

## 2022-12-16 PROCEDURE — 3288F PR FALLS RISK ASSESSMENT DOCUMENTED: ICD-10-PCS | Mod: CPTII,S$GLB,,

## 2022-12-16 RX ORDER — LEVOFLOXACIN 500 MG/1
500 TABLET, FILM COATED ORAL DAILY
Qty: 10 TABLET | Refills: 0 | Status: SHIPPED | OUTPATIENT
Start: 2022-12-16 | End: 2022-12-26

## 2022-12-16 RX ORDER — CLINDAMYCIN HYDROCHLORIDE 300 MG/1
300 CAPSULE ORAL 3 TIMES DAILY
Qty: 30 CAPSULE | Refills: 0 | Status: SHIPPED | OUTPATIENT
Start: 2022-12-16 | End: 2022-12-26

## 2022-12-16 NOTE — PATIENT INSTRUCTIONS
Apply Neomycin to wound three times daily  Keep wound clean  Keep open  Inform clinic if wound does not improve after completing antibiotic course.

## 2022-12-16 NOTE — PROGRESS NOTES
"Subjective:         Chief Complaint: Cyst     Jose Antonio Allen is a 72 y.o. male, patient of Abilio Weber MD unknown to me, presents today with complaints of Cyst    71 y/o with  history of DM, chronic lung disease, home O2 and immunosuppression followed by pulmonologist. Presents to clinic with c/o "infected cyst" to left outer breast area. Reports pimple first appear approximately 2 weeks ago, he drained it approx 1 week, wound continue to drain until yesterday with mild tenderness to palpation that resolved yesterday too. Today wound feel better. Denies fever, chills, fatigue, joint pain, or weakness. DM followed by  Endocrinology in [facility], Care everywhere outside records reviewed, no recent A1C found, patient reports last A1C done was about a month ago = 8.4. Pt was also Covid positive on 12/5/2022, symptoms resolved.   Of note, patient reports discoloration and scaling of forehead (bilateral temple area), reports it has been presents for a while now, he just want it checked, denies recent changes, bleeding, or new growth to skin.     Abscess  Chronicity:  NewProgression Since Onset: gradually improving  Location:  [place]  Associated Symptoms: no fever, no chills, no sweats  Characteristics: draining    Pain Scale:  0/10  Treatments Tried:  Warm compresses and topical antibiotics  Relieved by:  Topical antibiotics  Worsened by:  Nothing  Past Medical History:   Diagnosis Date    Angina pectoris     Arthritis     CKD (chronic kidney disease)     Colon polyps 09/14/2018    Coronary artery disease     Diabetes mellitus     Diabetes mellitus, type 2     GERD (gastroesophageal reflux disease)     History of tobacco use     Hyperlipidemia     Hypertension     S/P CABG (coronary artery bypass graft) 1996    Thyroid disease        Past Surgical History:   Procedure Laterality Date    CATARACT EXTRACTION Bilateral 3YRS    COLONOSCOPY W/ POLYPECTOMY  09/14/2018    CORONARY ANGIOGRAPHY N/A 4/11/2022    Procedure: " ANGIOGRAM, CORONARY ARTERY;  Surgeon: Gordy Parmar MD;  Location: [facility] CATH LAB/EP;  Service: Cardiology;  Laterality: N/A;    CORONARY ANGIOGRAPHY INCLUDING BYPASS GRAFTS WITH CATHETERIZATION OF LEFT HEART N/A 4/11/2022    Procedure: ANGIOGRAM, CORONARY, INCLUDING BYPASS GRAFT, WITH LEFT HEART CATHETERIZATION;  Surgeon: Gordy Parmar MD;  Location: [facility] CATH LAB/EP;  Service: Cardiology;  Laterality: N/A;    CORONARY ARTERY BYPASS GRAFT  1996    CORONARY BYPASS GRAFT ANGIOGRAPHY  4/20/2022    Procedure: Bypass graft study;  Surgeon: Gordy Parmar MD;  Location: [facility] CATH LAB/EP;  Service: Cardiology;;    EYELID SURGERY  03/2012    LEFT HEART CATHETERIZATION N/A 4/11/2022    Procedure: Left heart cath;  Surgeon: Gordy Parmar MD;  Location: [facility] CATH LAB/EP;  Service: Cardiology;  Laterality: N/A;    PERCUTANEOUS TRANSLUMINAL BALLOON ANGIOPLASTY OF CORONARY ARTERY  4/20/2022    Procedure: Angioplasty-coronary;  Surgeon: Gordy Parmar MD;  Location: [facility] CATH LAB/EP;  Service: Cardiology;;    RIGHT HEART CATHETERIZATION Right 11/3/2021    Procedure: INSERTION, CATHETER, RIGHT HEART;  Surgeon: Temitope Rahman MD;  Location: [facility] CATH LAB/EP;  Service: Cardiology;  Laterality: Right;    VEIN BYPASS SURGERY  1996    VEIN SURGERY Left 2017    PAD    VEIN SURGERY Right 2018    PAD       Family History   Problem Relation Age of Onset    Diabetes Mother     Diabetes Father     Diabetes Sister     Kidney disease Sister     Diabetes Brother     Diabetes Sister     Blindness Neg Hx     Glaucoma Neg Hx     Macular degeneration Neg Hx     Retinal detachment Neg Hx     Strabismus Neg Hx     Stroke Neg Hx     Thyroid disease Neg Hx     Cancer Neg Hx     Cataracts Neg Hx     Hypertension Neg Hx        Social History     Socioeconomic History    Marital status:    Tobacco Use    Smoking status: Former     Packs/day: 2.00     Years: 27.00     Pack years: 54.00     Types: Cigarettes     Quit date: 1996      "Years since quittin.9    Smokeless tobacco: Never   Substance and Sexual Activity    Alcohol use: Yes     Comment: Occasionally has a drink    Drug use: No    Sexual activity: Yes     Partners: Female       Review of Systems   Constitutional:  Negative for activity change, appetite change, chills, diaphoresis, fatigue, fever and unexpected weight change.   Eyes:  Negative for visual disturbance.   Respiratory:  Positive for shortness of breath (at baseline - patient uses Home O2). Negative for chest tightness.    Cardiovascular:  Negative for chest pain, palpitations and leg swelling.   Gastrointestinal:  Negative for abdominal pain.   Endocrine: Negative for polydipsia, polyphagia and polyuria.   Genitourinary: Negative.    Musculoskeletal: Negative.    Skin:  Positive for color change and wound.        "Cyst" near left breast   Allergic/Immunologic: Positive for immunocompromised state.   Neurological:  Negative for dizziness, weakness and headaches.   Hematological:  Negative for adenopathy.   All other systems reviewed and are negative.      Objective:     Vitals:    22 1318   BP: 134/78   BP Location: Left arm   Patient Position: Sitting   BP Method: Medium (Manual)   Pulse: 77   SpO2: 95%   Weight: 62.6 kg (138 lb 0.1 oz)   Height: 5' 4" (1.626 m)          Physical Exam  Nursing note reviewed.   Constitutional:       General: He is not in acute distress.     Appearance: Normal appearance. He is not ill-appearing or toxic-appearing.   Cardiovascular:      Rate and Rhythm: Normal rate and regular rhythm.      Pulses: Normal pulses.      Heart sounds: Normal heart sounds. No murmur heard.  Pulmonary:      Effort: Pulmonary effort is normal. No respiratory distress.      Comments: Home  O2 in place.  Abdominal:      General: Bowel sounds are normal.      Palpations: Abdomen is soft.   Musculoskeletal:         General: Normal range of motion.      Right lower leg: No edema.      Left lower leg: No edema. "   Skin:     General: Skin is warm and dry.      Findings: Erythema and lesion present.             Comments: Drained cutaneous abscess, no fluctuant mass, granulation tissue present, mild erythematous skin around abscess, no TTP, no warmth or active signs of infection noted.    Neurological:      General: No focal deficit present.      Mental Status: He is alert and oriented to person, place, and time.   Psychiatric:         Mood and Affect: Mood normal.         Speech: Speech normal.                 Assessment:         ICD-10-CM ICD-9-CM   1. Abscess of skin of breast  N61.1 611.0   2. Rash  R21 782.1       Plan:       Abscess of skin of breast  -     levoFLOXacin (LEVAQUIN) 500 MG tablet; Take 1 tablet (500 mg total) by mouth once daily. for 10 days  Dispense: 10 tablet; Refill: 0  -     clindamycin (CLEOCIN) 300 MG capsule; Take 1 capsule (300 mg total) by mouth 3 (three) times daily. for 10 days  Dispense: 30 capsule; Refill: 0    Rash  -     Ambulatory referral/consult to Dermatology; Future; Expected date: 12/23/2022    -Levaquin + clindamycin to treat skin abscess (uncontrolled DM pt). Patient counseled on medication mechanism of action, side effects, benefits, and risks. Patient verbalizes understanding.    -Neomycin to area three times daily.  -Keep wound clean and open.  -If abscess does not improve after completion of antibiotics, RTC for wound care referral.  -Amb referral to Dermatology for forehead skin discoloration and scaling, plus yearly skin check.   -Consulted treatment plan of care with Dr. Toledo, she is in agreement.   -Strict ER precautions given.Red flags discussed with patient in detail.Patient voiced understanding and in agreement with current treatment plan.     If symptoms worsen, go to ER.  If symptoms do not improve, return to clinic.   Keep appointments with all specialists.   Follow up if symptoms worsen or fail to improve.     Patient's Medications   New Prescriptions     "CLINDAMYCIN (CLEOCIN) 300 MG CAPSULE    Take 1 capsule (300 mg total) by mouth 3 (three) times daily. for 10 days    LEVOFLOXACIN (LEVAQUIN) 500 MG TABLET    Take 1 tablet (500 mg total) by mouth once daily. for 10 days   Previous Medications    ACCU-CHEK ADRIEN PLUS METER MISC    1 Product by Other route daily as needed.    ACCU-CHEK SMARTVIEW TEST STRIP STRP        ACCU-CHEK SOFTCLIX LANCETS MISC    Inject 100 lancets into the skin daily as needed.    AMLODIPINE (NORVASC) 5 MG TABLET    Take 1 tablet (5 mg total) by mouth once daily.    ASPIRIN 81 MG CHEW    81 mg once daily.     ATORVASTATIN (LIPITOR) 80 MG TABLET    TAKE 1 TABLET EVERY DAY    B COMPLEX VITAMINS TABLET    Take 1 tablet by mouth once daily.    CLOPIDOGREL (PLAVIX) 75 MG TABLET    Take 1 tablet (75 mg total) by mouth once daily.    DROPLET PEN NEEDLE 32 GAUGE X 5/32" NDLE    Inject 1 Product into the skin daily as needed.    EMPAGLIFLOZIN (JARDIANCE) 10 MG TABLET    Take 10 mg by mouth.    ESBRIET 801 MG TAB    3 (three) times daily.    EZETIMIBE (ZETIA) 10 MG TABLET    TAKE 1 TABLET EVERY DAY    INSULIN DEGLUDEC (TRESIBA FLEXTOUCH U-100) 100 UNIT/ML (3 ML) INSULIN PEN    Inject 50 Units into the skin every evening.    LEVOTHYROXINE (SYNTHROID) 100 MCG TABLET    Take 1 tablet (100 mcg total) by mouth before breakfast.    LISINOPRIL (PRINIVIL,ZESTRIL) 2.5 MG TABLET    TAKE 1 TABLET EVERY DAY    LUBRICANT EYE DROPS 0.5 % DPET        METOPROLOL SUCCINATE (TOPROL-XL) 100 MG 24 HR TABLET    TAKE 1 TABLET ONE TIME DAILY    MIRTAZAPINE (REMERON) 7.5 MG TAB    TAKE 1 TABLET (7.5 MG TOTAL) BY MOUTH EVERY EVENING.    NITROGLYCERIN (NITROSTAT) 0.4 MG SL TABLET    Place 1 tablet (0.4 mg total) under the tongue every 5 (five) minutes as needed for Chest pain.    NOVOLOG 100 UNIT/ML INJECTION    Sliding scale    ORENCIA 125 MG/ML SYRG    INJECT CONTENTS OF ONE SYRINGE UNDER THE SKIN EVERY 7 DAYS    VITAMIN D (VITAMIN D3) 1000 UNITS TAB    Take 1,000 Units by " mouth once daily.   Modified Medications    No medications on file   Discontinued Medications    No medications on file       Kaitlyn Regalado NP

## 2022-12-20 ENCOUNTER — PES CALL (OUTPATIENT)
Dept: ADMINISTRATIVE | Facility: OTHER | Age: 72
End: 2022-12-20
Payer: MEDICARE

## 2023-01-01 ENCOUNTER — HOSPITAL ENCOUNTER (EMERGENCY)
Facility: HOSPITAL | Age: 73
Discharge: HOME | End: 2023-10-25
Attending: EMERGENCY MEDICINE
Payer: MEDICARE

## 2023-01-01 ENCOUNTER — HOSPITAL ENCOUNTER (INPATIENT)
Facility: HOSPITAL | Age: 73
LOS: 1 days | Discharge: HOME HEALTH | DRG: 305 | End: 2023-10-18
Attending: EMERGENCY MEDICINE | Admitting: FAMILY MEDICINE
Payer: MEDICARE

## 2023-01-01 ENCOUNTER — CLINICAL SUPPORT (OUTPATIENT)
Dept: REHABILITATION | Facility: HOSPITAL | Age: 73
End: 2023-01-01
Payer: MEDICARE

## 2023-01-01 ENCOUNTER — CLINICAL SUPPORT (OUTPATIENT)
Dept: REHABILITATION | Facility: HOSPITAL | Age: 73
End: 2023-01-01
Attending: NURSE PRACTITIONER
Payer: MEDICARE

## 2023-01-01 ENCOUNTER — PATIENT MESSAGE (OUTPATIENT)
Dept: RESPIRATORY THERAPY | Facility: HOSPITAL | Age: 73
End: 2023-01-01
Payer: MEDICARE

## 2023-01-01 ENCOUNTER — TELEPHONE (OUTPATIENT)
Dept: CARDIOLOGY | Facility: CLINIC | Age: 73
End: 2023-01-01
Payer: MEDICARE

## 2023-01-01 ENCOUNTER — PATIENT OUTREACH (OUTPATIENT)
Dept: ADMINISTRATIVE | Facility: CLINIC | Age: 73
End: 2023-01-01
Payer: MEDICARE

## 2023-01-01 ENCOUNTER — HOSPITAL ENCOUNTER (OUTPATIENT)
Facility: HOSPITAL | Age: 73
Discharge: HOME | End: 2023-03-20
Attending: INTERNAL MEDICINE | Admitting: INTERNAL MEDICINE
Payer: MEDICARE

## 2023-01-01 ENCOUNTER — OFFICE VISIT (OUTPATIENT)
Dept: CARDIOLOGY | Facility: CLINIC | Age: 73
End: 2023-01-01
Payer: MEDICARE

## 2023-01-01 ENCOUNTER — PES CALL (OUTPATIENT)
Dept: ADMINISTRATIVE | Facility: CLINIC | Age: 73
End: 2023-01-01
Payer: MEDICARE

## 2023-01-01 ENCOUNTER — OFFICE VISIT (OUTPATIENT)
Dept: URGENT CARE | Facility: CLINIC | Age: 73
End: 2023-01-01
Payer: MEDICARE

## 2023-01-01 ENCOUNTER — LAB VISIT (OUTPATIENT)
Dept: LAB | Facility: HOSPITAL | Age: 73
End: 2023-01-01
Attending: INTERNAL MEDICINE
Payer: MEDICARE

## 2023-01-01 ENCOUNTER — HOSPITAL ENCOUNTER (OUTPATIENT)
Facility: HOSPITAL | Age: 73
Discharge: HOME | End: 2023-11-10
Attending: INTERNAL MEDICINE | Admitting: INTERNAL MEDICINE
Payer: MEDICARE

## 2023-01-01 ENCOUNTER — HOSPITAL ENCOUNTER (INPATIENT)
Facility: HOSPITAL | Age: 73
LOS: 3 days | Discharge: HOSPICE | DRG: 280 | End: 2024-01-03
Attending: STUDENT IN AN ORGANIZED HEALTH CARE EDUCATION/TRAINING PROGRAM | Admitting: STUDENT IN AN ORGANIZED HEALTH CARE EDUCATION/TRAINING PROGRAM
Payer: MEDICARE

## 2023-01-01 ENCOUNTER — OFFICE VISIT (OUTPATIENT)
Dept: PRIMARY CARE CLINIC | Facility: CLINIC | Age: 73
End: 2023-01-01
Payer: MEDICARE

## 2023-01-01 VITALS
SYSTOLIC BLOOD PRESSURE: 128 MMHG | HEART RATE: 69 BPM | HEIGHT: 64 IN | DIASTOLIC BLOOD PRESSURE: 72 MMHG | BODY MASS INDEX: 24.07 KG/M2 | WEIGHT: 141 LBS

## 2023-01-01 VITALS
SYSTOLIC BLOOD PRESSURE: 145 MMHG | WEIGHT: 145 LBS | TEMPERATURE: 97 F | HEIGHT: 64 IN | RESPIRATION RATE: 22 BRPM | DIASTOLIC BLOOD PRESSURE: 74 MMHG | HEART RATE: 70 BPM | OXYGEN SATURATION: 99 % | BODY MASS INDEX: 24.75 KG/M2

## 2023-01-01 VITALS
HEART RATE: 74 BPM | SYSTOLIC BLOOD PRESSURE: 130 MMHG | OXYGEN SATURATION: 98 % | BODY MASS INDEX: 24.75 KG/M2 | DIASTOLIC BLOOD PRESSURE: 70 MMHG | WEIGHT: 144.19 LBS

## 2023-01-01 VITALS
SYSTOLIC BLOOD PRESSURE: 134 MMHG | HEIGHT: 64 IN | HEART RATE: 75 BPM | RESPIRATION RATE: 18 BRPM | TEMPERATURE: 98 F | DIASTOLIC BLOOD PRESSURE: 70 MMHG | WEIGHT: 144.81 LBS | BODY MASS INDEX: 24.72 KG/M2 | OXYGEN SATURATION: 99 %

## 2023-01-01 VITALS
RESPIRATION RATE: 20 BRPM | BODY MASS INDEX: 24.75 KG/M2 | SYSTOLIC BLOOD PRESSURE: 129 MMHG | TEMPERATURE: 98 F | DIASTOLIC BLOOD PRESSURE: 60 MMHG | OXYGEN SATURATION: 100 % | HEART RATE: 66 BPM | WEIGHT: 144.19 LBS

## 2023-01-01 VITALS
OXYGEN SATURATION: 98 % | HEIGHT: 64 IN | RESPIRATION RATE: 20 BRPM | HEART RATE: 72 BPM | WEIGHT: 140 LBS | SYSTOLIC BLOOD PRESSURE: 116 MMHG | BODY MASS INDEX: 23.9 KG/M2 | DIASTOLIC BLOOD PRESSURE: 55 MMHG | TEMPERATURE: 98 F

## 2023-01-01 VITALS
DIASTOLIC BLOOD PRESSURE: 102 MMHG | BODY MASS INDEX: 24.75 KG/M2 | WEIGHT: 145 LBS | TEMPERATURE: 101 F | HEART RATE: 120 BPM | RESPIRATION RATE: 32 BRPM | SYSTOLIC BLOOD PRESSURE: 140 MMHG | OXYGEN SATURATION: 84 % | HEIGHT: 64 IN

## 2023-01-01 DIAGNOSIS — Z01.818 PREOP TESTING: Primary | ICD-10-CM

## 2023-01-01 DIAGNOSIS — N18.31 TYPE 2 DIABETES MELLITUS WITH STAGE 3A CHRONIC KIDNEY DISEASE, WITHOUT LONG-TERM CURRENT USE OF INSULIN: ICD-10-CM

## 2023-01-01 DIAGNOSIS — J44.9 CHRONIC OBSTRUCTIVE PULMONARY DISEASE, UNSPECIFIED COPD TYPE: ICD-10-CM

## 2023-01-01 DIAGNOSIS — M25.512 CHRONIC LEFT SHOULDER PAIN: Primary | ICD-10-CM

## 2023-01-01 DIAGNOSIS — E78.5 HYPERLIPIDEMIA, UNSPECIFIED HYPERLIPIDEMIA TYPE: ICD-10-CM

## 2023-01-01 DIAGNOSIS — M25.612 STIFFNESS OF LEFT SHOULDER JOINT: ICD-10-CM

## 2023-01-01 DIAGNOSIS — Z95.1 S/P CABG (CORONARY ARTERY BYPASS GRAFT): ICD-10-CM

## 2023-01-01 DIAGNOSIS — Z79.4 TYPE 2 DIABETES MELLITUS WITH HYPERGLYCEMIA, WITH LONG-TERM CURRENT USE OF INSULIN: ICD-10-CM

## 2023-01-01 DIAGNOSIS — J96.11 CHRONIC HYPOXIC RESPIRATORY FAILURE, ON HOME OXYGEN THERAPY: ICD-10-CM

## 2023-01-01 DIAGNOSIS — G89.29 CHRONIC LEFT SHOULDER PAIN: Primary | ICD-10-CM

## 2023-01-01 DIAGNOSIS — I20.89 STABLE ANGINA: Primary | ICD-10-CM

## 2023-01-01 DIAGNOSIS — N17.9 AKI (ACUTE KIDNEY INJURY): ICD-10-CM

## 2023-01-01 DIAGNOSIS — I10 ESSENTIAL HYPERTENSION: ICD-10-CM

## 2023-01-01 DIAGNOSIS — I25.10 CAD (CORONARY ARTERY DISEASE): ICD-10-CM

## 2023-01-01 DIAGNOSIS — I20.9 ANGINA PECTORIS: ICD-10-CM

## 2023-01-01 DIAGNOSIS — R63.0 LOSS OF APPETITE: ICD-10-CM

## 2023-01-01 DIAGNOSIS — E11.65 TYPE 2 DIABETES MELLITUS WITH HYPERGLYCEMIA, WITH LONG-TERM CURRENT USE OF INSULIN: ICD-10-CM

## 2023-01-01 DIAGNOSIS — I16.1 HYPERTENSIVE EMERGENCY: Primary | ICD-10-CM

## 2023-01-01 DIAGNOSIS — R07.9 CHEST PAIN: ICD-10-CM

## 2023-01-01 DIAGNOSIS — Z01.810 PRE-PROCEDURAL CARDIOVASCULAR EXAMINATION: Primary | ICD-10-CM

## 2023-01-01 DIAGNOSIS — J84.10 FIBROSIS OF LUNG: ICD-10-CM

## 2023-01-01 DIAGNOSIS — I25.110 CORONARY ARTERY DISEASE INVOLVING NATIVE CORONARY ARTERY OF NATIVE HEART WITH UNSTABLE ANGINA PECTORIS: Primary | ICD-10-CM

## 2023-01-01 DIAGNOSIS — I73.9 PAD (PERIPHERAL ARTERY DISEASE): ICD-10-CM

## 2023-01-01 DIAGNOSIS — J84.10 PULMONARY FIBROSIS: ICD-10-CM

## 2023-01-01 DIAGNOSIS — I21.4 NSTEMI (NON-ST ELEVATION MYOCARDIAL INFARCTION): ICD-10-CM

## 2023-01-01 DIAGNOSIS — R09.02 HYPOXIA: ICD-10-CM

## 2023-01-01 DIAGNOSIS — R06.02 SHORTNESS OF BREATH: ICD-10-CM

## 2023-01-01 DIAGNOSIS — I21.4 NSTEMI (NON-ST ELEVATED MYOCARDIAL INFARCTION): Primary | ICD-10-CM

## 2023-01-01 DIAGNOSIS — E03.9 ACQUIRED HYPOTHYROIDISM: ICD-10-CM

## 2023-01-01 DIAGNOSIS — I70.0 ABDOMINAL AORTIC ATHEROSCLEROSIS: ICD-10-CM

## 2023-01-01 DIAGNOSIS — Z01.818 PREOP TESTING: ICD-10-CM

## 2023-01-01 DIAGNOSIS — Z99.81 CHRONIC HYPOXIC RESPIRATORY FAILURE, ON HOME OXYGEN THERAPY: ICD-10-CM

## 2023-01-01 DIAGNOSIS — R07.9 CHEST PAIN, UNSPECIFIED TYPE: ICD-10-CM

## 2023-01-01 DIAGNOSIS — I21.4 NON-ST ELEVATION MYOCARDIAL INFARCTION (NSTEMI): ICD-10-CM

## 2023-01-01 DIAGNOSIS — I25.110 CORONARY ARTERY DISEASE INVOLVING NATIVE CORONARY ARTERY OF NATIVE HEART WITH UNSTABLE ANGINA PECTORIS: ICD-10-CM

## 2023-01-01 DIAGNOSIS — Z01.810 PRE-PROCEDURAL CARDIOVASCULAR EXAMINATION: ICD-10-CM

## 2023-01-01 DIAGNOSIS — I65.21 INTERNAL CAROTID ARTERY OCCLUSION, RIGHT: ICD-10-CM

## 2023-01-01 DIAGNOSIS — I25.10 CORONARY ARTERY DISEASE INVOLVING NATIVE CORONARY ARTERY OF NATIVE HEART WITHOUT ANGINA PECTORIS: Primary | ICD-10-CM

## 2023-01-01 DIAGNOSIS — R09.02 HYPOXIA: Primary | ICD-10-CM

## 2023-01-01 DIAGNOSIS — Z95.5 HISTORY OF CORONARY ARTERY STENT PLACEMENT: ICD-10-CM

## 2023-01-01 DIAGNOSIS — I25.112 CORONARY ARTERY DISEASE INVOLVING NATIVE CORONARY ARTERY OF NATIVE HEART WITH REFRACTORY ANGINA PECTORIS: ICD-10-CM

## 2023-01-01 DIAGNOSIS — E11.22 TYPE 2 DIABETES MELLITUS WITH STAGE 3A CHRONIC KIDNEY DISEASE, WITHOUT LONG-TERM CURRENT USE OF INSULIN: ICD-10-CM

## 2023-01-01 DIAGNOSIS — Z51.5 PALLIATIVE CARE ENCOUNTER: ICD-10-CM

## 2023-01-01 DIAGNOSIS — R50.9 FEVER, UNSPECIFIED FEVER CAUSE: ICD-10-CM

## 2023-01-01 LAB
ABO + RH BLD: NORMAL
ABO + RH BLD: NORMAL
ALBUMIN SERPL BCP-MCNC: 2.4 G/DL (ref 3.5–5.2)
ALBUMIN SERPL BCP-MCNC: 2.6 G/DL (ref 3.5–5.2)
ALBUMIN SERPL BCP-MCNC: 3.3 G/DL (ref 3.5–5.2)
ALBUMIN SERPL BCP-MCNC: 3.3 G/DL (ref 3.5–5.2)
ALBUMIN SERPL BCP-MCNC: 3.5 G/DL (ref 3.5–5.2)
ALBUMIN SERPL BCP-MCNC: 3.7 G/DL (ref 3.5–5.2)
ALLENS TEST: ABNORMAL
ALP SERPL-CCNC: 102 U/L (ref 55–135)
ALP SERPL-CCNC: 102 U/L (ref 55–135)
ALP SERPL-CCNC: 104 U/L (ref 55–135)
ALP SERPL-CCNC: 114 U/L (ref 55–135)
ALP SERPL-CCNC: 147 U/L (ref 55–135)
ALP SERPL-CCNC: 95 U/L (ref 55–135)
ALT SERPL W/O P-5'-P-CCNC: 23 U/L (ref 10–44)
ALT SERPL W/O P-5'-P-CCNC: 29 U/L (ref 10–44)
ALT SERPL W/O P-5'-P-CCNC: 29 U/L (ref 10–44)
ALT SERPL W/O P-5'-P-CCNC: 31 U/L (ref 10–44)
ALT SERPL W/O P-5'-P-CCNC: 40 U/L (ref 10–44)
ALT SERPL W/O P-5'-P-CCNC: 46 U/L (ref 10–44)
AMORPH CRY URNS QL MICRO: ABNORMAL
ANION GAP SERPL CALC-SCNC: 11 MMOL/L (ref 8–16)
ANION GAP SERPL CALC-SCNC: 11 MMOL/L (ref 8–16)
ANION GAP SERPL CALC-SCNC: 13 MMOL/L (ref 8–16)
ANION GAP SERPL CALC-SCNC: 13 MMOL/L (ref 8–16)
ANION GAP SERPL CALC-SCNC: 15 MMOL/L (ref 8–16)
ANION GAP SERPL CALC-SCNC: 7 MMOL/L (ref 8–16)
ANION GAP SERPL CALC-SCNC: 7 MMOL/L (ref 8–16)
ANION GAP SERPL CALC-SCNC: 9 MMOL/L (ref 8–16)
APTT PPP: 33.6 SEC (ref 21–32)
APTT PPP: 33.7 SEC (ref 21–32)
APTT PPP: 48 SEC (ref 21–32)
APTT PPP: 51.3 SEC (ref 21–32)
APTT PPP: 53.2 SEC (ref 21–32)
APTT PPP: 78.1 SEC (ref 21–32)
ASCENDING AORTA: 2.92 CM
AST SERPL-CCNC: 20 U/L (ref 10–40)
AST SERPL-CCNC: 22 U/L (ref 10–40)
AST SERPL-CCNC: 24 U/L (ref 10–40)
AST SERPL-CCNC: 26 U/L (ref 10–40)
AST SERPL-CCNC: 43 U/L (ref 10–40)
AST SERPL-CCNC: 45 U/L (ref 10–40)
AV INDEX (PROSTH): 0.61
AV MEAN GRADIENT: 5 MMHG
AV PEAK GRADIENT: 9 MMHG
AV VALVE AREA BY VELOCITY RATIO: 2.03 CM²
AV VALVE AREA: 2.19 CM²
AV VELOCITY RATIO: 0.56
BACTERIA #/AREA URNS HPF: ABNORMAL /HPF
BASOPHILS # BLD AUTO: 0.03 K/UL (ref 0–0.2)
BASOPHILS # BLD AUTO: 0.04 K/UL (ref 0–0.2)
BASOPHILS # BLD AUTO: 0.04 K/UL (ref 0–0.2)
BASOPHILS # BLD AUTO: 0.06 K/UL (ref 0–0.2)
BASOPHILS # BLD AUTO: ABNORMAL K/UL (ref 0–0.2)
BASOPHILS # BLD AUTO: ABNORMAL K/UL (ref 0–0.2)
BASOPHILS NFR BLD: 0 % (ref 0–1.9)
BASOPHILS NFR BLD: 0.4 % (ref 0–1.9)
BASOPHILS NFR BLD: 0.5 % (ref 0–1.9)
BASOPHILS NFR BLD: 0.6 % (ref 0–1.9)
BASOPHILS NFR BLD: 0.7 % (ref 0–1.9)
BASOPHILS NFR BLD: 0.7 % (ref 0–1.9)
BILIRUB SERPL-MCNC: 0.2 MG/DL (ref 0.1–1)
BILIRUB SERPL-MCNC: 0.3 MG/DL (ref 0.1–1)
BILIRUB SERPL-MCNC: 0.3 MG/DL (ref 0.1–1)
BILIRUB SERPL-MCNC: 0.4 MG/DL (ref 0.1–1)
BILIRUB SERPL-MCNC: 0.5 MG/DL (ref 0.1–1)
BILIRUB SERPL-MCNC: 0.6 MG/DL (ref 0.1–1)
BILIRUB UR QL STRIP: NEGATIVE
BLD GP AB SCN CELLS X3 SERPL QL: NORMAL
BLD GP AB SCN CELLS X3 SERPL QL: NORMAL
BNP SERPL-MCNC: 128 PG/ML (ref 0–99)
BNP SERPL-MCNC: 270 PG/ML (ref 0–99)
BNP SERPL-MCNC: 90 PG/ML (ref 0–99)
BSA FOR ECHO PROCEDURE: 1.71 M2
BUN SERPL-MCNC: 22 MG/DL (ref 8–23)
BUN SERPL-MCNC: 23 MG/DL (ref 8–23)
BUN SERPL-MCNC: 23 MG/DL (ref 8–23)
BUN SERPL-MCNC: 24 MG/DL (ref 8–23)
BUN SERPL-MCNC: 25 MG/DL (ref 8–23)
BUN SERPL-MCNC: 25 MG/DL (ref 8–23)
BUN SERPL-MCNC: 29 MG/DL (ref 8–23)
BUN SERPL-MCNC: 29 MG/DL (ref 8–23)
BUN SERPL-MCNC: 30 MG/DL (ref 8–23)
BUN SERPL-MCNC: 32 MG/DL (ref 8–23)
CALCIUM SERPL-MCNC: 8.8 MG/DL (ref 8.7–10.5)
CALCIUM SERPL-MCNC: 8.9 MG/DL (ref 8.7–10.5)
CALCIUM SERPL-MCNC: 9 MG/DL (ref 8.7–10.5)
CALCIUM SERPL-MCNC: 9.2 MG/DL (ref 8.7–10.5)
CALCIUM SERPL-MCNC: 9.4 MG/DL (ref 8.7–10.5)
CALCIUM SERPL-MCNC: 9.7 MG/DL (ref 8.7–10.5)
CHLORIDE SERPL-SCNC: 105 MMOL/L (ref 95–110)
CHLORIDE SERPL-SCNC: 106 MMOL/L (ref 95–110)
CHLORIDE SERPL-SCNC: 107 MMOL/L (ref 95–110)
CHLORIDE SERPL-SCNC: 108 MMOL/L (ref 95–110)
CHLORIDE SERPL-SCNC: 109 MMOL/L (ref 95–110)
CHLORIDE SERPL-SCNC: 109 MMOL/L (ref 95–110)
CHLORIDE SERPL-SCNC: 110 MMOL/L (ref 95–110)
CHLORIDE SERPL-SCNC: 110 MMOL/L (ref 95–110)
CHLORIDE SERPL-SCNC: 112 MMOL/L (ref 95–110)
CHLORIDE SERPL-SCNC: 112 MMOL/L (ref 95–110)
CHOLEST SERPL-MCNC: 121 MG/DL (ref 120–199)
CHOLEST SERPL-MCNC: 89 MG/DL (ref 120–199)
CHOLEST/HDLC SERPL: 2.5 {RATIO} (ref 2–5)
CHOLEST/HDLC SERPL: 3.2 {RATIO} (ref 2–5)
CLARITY UR: ABNORMAL
CO2 SERPL-SCNC: 19 MMOL/L (ref 23–29)
CO2 SERPL-SCNC: 20 MMOL/L (ref 23–29)
CO2 SERPL-SCNC: 21 MMOL/L (ref 23–29)
CO2 SERPL-SCNC: 21 MMOL/L (ref 23–29)
CO2 SERPL-SCNC: 24 MMOL/L (ref 23–29)
CO2 SERPL-SCNC: 25 MMOL/L (ref 23–29)
CO2 SERPL-SCNC: 26 MMOL/L (ref 23–29)
CO2 SERPL-SCNC: 26 MMOL/L (ref 23–29)
COLOR UR: YELLOW
CREAT SERPL-MCNC: 1.4 MG/DL (ref 0.5–1.4)
CREAT SERPL-MCNC: 1.5 MG/DL (ref 0.5–1.4)
CREAT SERPL-MCNC: 1.5 MG/DL (ref 0.5–1.4)
CREAT SERPL-MCNC: 1.6 MG/DL (ref 0.5–1.4)
CREAT SERPL-MCNC: 1.7 MG/DL (ref 0.5–1.4)
CREAT SERPL-MCNC: 1.7 MG/DL (ref 0.5–1.4)
CREAT SERPL-MCNC: 1.8 MG/DL (ref 0.5–1.4)
CREAT SERPL-MCNC: 1.8 MG/DL (ref 0.5–1.4)
CREAT SERPL-MCNC: 2 MG/DL (ref 0.5–1.4)
CREAT SERPL-MCNC: 2.1 MG/DL (ref 0.5–1.4)
CTP QC/QA: YES
CTP QC/QA: YES
CV ECHO LV RWT: 0.54 CM
DIFFERENTIAL METHOD BLD: ABNORMAL
DIFFERENTIAL METHOD: ABNORMAL
DOHLE BOD BLD QL SMEAR: PRESENT
DOP CALC AO PEAK VEL: 1.47 M/S
DOP CALC AO VTI: 30.8 CM
DOP CALC LVOT AREA: 3.6 CM2
DOP CALC LVOT DIAMETER: 2.14 CM
DOP CALC LVOT PEAK VEL: 0.83 M/S
DOP CALC LVOT STROKE VOLUME: 67.59 CM3
DOP CALC MV VTI: 25.8 CM
DOP CALCLVOT PEAK VEL VTI: 18.8 CM
E WAVE DECELERATION TIME: 265.15 MSEC
E/A RATIO: 0.66
E/E' RATIO: 11.82 M/S
ECHO LV POSTERIOR WALL: 1.2 CM (ref 0.6–1.1)
EJECTION FRACTION: 55 %
EOSINOPHIL # BLD AUTO: 0.2 K/UL (ref 0–0.5)
EOSINOPHIL # BLD AUTO: 0.3 K/UL (ref 0–0.5)
EOSINOPHIL # BLD AUTO: ABNORMAL K/UL (ref 0–0.5)
EOSINOPHIL # BLD AUTO: ABNORMAL K/UL (ref 0–0.5)
EOSINOPHIL NFR BLD: 0 % (ref 0–8)
EOSINOPHIL NFR BLD: 3.4 % (ref 0–8)
EOSINOPHIL NFR BLD: 3.8 % (ref 0–8)
EOSINOPHIL NFR BLD: 3.8 % (ref 0–8)
EOSINOPHIL NFR BLD: 3.9 % (ref 0–8)
EOSINOPHIL NFR BLD: 4 % (ref 0–8)
EOSINOPHIL NFR BLD: 4.4 % (ref 0–8)
EOSINOPHIL NFR BLD: 4.4 % (ref 0–8)
ERYTHROCYTE [DISTWIDTH] IN BLOOD BY AUTOMATED COUNT: 12.2 % (ref 11.5–14.5)
ERYTHROCYTE [DISTWIDTH] IN BLOOD BY AUTOMATED COUNT: 12.3 % (ref 11.5–14.5)
ERYTHROCYTE [DISTWIDTH] IN BLOOD BY AUTOMATED COUNT: 12.4 % (ref 11.5–14.5)
ERYTHROCYTE [DISTWIDTH] IN BLOOD BY AUTOMATED COUNT: 12.4 % (ref 11.5–14.5)
ERYTHROCYTE [DISTWIDTH] IN BLOOD BY AUTOMATED COUNT: 12.5 % (ref 11.5–14.5)
ERYTHROCYTE [DISTWIDTH] IN BLOOD BY AUTOMATED COUNT: 12.8 % (ref 11.5–14.5)
ERYTHROCYTE [DISTWIDTH] IN BLOOD BY AUTOMATED COUNT: 13.2 % (ref 11.5–14.5)
ERYTHROCYTE [DISTWIDTH] IN BLOOD BY AUTOMATED COUNT: 13.6 % (ref 11.5–14.5)
ERYTHROCYTE [DISTWIDTH] IN BLOOD BY AUTOMATED COUNT: 13.8 % (ref 11.5–14.5)
ERYTHROCYTE [DISTWIDTH] IN BLOOD BY AUTOMATED COUNT: 14.2 % (ref 11.5–14.5)
EST. GFR  (NO RACE VARIABLE): 33 ML/MIN/1.73 M^2
EST. GFR  (NO RACE VARIABLE): 35 ML/MIN/1.73 M^2
EST. GFR  (NO RACE VARIABLE): 39 ML/MIN/1.73 M^2
EST. GFR  (NO RACE VARIABLE): 39 ML/MIN/1.73 M^2
EST. GFR  (NO RACE VARIABLE): 42 ML/MIN/1.73 M^2
EST. GFR  (NO RACE VARIABLE): 42 ML/MIN/1.73 M^2
EST. GFR  (NO RACE VARIABLE): 45 ML/MIN/1.73 M^2
EST. GFR  (NO RACE VARIABLE): 49 ML/MIN/1.73 M^2
EST. GFR  (NO RACE VARIABLE): 49 ML/MIN/1.73 M^2
EST. GFR  (NO RACE VARIABLE): 53 ML/MIN/1.73 M^2
ESTIMATED AVG GLUCOSE: 171 MG/DL (ref 68–131)
ESTIMATED AVG GLUCOSE: 180 MG/DL (ref 68–131)
FIO2: 95 %
FRACTIONAL SHORTENING: 28 % (ref 28–44)
GLUCOSE SERPL-MCNC: 127 MG/DL (ref 70–110)
GLUCOSE SERPL-MCNC: 133 MG/DL (ref 70–110)
GLUCOSE SERPL-MCNC: 142 MG/DL (ref 70–110)
GLUCOSE SERPL-MCNC: 146 MG/DL (ref 70–110)
GLUCOSE SERPL-MCNC: 164 MG/DL (ref 70–110)
GLUCOSE SERPL-MCNC: 224 MG/DL (ref 70–110)
GLUCOSE SERPL-MCNC: 226 MG/DL (ref 70–110)
GLUCOSE SERPL-MCNC: 256 MG/DL (ref 70–110)
GLUCOSE SERPL-MCNC: 306 MG/DL (ref 70–110)
GLUCOSE SERPL-MCNC: 388 MG/DL (ref 70–110)
GLUCOSE UR QL STRIP: NEGATIVE
GRAN CASTS #/AREA URNS LPF: 62 /LPF
HBA1C MFR BLD: 7.6 % (ref 4–5.6)
HBA1C MFR BLD: 7.9 % (ref 4–5.6)
HCT VFR BLD AUTO: 33.2 % (ref 40–54)
HCT VFR BLD AUTO: 34.2 % (ref 40–54)
HCT VFR BLD AUTO: 34.3 % (ref 40–54)
HCT VFR BLD AUTO: 35.9 % (ref 40–54)
HCT VFR BLD AUTO: 38.9 % (ref 40–54)
HCT VFR BLD AUTO: 40.3 % (ref 40–54)
HCT VFR BLD AUTO: 40.8 % (ref 40–54)
HCT VFR BLD AUTO: 40.9 % (ref 40–54)
HCT VFR BLD AUTO: 41.2 % (ref 40–54)
HCT VFR BLD AUTO: 42.3 % (ref 40–54)
HCT VFR BLD CALC: 32.8 % (ref 36–54)
HDLC SERPL-MCNC: 35 MG/DL (ref 40–75)
HDLC SERPL-MCNC: 38 MG/DL (ref 40–75)
HDLC SERPL: 31.4 % (ref 20–50)
HDLC SERPL: 39.3 % (ref 20–50)
HGB BLD-MCNC: 10.5 G/DL (ref 14–18)
HGB BLD-MCNC: 10.7 G/DL (ref 14–18)
HGB BLD-MCNC: 10.7 G/DL (ref 9–18)
HGB BLD-MCNC: 10.9 G/DL (ref 14–18)
HGB BLD-MCNC: 11.8 G/DL (ref 14–18)
HGB BLD-MCNC: 12.5 G/DL (ref 14–18)
HGB BLD-MCNC: 12.6 G/DL (ref 14–18)
HGB BLD-MCNC: 13 G/DL (ref 14–18)
HGB BLD-MCNC: 13.1 G/DL (ref 14–18)
HGB BLD-MCNC: 13.3 G/DL (ref 14–18)
HGB BLD-MCNC: 13.4 G/DL (ref 14–18)
HGB UR QL STRIP: ABNORMAL
HYALINE CASTS #/AREA URNS LPF: 82 /LPF
IMM GRANULOCYTES # BLD AUTO: 0.01 K/UL (ref 0–0.04)
IMM GRANULOCYTES # BLD AUTO: 0.02 K/UL (ref 0–0.04)
IMM GRANULOCYTES # BLD AUTO: ABNORMAL K/UL (ref 0–0.04)
IMM GRANULOCYTES NFR BLD AUTO: 0.1 % (ref 0–0.5)
IMM GRANULOCYTES NFR BLD AUTO: 0.2 % (ref 0–0.5)
IMM GRANULOCYTES NFR BLD AUTO: 0.2 % (ref 0–0.5)
IMM GRANULOCYTES NFR BLD AUTO: 0.3 % (ref 0–0.5)
IMM GRANULOCYTES NFR BLD AUTO: ABNORMAL % (ref 0–0.5)
INFLUENZA A, MOLECULAR: NOT DETECTED
INFLUENZA B, MOLECULAR: NOT DETECTED
INR PPP: 1 (ref 0.8–1.2)
INR PPP: 1.1 (ref 0.8–1.2)
INTERVENTRICULAR SEPTUM: 0.99 CM (ref 0.6–1.1)
IVC DIAMETER: 1.82 CM
KETONES UR QL STRIP: NEGATIVE
LA MAJOR: 5.89 CM
LA MINOR: 5.62 CM
LA WIDTH: 4.2 CM
LACTATE SERPL-SCNC: 1.2 MMOL/L (ref 0.5–2.2)
LDH SERPL L TO P-CCNC: 1.9 MMOL/L (ref 0.5–2.2)
LDLC SERPL CALC-MCNC: 35.4 MG/DL (ref 63–159)
LDLC SERPL CALC-MCNC: 66.6 MG/DL (ref 63–159)
LEFT ATRIUM SIZE: 3.97 CM
LEFT ATRIUM VOLUME INDEX MOD: 39.9 ML/M2
LEFT ATRIUM VOLUME INDEX: 48 ML/M2
LEFT ATRIUM VOLUME MOD: 67.89 CM3
LEFT ATRIUM VOLUME: 81.52 CM3
LEFT INTERNAL DIMENSION IN SYSTOLE: 3.23 CM (ref 2.1–4)
LEFT VENTRICLE DIASTOLIC VOLUME INDEX: 53.55 ML/M2
LEFT VENTRICLE DIASTOLIC VOLUME: 91.03 ML
LEFT VENTRICLE MASS INDEX: 101 G/M2
LEFT VENTRICLE SYSTOLIC VOLUME INDEX: 24.7 ML/M2
LEFT VENTRICLE SYSTOLIC VOLUME: 42.05 ML
LEFT VENTRICULAR INTERNAL DIMENSION IN DIASTOLE: 4.47 CM (ref 3.5–6)
LEFT VENTRICULAR MASS: 172.07 G
LEUKOCYTE ESTERASE UR QL STRIP: NEGATIVE
LPM: 15
LV LATERAL E/E' RATIO: 9.29 M/S
LV SEPTAL E/E' RATIO: 16.25 M/S
LVOT MG: 1.54 MMHG
LVOT MV: 0.59 CM/S
LYMPHOCYTES # BLD AUTO: 1.1 K/UL (ref 1–4.8)
LYMPHOCYTES # BLD AUTO: 1.2 K/UL (ref 1–4.8)
LYMPHOCYTES # BLD AUTO: 1.3 K/UL (ref 1–4.8)
LYMPHOCYTES # BLD AUTO: 1.4 K/UL (ref 1–4.8)
LYMPHOCYTES # BLD AUTO: 1.7 K/UL (ref 1–4.8)
LYMPHOCYTES # BLD AUTO: ABNORMAL K/UL (ref 1–4.8)
LYMPHOCYTES # BLD AUTO: ABNORMAL K/UL (ref 1–4.8)
LYMPHOCYTES NFR BLD: 17.4 % (ref 18–48)
LYMPHOCYTES NFR BLD: 18.2 % (ref 18–48)
LYMPHOCYTES NFR BLD: 18.3 % (ref 18–48)
LYMPHOCYTES NFR BLD: 20.2 % (ref 18–48)
LYMPHOCYTES NFR BLD: 21.2 % (ref 18–48)
LYMPHOCYTES NFR BLD: 21.2 % (ref 18–48)
LYMPHOCYTES NFR BLD: 23 % (ref 18–48)
LYMPHOCYTES NFR BLD: 25.4 % (ref 18–48)
LYMPHOCYTES NFR BLD: 32 % (ref 18–48)
LYMPHOCYTES NFR BLD: 7 % (ref 18–48)
MAGNESIUM SERPL-MCNC: 1.9 MG/DL (ref 1.6–2.6)
MAGNESIUM SERPL-MCNC: 2.1 MG/DL (ref 1.6–2.6)
MAGNESIUM SERPL-MCNC: 2.1 MG/DL (ref 1.6–2.6)
MCH RBC QN AUTO: 27 PG (ref 27–31)
MCH RBC QN AUTO: 27.2 PG (ref 27–31)
MCH RBC QN AUTO: 27.3 PG (ref 27–31)
MCH RBC QN AUTO: 27.4 PG (ref 27–31)
MCH RBC QN AUTO: 27.6 PG (ref 27–31)
MCH RBC QN AUTO: 27.7 PG (ref 27–31)
MCH RBC QN AUTO: 27.7 PG (ref 27–31)
MCH RBC QN AUTO: 28 PG (ref 27–31)
MCH RBC QN AUTO: 28.1 PG (ref 27–31)
MCH RBC QN AUTO: 28.4 PG (ref 27–31)
MCHC RBC AUTO-ENTMCNC: 30.7 G/DL (ref 32–36)
MCHC RBC AUTO-ENTMCNC: 31.2 G/DL (ref 32–36)
MCHC RBC AUTO-ENTMCNC: 31.3 G/DL (ref 32–36)
MCHC RBC AUTO-ENTMCNC: 31.7 G/DL (ref 32–36)
MCHC RBC AUTO-ENTMCNC: 31.8 G/DL (ref 32–36)
MCHC RBC AUTO-ENTMCNC: 31.9 G/DL (ref 32–36)
MCHC RBC AUTO-ENTMCNC: 32.1 G/DL (ref 32–36)
MCHC RBC AUTO-ENTMCNC: 32.5 G/DL (ref 32–36)
MCHC RBC AUTO-ENTMCNC: 32.8 G/DL (ref 32–36)
MCHC RBC AUTO-ENTMCNC: 32.9 G/DL (ref 32–36)
MCV RBC AUTO: 85 FL (ref 82–98)
MCV RBC AUTO: 86 FL (ref 82–98)
MCV RBC AUTO: 87 FL (ref 82–98)
MCV RBC AUTO: 88 FL (ref 82–98)
MCV RBC AUTO: 89 FL (ref 82–98)
METAMYELOCYTES NFR BLD MANUAL: 2 %
METAMYELOCYTES NFR BLD MANUAL: 2 %
MICROSCOPIC COMMENT: ABNORMAL
MONOCYTES # BLD AUTO: 0.5 K/UL (ref 0.3–1)
MONOCYTES # BLD AUTO: 0.7 K/UL (ref 0.3–1)
MONOCYTES # BLD AUTO: 0.7 K/UL (ref 0.3–1)
MONOCYTES # BLD AUTO: ABNORMAL K/UL (ref 0.3–1)
MONOCYTES # BLD AUTO: ABNORMAL K/UL (ref 0.3–1)
MONOCYTES NFR BLD: 18 % (ref 4–15)
MONOCYTES NFR BLD: 23 % (ref 4–15)
MONOCYTES NFR BLD: 8 % (ref 4–15)
MONOCYTES NFR BLD: 8.1 % (ref 4–15)
MONOCYTES NFR BLD: 8.2 % (ref 4–15)
MONOCYTES NFR BLD: 8.2 % (ref 4–15)
MONOCYTES NFR BLD: 8.3 % (ref 4–15)
MONOCYTES NFR BLD: 8.5 % (ref 4–15)
MONOCYTES NFR BLD: 8.6 % (ref 4–15)
MONOCYTES NFR BLD: 9.8 % (ref 4–15)
MV MEAN GRADIENT: 2 MMHG
MV PEAK A VEL: 0.98 M/S
MV PEAK E VEL: 0.65 M/S
MV PEAK GRADIENT: 5 MMHG
MV STENOSIS PRESSURE HALF TIME: 76.89 MS
MV VALVE AREA BY CONTINUITY EQUATION: 2.62 CM2
MV VALVE AREA P 1/2 METHOD: 2.86 CM2
NEUTROPHILS # BLD AUTO: 3.4 K/UL (ref 1.8–7.7)
NEUTROPHILS # BLD AUTO: 3.8 K/UL (ref 1.8–7.7)
NEUTROPHILS # BLD AUTO: 3.9 K/UL (ref 1.8–7.7)
NEUTROPHILS # BLD AUTO: 4 K/UL (ref 1.8–7.7)
NEUTROPHILS # BLD AUTO: 4.4 K/UL (ref 1.8–7.7)
NEUTROPHILS # BLD AUTO: 5 K/UL (ref 1.8–7.7)
NEUTROPHILS # BLD AUTO: 5.3 K/UL (ref 1.8–7.7)
NEUTROPHILS NFR BLD: 27 % (ref 38–73)
NEUTROPHILS NFR BLD: 35 % (ref 38–73)
NEUTROPHILS NFR BLD: 61.6 % (ref 38–73)
NEUTROPHILS NFR BLD: 65.2 % (ref 38–73)
NEUTROPHILS NFR BLD: 65.5 % (ref 38–73)
NEUTROPHILS NFR BLD: 67 % (ref 38–73)
NEUTROPHILS NFR BLD: 67.1 % (ref 38–73)
NEUTROPHILS NFR BLD: 68.3 % (ref 38–73)
NEUTROPHILS NFR BLD: 68.4 % (ref 38–73)
NEUTROPHILS NFR BLD: 69.3 % (ref 38–73)
NEUTS BAND NFR BLD MANUAL: 21 %
NEUTS BAND NFR BLD MANUAL: 3 %
NEUTS BAND NFR BLD MANUAL: 32 %
NITRITE UR QL STRIP: NEGATIVE
NON-SQ EPI CELLS #/AREA URNS HPF: 1 /HPF
NONHDLC SERPL-MCNC: 54 MG/DL
NONHDLC SERPL-MCNC: 83 MG/DL
NRBC BLD-RTO: 0 /100 WBC
OHS LV EJECTION FRACTION SIMPSONS BIPLANE MOD: 51 %
PCO2 BLDA: 40.6 MMHG (ref 35–45)
PH SMN: 7.37 [PH] (ref 7.35–7.45)
PH UR STRIP: 6 [PH] (ref 5–8)
PISA MRMAX VEL: 4.31 M/S
PISA TR MAX VEL: 2.45 M/S
PLATELET # BLD AUTO: 127 K/UL (ref 150–450)
PLATELET # BLD AUTO: 134 K/UL (ref 150–450)
PLATELET # BLD AUTO: 134 K/UL (ref 150–450)
PLATELET # BLD AUTO: 135 K/UL (ref 150–450)
PLATELET # BLD AUTO: 142 K/UL (ref 150–450)
PLATELET # BLD AUTO: 142 K/UL (ref 150–450)
PLATELET # BLD AUTO: 148 K/UL (ref 150–450)
PLATELET # BLD AUTO: 157 K/UL (ref 150–450)
PLATELET # BLD AUTO: 166 K/UL (ref 150–450)
PLATELET # BLD AUTO: 195 K/UL (ref 150–450)
PLATELET BLD QL SMEAR: ABNORMAL
PLATELET BLD QL SMEAR: ABNORMAL
PMV BLD AUTO: 10 FL (ref 9.2–12.9)
PMV BLD AUTO: 10 FL (ref 9.2–12.9)
PMV BLD AUTO: 10.1 FL (ref 9.2–12.9)
PMV BLD AUTO: 10.3 FL (ref 9.2–12.9)
PMV BLD AUTO: 10.4 FL (ref 9.2–12.9)
PMV BLD AUTO: 10.4 FL (ref 9.2–12.9)
PMV BLD AUTO: 9.6 FL (ref 9.2–12.9)
PMV BLD AUTO: 9.7 FL (ref 9.2–12.9)
PMV BLD AUTO: 9.7 FL (ref 9.2–12.9)
PMV BLD AUTO: 9.9 FL (ref 9.2–12.9)
PO2 BLDA: 42.3 MMHG (ref 40–60)
POC ACTIVATED CLOTTING TIME K: 203 SEC (ref 74–137)
POC ACTIVATED CLOTTING TIME K: 227 SEC (ref 74–137)
POC ACTIVATED CLOTTING TIME K: 239 SEC (ref 74–137)
POC ACTIVATED CLOTTING TIME K: 239 SEC (ref 74–137)
POC ACTIVATED CLOTTING TIME K: 263 SEC (ref 74–137)
POC ACTIVATED CLOTTING TIME K: 263 SEC (ref 74–137)
POC ACTIVATED CLOTTING TIME K: 299 SEC (ref 74–137)
POC BASE DEFICIT: -1.9 MMOL/L (ref -2–2)
POC HCO3: 23.3 MMOL/L (ref 24–28)
POC IONIZED CALCIUM: 1.11 MMOL/L (ref 1.06–1.42)
POC MOLECULAR INFLUENZA A AGN: NEGATIVE
POC MOLECULAR INFLUENZA B AGN: NEGATIVE
POC PERFORMED BY: ABNORMAL
POC SATURATED O2: 74.8 % (ref 95–100)
POCT GLUCOSE: 103 MG/DL (ref 70–110)
POCT GLUCOSE: 104 MG/DL (ref 70–110)
POCT GLUCOSE: 131 MG/DL (ref 70–110)
POCT GLUCOSE: 156 MG/DL (ref 70–110)
POCT GLUCOSE: 157 MG/DL (ref 70–110)
POCT GLUCOSE: 171 MG/DL (ref 70–110)
POCT GLUCOSE: 180 MG/DL (ref 70–110)
POCT GLUCOSE: 197 MG/DL (ref 70–110)
POCT GLUCOSE: 207 MG/DL (ref 70–110)
POCT GLUCOSE: 211 MG/DL (ref 70–110)
POCT GLUCOSE: 214 MG/DL (ref 70–110)
POCT GLUCOSE: 217 MG/DL (ref 70–110)
POCT GLUCOSE: 251 MG/DL (ref 70–110)
POCT GLUCOSE: 294 MG/DL (ref 70–110)
POCT GLUCOSE: 300 MG/DL (ref 70–110)
POCT GLUCOSE: 88 MG/DL (ref 70–110)
POCT GLUCOSE: 98 MG/DL (ref 70–110)
POTASSIUM BLD-SCNC: 3.8 MMOL/L (ref 3.5–5.1)
POTASSIUM SERPL-SCNC: 4.1 MMOL/L (ref 3.5–5.1)
POTASSIUM SERPL-SCNC: 4.3 MMOL/L (ref 3.5–5.1)
POTASSIUM SERPL-SCNC: 4.3 MMOL/L (ref 3.5–5.1)
POTASSIUM SERPL-SCNC: 4.4 MMOL/L (ref 3.5–5.1)
POTASSIUM SERPL-SCNC: 4.4 MMOL/L (ref 3.5–5.1)
POTASSIUM SERPL-SCNC: 4.5 MMOL/L (ref 3.5–5.1)
POTASSIUM SERPL-SCNC: 4.7 MMOL/L (ref 3.5–5.1)
POTASSIUM SERPL-SCNC: 5.2 MMOL/L (ref 3.5–5.1)
PROCALCITONIN SERPL IA-MCNC: 11.38 NG/ML
PROCALCITONIN SERPL IA-MCNC: 5.87 NG/ML
PROT SERPL-MCNC: 7.2 G/DL (ref 6–8.4)
PROT SERPL-MCNC: 7.3 G/DL (ref 6–8.4)
PROT SERPL-MCNC: 7.8 G/DL (ref 6–8.4)
PROT SERPL-MCNC: 8.1 G/DL (ref 6–8.4)
PROT UR QL STRIP: ABNORMAL
PROTHROMBIN TIME: 11.1 SEC (ref 9–12.5)
PROTHROMBIN TIME: 12.4 SEC (ref 9–12.5)
PULM VEIN S/D RATIO: 1.1
PV PEAK D VEL: 0.3 M/S
PV PEAK GRADIENT: 3 MMHG
PV PEAK S VEL: 0.33 M/S
PV PEAK VELOCITY: 0.85 M/S
RA MAJOR: 5.97 CM
RA PRESSURE ESTIMATED: 8 MMHG
RA WIDTH: 2.78 CM
RBC # BLD AUTO: 3.88 M/UL (ref 4.6–6.2)
RBC # BLD AUTO: 3.89 M/UL (ref 4.6–6.2)
RBC # BLD AUTO: 3.94 M/UL (ref 4.6–6.2)
RBC # BLD AUTO: 4.22 M/UL (ref 4.6–6.2)
RBC # BLD AUTO: 4.56 M/UL (ref 4.6–6.2)
RBC # BLD AUTO: 4.57 M/UL (ref 4.6–6.2)
RBC # BLD AUTO: 4.62 M/UL (ref 4.6–6.2)
RBC # BLD AUTO: 4.73 M/UL (ref 4.6–6.2)
RBC # BLD AUTO: 4.8 M/UL (ref 4.6–6.2)
RBC # BLD AUTO: 4.85 M/UL (ref 4.6–6.2)
RBC #/AREA URNS HPF: 4 /HPF (ref 0–4)
RIGHT VENTRICULAR END-DIASTOLIC DIMENSION: 3.25 CM
RSV AG BY MOLECULAR METHOD: NOT DETECTED
RV TB RVSP: 10 MMHG
RV TISSUE DOPPLER FREE WALL SYSTOLIC VELOCITY 1 (APICAL 4 CHAMBER VIEW): 7.24 CM/S
SAMPLE: ABNORMAL
SARS-COV-2 AG RESP QL IA.RAPID: NEGATIVE
SARS-COV-2 RNA RESP QL NAA+PROBE: NOT DETECTED
SINUS: 3.4 CM
SODIUM BLD-SCNC: 144 MMOL/L (ref 136–145)
SODIUM SERPL-SCNC: 141 MMOL/L (ref 136–145)
SODIUM SERPL-SCNC: 142 MMOL/L (ref 136–145)
SODIUM SERPL-SCNC: 145 MMOL/L (ref 136–145)
SODIUM SERPL-SCNC: 145 MMOL/L (ref 136–145)
SP GR UR STRIP: 1.03 (ref 1–1.03)
SPECIMEN OUTDATE: NORMAL
SPECIMEN OUTDATE: NORMAL
SPECIMEN SOURCE: ABNORMAL
SQUAMOUS #/AREA URNS HPF: 1 /HPF
STJ: 2.68 CM
TDI LATERAL: 0.07 M/S
TDI SEPTAL: 0.04 M/S
TDI: 0.06 M/S
TR MAX PG: 24 MMHG
TRICUSPID ANNULAR PLANE SYSTOLIC EXCURSION: 1.23 CM
TRIGL SERPL-MCNC: 82 MG/DL (ref 30–150)
TRIGL SERPL-MCNC: 93 MG/DL (ref 30–150)
TROPONIN I SERPL DL<=0.01 NG/ML-MCNC: 0.02 NG/ML (ref 0–0.03)
TROPONIN I SERPL DL<=0.01 NG/ML-MCNC: 0.02 NG/ML (ref 0–0.03)
TROPONIN I SERPL DL<=0.01 NG/ML-MCNC: 0.03 NG/ML (ref 0–0.03)
TROPONIN I SERPL DL<=0.01 NG/ML-MCNC: 0.26 NG/ML (ref 0–0.03)
TROPONIN I SERPL DL<=0.01 NG/ML-MCNC: 0.39 NG/ML (ref 0–0.03)
TROPONIN I SERPL DL<=0.01 NG/ML-MCNC: 0.56 NG/ML (ref 0–0.03)
TROPONIN I SERPL DL<=0.01 NG/ML-MCNC: 2.97 NG/ML (ref 0–0.03)
TROPONIN I SERPL DL<=0.01 NG/ML-MCNC: 4.68 NG/ML (ref 0–0.03)
TSH SERPL DL<=0.005 MIU/L-ACNC: 1.68 UIU/ML (ref 0.4–4)
TV REST PULMONARY ARTERY PRESSURE: 32 MMHG
URN SPEC COLLECT METH UR: ABNORMAL
UROBILINOGEN UR STRIP-ACNC: ABNORMAL EU/DL
WBC # BLD AUTO: 2.18 K/UL (ref 3.9–12.7)
WBC # BLD AUTO: 2.49 K/UL (ref 3.9–12.7)
WBC # BLD AUTO: 3.89 K/UL (ref 3.9–12.7)
WBC # BLD AUTO: 5.55 K/UL (ref 3.9–12.7)
WBC # BLD AUTO: 5.75 K/UL (ref 3.9–12.7)
WBC # BLD AUTO: 5.86 K/UL (ref 3.9–12.7)
WBC # BLD AUTO: 5.89 K/UL (ref 3.9–12.7)
WBC # BLD AUTO: 6.38 K/UL (ref 3.9–12.7)
WBC # BLD AUTO: 7.37 K/UL (ref 3.9–12.7)
WBC # BLD AUTO: 8.16 K/UL (ref 3.9–12.7)
WBC #/AREA URNS HPF: 8 /HPF (ref 0–5)
Z-SCORE OF LEFT VENTRICULAR DIMENSION IN END DIASTOLE: -0.56
Z-SCORE OF LEFT VENTRICULAR DIMENSION IN END SYSTOLE: 0.78

## 2023-01-01 PROCEDURE — 99213 PR OFFICE/OUTPT VISIT, EST, LEVL III, 20-29 MIN: ICD-10-PCS | Mod: HCNC,,, | Performed by: INTERNAL MEDICINE

## 2023-01-01 PROCEDURE — 99999 PR PBB SHADOW E&M-EST. PATIENT-LVL IV: ICD-10-PCS | Mod: PBBFAC,HCNC,, | Performed by: INTERNAL MEDICINE

## 2023-01-01 PROCEDURE — 93005 ELECTROCARDIOGRAM TRACING: CPT | Mod: HCNC

## 2023-01-01 PROCEDURE — 27100171 HC OXYGEN HIGH FLOW UP TO 24 HOURS: Mod: HCNC

## 2023-01-01 PROCEDURE — 27000221 HC OXYGEN, UP TO 24 HOURS: Mod: HCNC

## 2023-01-01 PROCEDURE — 63600175 PHARM REV CODE 636 W HCPCS: Mod: HCNC | Performed by: NURSE PRACTITIONER

## 2023-01-01 PROCEDURE — 85025 COMPLETE CBC W/AUTO DIFF WBC: CPT | Mod: HCNC | Performed by: STUDENT IN AN ORGANIZED HEALTH CARE EDUCATION/TRAINING PROGRAM

## 2023-01-01 PROCEDURE — 82962 GLUCOSE BLOOD TEST: CPT | Mod: HCNC

## 2023-01-01 PROCEDURE — 97140 MANUAL THERAPY 1/> REGIONS: CPT | Mod: HCNC,PN,CQ

## 2023-01-01 PROCEDURE — 99215 OFFICE O/P EST HI 40 MIN: CPT | Mod: S$GLB,,,

## 2023-01-01 PROCEDURE — 99900035 HC TECH TIME PER 15 MIN (STAT): Mod: HCNC

## 2023-01-01 PROCEDURE — 93459 L HRT ART/GRFT ANGIO: CPT | Mod: 26,59,51,HCNC | Performed by: INTERNAL MEDICINE

## 2023-01-01 PROCEDURE — 36415 COLL VENOUS BLD VENIPUNCTURE: CPT | Mod: HCNC | Performed by: INTERNAL MEDICINE

## 2023-01-01 PROCEDURE — 80053 COMPREHEN METABOLIC PANEL: CPT | Mod: HCNC | Performed by: STUDENT IN AN ORGANIZED HEALTH CARE EDUCATION/TRAINING PROGRAM

## 2023-01-01 PROCEDURE — G0378 HOSPITAL OBSERVATION PER HR: HCPCS | Mod: HCNC

## 2023-01-01 PROCEDURE — 97161 PT EVAL LOW COMPLEX 20 MIN: CPT | Mod: HCNC

## 2023-01-01 PROCEDURE — 80061 LIPID PANEL: CPT | Mod: HCNC | Performed by: NURSE PRACTITIONER

## 2023-01-01 PROCEDURE — 27000249 HC VAPOTHERM CIRCUIT: Mod: HCNC

## 2023-01-01 PROCEDURE — C1753 CATH, INTRAVAS ULTRASOUND: HCPCS | Mod: HCNC | Performed by: INTERNAL MEDICINE

## 2023-01-01 PROCEDURE — 94799 UNLISTED PULMONARY SVC/PX: CPT | Mod: HCNC,XB

## 2023-01-01 PROCEDURE — C1725 CATH, TRANSLUMIN NON-LASER: HCPCS | Mod: HCNC | Performed by: INTERNAL MEDICINE

## 2023-01-01 PROCEDURE — 85025 COMPLETE CBC W/AUTO DIFF WBC: CPT | Mod: HCNC | Performed by: NURSE PRACTITIONER

## 2023-01-01 PROCEDURE — 83880 ASSAY OF NATRIURETIC PEPTIDE: CPT | Mod: HCNC | Performed by: EMERGENCY MEDICINE

## 2023-01-01 PROCEDURE — 85025 COMPLETE CBC W/AUTO DIFF WBC: CPT | Mod: HCNC | Performed by: EMERGENCY MEDICINE

## 2023-01-01 PROCEDURE — 63600175 PHARM REV CODE 636 W HCPCS: Mod: HCNC | Performed by: STUDENT IN AN ORGANIZED HEALTH CARE EDUCATION/TRAINING PROGRAM

## 2023-01-01 PROCEDURE — 99152 MOD SED SAME PHYS/QHP 5/>YRS: CPT | Mod: HCNC | Performed by: INTERNAL MEDICINE

## 2023-01-01 PROCEDURE — 97110 THERAPEUTIC EXERCISES: CPT | Mod: HCNC,PN,CQ

## 2023-01-01 PROCEDURE — 99496 TRANSJ CARE MGMT HIGH F2F 7D: CPT | Mod: HCNC,S$GLB,, | Performed by: INTERNAL MEDICINE

## 2023-01-01 PROCEDURE — 63600175 PHARM REV CODE 636 W HCPCS: Mod: HCNC | Performed by: INTERNAL MEDICINE

## 2023-01-01 PROCEDURE — 93010 EKG 12-LEAD: ICD-10-PCS | Mod: HCNC,,, | Performed by: INTERNAL MEDICINE

## 2023-01-01 PROCEDURE — 25000003 PHARM REV CODE 250: Mod: HCNC | Performed by: PHYSICIAN ASSISTANT

## 2023-01-01 PROCEDURE — 3078F DIAST BP <80 MM HG: CPT | Mod: HCNC,CPTII,S$GLB, | Performed by: INTERNAL MEDICINE

## 2023-01-01 PROCEDURE — 84484 ASSAY OF TROPONIN QUANT: CPT | Mod: HCNC | Performed by: EMERGENCY MEDICINE

## 2023-01-01 PROCEDURE — 93459: ICD-10-PCS | Mod: 26,59,51,HCNC | Performed by: INTERNAL MEDICINE

## 2023-01-01 PROCEDURE — 1160F PR REVIEW ALL MEDS BY PRESCRIBER/CLIN PHARMACIST DOCUMENTED: ICD-10-PCS | Mod: HCNC,CPTII,S$GLB, | Performed by: INTERNAL MEDICINE

## 2023-01-01 PROCEDURE — 85025 COMPLETE CBC W/AUTO DIFF WBC: CPT | Mod: HCNC | Performed by: PHYSICIAN ASSISTANT

## 2023-01-01 PROCEDURE — 3288F FALL RISK ASSESSMENT DOCD: CPT | Mod: HCNC,CPTII,S$GLB, | Performed by: INTERNAL MEDICINE

## 2023-01-01 PROCEDURE — 83880 ASSAY OF NATRIURETIC PEPTIDE: CPT | Mod: HCNC | Performed by: PHYSICIAN ASSISTANT

## 2023-01-01 PROCEDURE — 85347 COAGULATION TIME ACTIVATED: CPT | Mod: HCNC,91 | Performed by: INTERNAL MEDICINE

## 2023-01-01 PROCEDURE — 94761 N-INVAS EAR/PLS OXIMETRY MLT: CPT | Mod: HCNC

## 2023-01-01 PROCEDURE — 85025 COMPLETE CBC W/AUTO DIFF WBC: CPT | Mod: HCNC | Performed by: INTERNAL MEDICINE

## 2023-01-01 PROCEDURE — C1887 CATHETER, GUIDING: HCPCS | Mod: HCNC | Performed by: INTERNAL MEDICINE

## 2023-01-01 PROCEDURE — 99285 EMERGENCY DEPT VISIT HI MDM: CPT | Mod: 25,HCNC

## 2023-01-01 PROCEDURE — 96366 THER/PROPH/DIAG IV INF ADDON: CPT | Mod: HCNC

## 2023-01-01 PROCEDURE — 63700000 PHARM REV CODE 250 ALT 637 W/O HCPCS: Mod: HCNC | Performed by: PHYSICIAN ASSISTANT

## 2023-01-01 PROCEDURE — 93005 ELECTROCARDIOGRAM TRACING: CPT | Mod: HCNC,59

## 2023-01-01 PROCEDURE — 25000003 PHARM REV CODE 250: Mod: HCNC | Performed by: NURSE PRACTITIONER

## 2023-01-01 PROCEDURE — 1125F AMNT PAIN NOTED PAIN PRSNT: CPT | Mod: HCNC,CPTII,S$GLB, | Performed by: INTERNAL MEDICINE

## 2023-01-01 PROCEDURE — 92978 ENDOLUMINL IVUS OCT C 1ST: CPT | Mod: 26,RC,HCNC, | Performed by: INTERNAL MEDICINE

## 2023-01-01 PROCEDURE — 1126F AMNT PAIN NOTED NONE PRSNT: CPT | Mod: HCNC,CPTII,S$GLB, | Performed by: INTERNAL MEDICINE

## 2023-01-01 PROCEDURE — 85730 THROMBOPLASTIN TIME PARTIAL: CPT | Mod: HCNC | Performed by: NURSE PRACTITIONER

## 2023-01-01 PROCEDURE — 85610 PROTHROMBIN TIME: CPT | Mod: HCNC | Performed by: NURSE PRACTITIONER

## 2023-01-01 PROCEDURE — 96366 THER/PROPH/DIAG IV INF ADDON: CPT

## 2023-01-01 PROCEDURE — 80053 COMPREHEN METABOLIC PANEL: CPT | Mod: HCNC | Performed by: PHYSICIAN ASSISTANT

## 2023-01-01 PROCEDURE — 86900 BLOOD TYPING SEROLOGIC ABO: CPT | Mod: HCNC | Performed by: NURSE PRACTITIONER

## 2023-01-01 PROCEDURE — 85610 PROTHROMBIN TIME: CPT | Mod: HCNC | Performed by: STUDENT IN AN ORGANIZED HEALTH CARE EDUCATION/TRAINING PROGRAM

## 2023-01-01 PROCEDURE — 99999 PR PBB SHADOW E&M-EST. PATIENT-LVL IV: CPT | Mod: PBBFAC,HCNC,, | Performed by: INTERNAL MEDICINE

## 2023-01-01 PROCEDURE — 3074F PR MOST RECENT SYSTOLIC BLOOD PRESSURE < 130 MM HG: ICD-10-PCS | Mod: HCNC,CPTII,S$GLB, | Performed by: INTERNAL MEDICINE

## 2023-01-01 PROCEDURE — 25000003 PHARM REV CODE 250: Mod: HCNC | Performed by: STUDENT IN AN ORGANIZED HEALTH CARE EDUCATION/TRAINING PROGRAM

## 2023-01-01 PROCEDURE — 25000003 PHARM REV CODE 250: Mod: HCNC | Performed by: INTERNAL MEDICINE

## 2023-01-01 PROCEDURE — 93010 ELECTROCARDIOGRAM REPORT: CPT | Mod: HCNC,,, | Performed by: INTERNAL MEDICINE

## 2023-01-01 PROCEDURE — 63600175 PHARM REV CODE 636 W HCPCS: Mod: HCNC | Performed by: PHYSICIAN ASSISTANT

## 2023-01-01 PROCEDURE — 80048 BASIC METABOLIC PNL TOTAL CA: CPT | Mod: HCNC | Performed by: NURSE PRACTITIONER

## 2023-01-01 PROCEDURE — C1769 GUIDE WIRE: HCPCS | Mod: HCNC | Performed by: INTERNAL MEDICINE

## 2023-01-01 PROCEDURE — 3078F PR MOST RECENT DIASTOLIC BLOOD PRESSURE < 80 MM HG: ICD-10-PCS | Mod: HCNC,CPTII,S$GLB, | Performed by: INTERNAL MEDICINE

## 2023-01-01 PROCEDURE — 84484 ASSAY OF TROPONIN QUANT: CPT | Mod: HCNC | Performed by: STUDENT IN AN ORGANIZED HEALTH CARE EDUCATION/TRAINING PROGRAM

## 2023-01-01 PROCEDURE — C1894 INTRO/SHEATH, NON-LASER: HCPCS | Mod: HCNC | Performed by: INTERNAL MEDICINE

## 2023-01-01 PROCEDURE — 1159F PR MEDICATION LIST DOCUMENTED IN MEDICAL RECORD: ICD-10-PCS | Mod: HCNC,CPTII,S$GLB, | Performed by: INTERNAL MEDICINE

## 2023-01-01 PROCEDURE — 99999 PR PBB SHADOW E&M-EST. PATIENT-LVL III: CPT | Mod: PBBFAC,HCNC,, | Performed by: INTERNAL MEDICINE

## 2023-01-01 PROCEDURE — 84145 PROCALCITONIN (PCT): CPT | Mod: HCNC | Performed by: PHYSICIAN ASSISTANT

## 2023-01-01 PROCEDURE — 11000001 HC ACUTE MED/SURG PRIVATE ROOM: Mod: HCNC

## 2023-01-01 PROCEDURE — 96361 HYDRATE IV INFUSION ADD-ON: CPT | Mod: HCNC

## 2023-01-01 PROCEDURE — 1111F PR DISCHARGE MEDS RECONCILED W/ CURRENT OUTPATIENT MED LIST: ICD-10-PCS | Mod: HCNC,CPTII,S$GLB, | Performed by: INTERNAL MEDICINE

## 2023-01-01 PROCEDURE — 80048 BASIC METABOLIC PNL TOTAL CA: CPT | Mod: HCNC | Performed by: INTERNAL MEDICINE

## 2023-01-01 PROCEDURE — 83735 ASSAY OF MAGNESIUM: CPT | Mod: HCNC | Performed by: PHYSICIAN ASSISTANT

## 2023-01-01 PROCEDURE — 85730 THROMBOPLASTIN TIME PARTIAL: CPT | Mod: 91,HCNC | Performed by: STUDENT IN AN ORGANIZED HEALTH CARE EDUCATION/TRAINING PROGRAM

## 2023-01-01 PROCEDURE — 96375 TX/PRO/DX INJ NEW DRUG ADDON: CPT | Mod: HCNC

## 2023-01-01 PROCEDURE — 84443 ASSAY THYROID STIM HORMONE: CPT | Mod: HCNC | Performed by: PHYSICIAN ASSISTANT

## 2023-01-01 PROCEDURE — 84484 ASSAY OF TROPONIN QUANT: CPT | Mod: HCNC | Performed by: NURSE PRACTITIONER

## 2023-01-01 PROCEDURE — 99232 SBSQ HOSP IP/OBS MODERATE 35: CPT | Mod: HCNC,25,, | Performed by: NURSE PRACTITIONER

## 2023-01-01 PROCEDURE — 96372 THER/PROPH/DIAG INJ SC/IM: CPT | Performed by: HOSPITALIST

## 2023-01-01 PROCEDURE — 85027 COMPLETE CBC AUTOMATED: CPT | Mod: 91,HCNC | Performed by: STUDENT IN AN ORGANIZED HEALTH CARE EDUCATION/TRAINING PROGRAM

## 2023-01-01 PROCEDURE — 85730 THROMBOPLASTIN TIME PARTIAL: CPT | Mod: HCNC | Performed by: PHYSICIAN ASSISTANT

## 2023-01-01 PROCEDURE — 99232 PR SUBSEQUENT HOSPITAL CARE,LEVL II: ICD-10-PCS | Mod: HCNC,25,, | Performed by: NURSE PRACTITIONER

## 2023-01-01 PROCEDURE — 3051F PR MOST RECENT HEMOGLOBIN A1C LEVEL 7.0 - < 8.0%: ICD-10-PCS | Mod: HCNC,CPTII,S$GLB, | Performed by: INTERNAL MEDICINE

## 2023-01-01 PROCEDURE — 83880 ASSAY OF NATRIURETIC PEPTIDE: CPT | Mod: HCNC | Performed by: STUDENT IN AN ORGANIZED HEALTH CARE EDUCATION/TRAINING PROGRAM

## 2023-01-01 PROCEDURE — 80048 BASIC METABOLIC PNL TOTAL CA: CPT | Mod: HCNC | Performed by: STUDENT IN AN ORGANIZED HEALTH CARE EDUCATION/TRAINING PROGRAM

## 2023-01-01 PROCEDURE — 0715T *PR CORONARY LITHOTRIPSY: CPT | Mod: HCNC,,, | Performed by: INTERNAL MEDICINE

## 2023-01-01 PROCEDURE — 99152 MOD SED SAME PHYS/QHP 5/>YRS: CPT | Mod: HCNC,,, | Performed by: INTERNAL MEDICINE

## 2023-01-01 PROCEDURE — 99152 PR MOD CONSCIOUS SEDATION, SAME PHYS, 5+ YRS, FIRST 15 MIN: ICD-10-PCS | Mod: HCNC,,, | Performed by: INTERNAL MEDICINE

## 2023-01-01 PROCEDURE — 99153 MOD SED SAME PHYS/QHP EA: CPT | Mod: HCNC | Performed by: INTERNAL MEDICINE

## 2023-01-01 PROCEDURE — 3074F SYST BP LT 130 MM HG: CPT | Mod: HCNC,CPTII,S$GLB, | Performed by: INTERNAL MEDICINE

## 2023-01-01 PROCEDURE — 99214 OFFICE O/P EST MOD 30 MIN: CPT | Mod: HCNC,S$GLB,, | Performed by: INTERNAL MEDICINE

## 2023-01-01 PROCEDURE — 80053 COMPREHEN METABOLIC PANEL: CPT | Mod: HCNC | Performed by: NURSE PRACTITIONER

## 2023-01-01 PROCEDURE — 27201423 OPTIME MED/SURG SUP & DEVICES STERILE SUPPLY: Mod: HCNC | Performed by: INTERNAL MEDICINE

## 2023-01-01 PROCEDURE — 1101F PR PT FALLS ASSESS DOC 0-1 FALLS W/OUT INJ PAST YR: ICD-10-PCS | Mod: HCNC,CPTII,S$GLB, | Performed by: INTERNAL MEDICINE

## 2023-01-01 PROCEDURE — 93010 EKG 12-LEAD: ICD-10-PCS | Mod: HCNC,76,, | Performed by: INTERNAL MEDICINE

## 2023-01-01 PROCEDURE — 85007 BL SMEAR W/DIFF WBC COUNT: CPT | Mod: 91,HCNC | Performed by: STUDENT IN AN ORGANIZED HEALTH CARE EDUCATION/TRAINING PROGRAM

## 2023-01-01 PROCEDURE — 36415 COLL VENOUS BLD VENIPUNCTURE: CPT | Mod: HCNC | Performed by: STUDENT IN AN ORGANIZED HEALTH CARE EDUCATION/TRAINING PROGRAM

## 2023-01-01 PROCEDURE — 86850 RBC ANTIBODY SCREEN: CPT | Mod: HCNC | Performed by: PHYSICIAN ASSISTANT

## 2023-01-01 PROCEDURE — 92937 PRQ TRLUML REVSC CAB GRF 1: CPT | Mod: RC,HCNC,, | Performed by: INTERNAL MEDICINE

## 2023-01-01 PROCEDURE — 25500020 PHARM REV CODE 255: Mod: HCNC | Performed by: INTERNAL MEDICINE

## 2023-01-01 PROCEDURE — 96372 THER/PROPH/DIAG INJ SC/IM: CPT | Performed by: NURSE PRACTITIONER

## 2023-01-01 PROCEDURE — 1159F MED LIST DOCD IN RCRD: CPT | Mod: HCNC,CPTII,S$GLB, | Performed by: INTERNAL MEDICINE

## 2023-01-01 PROCEDURE — 3288F PR FALLS RISK ASSESSMENT DOCUMENTED: ICD-10-PCS | Mod: HCNC,CPTII,S$GLB, | Performed by: INTERNAL MEDICINE

## 2023-01-01 PROCEDURE — 63600175 PHARM REV CODE 636 W HCPCS: Mod: HCNC | Performed by: HOSPITALIST

## 2023-01-01 PROCEDURE — C9604 PERC D-E COR REVASC T CABG S: HCPCS | Mod: RC,HCNC | Performed by: INTERNAL MEDICINE

## 2023-01-01 PROCEDURE — 83735 ASSAY OF MAGNESIUM: CPT | Mod: HCNC | Performed by: NURSE PRACTITIONER

## 2023-01-01 PROCEDURE — 1160F RVW MEDS BY RX/DR IN RCRD: CPT | Mod: HCNC,CPTII,S$GLB, | Performed by: INTERNAL MEDICINE

## 2023-01-01 PROCEDURE — 99213 OFFICE O/P EST LOW 20 MIN: CPT | Mod: HCNC,,, | Performed by: INTERNAL MEDICINE

## 2023-01-01 PROCEDURE — 87811 SARS-COV-2 COVID19 W/OPTIC: CPT | Mod: QW,S$GLB,,

## 2023-01-01 PROCEDURE — 36415 COLL VENOUS BLD VENIPUNCTURE: CPT | Mod: HCNC | Performed by: NURSE PRACTITIONER

## 2023-01-01 PROCEDURE — 99291 CRITICAL CARE FIRST HOUR: CPT | Mod: HCNC,,, | Performed by: STUDENT IN AN ORGANIZED HEALTH CARE EDUCATION/TRAINING PROGRAM

## 2023-01-01 PROCEDURE — 83036 HEMOGLOBIN GLYCOSYLATED A1C: CPT | Mod: HCNC | Performed by: PHYSICIAN ASSISTANT

## 2023-01-01 PROCEDURE — 25000003 PHARM REV CODE 250: Mod: HCNC | Performed by: EMERGENCY MEDICINE

## 2023-01-01 PROCEDURE — 87502 POCT INFLUENZA A/B MOLECULAR: ICD-10-PCS | Mod: QW,S$GLB,,

## 2023-01-01 PROCEDURE — 83605 ASSAY OF LACTIC ACID: CPT | Mod: HCNC | Performed by: INTERNAL MEDICINE

## 2023-01-01 PROCEDURE — 87811 SARS CORONAVIRUS 2 ANTIGEN POCT, MANUAL READ: ICD-10-PCS | Mod: QW,S$GLB,,

## 2023-01-01 PROCEDURE — 84484 ASSAY OF TROPONIN QUANT: CPT | Mod: 91,HCNC | Performed by: PHYSICIAN ASSISTANT

## 2023-01-01 PROCEDURE — 93010 ELECTROCARDIOGRAM REPORT: CPT | Mod: HCNC,76,, | Performed by: INTERNAL MEDICINE

## 2023-01-01 PROCEDURE — 92978 ENDOLUMINL IVUS OCT C 1ST: CPT | Mod: RC,HCNC | Performed by: INTERNAL MEDICINE

## 2023-01-01 PROCEDURE — 4010F ACE/ARB THERAPY RXD/TAKEN: CPT | Mod: HCNC,CPTII,S$GLB, | Performed by: INTERNAL MEDICINE

## 2023-01-01 PROCEDURE — 99496 TRANSITIONAL CARE MANAGE SERVICE 7 DAY DISCHARGE: ICD-10-PCS | Mod: HCNC,S$GLB,, | Performed by: INTERNAL MEDICINE

## 2023-01-01 PROCEDURE — 85730 THROMBOPLASTIN TIME PARTIAL: CPT | Mod: HCNC | Performed by: STUDENT IN AN ORGANIZED HEALTH CARE EDUCATION/TRAINING PROGRAM

## 2023-01-01 PROCEDURE — 97110 THERAPEUTIC EXERCISES: CPT | Mod: HCNC,PN

## 2023-01-01 PROCEDURE — 92937 PR BYPASS (IN OR THROUGH): ICD-10-PCS | Mod: RC,HCNC,, | Performed by: INTERNAL MEDICINE

## 2023-01-01 PROCEDURE — 3075F SYST BP GE 130 - 139MM HG: CPT | Mod: HCNC,CPTII,S$GLB, | Performed by: INTERNAL MEDICINE

## 2023-01-01 PROCEDURE — 3008F BODY MASS INDEX DOCD: CPT | Mod: HCNC,CPTII,S$GLB, | Performed by: INTERNAL MEDICINE

## 2023-01-01 PROCEDURE — 81000 URINALYSIS NONAUTO W/SCOPE: CPT | Mod: HCNC | Performed by: INTERNAL MEDICINE

## 2023-01-01 PROCEDURE — 97116 GAIT TRAINING THERAPY: CPT | Mod: HCNC

## 2023-01-01 PROCEDURE — 0241U SARS-COV2 (COVID) WITH FLU/RSV BY PCR: CPT | Mod: HCNC | Performed by: STUDENT IN AN ORGANIZED HEALTH CARE EDUCATION/TRAINING PROGRAM

## 2023-01-01 PROCEDURE — 36415 COLL VENOUS BLD VENIPUNCTURE: CPT | Mod: HCNC | Performed by: FAMILY MEDICINE

## 2023-01-01 PROCEDURE — 3075F PR MOST RECENT SYSTOLIC BLOOD PRESS GE 130-139MM HG: ICD-10-PCS | Mod: HCNC,CPTII,S$GLB, | Performed by: INTERNAL MEDICINE

## 2023-01-01 PROCEDURE — 3051F HG A1C>EQUAL 7.0%<8.0%: CPT | Mod: HCNC,CPTII,S$GLB, | Performed by: INTERNAL MEDICINE

## 2023-01-01 PROCEDURE — C1760 CLOSURE DEV, VASC: HCPCS | Mod: HCNC | Performed by: INTERNAL MEDICINE

## 2023-01-01 PROCEDURE — 1101F PT FALLS ASSESS-DOCD LE1/YR: CPT | Mod: HCNC,CPTII,S$GLB, | Performed by: INTERNAL MEDICINE

## 2023-01-01 PROCEDURE — 1126F PR PAIN SEVERITY QUANTIFIED, NO PAIN PRESENT: ICD-10-PCS | Mod: HCNC,CPTII,S$GLB, | Performed by: INTERNAL MEDICINE

## 2023-01-01 PROCEDURE — 3008F PR BODY MASS INDEX (BMI) DOCUMENTED: ICD-10-PCS | Mod: HCNC,CPTII,S$GLB, | Performed by: INTERNAL MEDICINE

## 2023-01-01 PROCEDURE — 96368 THER/DIAG CONCURRENT INF: CPT | Mod: HCNC

## 2023-01-01 PROCEDURE — 84145 PROCALCITONIN (PCT): CPT | Mod: HCNC | Performed by: INTERNAL MEDICINE

## 2023-01-01 PROCEDURE — 93459 L HRT ART/GRFT ANGIO: CPT | Mod: HCNC,59 | Performed by: INTERNAL MEDICINE

## 2023-01-01 PROCEDURE — 96365 THER/PROPH/DIAG IV INF INIT: CPT | Mod: HCNC

## 2023-01-01 PROCEDURE — 92978 PR IVUS, CORONARY, 1ST VESSEL: ICD-10-PCS | Mod: 26,RC,HCNC, | Performed by: INTERNAL MEDICINE

## 2023-01-01 PROCEDURE — 0715T *HC CORONARY LITHOTRIPSY: CPT | Mod: HCNC | Performed by: INTERNAL MEDICINE

## 2023-01-01 PROCEDURE — 4010F PR ACE/ARB THEARPY RXD/TAKEN: ICD-10-PCS | Mod: HCNC,CPTII,S$GLB, | Performed by: INTERNAL MEDICINE

## 2023-01-01 PROCEDURE — 1111F DSCHRG MED/CURRENT MED MERGE: CPT | Mod: HCNC,CPTII,S$GLB, | Performed by: INTERNAL MEDICINE

## 2023-01-01 PROCEDURE — 85347 COAGULATION TIME ACTIVATED: CPT | Mod: HCNC | Performed by: INTERNAL MEDICINE

## 2023-01-01 PROCEDURE — 1125F PR PAIN SEVERITY QUANTIFIED, PAIN PRESENT: ICD-10-PCS | Mod: HCNC,CPTII,S$GLB, | Performed by: INTERNAL MEDICINE

## 2023-01-01 PROCEDURE — 99214 PR OFFICE/OUTPT VISIT, EST, LEVL IV, 30-39 MIN: ICD-10-PCS | Mod: HCNC,S$GLB,, | Performed by: INTERNAL MEDICINE

## 2023-01-01 PROCEDURE — 85730 THROMBOPLASTIN TIME PARTIAL: CPT | Mod: 91,HCNC | Performed by: FAMILY MEDICINE

## 2023-01-01 PROCEDURE — 87502 INFLUENZA DNA AMP PROBE: CPT | Mod: QW,S$GLB,,

## 2023-01-01 PROCEDURE — 99215 PR OFFICE/OUTPT VISIT, EST, LEVL V, 40-54 MIN: ICD-10-PCS | Mod: S$GLB,,,

## 2023-01-01 PROCEDURE — 80053 COMPREHEN METABOLIC PANEL: CPT | Mod: HCNC | Performed by: EMERGENCY MEDICINE

## 2023-01-01 PROCEDURE — C1761 OPTIME CATH, TRANSLUMINAL INTRAVASC LITHO, CORONARY: HCPCS | Mod: HCNC | Performed by: INTERNAL MEDICINE

## 2023-01-01 PROCEDURE — 80061 LIPID PANEL: CPT | Mod: HCNC | Performed by: PHYSICIAN ASSISTANT

## 2023-01-01 PROCEDURE — 83036 HEMOGLOBIN GLYCOSYLATED A1C: CPT | Mod: HCNC | Performed by: NURSE PRACTITIONER

## 2023-01-01 PROCEDURE — C1757 CATH, THROMBECTOMY/EMBOLECT: HCPCS | Mod: HCNC | Performed by: INTERNAL MEDICINE

## 2023-01-01 PROCEDURE — 87040 BLOOD CULTURE FOR BACTERIA: CPT | Mod: 59,HCNC | Performed by: FAMILY MEDICINE

## 2023-01-01 PROCEDURE — 99999 PR PBB SHADOW E&M-EST. PATIENT-LVL III: ICD-10-PCS | Mod: PBBFAC,HCNC,, | Performed by: INTERNAL MEDICINE

## 2023-01-01 PROCEDURE — 0715T *PR CORONARY LITHOTRIPSY: ICD-10-PCS | Mod: HCNC,,, | Performed by: INTERNAL MEDICINE

## 2023-01-01 PROCEDURE — C1874 STENT, COATED/COV W/DEL SYS: HCPCS | Mod: HCNC | Performed by: INTERNAL MEDICINE

## 2023-01-01 DEVICE — EVEROLIMUS-ELUTING PLATINUM CHROMIUM CORONARY STENT SYSTEM
Type: IMPLANTABLE DEVICE | Site: CORONARY | Status: FUNCTIONAL
Brand: SYNERGY™ XD

## 2023-01-01 RX ORDER — NITROGLYCERIN 0.4 MG/1
0.4 TABLET SUBLINGUAL EVERY 5 MIN PRN
Status: DISCONTINUED | OUTPATIENT
Start: 2023-01-01 | End: 2023-01-01 | Stop reason: HOSPADM

## 2023-01-01 RX ORDER — LIDOCAINE HYDROCHLORIDE 10 MG/ML
INJECTION, SOLUTION EPIDURAL; INFILTRATION; INTRACAUDAL; PERINEURAL
Status: DISCONTINUED | OUTPATIENT
Start: 2023-01-01 | End: 2023-01-01 | Stop reason: HOSPADM

## 2023-01-01 RX ORDER — HEPARIN SODIUM 1000 [USP'U]/ML
INJECTION, SOLUTION INTRAVENOUS; SUBCUTANEOUS
Status: DISCONTINUED | OUTPATIENT
Start: 2023-01-01 | End: 2023-01-01 | Stop reason: HOSPADM

## 2023-01-01 RX ORDER — DIPHENHYDRAMINE HCL 25 MG
50 CAPSULE ORAL ONCE
Status: DISCONTINUED | OUTPATIENT
Start: 2023-01-01 | End: 2023-01-01 | Stop reason: HOSPADM

## 2023-01-01 RX ORDER — FUROSEMIDE 10 MG/ML
20 INJECTION INTRAMUSCULAR; INTRAVENOUS ONCE
Status: COMPLETED | OUTPATIENT
Start: 2023-01-01 | End: 2023-01-01

## 2023-01-01 RX ORDER — HEPARIN SODIUM 200 [USP'U]/100ML
INJECTION, SOLUTION INTRAVENOUS
Status: DISCONTINUED | OUTPATIENT
Start: 2023-01-01 | End: 2023-01-01 | Stop reason: HOSPADM

## 2023-01-01 RX ORDER — MORPHINE SULFATE 2 MG/ML
1 INJECTION, SOLUTION INTRAMUSCULAR; INTRAVENOUS ONCE
Status: COMPLETED | OUTPATIENT
Start: 2023-01-01 | End: 2023-01-01

## 2023-01-01 RX ORDER — ONDANSETRON 2 MG/ML
4 INJECTION INTRAMUSCULAR; INTRAVENOUS EVERY 8 HOURS PRN
Status: DISCONTINUED | OUTPATIENT
Start: 2023-01-01 | End: 2024-01-01 | Stop reason: HOSPADM

## 2023-01-01 RX ORDER — SODIUM CHLORIDE 9 MG/ML
INJECTION, SOLUTION INTRAVENOUS CONTINUOUS
Status: ACTIVE | OUTPATIENT
Start: 2023-01-01 | End: 2023-01-01

## 2023-01-01 RX ORDER — INSULIN DEGLUDEC 100 U/ML
50 INJECTION, SOLUTION SUBCUTANEOUS NIGHTLY
Qty: 5 PEN | Refills: 3 | Status: SHIPPED | OUTPATIENT
Start: 2023-01-01 | End: 2024-04-30

## 2023-01-01 RX ORDER — INSULIN ASPART 100 [IU]/ML
0-10 INJECTION, SOLUTION INTRAVENOUS; SUBCUTANEOUS
Status: DISCONTINUED | OUTPATIENT
Start: 2023-01-01 | End: 2023-01-01 | Stop reason: HOSPADM

## 2023-01-01 RX ORDER — CLOPIDOGREL BISULFATE 75 MG/1
75 TABLET ORAL DAILY
Status: DISCONTINUED | OUTPATIENT
Start: 2023-01-01 | End: 2023-01-01 | Stop reason: HOSPADM

## 2023-01-01 RX ORDER — HYDRALAZINE HYDROCHLORIDE 20 MG/ML
10 INJECTION INTRAMUSCULAR; INTRAVENOUS EVERY 8 HOURS PRN
Status: DISCONTINUED | OUTPATIENT
Start: 2023-01-01 | End: 2023-01-01 | Stop reason: HOSPADM

## 2023-01-01 RX ORDER — MIRTAZAPINE 7.5 MG/1
7.5 TABLET, FILM COATED ORAL NIGHTLY
Status: DISCONTINUED | OUTPATIENT
Start: 2023-01-01 | End: 2024-01-01 | Stop reason: HOSPADM

## 2023-01-01 RX ORDER — SODIUM CHLORIDE 0.9 % (FLUSH) 0.9 %
10 SYRINGE (ML) INJECTION
Status: SHIPPED | OUTPATIENT
Start: 2023-01-01

## 2023-01-01 RX ORDER — INSULIN ASPART 100 [IU]/ML
1-10 INJECTION, SOLUTION INTRAVENOUS; SUBCUTANEOUS
COMMUNITY

## 2023-01-01 RX ORDER — GUAIFENESIN 100 MG/5ML
200 SOLUTION ORAL EVERY 4 HOURS PRN
Status: DISCONTINUED | OUTPATIENT
Start: 2023-01-01 | End: 2024-01-01 | Stop reason: HOSPADM

## 2023-01-01 RX ORDER — FACIAL-BODY WIPES
10 EACH TOPICAL DAILY PRN
Status: DISCONTINUED | OUTPATIENT
Start: 2023-01-01 | End: 2024-01-01 | Stop reason: HOSPADM

## 2023-01-01 RX ORDER — GLUCAGON 1 MG
1 KIT INJECTION
Status: DISCONTINUED | OUTPATIENT
Start: 2023-01-01 | End: 2024-01-01 | Stop reason: HOSPADM

## 2023-01-01 RX ORDER — IBUPROFEN 200 MG
16 TABLET ORAL
Status: DISCONTINUED | OUTPATIENT
Start: 2023-01-01 | End: 2023-01-01 | Stop reason: HOSPADM

## 2023-01-01 RX ORDER — MIDAZOLAM HYDROCHLORIDE 1 MG/ML
INJECTION, SOLUTION INTRAMUSCULAR; INTRAVENOUS
Status: DISCONTINUED | OUTPATIENT
Start: 2023-01-01 | End: 2023-01-01 | Stop reason: HOSPADM

## 2023-01-01 RX ORDER — FENTANYL CITRATE 50 UG/ML
INJECTION, SOLUTION INTRAMUSCULAR; INTRAVENOUS
Status: DISCONTINUED | OUTPATIENT
Start: 2023-01-01 | End: 2023-01-01 | Stop reason: HOSPADM

## 2023-01-01 RX ORDER — AMLODIPINE BESYLATE 10 MG/1
10 TABLET ORAL DAILY
Qty: 30 TABLET | Refills: 11 | Status: SHIPPED | OUTPATIENT
Start: 2023-01-01 | End: 2023-01-01 | Stop reason: DRUGHIGH

## 2023-01-01 RX ORDER — AMLODIPINE BESYLATE 10 MG/1
10 TABLET ORAL DAILY
Qty: 30 TABLET | Refills: 11 | Status: SHIPPED | OUTPATIENT
Start: 2023-01-01 | End: 2023-01-01 | Stop reason: SDUPTHER

## 2023-01-01 RX ORDER — ONDANSETRON 2 MG/ML
4 INJECTION INTRAMUSCULAR; INTRAVENOUS
Status: COMPLETED | OUTPATIENT
Start: 2023-01-01 | End: 2023-01-01

## 2023-01-01 RX ORDER — METOPROLOL SUCCINATE 25 MG/1
25 TABLET, EXTENDED RELEASE ORAL DAILY
Status: DISCONTINUED | OUTPATIENT
Start: 2024-01-01 | End: 2024-01-01 | Stop reason: HOSPADM

## 2023-01-01 RX ORDER — HYDROCORTISONE 25 MG/G
CREAM TOPICAL 2 TIMES DAILY PRN
COMMUNITY
Start: 2023-01-01 | End: 2023-01-01

## 2023-01-01 RX ORDER — ATORVASTATIN CALCIUM 80 MG/1
TABLET, FILM COATED ORAL
Qty: 90 TABLET | Refills: 3 | Status: SHIPPED | OUTPATIENT
Start: 2023-01-01

## 2023-01-01 RX ORDER — CLOPIDOGREL BISULFATE 75 MG/1
75 TABLET ORAL DAILY
Qty: 90 TABLET | Refills: 3 | Status: SHIPPED | OUTPATIENT
Start: 2023-01-01 | End: 2023-01-01

## 2023-01-01 RX ORDER — ASPIRIN 325 MG
325 TABLET, DELAYED RELEASE (ENTERIC COATED) ORAL
Status: COMPLETED | OUTPATIENT
Start: 2023-01-01 | End: 2023-01-01

## 2023-01-01 RX ORDER — INSULIN DEGLUDEC 100 U/ML
35 INJECTION, SOLUTION SUBCUTANEOUS DAILY
Start: 2023-01-01 | End: 2024-10-15

## 2023-01-01 RX ORDER — INSULIN GLARGINE 100 [IU]/ML
25 INJECTION, SOLUTION SUBCUTANEOUS NIGHTLY
Status: DISCONTINUED | OUTPATIENT
Start: 2023-01-01 | End: 2023-01-01

## 2023-01-01 RX ORDER — ISOSORBIDE MONONITRATE 30 MG/1
30 TABLET, EXTENDED RELEASE ORAL DAILY
Status: DISCONTINUED | OUTPATIENT
Start: 2023-01-01 | End: 2023-01-01

## 2023-01-01 RX ORDER — MIRTAZAPINE 7.5 MG/1
7.5 TABLET, FILM COATED ORAL NIGHTLY
Qty: 90 TABLET | Refills: 3 | Status: SHIPPED | OUTPATIENT
Start: 2023-01-01

## 2023-01-01 RX ORDER — AZITHROMYCIN 250 MG/1
500 TABLET, FILM COATED ORAL DAILY
Status: DISCONTINUED | OUTPATIENT
Start: 2023-01-01 | End: 2024-01-01 | Stop reason: HOSPADM

## 2023-01-01 RX ORDER — AMLODIPINE BESYLATE 5 MG/1
5 TABLET ORAL DAILY
Status: DISCONTINUED | OUTPATIENT
Start: 2023-01-01 | End: 2023-01-01

## 2023-01-01 RX ORDER — PANTOPRAZOLE SODIUM 40 MG/1
40 TABLET, DELAYED RELEASE ORAL DAILY
COMMUNITY
Start: 2023-01-01 | End: 2023-01-01 | Stop reason: CLARIF

## 2023-01-01 RX ORDER — OMEPRAZOLE 20 MG/1
20 TABLET, DELAYED RELEASE ORAL
COMMUNITY
End: 2023-01-01 | Stop reason: SDUPTHER

## 2023-01-01 RX ORDER — ACETAMINOPHEN 500 MG
1000 TABLET ORAL EVERY 8 HOURS PRN
Status: DISCONTINUED | OUTPATIENT
Start: 2023-01-01 | End: 2024-01-01 | Stop reason: HOSPADM

## 2023-01-01 RX ORDER — LEVOTHYROXINE SODIUM 50 UG/1
100 TABLET ORAL
Status: DISCONTINUED | OUTPATIENT
Start: 2023-01-01 | End: 2024-01-01 | Stop reason: HOSPADM

## 2023-01-01 RX ORDER — TAMSULOSIN HYDROCHLORIDE 0.4 MG/1
0.4 CAPSULE ORAL NIGHTLY
COMMUNITY
Start: 2023-01-01

## 2023-01-01 RX ORDER — AMLODIPINE BESYLATE 5 MG/1
5 TABLET ORAL ONCE
Status: COMPLETED | OUTPATIENT
Start: 2023-01-01 | End: 2023-01-01

## 2023-01-01 RX ORDER — PIRFENIDONE 801 MG/1
801 TABLET, FILM COATED ORAL 3 TIMES DAILY
Status: DISCONTINUED | OUTPATIENT
Start: 2023-01-01 | End: 2024-01-01 | Stop reason: HOSPADM

## 2023-01-01 RX ORDER — OMEPRAZOLE 20 MG/1
20 CAPSULE, DELAYED RELEASE ORAL 2 TIMES DAILY
COMMUNITY
Start: 2023-01-01

## 2023-01-01 RX ORDER — CHOLECALCIFEROL (VITAMIN D3) 25 MCG
1000 TABLET ORAL DAILY
Status: DISCONTINUED | OUTPATIENT
Start: 2023-01-01 | End: 2024-01-01 | Stop reason: HOSPADM

## 2023-01-01 RX ORDER — ATORVASTATIN CALCIUM 40 MG/1
80 TABLET, FILM COATED ORAL NIGHTLY
Status: DISCONTINUED | OUTPATIENT
Start: 2023-01-01 | End: 2024-01-01 | Stop reason: HOSPADM

## 2023-01-01 RX ORDER — PANTOPRAZOLE SODIUM 40 MG/1
40 TABLET, DELAYED RELEASE ORAL DAILY
Status: DISCONTINUED | OUTPATIENT
Start: 2023-01-01 | End: 2024-01-01 | Stop reason: HOSPADM

## 2023-01-01 RX ORDER — GUAIFENESIN 100 MG/5ML
81 LIQUID (ML) ORAL DAILY
Status: DISCONTINUED | OUTPATIENT
Start: 2023-01-01 | End: 2024-01-01 | Stop reason: HOSPADM

## 2023-01-01 RX ORDER — CLOPIDOGREL BISULFATE 75 MG/1
75 TABLET ORAL NIGHTLY
Status: DISCONTINUED | OUTPATIENT
Start: 2023-01-01 | End: 2024-01-01 | Stop reason: HOSPADM

## 2023-01-01 RX ORDER — AMLODIPINE BESYLATE 5 MG/1
5 TABLET ORAL
Qty: 90 TABLET | Refills: 10 | Status: SHIPPED | OUTPATIENT
Start: 2023-01-01 | End: 2023-01-01

## 2023-01-01 RX ORDER — METOPROLOL SUCCINATE 50 MG/1
100 TABLET, EXTENDED RELEASE ORAL DAILY
Status: DISCONTINUED | OUTPATIENT
Start: 2023-01-01 | End: 2023-01-01

## 2023-01-01 RX ORDER — IBUPROFEN 200 MG
24 TABLET ORAL
Status: DISCONTINUED | OUTPATIENT
Start: 2023-01-01 | End: 2023-01-01 | Stop reason: HOSPADM

## 2023-01-01 RX ORDER — INSULIN DEGLUDEC 100 U/ML
50 INJECTION, SOLUTION SUBCUTANEOUS NIGHTLY
Qty: 5 PEN | Refills: 3 | Status: SHIPPED | OUTPATIENT
Start: 2023-01-01 | End: 2023-01-01 | Stop reason: SDUPTHER

## 2023-01-01 RX ORDER — GUAIFENESIN 100 MG/5ML
324 LIQUID (ML) ORAL ONCE
Status: COMPLETED | OUTPATIENT
Start: 2023-01-01 | End: 2023-01-01

## 2023-01-01 RX ORDER — ACETAMINOPHEN 500 MG
1000 TABLET ORAL
Status: COMPLETED | OUTPATIENT
Start: 2023-01-01 | End: 2023-01-01

## 2023-01-01 RX ORDER — AMLODIPINE BESYLATE 5 MG/1
5 TABLET ORAL DAILY
COMMUNITY
Start: 2023-01-01

## 2023-01-01 RX ORDER — GLUCAGON 1 MG
1 KIT INJECTION
Status: DISCONTINUED | OUTPATIENT
Start: 2023-01-01 | End: 2023-01-01 | Stop reason: HOSPADM

## 2023-01-01 RX ORDER — ADENOSINE 3 MG/ML
INJECTION, SOLUTION INTRAVENOUS
Status: DISCONTINUED | OUTPATIENT
Start: 2023-01-01 | End: 2023-01-01 | Stop reason: HOSPADM

## 2023-01-01 RX ORDER — AMLODIPINE BESYLATE 10 MG/1
10 TABLET ORAL DAILY
Qty: 30 TABLET | Refills: 11 | OUTPATIENT
Start: 2023-01-01 | End: 2024-10-16

## 2023-01-01 RX ORDER — ATORVASTATIN CALCIUM 40 MG/1
80 TABLET, FILM COATED ORAL DAILY
Status: DISCONTINUED | OUTPATIENT
Start: 2023-01-01 | End: 2023-01-01 | Stop reason: HOSPADM

## 2023-01-01 RX ORDER — ACETAMINOPHEN 325 MG/1
650 TABLET ORAL EVERY 4 HOURS PRN
Status: DISCONTINUED | OUTPATIENT
Start: 2023-01-01 | End: 2024-01-01 | Stop reason: HOSPADM

## 2023-01-01 RX ORDER — FUROSEMIDE 10 MG/ML
INJECTION INTRAMUSCULAR; INTRAVENOUS
Status: DISCONTINUED | OUTPATIENT
Start: 2023-01-01 | End: 2023-01-01 | Stop reason: HOSPADM

## 2023-01-01 RX ORDER — NITROGLYCERIN 0.4 MG/1
0.4 TABLET SUBLINGUAL EVERY 5 MIN PRN
Status: DISCONTINUED | OUTPATIENT
Start: 2023-01-01 | End: 2024-01-01 | Stop reason: HOSPADM

## 2023-01-01 RX ORDER — FINERENONE 10 MG/1
10 TABLET, FILM COATED ORAL DAILY
Status: ON HOLD | COMMUNITY
Start: 2023-01-03 | End: 2023-01-01 | Stop reason: HOSPADM

## 2023-01-01 RX ORDER — HYDROXYZINE HYDROCHLORIDE 25 MG/1
25 TABLET, FILM COATED ORAL 3 TIMES DAILY PRN
Status: DISCONTINUED | OUTPATIENT
Start: 2023-01-01 | End: 2023-01-01 | Stop reason: HOSPADM

## 2023-01-01 RX ORDER — IPRATROPIUM BROMIDE AND ALBUTEROL SULFATE 2.5; .5 MG/3ML; MG/3ML
3 SOLUTION RESPIRATORY (INHALATION) EVERY 4 HOURS PRN
Status: DISCONTINUED | OUTPATIENT
Start: 2023-01-01 | End: 2024-01-01 | Stop reason: HOSPADM

## 2023-01-01 RX ORDER — DIPHENHYDRAMINE HCL 50 MG
50 CAPSULE ORAL ONCE
Status: CANCELLED | OUTPATIENT
Start: 2023-01-01 | End: 2023-01-01

## 2023-01-01 RX ORDER — ENOXAPARIN SODIUM 100 MG/ML
1 INJECTION SUBCUTANEOUS
Status: DISCONTINUED | OUTPATIENT
Start: 2023-01-01 | End: 2023-01-01

## 2023-01-01 RX ORDER — INSULIN ASPART 100 [IU]/ML
0-5 INJECTION, SOLUTION INTRAVENOUS; SUBCUTANEOUS EVERY 6 HOURS PRN
Status: DISCONTINUED | OUTPATIENT
Start: 2023-01-01 | End: 2024-01-01 | Stop reason: HOSPADM

## 2023-01-01 RX ORDER — ENOXAPARIN SODIUM 100 MG/ML
70 INJECTION SUBCUTANEOUS EVERY 12 HOURS
Status: DISCONTINUED | OUTPATIENT
Start: 2023-01-01 | End: 2023-01-01 | Stop reason: HOSPADM

## 2023-01-01 RX ORDER — AMLODIPINE BESYLATE 5 MG/1
10 TABLET ORAL DAILY
Status: DISCONTINUED | OUTPATIENT
Start: 2023-01-01 | End: 2023-01-01 | Stop reason: HOSPADM

## 2023-01-01 RX ORDER — ACETAMINOPHEN 325 MG/1
650 TABLET ORAL EVERY 4 HOURS PRN
Status: DISCONTINUED | OUTPATIENT
Start: 2023-01-01 | End: 2023-01-01 | Stop reason: HOSPADM

## 2023-01-01 RX ORDER — NAPROXEN SODIUM 220 MG/1
81 TABLET, FILM COATED ORAL DAILY
Status: DISCONTINUED | OUTPATIENT
Start: 2023-01-01 | End: 2023-01-01 | Stop reason: HOSPADM

## 2023-01-01 RX ORDER — POLYETHYLENE GLYCOL 3350 17 G/17G
17 POWDER, FOR SOLUTION ORAL 2 TIMES DAILY PRN
Status: DISCONTINUED | OUTPATIENT
Start: 2023-01-01 | End: 2024-01-01 | Stop reason: HOSPADM

## 2023-01-01 RX ORDER — LISINOPRIL 2.5 MG/1
2.5 TABLET ORAL DAILY
Status: DISCONTINUED | OUTPATIENT
Start: 2023-01-01 | End: 2023-01-01

## 2023-01-01 RX ORDER — METOPROLOL SUCCINATE 100 MG/1
100 TABLET, EXTENDED RELEASE ORAL DAILY
Qty: 30 TABLET | Refills: 11 | Status: SHIPPED | OUTPATIENT
Start: 2023-01-01 | End: 2024-01-01

## 2023-01-01 RX ORDER — EZETIMIBE 10 MG/1
10 TABLET ORAL DAILY
Status: DISCONTINUED | OUTPATIENT
Start: 2023-01-01 | End: 2023-01-01 | Stop reason: HOSPADM

## 2023-01-01 RX ORDER — ONDANSETRON 4 MG/1
8 TABLET, ORALLY DISINTEGRATING ORAL EVERY 8 HOURS PRN
Status: DISCONTINUED | OUTPATIENT
Start: 2023-01-01 | End: 2023-01-01 | Stop reason: HOSPADM

## 2023-01-01 RX ORDER — LEVOTHYROXINE SODIUM 50 UG/1
100 TABLET ORAL
Status: DISCONTINUED | OUTPATIENT
Start: 2023-01-01 | End: 2023-01-01 | Stop reason: HOSPADM

## 2023-01-01 RX ORDER — TAMSULOSIN HYDROCHLORIDE 0.4 MG/1
0.4 CAPSULE ORAL NIGHTLY
Status: DISCONTINUED | OUTPATIENT
Start: 2023-01-01 | End: 2024-01-01 | Stop reason: HOSPADM

## 2023-01-01 RX ORDER — SODIUM CHLORIDE 9 MG/ML
INJECTION, SOLUTION INTRAVENOUS ONCE AS NEEDED
Status: DISCONTINUED | OUTPATIENT
Start: 2023-01-01 | End: 2023-01-01 | Stop reason: HOSPADM

## 2023-01-01 RX ORDER — HEPARIN SODIUM,PORCINE/D5W 25000/250
0-40 INTRAVENOUS SOLUTION INTRAVENOUS CONTINUOUS
Status: DISPENSED | OUTPATIENT
Start: 2023-01-01 | End: 2024-01-01

## 2023-01-01 RX ORDER — SODIUM CHLORIDE 9 MG/ML
INJECTION, SOLUTION INTRAVENOUS CONTINUOUS
Status: DISCONTINUED | OUTPATIENT
Start: 2023-01-01 | End: 2023-01-01 | Stop reason: HOSPADM

## 2023-01-01 RX ORDER — ACETAMINOPHEN 325 MG/1
650 TABLET ORAL EVERY 6 HOURS PRN
Status: DISCONTINUED | OUTPATIENT
Start: 2023-01-01 | End: 2023-01-01 | Stop reason: HOSPADM

## 2023-01-01 RX ORDER — DIPHENHYDRAMINE HYDROCHLORIDE 50 MG/ML
INJECTION INTRAMUSCULAR; INTRAVENOUS
Status: DISCONTINUED | OUTPATIENT
Start: 2023-01-01 | End: 2023-01-01 | Stop reason: HOSPADM

## 2023-01-01 RX ORDER — METOPROLOL SUCCINATE 50 MG/1
100 TABLET, EXTENDED RELEASE ORAL DAILY
Status: DISCONTINUED | OUTPATIENT
Start: 2023-01-01 | End: 2023-01-01 | Stop reason: HOSPADM

## 2023-01-01 RX ORDER — ISOSORBIDE MONONITRATE 30 MG/1
30 TABLET, EXTENDED RELEASE ORAL DAILY
Qty: 30 TABLET | Refills: 11 | Status: SHIPPED | OUTPATIENT
Start: 2023-01-01 | End: 2024-10-24

## 2023-01-01 RX ORDER — IODIXANOL 320 MG/ML
INJECTION, SOLUTION INTRAVASCULAR
Status: DISCONTINUED | OUTPATIENT
Start: 2023-01-01 | End: 2023-01-01 | Stop reason: HOSPADM

## 2023-01-01 RX ORDER — SODIUM CHLORIDE 9 MG/ML
INJECTION, SOLUTION INTRAVENOUS CONTINUOUS
Status: CANCELLED | OUTPATIENT
Start: 2023-01-01 | End: 2023-01-01

## 2023-01-01 RX ADMIN — CLOPIDOGREL BISULFATE 75 MG: 75 TABLET ORAL at 09:10

## 2023-01-01 RX ADMIN — HEPARIN SODIUM 9 UNITS/KG/HR: 10000 INJECTION, SOLUTION INTRAVENOUS at 03:12

## 2023-01-01 RX ADMIN — ATORVASTATIN CALCIUM 80 MG: 40 TABLET, FILM COATED ORAL at 08:10

## 2023-01-01 RX ADMIN — ASPIRIN 81 MG CHEWABLE TABLET 81 MG: 81 TABLET CHEWABLE at 08:10

## 2023-01-01 RX ADMIN — ENOXAPARIN SODIUM 70 MG: 80 INJECTION SUBCUTANEOUS at 09:10

## 2023-01-01 RX ADMIN — Medication 1000 MG: at 12:12

## 2023-01-01 RX ADMIN — ENOXAPARIN SODIUM 70 MG: 80 INJECTION SUBCUTANEOUS at 08:10

## 2023-01-01 RX ADMIN — SODIUM CHLORIDE: 0.9 INJECTION, SOLUTION INTRAVENOUS at 10:11

## 2023-01-01 RX ADMIN — FUROSEMIDE 20 MG: 10 INJECTION, SOLUTION INTRAMUSCULAR; INTRAVENOUS at 07:12

## 2023-01-01 RX ADMIN — ISOSORBIDE MONONITRATE 30 MG: 30 TABLET, EXTENDED RELEASE ORAL at 08:12

## 2023-01-01 RX ADMIN — METOPROLOL SUCCINATE 100 MG: 50 TABLET, EXTENDED RELEASE ORAL at 09:10

## 2023-01-01 RX ADMIN — LEVOTHYROXINE SODIUM 100 MCG: 50 TABLET ORAL at 05:10

## 2023-01-01 RX ADMIN — INSULIN ASPART 2 UNITS: 100 INJECTION, SOLUTION INTRAVENOUS; SUBCUTANEOUS at 08:10

## 2023-01-01 RX ADMIN — MIRTAZAPINE 7.5 MG: 7.5 TABLET ORAL at 09:12

## 2023-01-01 RX ADMIN — PIRFENIDONE 801 MG: 801 TABLET, FILM COATED ORAL at 04:12

## 2023-01-01 RX ADMIN — METOPROLOL SUCCINATE 100 MG: 50 TABLET, EXTENDED RELEASE ORAL at 08:10

## 2023-01-01 RX ADMIN — AZITHROMYCIN DIHYDRATE 500 MG: 250 TABLET ORAL at 08:12

## 2023-01-01 RX ADMIN — ACETAMINOPHEN 650 MG: 325 TABLET ORAL at 08:10

## 2023-01-01 RX ADMIN — EZETIMIBE 10 MG: 10 TABLET ORAL at 08:10

## 2023-01-01 RX ADMIN — INSULIN ASPART 6 UNITS: 100 INJECTION, SOLUTION INTRAVENOUS; SUBCUTANEOUS at 04:10

## 2023-01-01 RX ADMIN — CLOPIDOGREL BISULFATE 75 MG: 75 TABLET ORAL at 09:12

## 2023-01-01 RX ADMIN — CEFTRIAXONE SODIUM 1 G: 1 INJECTION, POWDER, FOR SOLUTION INTRAMUSCULAR; INTRAVENOUS at 09:12

## 2023-01-01 RX ADMIN — HEPARIN SODIUM 12 UNITS/KG/HR: 10000 INJECTION, SOLUTION INTRAVENOUS at 09:12

## 2023-01-01 RX ADMIN — ASPIRIN 81 MG CHEWABLE TABLET 81 MG: 81 TABLET CHEWABLE at 09:10

## 2023-01-01 RX ADMIN — ACETAMINOPHEN 1000 MG: 500 TABLET ORAL at 08:12

## 2023-01-01 RX ADMIN — CLOPIDOGREL BISULFATE 75 MG: 75 TABLET ORAL at 08:10

## 2023-01-01 RX ADMIN — GUAIFENESIN 200 MG: 200 SOLUTION ORAL at 11:12

## 2023-01-01 RX ADMIN — AMLODIPINE BESYLATE 5 MG: 5 TABLET ORAL at 08:10

## 2023-01-01 RX ADMIN — AMLODIPINE BESYLATE 5 MG: 5 TABLET ORAL at 04:10

## 2023-01-01 RX ADMIN — INSULIN ASPART 2 UNITS: 100 INJECTION, SOLUTION INTRAVENOUS; SUBCUTANEOUS at 09:10

## 2023-01-01 RX ADMIN — METOPROLOL SUCCINATE 100 MG: 50 TABLET, EXTENDED RELEASE ORAL at 08:12

## 2023-01-01 RX ADMIN — INSULIN ASPART 1 UNITS: 100 INJECTION, SOLUTION INTRAVENOUS; SUBCUTANEOUS at 08:10

## 2023-01-01 RX ADMIN — SODIUM CHLORIDE 500 ML: 9 INJECTION, SOLUTION INTRAVENOUS at 07:12

## 2023-01-01 RX ADMIN — GUAIFENESIN 200 MG: 200 SOLUTION ORAL at 08:12

## 2023-01-01 RX ADMIN — INSULIN ASPART 2 UNITS: 100 INJECTION, SOLUTION INTRAVENOUS; SUBCUTANEOUS at 12:10

## 2023-01-01 RX ADMIN — LEVOTHYROXINE SODIUM 100 MCG: 50 TABLET ORAL at 06:10

## 2023-01-01 RX ADMIN — ATORVASTATIN CALCIUM 80 MG: 40 TABLET, FILM COATED ORAL at 09:12

## 2023-01-01 RX ADMIN — AMLODIPINE BESYLATE 10 MG: 5 TABLET ORAL at 08:10

## 2023-01-01 RX ADMIN — LEVOTHYROXINE SODIUM 100 MCG: 50 TABLET ORAL at 07:12

## 2023-01-01 RX ADMIN — NITROGLYCERIN 1 INCH: 20 OINTMENT TOPICAL at 05:10

## 2023-01-01 RX ADMIN — INSULIN ASPART 2 UNITS: 100 INJECTION, SOLUTION INTRAVENOUS; SUBCUTANEOUS at 05:10

## 2023-01-01 RX ADMIN — ENOXAPARIN SODIUM 70 MG: 100 INJECTION SUBCUTANEOUS at 10:10

## 2023-01-01 RX ADMIN — INSULIN DETEMIR 25 UNITS: 100 INJECTION, SOLUTION SUBCUTANEOUS at 09:10

## 2023-01-01 RX ADMIN — INSULIN DETEMIR 25 UNITS: 100 INJECTION, SOLUTION SUBCUTANEOUS at 10:10

## 2023-01-01 RX ADMIN — TAMSULOSIN HYDROCHLORIDE 0.4 MG: 0.4 CAPSULE ORAL at 09:12

## 2023-01-01 RX ADMIN — PIRFENIDONE 801 MG: 801 TABLET, FILM COATED ORAL at 10:12

## 2023-01-01 RX ADMIN — SODIUM CHLORIDE: 0.9 INJECTION, SOLUTION INTRAVENOUS at 08:03

## 2023-01-01 RX ADMIN — HYDROXYZINE HYDROCHLORIDE 25 MG: 25 TABLET ORAL at 02:10

## 2023-01-01 RX ADMIN — PANTOPRAZOLE SODIUM 40 MG: 40 TABLET, DELAYED RELEASE ORAL at 08:12

## 2023-01-01 RX ADMIN — ONDANSETRON 4 MG: 2 INJECTION INTRAMUSCULAR; INTRAVENOUS at 02:12

## 2023-01-01 RX ADMIN — ACETAMINOPHEN 1000 MG: 500 TABLET ORAL at 10:12

## 2023-01-01 RX ADMIN — INSULIN DETEMIR 25 UNITS: 100 INJECTION, SOLUTION SUBCUTANEOUS at 08:10

## 2023-01-01 RX ADMIN — Medication 1000 UNITS: at 08:12

## 2023-01-01 RX ADMIN — ASPIRIN 81 MG CHEWABLE TABLET 81 MG: 81 TABLET CHEWABLE at 08:12

## 2023-01-01 RX ADMIN — ASPIRIN 325 MG: 325 TABLET, COATED ORAL at 05:10

## 2023-01-01 RX ADMIN — INSULIN ASPART 3 UNITS: 100 INJECTION, SOLUTION INTRAVENOUS; SUBCUTANEOUS at 10:10

## 2023-01-01 RX ADMIN — CEFTRIAXONE SODIUM 1 G: 1 INJECTION, POWDER, FOR SOLUTION INTRAMUSCULAR; INTRAVENOUS at 08:12

## 2023-01-01 RX ADMIN — ENOXAPARIN SODIUM 70 MG: 100 INJECTION SUBCUTANEOUS at 09:10

## 2023-01-01 RX ADMIN — AMLODIPINE BESYLATE 5 MG: 5 TABLET ORAL at 08:12

## 2023-01-01 RX ADMIN — AZITHROMYCIN DIHYDRATE 500 MG: 250 TABLET ORAL at 07:12

## 2023-01-01 RX ADMIN — ACETAMINOPHEN 1000 MG: 500 TABLET ORAL at 07:12

## 2023-01-01 RX ADMIN — ASPIRIN 81 MG CHEWABLE TABLET 324 MG: 81 TABLET CHEWABLE at 07:12

## 2023-01-01 RX ADMIN — EZETIMIBE 10 MG: 10 TABLET ORAL at 09:10

## 2023-01-01 RX ADMIN — ATORVASTATIN CALCIUM 80 MG: 40 TABLET, FILM COATED ORAL at 09:10

## 2023-01-01 RX ADMIN — ACETAMINOPHEN 1000 MG: 500 TABLET ORAL at 03:12

## 2023-01-01 RX ADMIN — AMLODIPINE BESYLATE 10 MG: 5 TABLET ORAL at 09:10

## 2023-01-01 RX ADMIN — INSULIN ASPART 2 UNITS: 100 INJECTION, SOLUTION INTRAVENOUS; SUBCUTANEOUS at 04:10

## 2023-01-01 RX ADMIN — MORPHINE SULFATE 1 MG: 2 INJECTION, SOLUTION INTRAMUSCULAR; INTRAVENOUS at 10:12

## 2023-01-01 RX ADMIN — SODIUM CHLORIDE: 0.9 INJECTION, SOLUTION INTRAVENOUS at 09:11

## 2023-01-20 ENCOUNTER — TELEPHONE (OUTPATIENT)
Dept: CARDIOLOGY | Facility: CLINIC | Age: 73
End: 2023-01-20
Payer: MEDICARE

## 2023-01-20 NOTE — TELEPHONE ENCOUNTER
-LVM for the patient to call his pharmacy and request the refill for the medication he is trying to get. I do not know what medications you want filled.      ---- Message from Kory Schmidt sent at 1/20/2023  3:50 PM CST -----      Can the clinic reply in [facility]:N        Please refill the medication(s) listed below. Please call the patient when the prescription(s) is ready for  at this phone number         Medication #1 Chest pain medication    Medication #2       Preferred Pharmacy: [facility]/pharmacy #[id number] - [place] - [address] AT [facility]   Phone:  [phone]  Fax:  [phone]

## 2023-01-23 DIAGNOSIS — I25.110 CORONARY ARTERY DISEASE INVOLVING NATIVE CORONARY ARTERY OF NATIVE HEART WITH UNSTABLE ANGINA PECTORIS: ICD-10-CM

## 2023-01-23 RX ORDER — NITROGLYCERIN 0.4 MG/1
0.4 TABLET SUBLINGUAL EVERY 5 MIN PRN
Qty: 25 TABLET | Refills: 11 | Status: SHIPPED | OUTPATIENT
Start: 2023-01-23

## 2023-01-23 NOTE — TELEPHONE ENCOUNTER
----- Message from Tangela Qiu sent at 1/23/2023  9:41 AM CST -----  Pt need refill on his med nitroGLYCERIN (NITROSTAT) 0.4 MG SL tablet  please send it to  [facility] pharmacy.

## 2023-02-07 DIAGNOSIS — M75.00 FROZEN SHOULDER: ICD-10-CM

## 2023-02-07 DIAGNOSIS — M75.52 BURSITIS OF LEFT SHOULDER: Primary | ICD-10-CM

## 2023-02-07 DIAGNOSIS — Z00.00 ENCOUNTER FOR MEDICARE ANNUAL WELLNESS EXAM: ICD-10-CM

## 2023-02-07 DIAGNOSIS — M05.9 JUVENILE SEROPOSITIVE ARTHRITIS: ICD-10-CM

## 2023-02-09 DIAGNOSIS — Z00.00 ENCOUNTER FOR MEDICARE ANNUAL WELLNESS EXAM: ICD-10-CM

## 2023-02-24 DIAGNOSIS — E11.9 TYPE 2 DIABETES MELLITUS WITHOUT COMPLICATION: ICD-10-CM

## 2023-02-27 ENCOUNTER — CLINICAL SUPPORT (OUTPATIENT)
Dept: REHABILITATION | Facility: HOSPITAL | Age: 73
End: 2023-02-27
Attending: NURSE PRACTITIONER
Payer: MEDICARE

## 2023-02-27 DIAGNOSIS — M25.512 CHRONIC LEFT SHOULDER PAIN: Primary | ICD-10-CM

## 2023-02-27 DIAGNOSIS — R53.1 WEAKNESS: ICD-10-CM

## 2023-02-27 DIAGNOSIS — Z74.09 STIFFNESS DUE TO IMMOBILITY: ICD-10-CM

## 2023-02-27 DIAGNOSIS — M79.602 PAIN OF LEFT UPPER EXTREMITY: ICD-10-CM

## 2023-02-27 DIAGNOSIS — M25.612 STIFFNESS OF LEFT SHOULDER JOINT: ICD-10-CM

## 2023-02-27 DIAGNOSIS — G89.29 CHRONIC LEFT SHOULDER PAIN: Primary | ICD-10-CM

## 2023-02-27 DIAGNOSIS — M25.60 STIFFNESS DUE TO IMMOBILITY: ICD-10-CM

## 2023-02-27 PROCEDURE — 97161 PT EVAL LOW COMPLEX 20 MIN: CPT | Mod: HCNC,PN

## 2023-02-27 PROCEDURE — 97110 THERAPEUTIC EXERCISES: CPT | Mod: HCNC,PN

## 2023-02-27 NOTE — PLAN OF CARE
[facility]  Physical Therapy Initial Evaluation    Name: Jose Antonio Allen  Clinic Number: [id number]    Therapy Diagnosis:   Encounter Diagnoses   Name Primary?    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 Now his left shoulder bothers him a lot and he can not easily each reach behind him or lift it up or out. He states that he got a shot from his doctor and after that his L shoulder feels better but both shoulders bother him a bit still. States that he cant do overhead arm work for more than 2-3 minutes before he has to stop because of pain. States that sometimes the pain in his shoulders goes down to his elbow. Denies N[employer]T. States he can't remember getting any imaging on his shoulders. States that exercising makes his shoulders feel better. States that he goes to the gym a couple times a week and does a lot of exercise of his arms there. States that overhead motions with his arms and when he bends over he gets really short of breath due to his lung condition.     Medical History:   Past Medical History:   Diagnosis Date    Angina pectoris     Arthritis     CKD (chronic kidney disease)     Colon polyps  09/14/2018    Coronary artery disease     Diabetes mellitus     Diabetes mellitus, type 2     GERD (gastroesophageal reflux disease)     History of tobacco use     Hyperlipidemia     Hypertension     S/P CABG (coronary artery bypass graft) 1996    Thyroid disease        Surgical History:   Jose Antonio Allen  has a past surgical history that includes Cataract extraction (Bilateral, 3YRS); EYELID SURGERY (03/2012); Vein bypass surgery (1996); Coronary artery bypass graft (1996); Vein Surgery (Left, 2017); Vein Surgery (Right, 2018); Colonoscopy w/ polypectomy (09/14/2018); Right heart catheterization (Right, 11/3/2021); Left heart catheterization (N/A, 4/11/2022); Coronary angiography (N/A, 4/11/2022); Coronary angiography including bypass grafts with catheterization of left heart (N/A, 4/11/2022); Coronary bypass graft angiography (4/20/2022); and Percutaneous transluminal balloon angioplasty of coronary artery (4/20/2022).    Medications:   Jose Antonio has a current medication list which includes the following prescription(s): accu-chek brock plus meter, accu-chek smartview test strip, accu-chek softclix lancets, amlodipine, aspirin, atorvastatin, b complex vitamins, clopidogrel, droplet pen needle, esbriet, ezetimibe, insulin degludec, levothyroxine, lisinopril, lubricant eye drops, metoprolol succinate, mirtazapine, nitroglycerin, novolog u-100 insulin aspart, orencia, and vitamin d.    Allergies:   Review of patient's allergies indicates:  No Known Allergies     Imaging: None known    Prior Therapy: For the L shoulder about a year and a half ago  Occupation: Retired  Prior Level of Function: Independent  Current Level of Function: Limited in ability to participate in overhead activity    Pain:  Current 5/10, worst 8/10, best 0/10   Location: bilateral shoulder L>R  Description: Aching  Aggravating Factors: Lifting  Easing Factors:  exercise    Patient's goals: To not have pain in his shoulders    Objective        Observation: Brings portable oxygen tank    Posture: forward head and shoulder    Passive Range of Motion:   Shoulder Right Left   Flexion    Abduction    ER at 0 60 45   ER at 90 45 35   IR WNL WNL      Active Range of Motion:   Shoulder Right Left   Flexion 150 117   Abduction 155 115   ER at 0 60 45   ER at 90 60 30   IR WNL WNL   Reach behind head To T1 To C5   Reach behind back  To T10 To L Glute     Strength:  Shoulder Right Left   Flexion 4+ 4   Abduction 4+ 4   ER 4 4-   IR 5 4+       Special Tests:  Impingement Cluster:   Right Left   Najma Alvarez - +     Painful Arc - -   Resisted ER - -     Rotator Cuff Tear   Right Left   Najma Vizcarra - +   Drop Arm test - -   Resisted ER - -       SLAP:   Right Left   Biceps Load II - +   O'Jayy's Test - +     Other:   Right Left   Subscapularis Lift Off Test - Unable to reach test position   Empty Can - -     Joint Mobility: Mildly hypomobile L GH joint, potentially due to muscle guarding    Palpation: Tenderness to palpation at bicep muscle belly, anterior shoulder, posterior shoulder    Sensation: Intact according to patient    CMS Impairment/Limitation/Restriction for FOTO shoulder Survey    Therapist reviewed FOTO scores for Jose Antonio Allen on 2/27/2023.   FOTO documents entered into [employer] - see Media section.    Limitation Score: 46%  Category: Mobility     TREATMENT     Treatment Time In: 10:47 am  Treatment Time Out: 10:55 am  Total Treatment time separate from Evaluation: 8 minutes    Jose Antonio received therapeutic exercises to develop strength, endurance, ROM, and posture for 8 minutes including:  Education about prognosis, PT plan of care, expected outcomes, HEP regression and progression    Home Exercises and Patient Education Provided:    Education provided:   - Findings; prognosis and plan of care  - Home exercise program  - Modality options  - Therapist contact information    Written Home Exercises Provided: yes.  Exercises were  reviewed and Jose Antonio was able to demonstrate them prior to the end of the session.  Jose Antonio demonstrated good  understanding of the education provided.     See electronic medical record under Notes-Patient Instructions for exercises provided 2/27/2023.    Assessment     Jose Antonio is a 72 y.o. male referred to outpatient Physical Therapy with a medical diagnosis of Bursitis of left shoulder, Frozen shoulder, Juvenile seropositive arthritis . Patient presents to initial evaluation with Pain in his L shoulder with end-range and repetitive overhead motions, as well as stiffness of the L shoulder and difficulty with functional tasks required around his house. Patient     Patient prognosis is Good.   Patient will benefit from skilled outpatient Physical Therapy to address the deficits stated above and in the chart below, provide patient/family education, and to maximize patient's level of independence.     Plan of care discussed with patient: Yes  Patient's spiritual, cultural and educational needs considered and patient agreeable to plan of care and goals as stated below: YES    Anticipated barriers for therapy: comorbidities, age    Medical necessity is demonstrated by the following  History  Co-morbidities and personal factors that may impact the plan of care Co-morbidities:   CAD, CKD stage 3, and HTN, lung disease    Personal Factors:   no deficits     moderate   Examination  Body Structures and Functions, activity limitations and participation restrictions that may impact the plan of care Body Regions:   upper extremities    Body Systems:    ROM  strength    Participation Restrictions:   Difficulty participating in overhead activities around the house    Activity limitations:   Learning and applying knowledge  no deficits    General Tasks and Commands  no deficits    Communication  no deficits    Mobility  lifting and carrying objects  fine hand use (grasping/picking up)    Self care  no deficits    Domestic Life  doing  house work (cleaning house, washing dishes, laundry)    Interactions/Relationships  no deficits    Life Areas  no deficits    Community and Social Life  recreation and leisure         low   Clinical Presentation stable and uncomplicated low   Decision Making/ Complexity Score: low     GOALS: Short Term Goals:  3 weeks  1.Report decreased L shoulder pain < / =  4/10  to increase tolerance for physical therapy interventions  2. Increase PROM to WNL L shoulder   3. Increased strength by 0.5 MMT grade in L shoulder external rotation to increase tolerance for ADL and work activities.  4. Pt to tolerate HEP to improve ROM and independence with ADL's    Long Term Goals: 6 weeks  1.Report decreased L shoulder pain  < / =  2 /10  to increase tolerance for daily overhead activities  2.Increase AROM to WNL L shoulder  3.Increase strength to >/= 4+/5 in L shoulder globally to increase tolerance for ADL and work activities.  4. Pt will have improved score of 67 on FOTO shoulder in order to demonstrate true functional improvement.      Plan     Plan of care certification: 2/27/2023 to 04/17/2023.    Outpatient Physical Therapy 2 times weekly for 6 weeks to include the following interventions: Electrical Stimulation  , Manual Therapy, Moist Heat/ Ice, Neuromuscular Re-ed, Patient Education, Self Care, Therapeutic Activities, Therapeutic Exercise, and Ultrasound.   Modalities, kinesiotape, and functional dry needling as needed.    Rufus Armijo, PT, DPT

## 2023-03-01 ENCOUNTER — PATIENT MESSAGE (OUTPATIENT)
Dept: ADMINISTRATIVE | Facility: HOSPITAL | Age: 73
End: 2023-03-01
Payer: MEDICARE

## 2023-03-03 ENCOUNTER — CLINICAL SUPPORT (OUTPATIENT)
Dept: REHABILITATION | Facility: HOSPITAL | Age: 73
End: 2023-03-03
Attending: NURSE PRACTITIONER
Payer: MEDICARE

## 2023-03-03 DIAGNOSIS — M79.602 PAIN OF LEFT UPPER EXTREMITY: Primary | ICD-10-CM

## 2023-03-03 PROBLEM — M25.512 LEFT SHOULDER PAIN: Status: ACTIVE | Noted: 2023-03-03

## 2023-03-03 PROBLEM — M25.612 STIFFNESS OF LEFT SHOULDER JOINT: Status: ACTIVE | Noted: 2023-03-03

## 2023-03-03 PROCEDURE — 97110 THERAPEUTIC EXERCISES: CPT | Mod: HCNC,PN,CQ

## 2023-03-03 NOTE — PROGRESS NOTES
"  Physical Therapy Daily Treatment Note     Name: Jose Antonio Allen  Clinic Number: [id number]    Therapy Diagnosis:   Encounter Diagnosis   Name Primary?    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  He was compliant with home exercise program.  Response to previous treatment: No adverse effects  Functional change: ongoing    Pain: 5/10  Location: bilateral shoulder  (left greater than right)    Objective       Jose Antonio received therapeutic exercises to develop strength, endurance, ROM, and posture for 45 minutes including:  UBE:  level 1 2 min forward/2 min backwards  Supine wand flexion:  !# dowel 2x10  Supine serratus push:  1# dowel 2x10  Shldr extension: red theraband 2x10  Eccentric Flexion:  red theraband 2x10 (back to bands)  Walk outs red theraband: internal rotation/external rotation 1x10 each  Wall slides:  with towel 1x10  Serratus wall:  foam roll 2x10    Jose Antonio participated in neuromuscular re-education activities to improve:  for 00 minutes. The following activities were included:  Not today    Home Exercises Provided and Patient Education Provided     Education provided:   - Postural awareness  - Importance of daily home exercise program in pain free range    Written Home Exercises Provided: Patient instructed to cont prior HEP.  Exercises were reviewed and Jose Antonio was able to demonstrate them prior to the end " "of the session.  Jose Antonio demonstrated good  understanding of the education provided.     See EMR under Patient Instructions for exercises provided  at [facility] on 2/27/2023 .      Assessment     Patient required verbal cues to slow dowwn pace with performance of therex for best benefit.  Able to complete all therex as noted with intermittent short rest breaks. One seated break due to short of breath. Patient required intermittent cues throughout to maintain normal breathing. (Portable oxygen tank and use of nasal canula) States "good" after treatment. "A little tired now"  Not specific to scale.  Jose Antonio Is progressing well towards his goals.   Pt prognosis is Good.     Pt will continue to benefit from skilled outpatient physical therapy to address the deficits listed in the problem list box on initial evaluation, provide pt/family education and to maximize pt's level of independence in the home and community environment.     Pt's spiritual, cultural and educational needs considered and pt agreeable to plan of care and goals.    Anticipated barriers to physical therapy: Comorbidities, age    GOALS: Short Term Goals:  3 weeks  1.Report decreased L shoulder pain < / =  4/10  to increase tolerance for physical therapy interventions  2. Increase PROM to WNL L shoulder   3. Increased strength by 0.5 MMT grade in L shoulder external rotation to increase tolerance for ADL and work activities.  4. Pt to tolerate HEP to improve ROM and independence with ADL's     Long Term Goals: 6 weeks  1.Report decreased L shoulder pain  < / =  2 /10  to increase tolerance for daily overhead activities  2.Increase AROM to WNL L shoulder  3.Increase strength to >/= 4+/5 in L shoulder globally to increase tolerance for ADL and work activities.  4. Pt will have improved score of 67 on FOTO shoulder in order to demonstrate true functional improvement.      Plan     Continue PT per plan of care, progress as appropriate.    Eugenia Braun, PTA     "

## 2023-03-08 ENCOUNTER — TELEPHONE (OUTPATIENT)
Dept: CARDIOLOGY | Facility: CLINIC | Age: 73
End: 2023-03-08
Payer: MEDICARE

## 2023-03-08 NOTE — TELEPHONE ENCOUNTER
Pt states he has been having CP lately and since Dr. Parmar placed stents in pt he would like to f/u w him in case the stents need to be replaced or new stents added       V/a        ja

## 2023-03-09 ENCOUNTER — OFFICE VISIT (OUTPATIENT)
Dept: CARDIOLOGY | Facility: CLINIC | Age: 73
End: 2023-03-09
Payer: MEDICARE

## 2023-03-09 VITALS
HEART RATE: 74 BPM | WEIGHT: 143.5 LBS | OXYGEN SATURATION: 94 % | BODY MASS INDEX: 24.5 KG/M2 | SYSTOLIC BLOOD PRESSURE: 138 MMHG | DIASTOLIC BLOOD PRESSURE: 76 MMHG | HEIGHT: 64 IN

## 2023-03-09 DIAGNOSIS — I70.0 ABDOMINAL AORTIC ATHEROSCLEROSIS: ICD-10-CM

## 2023-03-09 DIAGNOSIS — E78.5 HYPERLIPIDEMIA, UNSPECIFIED HYPERLIPIDEMIA TYPE: ICD-10-CM

## 2023-03-09 DIAGNOSIS — E11.22 TYPE 2 DIABETES MELLITUS WITH STAGE 3A CHRONIC KIDNEY DISEASE, WITHOUT LONG-TERM CURRENT USE OF INSULIN: ICD-10-CM

## 2023-03-09 DIAGNOSIS — I73.9 INTERMITTENT CLAUDICATION: ICD-10-CM

## 2023-03-09 DIAGNOSIS — Z95.1 S/P CABG (CORONARY ARTERY BYPASS GRAFT): ICD-10-CM

## 2023-03-09 DIAGNOSIS — J44.9 CHRONIC OBSTRUCTIVE PULMONARY DISEASE, UNSPECIFIED COPD TYPE: ICD-10-CM

## 2023-03-09 DIAGNOSIS — N18.31 TYPE 2 DIABETES MELLITUS WITH STAGE 3A CHRONIC KIDNEY DISEASE, WITHOUT LONG-TERM CURRENT USE OF INSULIN: ICD-10-CM

## 2023-03-09 DIAGNOSIS — I25.112 CORONARY ARTERY DISEASE INVOLVING NATIVE CORONARY ARTERY OF NATIVE HEART WITH REFRACTORY ANGINA PECTORIS: Primary | ICD-10-CM

## 2023-03-09 DIAGNOSIS — I65.21 INTERNAL CAROTID ARTERY OCCLUSION, RIGHT: ICD-10-CM

## 2023-03-09 DIAGNOSIS — Z95.5 HISTORY OF CORONARY ARTERY STENT PLACEMENT: ICD-10-CM

## 2023-03-09 DIAGNOSIS — I73.9 PAD (PERIPHERAL ARTERY DISEASE): ICD-10-CM

## 2023-03-09 DIAGNOSIS — I27.20 PULMONARY HYPERTENSION: ICD-10-CM

## 2023-03-09 DIAGNOSIS — J84.10 FIBROSIS OF LUNG: ICD-10-CM

## 2023-03-09 DIAGNOSIS — I10 ESSENTIAL HYPERTENSION: ICD-10-CM

## 2023-03-09 PROCEDURE — 99214 PR OFFICE/OUTPT VISIT, EST, LEVL IV, 30-39 MIN: ICD-10-PCS | Mod: HCNC,S$GLB,, | Performed by: INTERNAL MEDICINE

## 2023-03-09 PROCEDURE — 99214 OFFICE O/P EST MOD 30 MIN: CPT | Mod: HCNC,S$GLB,, | Performed by: INTERNAL MEDICINE

## 2023-03-09 PROCEDURE — 1159F MED LIST DOCD IN RCRD: CPT | Mod: HCNC,CPTII,S$GLB, | Performed by: INTERNAL MEDICINE

## 2023-03-09 PROCEDURE — 99999 PR PBB SHADOW E&M-EST. PATIENT-LVL IV: CPT | Mod: PBBFAC,HCNC,, | Performed by: INTERNAL MEDICINE

## 2023-03-09 PROCEDURE — 3288F PR FALLS RISK ASSESSMENT DOCUMENTED: ICD-10-PCS | Mod: HCNC,CPTII,S$GLB, | Performed by: INTERNAL MEDICINE

## 2023-03-09 PROCEDURE — 3075F SYST BP GE 130 - 139MM HG: CPT | Mod: HCNC,CPTII,S$GLB, | Performed by: INTERNAL MEDICINE

## 2023-03-09 PROCEDURE — 99999 PR PBB SHADOW E&M-EST. PATIENT-LVL IV: ICD-10-PCS | Mod: PBBFAC,HCNC,, | Performed by: INTERNAL MEDICINE

## 2023-03-09 PROCEDURE — 1125F PR PAIN SEVERITY QUANTIFIED, PAIN PRESENT: ICD-10-PCS | Mod: HCNC,CPTII,S$GLB, | Performed by: INTERNAL MEDICINE

## 2023-03-09 PROCEDURE — 3075F PR MOST RECENT SYSTOLIC BLOOD PRESS GE 130-139MM HG: ICD-10-PCS | Mod: HCNC,CPTII,S$GLB, | Performed by: INTERNAL MEDICINE

## 2023-03-09 PROCEDURE — 3078F PR MOST RECENT DIASTOLIC BLOOD PRESSURE < 80 MM HG: ICD-10-PCS | Mod: HCNC,CPTII,S$GLB, | Performed by: INTERNAL MEDICINE

## 2023-03-09 PROCEDURE — 1125F AMNT PAIN NOTED PAIN PRSNT: CPT | Mod: HCNC,CPTII,S$GLB, | Performed by: INTERNAL MEDICINE

## 2023-03-09 PROCEDURE — 3008F PR BODY MASS INDEX (BMI) DOCUMENTED: ICD-10-PCS | Mod: HCNC,CPTII,S$GLB, | Performed by: INTERNAL MEDICINE

## 2023-03-09 PROCEDURE — 3008F BODY MASS INDEX DOCD: CPT | Mod: HCNC,CPTII,S$GLB, | Performed by: INTERNAL MEDICINE

## 2023-03-09 PROCEDURE — 1101F PT FALLS ASSESS-DOCD LE1/YR: CPT | Mod: HCNC,CPTII,S$GLB, | Performed by: INTERNAL MEDICINE

## 2023-03-09 PROCEDURE — 3078F DIAST BP <80 MM HG: CPT | Mod: HCNC,CPTII,S$GLB, | Performed by: INTERNAL MEDICINE

## 2023-03-09 PROCEDURE — 3288F FALL RISK ASSESSMENT DOCD: CPT | Mod: HCNC,CPTII,S$GLB, | Performed by: INTERNAL MEDICINE

## 2023-03-09 PROCEDURE — 1159F PR MEDICATION LIST DOCUMENTED IN MEDICAL RECORD: ICD-10-PCS | Mod: HCNC,CPTII,S$GLB, | Performed by: INTERNAL MEDICINE

## 2023-03-09 PROCEDURE — 1160F PR REVIEW ALL MEDS BY PRESCRIBER/CLIN PHARMACIST DOCUMENTED: ICD-10-PCS | Mod: HCNC,CPTII,S$GLB, | Performed by: INTERNAL MEDICINE

## 2023-03-09 PROCEDURE — 1160F RVW MEDS BY RX/DR IN RCRD: CPT | Mod: HCNC,CPTII,S$GLB, | Performed by: INTERNAL MEDICINE

## 2023-03-09 PROCEDURE — 1101F PR PT FALLS ASSESS DOC 0-1 FALLS W/OUT INJ PAST YR: ICD-10-PCS | Mod: HCNC,CPTII,S$GLB, | Performed by: INTERNAL MEDICINE

## 2023-03-09 RX ORDER — DIPHENHYDRAMINE HCL 50 MG
50 CAPSULE ORAL ONCE
Status: CANCELLED | OUTPATIENT
Start: 2023-03-09 | End: 2023-03-09

## 2023-03-09 RX ORDER — SODIUM CHLORIDE 0.9 % (FLUSH) 0.9 %
10 SYRINGE (ML) INJECTION
Status: DISCONTINUED | OUTPATIENT
Start: 2023-03-09 | End: 2023-01-01 | Stop reason: CLARIF

## 2023-03-09 RX ORDER — SODIUM CHLORIDE 9 MG/ML
INJECTION, SOLUTION INTRAVENOUS CONTINUOUS
Status: CANCELLED | OUTPATIENT
Start: 2023-03-09 | End: 2023-03-09

## 2023-03-09 NOTE — H&P (VIEW-ONLY)
Subjective:    Patient ID:  Jose Antonio Allen is a 72 y.o. male who presents for follow-up of Chest Pain and Coronary Artery Disease      HPI    71 y/o male referred by Dr Connelly for abnormal stress test. He has a hx of CAD s/p 4V CABG 1996 at 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 Began having substernal, exertional, non radiating chest pain he thinks after starting Tyvaso. Has stopped and continues to have progressive angina. Currently with CCA Class III angina, no rest pain. Has chronic DE LOS SANTOS, unchanged. Denies orthopnea, PND, syncope, palps, LE edema. Usually pretty active and exercises regularly, although, has cut back due to progressive angina. Prescribed isosorbide, but has not received it yet (mail order). Has used SL NTG intermittently with relief. Compliant wit meds. Nuclear stress test with:  1. Scintigraphic evidence of ischemia within the anterolateral wall.  2. The global left ventricular systolic function is within normal limits with an LV ejection fraction of 63 % and no evidence of LV dilatation.  Lateral wall hypokinesis.  3. Additional findings as above.    3/8/2023:  Has again developed angina and worsening SOB/DE LOS SANTOS. Currently on 2 anti-anginals. Denies palps, LE edema.       Review of Systems   Constitutional: Negative for malaise/fatigue.   HENT:  Negative for congestion.    Eyes:  Negative for blurred vision.   Cardiovascular:  Positive for chest pain and dyspnea on exertion. Negative for claudication, cyanosis, irregular heartbeat, leg swelling, near-syncope, orthopnea, palpitations, paroxysmal nocturnal dyspnea and syncope.   Respiratory:  Negative for shortness of breath.    Endocrine: Negative for polyuria.   Hematologic/Lymphatic: Negative for  bleeding problem.   Skin:  Negative for itching and rash.   Musculoskeletal:  Negative for joint swelling, muscle cramps and muscle weakness.   Gastrointestinal:  Negative for abdominal pain, hematemesis, hematochezia, melena, nausea and vomiting.   Genitourinary:  Negative for dysuria and hematuria.   Neurological:  Negative for dizziness, focal weakness, headaches, light-headedness, loss of balance and weakness.   Psychiatric/Behavioral:  Negative for depression. The patient is not nervous/anxious.       Objective:    Physical Exam  Constitutional:       Appearance: He is well-developed.   HENT:      Head: Normocephalic and atraumatic.   Neck:      Vascular: No JVD.   Cardiovascular:      Rate and Rhythm: Normal rate and regular rhythm.      Pulses:           Carotid pulses are 2+ on the right side and 2+ on the left side.       Radial pulses are 2+ on the right side and 2+ on the left side.        Femoral pulses are 2+ on the right side and 2+ on the left side.       Dorsalis pedis pulses are 2+ on the right side and 2+ on the left side.        Posterior tibial pulses are 2+ on the right side and 2+ on the left side.      Heart sounds: Normal heart sounds.   Pulmonary:      Effort: Pulmonary effort is normal.      Breath sounds: Rales present.   Abdominal:      General: Bowel sounds are normal.      Palpations: Abdomen is soft.   Musculoskeletal:      Cervical back: Neck supple.   Skin:     General: Skin is warm and dry.   Neurological:      Mental Status: He is alert and oriented to person, place, and time.   Psychiatric:         Behavior: Behavior normal.         Thought Content: Thought content normal.         Assessment:       1. Coronary artery disease involving native coronary artery of native heart with refractory angina pectoris    2. S/P CABG (coronary artery bypass graft)    3. History of coronary artery stent placement    4. PAD (peripheral artery disease)    5. Internal carotid artery occlusion, right     6. Intermittent claudication    7. Hyperlipidemia, unspecified hyperlipidemia type    8. Essential hypertension    9. Abdominal aortic atherosclerosis    10. Chronic obstructive pulmonary disease, unspecified COPD type    11. Fibrosis of lung    12. Pulmonary hypertension    13. Type 2 diabetes mellitus with stage 3a chronic kidney disease, without long-term current use of insulin      72 year old patient with hx and presentation as above. Has hx of CAD and progressive/unstable angina and will evaluate symptoms with LHC +/-. Cont DAPT. Increase anti-anginal dose. EDILMA candidate. Discussed if CP worsened or persists to present to ED. Needs to stay active. Discussed the etiology, evaluation, and management of CAD, CABG, UA, HTN, HLD, DM. Discussed the importance of med compliance, heart healthy diet, and regular exercise.      Plan:       -LHC +/- intervention  -RCFA access with 6 Fr sheath  -Consents at time of cath  -Low contrast technique  -f/u in 1month

## 2023-03-09 NOTE — PROGRESS NOTES
Subjective:    Patient ID:  Jose Antonio Allen is a 72 y.o. male who presents for follow-up of Chest Pain and Coronary Artery Disease      HPI    71 y/o male referred by Dr Connelly for abnormal stress test. 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 Began having substernal, exertional, non radiating chest pain he thinks after starting Tyvaso. Has stopped and continues to have progressive angina. Currently with CCA Class III angina, no rest pain. Has chronic DE LOS SANTOS, unchanged. Denies orthopnea, PND, syncope, palps, LE edema. Usually pretty active and exercises regularly, although, has cut back due to progressive angina. Prescribed isosorbide, but has not received it yet (mail order). Has used SL NTG intermittently with relief. Compliant wit meds. Nuclear stress test with:  1. Scintigraphic evidence of ischemia within the anterolateral wall.  2. The global left ventricular systolic function is within normal limits with an LV ejection fraction of 63 % and no evidence of LV dilatation.  Lateral wall hypokinesis.  3. Additional findings as above.    3/8/2023:  Has again developed angina and worsening SOB/DE LOS SANTOS. Currently on 2 anti-anginals. Denies palps, LE edema.       Review of Systems   Constitutional: Negative for malaise/fatigue.   HENT:  Negative for congestion.    Eyes:  Negative for blurred vision.   Cardiovascular:  Positive for chest pain and dyspnea on exertion. Negative for claudication, cyanosis, irregular heartbeat, leg swelling, near-syncope, orthopnea, palpitations, paroxysmal nocturnal dyspnea and syncope.   Respiratory:  Negative for shortness of breath.    Endocrine: Negative for polyuria.   Hematologic/Lymphatic: Negative for  bleeding problem.   Skin:  Negative for itching and rash.   Musculoskeletal:  Negative for joint swelling, muscle cramps and muscle weakness.   Gastrointestinal:  Negative for abdominal pain, hematemesis, hematochezia, melena, nausea and vomiting.   Genitourinary:  Negative for dysuria and hematuria.   Neurological:  Negative for dizziness, focal weakness, headaches, light-headedness, loss of balance and weakness.   Psychiatric/Behavioral:  Negative for depression. The patient is not nervous/anxious.       Objective:    Physical Exam  Constitutional:       Appearance: He is well-developed.   HENT:      Head: Normocephalic and atraumatic.   Neck:      Vascular: No JVD.   Cardiovascular:      Rate and Rhythm: Normal rate and regular rhythm.      Pulses:           Carotid pulses are 2+ on the right side and 2+ on the left side.       Radial pulses are 2+ on the right side and 2+ on the left side.        Femoral pulses are 2+ on the right side and 2+ on the left side.       Dorsalis pedis pulses are 2+ on the right side and 2+ on the left side.        Posterior tibial pulses are 2+ on the right side and 2+ on the left side.      Heart sounds: Normal heart sounds.   Pulmonary:      Effort: Pulmonary effort is normal.      Breath sounds: Rales present.   Abdominal:      General: Bowel sounds are normal.      Palpations: Abdomen is soft.   Musculoskeletal:      Cervical back: Neck supple.   Skin:     General: Skin is warm and dry.   Neurological:      Mental Status: He is alert and oriented to person, place, and time.   Psychiatric:         Behavior: Behavior normal.         Thought Content: Thought content normal.         Assessment:       1. Coronary artery disease involving native coronary artery of native heart with refractory angina pectoris    2. S/P CABG (coronary artery bypass graft)    3. History of coronary artery stent placement    4. PAD (peripheral artery disease)    5. Internal carotid artery occlusion, right     6. Intermittent claudication    7. Hyperlipidemia, unspecified hyperlipidemia type    8. Essential hypertension    9. Abdominal aortic atherosclerosis    10. Chronic obstructive pulmonary disease, unspecified COPD type    11. Fibrosis of lung    12. Pulmonary hypertension    13. Type 2 diabetes mellitus with stage 3a chronic kidney disease, without long-term current use of insulin      72 year old patient with hx and presentation as above. Has hx of CAD and progressive/unstable angina and will evaluate symptoms with LHC +/-. Cont DAPT. Increase anti-anginal dose. EDILMA candidate. Discussed if CP worsened or persists to present to ED. Needs to stay active. Discussed the etiology, evaluation, and management of CAD, CABG, UA, HTN, HLD, DM. Discussed the importance of med compliance, heart healthy diet, and regular exercise.      Plan:       -LHC +/- intervention  -RCFA access with 6 Fr sheath  -Consents at time of cath  -Low contrast technique  -f/u in 1month

## 2023-03-10 ENCOUNTER — CLINICAL SUPPORT (OUTPATIENT)
Dept: REHABILITATION | Facility: HOSPITAL | Age: 73
End: 2023-03-10
Attending: NURSE PRACTITIONER
Payer: MEDICARE

## 2023-03-10 DIAGNOSIS — M25.512 CHRONIC LEFT SHOULDER PAIN: Primary | ICD-10-CM

## 2023-03-10 DIAGNOSIS — G89.29 CHRONIC LEFT SHOULDER PAIN: Primary | ICD-10-CM

## 2023-03-10 DIAGNOSIS — M25.612 STIFFNESS OF LEFT SHOULDER JOINT: ICD-10-CM

## 2023-03-10 PROCEDURE — 97110 THERAPEUTIC EXERCISES: CPT | Mod: HCNC,PN,CQ

## 2023-03-10 NOTE — PROGRESS NOTES
"  Physical Therapy Daily Treatment Note     Name: Jose Antonio Allen  Clinic Number: [id number]    Therapy Diagnosis:   Encounter Diagnoses   Name Primary?    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  He was compliant with home exercise program.  Response to previous treatment: No adverse effects  Functional change: ongoing    Pain: 0/10  Location: bilateral shoulder  (left greater than right)    Objective       Jose Antonio received therapeutic exercises to develop strength, endurance, ROM, and posture for 45 minutes including:  UBE:  level 1 2 min forward/2 min backwards  Supine wand flexion:  !# dowel 2x10  Supine serratus push:  1# dowel 2x10  Supine + and - with 1# dowel 2x10 each Left  Sidelying shoulder abduction:  2x10 left  Sidelying external rotation:  2x10  Sidelying flexion:  2x10    Shldr extension: red theraband 2x10  Eccentric Flexion:  red theraband 2x10 (back to bands)  Walk outs red theraband: internal rotation/external rotation 1x10 each  Wall slides:  with towel 2x10  Serratus wall:  foam roll 2x10    Jose Antonio participated in neuromuscular re-education activities to improve:  for 00 minutes. The following activities were included:  Not today    Home Exercises Provided and Patient Education Provided     Education provided:   - Postural " "awareness  - Importance of daily home exercise program in pain free range    Written Home Exercises Provided: Patient instructed to cont prior HEP.  Exercises were reviewed and Jose Antonio was able to demonstrate them prior to the end of the session.  Jose Antonio demonstrated good  understanding of the education provided.     See EMR under Patient Instructions for exercises provided  at [facility] on 2/27/2023 .      Assessment     Patient required verbal cues to slow down pace with performance of therex for best benefit.  Able to complete all therex as noted with intermittent short rest breaks. One seated break between standing therex due to short of breath. Patient required intermittent cues throughout to maintain normal breathing. (Portable oxygen tank and use of nasal canula) Able to perform increased activity as bolded.  Achieved appropriate level of fatigue States "good" after treatment. "Now I'm really tired"  Not specific to scale.  Jose Antonio Is progressing well towards his goals.   Pt prognosis is Good.     Pt will continue to benefit from skilled outpatient physical therapy to address the deficits listed in the problem list box on initial evaluation, provide pt/family education and to maximize pt's level of independence in the home and community environment.     Pt's spiritual, cultural and educational needs considered and pt agreeable to plan of care and goals.    Anticipated barriers to physical therapy: Comorbidities, age    GOALS: Short Term Goals:  3 weeks  1.Report decreased L shoulder pain < / =  4/10  to increase tolerance for physical therapy interventions  2. Increase PROM to WNL L shoulder   3. Increased strength by 0.5 MMT grade in L shoulder external rotation to increase tolerance for ADL and work activities.  4. Pt to tolerate HEP to improve ROM and independence with ADL's     Long Term Goals: 6 weeks  1.Report decreased L shoulder pain  < / =  2 /10  to increase tolerance for daily overhead activities  2.Increase " AROM to WNL L shoulder  3.Increase strength to >/= 4+/5 in L shoulder globally to increase tolerance for ADL and work activities.  4. Pt will have improved score of 67 on FOTO shoulder in order to demonstrate true functional improvement.      Plan     Continue PT per plan of care, progress as appropriate.    Eugenia Braun, PTA

## 2023-03-13 ENCOUNTER — TELEPHONE (OUTPATIENT)
Dept: CARDIOLOGY | Facility: CLINIC | Age: 73
End: 2023-03-13
Payer: MEDICARE

## 2023-03-13 NOTE — TELEPHONE ENCOUNTER
----- Message from Jenn Carvajal sent at 3/13/2023  8:41 AM CDT -----  Type:  Needs Medical Advice    Who Called: pt  Symptoms (please be specific): pt has a procedure scheduled for 03/20/23 and he requesting to get his arrival times and any other instructions      Would the patient rather a call back or a response via [facility]? call  Best Call Back Number: [phone]  Additional Information:

## 2023-03-20 NOTE — Clinical Note
The catheter was inserted into the ostial SVG graft. An angiography was performed of the right coronary arteries.

## 2023-03-20 NOTE — Clinical Note
The catheter was inserted into the ostial LIMA graft. An angiography was performed of the left coronary arteries. The angiography was performed via hand injection with 5 mL of contrast.

## 2023-03-20 NOTE — PLAN OF CARE
Pt arrived independently with family from home, ambulates with/out assistance. Patient lying in bed with no complaints of pain or distress noted. Monitors applied. VSS, AAOx4.  Yellow non-skid socks placed on patient. Fall risk reviewed with patient. Procedure and recovery process explained to patient and all questions answered.  Patient verbalized understanding, denies questions. Will continue to monitor for safety and needs.

## 2023-03-20 NOTE — Clinical Note
The catheter was inserted into the ostial SVG graft. An angiography was performed of the right coronary arteries. The angiography was performed via hand injection with 6 mL of contrast.

## 2023-03-20 NOTE — Clinical Note
37 ml of contrast were injected throughout the case. 113 mL of contrast was the total wasted during the case. 150 mL was the total amount used during the case.

## 2023-03-20 NOTE — PLAN OF CARE
Patient discharged to home as ordered after 2.5 hour recovery per Dr. Parmar. Pt is AAOx4, VSS, denies any pain.  All discharge instructions and printed materials reviewed and given to patient.   Patient instructed on follow-up appointment as ordered.   Prescriptions delivered to bedside by pharmacy and reviewed with patient.   Patient verbalized understanding and agreement with all discharge instructions given.     Right groin gauze and tegaderm dressing c.d.i. No bleeding or hematoma noted around site. Site and surrounding area is soft.    Palpated +2 khushbu radial pulses. Dopplered khushbu pedal pulses.     Pt voided without difficulty 450 ml.  Pt ate lunch tray. No n/v.   Pt got dressed and moving around without difficulty and no distress noted.  Pt in w/c. Left\ AC  20 gauge iv dc'd as ordered with tip intact. Gauze and koban dressing applied c.d.i.  Pt discharged home via wheelchair to private vehicle by pt's wife.

## 2023-03-20 NOTE — DISCHARGE INSTRUCTIONS
Discharge Instructions:     Do not drive a car, operate heavy equipment, care for a young child, etc for the next 12-24 hours.   Avoid drinking alcohol for 24 hours.  Do not make any important decisions for 24 hours.     Drink fluids to keep hydrated. Resume your usual diet as tolerated.      Rest for today then activity as tolerated.   Do not lift anything over 5 pounds for the first 3 days after procedure.     Remove dressing tomorrow after 10am then may shower with warm soapy water. Do not scrub site. Pat dry.   May apply bandaid for 2 days.  No tub baths.  Do not submerge wound in water for 3 days.      Call MD for any unrelieved pain, excessive nausea or vomiting, redness around site, bleeding, or pus or foul smelling drainage, or any other questions or concerns.     Go to the ER for any difficulty breathing or chest pain.        If site swells or bleeds, hold direct pressure to area for 10 full minutes.   If site continues to bleed, continue to hold pressure to site and have someone bring you to the ER.       Follow any additional instructions given to you by MD.      Call MD to schedule a follow up appointment, or follow up as instructed.         Last Modified by Joycelyn Saenz RN at 6/1/22 5716

## 2023-03-20 NOTE — BRIEF OP NOTE
Brief Operative Note:    : Gordy Parmar MD     Referring Physician: Gordy Parmar     All Operators: Surgeon(s):  MD Jim Elaine MD     Preoperative Diagnosis: Coronary artery disease involving native coronary artery of native heart with refractory angina pectoris [I25.118]  S/P CABG (coronary artery bypass graft) [Z95.1]     Postop Diagnosis: Coronary artery disease involving native coronary artery of native heart with refractory angina pectoris [I25.118]  S/P CABG (coronary artery bypass graft) [Z95.1]    Treatments/Procedures: Procedure(s) (LRB):  Left heart cath (N/A)  Angioplasty-coronary  IVUS, Coronary  Stent, Drug Eluting, Single Vessel, Coronary  Bypass graft study  Placement of Closure Device    Findings:Severe coronary disease is present. See catheterization report for full details.    -patent LIMA-LAD with L-R collaterals  -known  native RCA, SVG-OM and SVG-RCA not studied  -80% stenosis pLM with  pLAD and mLCx  -severe ISR to previously placed pSVG-PDA stent with PTA via 3.5 x 12 NCB and zero residual stenosis  -severe lesion distal to dSVG-PDA stent reduced to 0 with 3.5 x 8 EDILMA   -LVEDP 15      Estimated Blood loss: 20 cc    Specimens removed: No    Recommendations:   - Routine post-cath care as per orders  - IVF at  3cc/kg/hr  for 3 hrs  - Continue medical management, Risk factor reduction, Plavix for at least 1 year and ASA 81 mg indefinitely, Follow-up with outpatient cardiologist      Jim Winkler

## 2023-03-20 NOTE — Clinical Note
The catheter was inserted into the ostium   left main. An angiography was performed of the left coronary arteries. Multiple views were taken. The angiography was performed via hand injection with 5 mL of contrast.

## 2023-03-20 NOTE — Clinical Note
An angiography was performed of the graft. The angiography was performed via hand injection with 2 mL of contrast.

## 2023-03-20 NOTE — Clinical Note
The catheter was inserted into the right pulmonary artery distal   right coronary artery. Ivus performed

## 2023-03-20 NOTE — HOSPITAL COURSE
Patient brought for planned [facility] 2/2 angina with suspected ISR to SVG EDILMA. He was found to have ISR to SVG-PDA and new lesion distal to dSVG stent, please see report for full details. He tolerated the procedure well and was then discharged in stable condition.

## 2023-03-20 NOTE — DISCHARGE SUMMARY
[facility] ([facility])  Interventional Cardiology  Discharge Summary      Patient Name: Jose Antonio Allen  MRN: [id number]  Admission Date: 3/20/2023  Hospital Length of Stay: 0 days  Discharge Date and Time:  03/20/2023 10:49 AM  Attending Physician: Gordy Parmar MD  Discharging Provider: Jim Winkler MD  Primary Care Physician: Abilio Weber MD    HPI:  No notes on file    Procedure(s) (LRB):  Left heart cath (N/A)  Angioplasty-coronary  IVUS, Coronary  Stent, Drug Eluting, Single Vessel, Coronary  Bypass graft study  Placement of Closure Device     Indwelling Lines/Drains at time of discharge:  Lines/Drains/Airways     None                 Hospital Course:  Patient brought for planned [facility] 2/2 angina with suspected ISR to SVG EDILMA. He was found to have ISR to SVG-PDA and new lesion distal to dSVG stent, please see report for full details. He tolerated the procedure well and was then discharged in stable condition.       Goals of Care Treatment Preferences:  Code Status: Full Code            Pending Diagnostic Studies:     Procedure Component Value Units Date/Time    Basic metabolic panel [[id number]]     Order Status: Sent Lab Status: No result     Specimen: Blood     EKG 12-LEAD on arrival to floor [[id number]]     Order Status: Sent Lab Status: No result         No new Assessment & Plan notes have been filed under this hospital service since the last note was generated.  Service: Interventional Cardiology      Discharged Condition: good    Follow Up:    Patient Instructions:      CBC W/ AUTO DIFFERENTIAL   Standing Status: Future Number of Occurrences: 1 Standing Exp. Date: 05/11/24   Order Comments: Preop     BASIC METABOLIC PANEL   Standing Status: Future Number of Occurrences: 1 Standing Exp. Date: 05/11/24   Order Comments: Preop     Diet Cardiac     Activity as tolerated     Medications:  Reconciled Home Medications:      Medication List      CONTINUE taking these medications    ACCU-CHEK  "ADRIEN PLUS METER Misc  Generic drug: blood-glucose meter  1 Product by Other route daily as needed.     ACCU-CHEK SMARTVIEW TEST STRIP Strp  Generic drug: blood sugar diagnostic     ACCU-CHEK SOFTCLIX LANCETS Misc  Generic drug: lancets  Inject 100 lancets into the skin daily as needed.     amLODIPine 5 MG tablet  Commonly known as: NORVASC  Take 1 tablet (5 mg total) by mouth once daily.     aspirin 81 MG Chew  81 mg once daily.     atorvastatin 80 MG tablet  Commonly known as: LIPITOR  TAKE 1 TABLET EVERY DAY     b complex vitamins tablet  Take 1 tablet by mouth once daily.     clopidogreL 75 mg tablet  Commonly known as: PLAVIX  Take 1 tablet (75 mg total) by mouth once daily.     DROPLET PEN NEEDLE 32 gauge x 5/32" Ndle  Generic drug: pen needle, diabetic  Inject 1 Product into the skin daily as needed.     ESBRIET 801 mg Tab  Generic drug: pirfenidone  3 (three) times daily.     ezetimibe 10 mg tablet  Commonly known as: ZETIA  TAKE 1 TABLET EVERY DAY     insulin degludec 100 unit/mL (3 mL) insulin pen  Commonly known as: TRESIBA FLEXTOUCH U-100  Inject 50 Units into the skin every evening. F/u apt needed with PCP     levothyroxine 100 MCG tablet  Commonly known as: SYNTHROID  Take 1 tablet (100 mcg total) by mouth before breakfast.     lisinopriL 2.5 MG tablet  Commonly known as: PRINIVIL,ZESTRIL  TAKE 1 TABLET EVERY DAY     LUBRICANT EYE DROPS 0.5 % Dpet  Generic drug: carboxymethylcellulose     metoprolol succinate 100 MG 24 hr tablet  Commonly known as: TOPROL-XL  TAKE 1 TABLET ONE TIME DAILY     mirtazapine 7.5 MG Tab  Commonly known as: REMERON  TAKE 1 TABLET (7.5 MG TOTAL) BY MOUTH EVERY EVENING.     nitroGLYCERIN 0.4 MG SL tablet  Commonly known as: NITROSTAT  Place 1 tablet (0.4 mg total) under the tongue every 5 (five) minutes as needed for Chest pain.     NovoLOG U-100 Insulin aspart 100 unit/mL injection  Generic drug: insulin aspart U-100  Sliding scale     ORENCIA 125 mg/mL Syrg  Generic drug: " abatacept  INJECT CONTENTS OF ONE SYRINGE UNDER THE SKIN EVERY 7 DAYS     vitamin D 1000 units Tab  Commonly known as: VITAMIN D3  Take 1,000 Units by mouth once daily.            Time spent on the discharge of patient: 20 minutes    Jim Winkler MD  Interventional Cardiology  [facility] ([facility])

## 2023-03-20 NOTE — Clinical Note
An angiography was performed of the graft. The angiography was performed via hand injection with 3 mL of contrast.

## 2023-03-20 NOTE — PLAN OF CARE
Patient transfered to cath lab recovery [place] # 3 via stretcher with side rails up x2. Pt AAOx4 and able to follow commands. Pt is stable when connecting to cardiac monitors. VSS.     Right groin site with gauze and tegaderm  CDI. No redness, bruising, or hematoma noted around site. Dopplered bilateral Posterial Tibial pulses, Palpated khsuhbu Dorsalis pedal pulses. 2 khushbu radial pulses. Skin is normal in color, warm to touch, < 3 sec cap refill.    Fall risk precautions given and patient acknowledges. AIDET completed to pt. Updated pt's wife in sx waiting room. Will continue to monitor patient for safety and needs.

## 2023-03-23 NOTE — PROGRESS NOTES
Physical Therapy Daily Treatment Note     Name: Jose Antonio Allen  Clinic Number: [id number]    Therapy Diagnosis:   Encounter Diagnoses   Name Primary?    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 Pt reported that his shoulders feel better post tx today.   He was compliant with home exercise program.  Response to previous treatment: No adverse effects  Functional change: ongoing    Pain: 0/10  Location: bilateral shoulder     Objective       Jose Antonio received therapeutic exercises to develop strength, endurance, ROM, and posture for 43 minutes including:  UBE:  level 1 2 min forward/2 min backwards  Supine wand flexion:  !# dowel 2x10  Supine serratus push:  1# dowel 2x10  Sidelying shoulder abduction:  2x10   Sidelying external rotation:  1# 2x10  Sidelying flexion:  2x10    Shldr extension: red theraband 2x10 , cues to lift head up  Progressed to concentric internal rotation/external rotation 1x15 each red theraband  Wall slides: with towel 2x10  Serratus wall:  foam roll 2x10    NT:  Eccentric Flexion:  red theraband 2x10 (back to bands)  Supine + and - with 1# dowel 2x10 each     Jose Antonio participated in neuromuscular re-education activities to improve:  for 00 minutes. The following activities were included:  Not today    Home Exercises Provided and Patient  Education Provided     Education provided:   - Postural awareness  - Importance of daily home exercise program in pain free range    Written Home Exercises Provided: Patient instructed to cont prior HEP.  Exercises were reviewed and Jose Antonio was able to demonstrate them prior to the end of the session.  Jose Antonio demonstrated good  understanding of the education provided.     See EMR under Patient Instructions for exercises provided  at eval on 2/27/2023 .      Assessment     Pt w/ good tami of instructed exercises today as progressions were made in tx. Pt required min verbal cues throughout tx to complete exercises w/ proper technique and to slow down occasionally. Pt w/ no c/o increased pain throughout tx but reported tightness at end of tx.     Jose Antonio Is progressing well towards his goals.   Pt prognosis is Good.     Pt will continue to benefit from skilled outpatient physical therapy to address the deficits listed in the problem list box on initial evaluation, provide pt/family education and to maximize pt's level of independence in the home and community environment.     Pt's spiritual, cultural and educational needs considered and pt agreeable to plan of care and goals.    Anticipated barriers to physical therapy: Comorbidities, age    GOALS: Short Term Goals:  3 weeks  1.Report decreased L shoulder pain < / =  4/10  to increase tolerance for physical therapy interventions  2. Increase PROM to WNL L shoulder   3. Increased strength by 0.5 MMT grade in L shoulder external rotation to increase tolerance for ADL and work activities.  4. Pt to tolerate HEP to improve ROM and independence with ADL's     Long Term Goals: 6 weeks  1.Report decreased L shoulder pain  < / =  2 /10  to increase tolerance for daily overhead activities  2.Increase AROM to WNL L shoulder  3.Increase strength to >/= 4+/5 in L shoulder globally to increase tolerance for ADL and work activities.  4. Pt will have improved score of 67 on FOTO shoulder  in order to demonstrate true functional improvement.      Plan     Continue PT per plan of care, progress as appropriate.    Stephanie White, PTA

## 2023-03-30 NOTE — PROGRESS NOTES
"  Physical Therapy Daily Treatment Note     Name: Jose Antonio Allen  Clinic Number: [id number]    Therapy Diagnosis:   Encounter Diagnoses   Name Primary?    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 Pt stated that he continues to have shoulder pain w/ reaching up high such as in the fridge and also w/ reaching to the side w/ upper body rotation.   He was compliant with home exercise program.  Response to previous treatment: No adverse effects  Functional change: ongoing    Pain: 0/10  Location: bilateral shoulder     Objective       Jose Antonio received therapeutic exercises to develop strength, endurance, ROM, and posture for 49 minutes including:  UBE:  level 1 2 min forward/2 min backwards  Supine wand flexion:  !# dowel 2x10  Supine serratus push:  1# dowel 2x10  Sidelying external rotation:  1# 2x10  +open books in L SL 10x5"  +bows and arrows in R SL 10x5" (2/2 pain w/ open books)  +pulleys flexion 3'   +seated shoulder flexion x10 slowly  +seated shoulder abduction x10 slowly   +rows: 2x10 RTB  +no moneys: YTB 10x    NT:  Progressed to concentric internal rotation/external rotation 1x15 each red theraband -> add back next  Wall slides: with towel 2x10  Serratus wall:  foam roll 2x10  Shldr extension: red theraband 2x10 , cues to lift head up  Sidelying flexion: "  2x10  Sidelying shoulder abduction:  2x10   Eccentric Flexion:  red theraband 2x10 (back to bands)  Supine + and - with 1# dowel 2x10 each     Jose Antonio participated in neuromuscular re-education activities to improve:  for 00 minutes. The following activities were included:  Not today    Jose Antonio received the following manual therapy techniques: Jt mobilization were applied to the: bilateral shoulders for 10 minutes, including:  +grade I inferori GH mobs bilat  +grade I AP GH mobs R only (unable to tami on L)    Home Exercises Provided and Patient Education Provided     Education provided:   - Postural awareness  - Importance of daily home exercise program in pain free range    Written Home Exercises Provided: Patient instructed to cont prior HEP.  Exercises were reviewed and Jose Antonio was able to demonstrate them prior to the end of the session.  Jose Antonio demonstrated good  understanding of the education provided.     See EMR under Patient Instructions for exercises provided  at [facility] on 2/27/2023 .      Assessment     Pt w/ good tami of instructed exercises today as progressions were made in tx. Pt required at least min verbal cues throughout tx to complete exercises w/ proper technique and to slow down occasionally w/ utilization of rest breaks. Pt w/ good response to all MT today but demonstrated significant TTP over L ant shoulder and was unable to tami trial of grade I AP mobs. Pt was advised that he may be sore post tx 2/2 progressions and MTAutumn Brady Is progressing well towards his goals.   Pt prognosis is Good.     Pt will continue to benefit from skilled outpatient physical therapy to address the deficits listed in the problem list box on initial evaluation, provide pt/family education and to maximize pt's level of independence in the home and community environment.     Pt's spiritual, cultural and educational needs considered and pt agreeable to plan of care and goals.    Anticipated barriers to physical therapy:  Comorbidities, age    GOALS: Short Term Goals:  3 weeks  1.Report decreased L shoulder pain < / =  4/10  to increase tolerance for physical therapy interventions  2. Increase PROM to WNL L shoulder   3. Increased strength by 0.5 MMT grade in L shoulder external rotation to increase tolerance for ADL and work activities.  4. Pt to tolerate HEP to improve ROM and independence with ADL's     Long Term Goals: 6 weeks  1.Report decreased L shoulder pain  < / =  2 /10  to increase tolerance for daily overhead activities  2.Increase AROM to WNL L shoulder  3.Increase strength to >/= 4+/5 in L shoulder globally to increase tolerance for ADL and work activities.  4. Pt will have improved score of 67 on FOTO shoulder in order to demonstrate true functional improvement.      Plan     Continue PT per plan of care, progress as appropriate.    Stephanie White, PTA

## 2023-03-30 NOTE — PROGRESS NOTES
Subjective:    Patient ID:  Jose Antonio Allen is a 72 y.o. male who presents for follow-up of Coronary Artery Disease      HPI    71 y/o male referred by Dr Connelly for abnormal stress test. 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 Began having substernal, exertional, non radiating chest pain he thinks after starting Tyvaso. Has stopped and continues to have progressive angina. Currently with CCA Class III angina, no rest pain. Has chronic DE LOS SANTOS, unchanged. Denies orthopnea, PND, syncope, palps, LE edema. Usually pretty active and exercises regularly, although, has cut back due to progressive angina. Prescribed isosorbide, but has not received it yet (mail order). Has used SL NTG intermittently with relief. Compliant wit meds. Nuclear stress test with:  1. Scintigraphic evidence of ischemia within the anterolateral wall.  2. The global left ventricular systolic function is within normal limits with an LV ejection fraction of 63 % and no evidence of LV dilatation.  Lateral wall hypokinesis.  3. Additional findings as above.    3/8/2023:  Has again developed angina and worsening SOB/DE LOS SANTOS. Currently on 2 anti-anginals. Denies palps, LE edema.     3/30/2023:  Since last visit had [facility] with PCI with resolution of CP and improvement in SOB:    Patent LIMA-LAD with faint collaterals to PDA    Patent SVG-PDA with proximal moderate to severe ISR and distal de jaden moderate to severe lesion distal to distal stent    Successful VIUS guided PTA to prox SVG stent ISR and PCI with 3.5 x 8 EDILMA to distal SVG lesion with excellent results    <40 cc total contrast used for case, given CKD    Procedure performed for progressive angina on 2 anti-anginal  medications    Unable to pass filter distally, however, adenosine used  States he feels a little drained with increased amlodipine dose.     Review of Systems   Constitutional: Negative for malaise/fatigue.   HENT:  Negative for congestion.    Eyes:  Negative for blurred vision.   Cardiovascular:  Positive for dyspnea on exertion. Negative for chest pain, claudication, cyanosis, irregular heartbeat, leg swelling, near-syncope, orthopnea, palpitations, paroxysmal nocturnal dyspnea and syncope.   Respiratory:  Negative for shortness of breath.    Endocrine: Negative for polyuria.   Hematologic/Lymphatic: Negative for bleeding problem.   Skin:  Negative for itching and rash.   Musculoskeletal:  Negative for joint swelling, muscle cramps and muscle weakness.   Gastrointestinal:  Negative for abdominal pain, hematemesis, hematochezia, melena, nausea and vomiting.   Genitourinary:  Negative for dysuria and hematuria.   Neurological:  Negative for dizziness, focal weakness, headaches, light-headedness, loss of balance and weakness.   Psychiatric/Behavioral:  Negative for depression. The patient is not nervous/anxious.       Objective:    Physical Exam  Constitutional:       Appearance: He is well-developed.   HENT:      Head: Normocephalic and atraumatic.   Neck:      Vascular: No JVD.   Cardiovascular:      Rate and Rhythm: Normal rate and regular rhythm.      Pulses:           Carotid pulses are 2+ on the right side and 2+ on the left side.       Radial pulses are 2+ on the right side and 2+ on the left side.        Femoral pulses are 2+ on the right side and 2+ on the left side.       Dorsalis pedis pulses are 2+ on the right side and 2+ on the left side.        Posterior tibial pulses are 2+ on the right side and 2+ on the left side.      Heart sounds: Normal heart sounds.   Pulmonary:      Effort: Pulmonary effort is normal.      Breath sounds: Rales present.   Abdominal:      General: Bowel sounds are normal.       Palpations: Abdomen is soft.   Musculoskeletal:      Cervical back: Neck supple.   Skin:     General: Skin is warm and dry.   Neurological:      Mental Status: He is alert and oriented to person, place, and time.   Psychiatric:         Behavior: Behavior normal.         Thought Content: Thought content normal.         Assessment:       1. Coronary artery disease involving native coronary artery of native heart without angina pectoris    2. S/P CABG (coronary artery bypass graft)    3. PAD (peripheral artery disease)    4. Internal carotid artery occlusion, right    5. Hyperlipidemia, unspecified hyperlipidemia type    6. History of coronary artery stent placement    7. Essential hypertension    8. Abdominal aortic atherosclerosis    9. Type 2 diabetes mellitus with stage 3a chronic kidney disease, without long-term current use of insulin      71 y/o pt with hx and presentation as above. Doing well from a cardiac perspective and compensated from a HF perspective. Now s/p PCI and clinically improved. Cont DAPT. Decrease amlodipine. Needs to stay active. Discussed the etiology, evaluation, and management of CAD, CABG, UA, HTN, HLD, DM. Discussed the importance of med compliance, heart healthy diet, and regular exercise.      Plan:       -Decrease amlodipine to 5 mg daily  -f/u in 6 months

## 2023-04-10 NOTE — PROGRESS NOTES
"  Physical Therapy Daily Treatment Note     Name: Jose Antonio Allen  Clinic Number: [id number]    Therapy Diagnosis:   Encounter Diagnoses   Name Primary?    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 Pt stated that he does not have pain at rest but w/ movement and reaching up.   He was compliant with home exercise program.  Response to previous treatment: No adverse effects  Functional change: ongoing    Pain: 0/10  Location: bilateral shoulder     Objective       Jose Antonio received therapeutic exercises to develop strength, endurance, ROM, and posture for 45 minutes including:  UBE:  level 1 2 min forward/2 min backwards  Supine wand flexion:  !# dowel 2x10  Supine serratus push:  1# dowel 2x10  Sidelying external rotation:  1# 2x10  open books in L SL 10x5"  bows and arrows in R SL 10x5" (2/2 pain w/ open books)  pulleys flexion 3'   seated shoulder flexion x10 slowly  seated shoulder abduction x10 slowly   rows: 2x10 RTB  no moneys: YTB 10x  Wall slides: with towel 2x10  Shldr extension: red theraband 2x10 , cues to lift head up    NT:  Progressed to concentric internal rotation/external rotation 1x15 each red theraband -> add back next  Serratus wall:  foam roll 2x10  Sidelying flexion:  2x10  Sidelying shoulder abduction:  2x10   Eccentric Flexion:  red " theraband 2x10 (back to bands)  Supine + and - with 1# dowel 2x10 each     Jose Antonio participated in neuromuscular re-education activities to improve:  for 00 minutes. The following activities were included:  Not today    Jose Antonio received the following manual therapy techniques: Jt mobilization were applied to the: bilateral shoulders for 00 minutes, including:  +grade I inferori GH mobs bilat  +grade I AP GH mobs R only (unable to tami on L)    Home Exercises Provided and Patient Education Provided     Education provided:   - Postural awareness  - Importance of daily home exercise program in pain free range    Written Home Exercises Provided: Patient instructed to cont prior HEP.  Exercises were reviewed and Jose Antonio was able to demonstrate them prior to the end of the session.  Jose Antonio demonstrated good  understanding of the education provided.     See EMR under Patient Instructions for exercises provided  at [facility] on 2/27/2023 .      Assessment     Pt w/ good tami of instructed exercises today. Pt required at least min verbal cues throughout tx to complete exercises w/ proper technique and to slow down occasionally w/ utilization of rest breaks. Pt w/ no c/o increased pain throughout tx. Re-eval next visit w/ PT.     Jose Antonio Is progressing well towards his goals.   Pt prognosis is Good.     Pt will continue to benefit from skilled outpatient physical therapy to address the deficits listed in the problem list box on initial evaluation, provide pt/family education and to maximize pt's level of independence in the home and community environment.     Pt's spiritual, cultural and educational needs considered and pt agreeable to plan of care and goals.    Anticipated barriers to physical therapy: Comorbidities, age    GOALS: Short Term Goals:  3 weeks  1.Report decreased L shoulder pain < / =  4/10  to increase tolerance for physical therapy interventions  2. Increase PROM to WNL L shoulder   3. Increased strength by 0.5 MMT grade in  L shoulder external rotation to increase tolerance for ADL and work activities.  4. Pt to tolerate HEP to improve ROM and independence with ADL's     Long Term Goals: 6 weeks  1.Report decreased L shoulder pain  < / =  2 /10  to increase tolerance for daily overhead activities  2.Increase AROM to WNL L shoulder  3.Increase strength to >/= 4+/5 in L shoulder globally to increase tolerance for ADL and work activities.  4. Pt will have improved score of 67 on FOTO shoulder in order to demonstrate true functional improvement.      Plan     Continue PT per plan of care, progress as appropriate.    Stephanie White, PTA

## 2023-04-12 NOTE — PROGRESS NOTES
Physical Therapy Daily Treatment Note     Name: Jose Antonio Allen  Clinic Number: [id number]    Therapy Diagnosis:   Encounter Diagnoses   Name Primary?    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 States that he still has a lot of trouble with lifting his over his head or out to the side and behind him. Notes that when he works it out it feels better.  He was compliant with home exercise program.  Response to previous treatment: No adverse effects  Functional change: ongoing    Pain: 0/10  Location: bilateral shoulder     Objective     Observation: Brings portable oxygen tank     Posture: forward head and shoulder     Passive Range of Motion:   Shoulder Right Left   Flexion    Abduction    ER at 0 60 60   ER at 90 45 35   IR WNL WNL      Active Range of Motion:   Shoulder Right Left   Flexion 150 117   Abduction 155 125   ER at 0 60 60   ER at 90 60 30   IR WNL WNL   Reach behind head To T1 To C5   Reach behind back  To T10 To L Glute      Strength:  Shoulder Right Left   Flexion 4+ 4+   Abduction 5 4+   ER 4+ 4+   IR 5 5         Special Tests:  Impingement Cluster:    Right Left   Mendez's Antonio - +     Painful Arc - -   Resisted ER - -      Rotator Cuff Tear    Right Left   Hawkin's Zackery - +   Drop Arm test - -  "  Resisted ER - -         SLAP:    Right Left   Biceps Load II - +   O'Jayy's Test - +      Other:    Right Left   Subscapularis Lift Off Test - Unable to reach test position   Empty Can - -      Joint Mobility: Good mobility of GH joint, no muscle guarding     Palpation: Tenderness to palpation at anterior Lshoulder, bicipital groove     Sensation: Intact according to patient    Jose Antonio received therapeutic exercises to develop strength, endurance, ROM, and posture for 45 minutes including:  UBE:  level 2.5 x 5'  CABLE COLUMN rows  CABLE COLUMN extensions  Seated dumbbell press to 90* flexion  D1 flexion with GTB  D2 extension bilateral with GTB    Education on progressed HEP    NT  Supine wand flexion:  !# dowel 2x10  Supine serratus push:  1# dowel 2x10  Sidelying external rotation:  1# 2x10  open books in L SL 10x5"  bows and arrows in R SL 10x5" (2/2 pain w/ open books)  pulleys flexion 3'   seated shoulder flexion x10 slowly  seated shoulder abduction x10 slowly   rows: 2x10 RTB  no moneys: YTB 10x  Wall slides: with towel 2x10  Shldr extension: red theraband 2x10 , cues to lift head up  Progressed to concentric internal rotation/external rotation 1x15 each red theraband -> add back next  Serratus wall:  foam roll 2x10  Sidelying flexion:  2x10  Sidelying shoulder abduction:  2x10   Eccentric Flexion:  red theraband 2x10 (back to bands)  Supine + and - with 1# dowel 2x10 each     Jose Antonio participated in neuromuscular re-education activities to improve:  for 00 minutes. The following activities were included:  Not today    Jose Antonio received the following manual therapy techniques: Jt mobilization were applied to the: bilateral shoulders for 00 minutes, including:  +grade I inferori GH mobs bilat  +grade I AP GH mobs R only (unable to tami on L)    Home Exercises Provided and Patient Education Provided     Education provided:   - Postural awareness  - Importance of daily home exercise program in pain free " range    Written Home Exercises Provided: Patient instructed to cont prior HEP.  Exercises were reviewed and Jose Antonio was able to demonstrate them prior to the end of the session.  Jose Antonio demonstrated good  understanding of the education provided.     See EMR under Patient Instructions for exercises provided  at [facility] on 2/27/2023 .      Assessment     Pt has demonstrated significant improvements in strength of his L shoulder but very limited improvements in his L shoulder mobility and pain. He has reduced tenderness to multiple areas over his shoulder but notes minimal to no subjective improvement. WE discussed potential reasons for his less than ideal improvements with therapy and discussed how his heart conditions and other co morbidities may be negatively affecting his body's healing. Today we decided to discharge from the current case of physical therapy care and continue managing his symptoms with a progressed home exercise program. Patient agreeable to this plan. Patient appropriate for d/c at this time.    Jose Antonio Is progressing well towards his goals.   Pt prognosis is Good.     Pt will continue to benefit from skilled outpatient physical therapy to address the deficits listed in the problem list box on initial evaluation, provide pt/family education and to maximize pt's level of independence in the home and community environment.     Pt's spiritual, cultural and educational needs considered and pt agreeable to plan of care and goals.    Anticipated barriers to physical therapy: Comorbidities, age    GOALS: Short Term Goals:  3 weeks  1.Report decreased L shoulder pain < / =  4/10  to increase tolerance for physical therapy interventions Met  2. Increase PROM to WNL L shoulder Not met  3. Increased strength by 0.5 MMT grade in L shoulder external rotation to increase tolerance for ADL and work activities. Met  4. Pt to tolerate HEP to improve ROM and independence with ADL's Met     Long Term Goals: 6  weeks  1.Report decreased L shoulder pain  < / =  2 /10  to increase tolerance for daily overhead activities Not Met  2.Increase AROM to WNL L shoulder Not Met  3.Increase strength to >/= 4+/5 in L shoulder globally to increase tolerance for ADL and work activities. Met  4. Pt will have improved score of 67 on FOTO shoulder in order to demonstrate true functional improvement.  Not Met    Plan     Continue PT per plan of care, progress as appropriate.    Rufus Armijo, PT

## 2023-04-27 NOTE — TELEPHONE ENCOUNTER
Care Due:                  Date            Visit Type   Department     Provider  --------------------------------------------------------------------------------                                EP -                              PRIMARY      [facility]  Last Visit: 02-      CARE ([facility])   MEDICINE       Abilio Weber  Next Visit: None Scheduled  None         None Found                                                            Last  Test          Frequency    Reason                     Performed    Due Date  --------------------------------------------------------------------------------    Office Visit  12 months..  insulin, lisinopriL,       02- 02-                             mirtazapine..............    HBA1C.......  6 months...  insulin..................  02-   08-    Health [facility] Embedded Care Gaps. Reference number: [id number]. 4/27/2023   5:48:34 AM CDT

## 2023-04-28 NOTE — TELEPHONE ENCOUNTER
No care due was identified.  [facility] Embedded Care Due Messages. Reference number: [id number].   4/28/2023 2:14:04 PM CDT

## 2023-10-14 NOTE — ED PROVIDER NOTES
Encounter Date: 10/14/2023       History     Chief Complaint   Patient presents with    Chest Pain     Chest pain that started yesterday. Pain is relieved by nitro but returns shortly after a dose. Shortness of breath that is more severe than normal. Denies N/V.     Jose Antonio Allen is a 73 y.o. male who  has a past medical history of Angina pectoris, Arthritis, CKD (chronic kidney disease), Colon polyps (09/14/2018), Coronary artery disease, Diabetes mellitus, Diabetes mellitus, type 2, GERD (gastroesophageal reflux disease), History of tobacco use, Hyperlipidemia, Hypertension, S/P CABG (coronary artery bypass graft) (1996), and Thyroid disease.    The patient presents to the ED due to chest pain.  Patient has been having intermittent chest pain for the past 4 days.  Today his pain has were worsened.  Patient had an episode of chest pain today this morning and then had another episode today approximately 1 hour prior to arrival.  He states his temporarily relieved by nitroglycerin.  States it radiates to both of his shoulders.  He has had occasional sweating with it.  He states he notices it more when he stands or moves and he reports associated shortness of breath.  He has an extensive cardiac history with CABG in 1996 and had a heart catheterization in March of this year with a stent placed.  He is followed by Dr. Parmar, He has no other concerns today denies any fever vomiting or abdominal pains    The history is provided by the patient.     Review of patient's allergies indicates:  No Known Allergies  Past Medical History:   Diagnosis Date    Angina pectoris     Arthritis     CKD (chronic kidney disease)     Colon polyps 09/14/2018    Coronary artery disease     Diabetes mellitus     Diabetes mellitus, type 2     GERD (gastroesophageal reflux disease)     History of tobacco use     Hyperlipidemia     Hypertension     S/P CABG (coronary artery bypass graft) 1996    Thyroid disease      Past Surgical History:    Procedure Laterality Date    CATARACT EXTRACTION Bilateral 3YRS    CLOSURE DEVICE  3/20/2023    Procedure: Placement of Closure Device;  Surgeon: Gordy Parmar MD;  Location: [facility] CATH LAB/EP;  Service: Cardiology;;    COLONOSCOPY W/ POLYPECTOMY  09/14/2018    CORONARY ANGIOGRAPHY N/A 4/11/2022    Procedure: ANGIOGRAM, CORONARY ARTERY;  Surgeon: Gordy Parmar MD;  Location: [facility] CATH LAB/EP;  Service: Cardiology;  Laterality: N/A;    CORONARY ANGIOGRAPHY INCLUDING BYPASS GRAFTS WITH CATHETERIZATION OF LEFT HEART N/A 4/11/2022    Procedure: ANGIOGRAM, CORONARY, INCLUDING BYPASS GRAFT, WITH LEFT HEART CATHETERIZATION;  Surgeon: Gordy Parmar MD;  Location: [facility] CATH LAB/EP;  Service: Cardiology;  Laterality: N/A;    CORONARY ARTERY BYPASS GRAFT  1996    CORONARY BYPASS GRAFT ANGIOGRAPHY  4/20/2022    Procedure: Bypass graft study;  Surgeon: Gordy Parmar MD;  Location: [facility] CATH LAB/EP;  Service: Cardiology;;    CORONARY BYPASS GRAFT ANGIOGRAPHY  3/20/2023    Procedure: Bypass graft study;  Surgeon: Gordy Parmar MD;  Location: [facility] CATH LAB/EP;  Service: Cardiology;;    EYELID SURGERY  03/2012    IVUS, CORONARY  3/20/2023    Procedure: IVUS, Coronary;  Surgeon: Gordy Parmar MD;  Location: [facility] CATH LAB/EP;  Service: Cardiology;;    LEFT HEART CATHETERIZATION N/A 4/11/2022    Procedure: Left heart cath;  Surgeon: Gordy Parmar MD;  Location: [facility] CATH LAB/EP;  Service: Cardiology;  Laterality: N/A;    LEFT HEART CATHETERIZATION N/A 3/20/2023    Procedure: Left heart cath;  Surgeon: Gordy Parmar MD;  Location: [facility] CATH LAB/EP;  Service: Cardiology;  Laterality: N/A;    PERCUTANEOUS TRANSLUMINAL BALLOON ANGIOPLASTY OF CORONARY ARTERY  4/20/2022    Procedure: Angioplasty-coronary;  Surgeon: Gordy Parmar MD;  Location: [facility] CATH LAB/EP;  Service: Cardiology;;    PERCUTANEOUS TRANSLUMINAL BALLOON ANGIOPLASTY OF CORONARY ARTERY  3/20/2023    Procedure: Angioplasty-coronary;   Surgeon: Gordy Parmar MD;  Location: [facility] CATH LAB/EP;  Service: Cardiology;;    RIGHT HEART CATHETERIZATION Right 11/3/2021    Procedure: INSERTION, CATHETER, RIGHT HEART;  Surgeon: Temitope Rahman MD;  Location: [facility] CATH LAB/EP;  Service: Cardiology;  Laterality: Right;    STENT, DRUG ELUTING, SINGLE VESSEL, CORONARY  3/20/2023    Procedure: Stent, Drug Eluting, Single Vessel, Coronary;  Surgeon: Gordy Parmar MD;  Location: [facility] CATH LAB/EP;  Service: Cardiology;;    VEIN BYPASS SURGERY  1996    VEIN SURGERY Left 2017    PAD    VEIN SURGERY Right 2018    PAD     Family History   Problem Relation Age of Onset    Diabetes Mother     Diabetes Father     Diabetes Sister     Kidney disease Sister     Diabetes Brother     Diabetes Sister     Blindness Neg Hx     Glaucoma Neg Hx     Macular degeneration Neg Hx     Retinal detachment Neg Hx     Strabismus Neg Hx     Stroke Neg Hx     Thyroid disease Neg Hx     Cancer Neg Hx     Cataracts Neg Hx     Hypertension Neg Hx      Social History     Tobacco Use    Smoking status: Former     Current packs/day: 0.00     Average packs/day: 2.0 packs/day for 27.0 years (54.0 ttl pk-yrs)     Types: Cigarettes     Start date:      Quit date:      Years since quittin.8    Smokeless tobacco: Never   Substance Use Topics    Alcohol use: Yes     Comment: Occasionally has a drink    Drug use: No     Review of Systems   Constitutional:  Negative for fever.   HENT:  Negative for sore throat.    Respiratory:  Positive for shortness of breath.    Cardiovascular:  Positive for chest pain.   Gastrointestinal:  Negative for nausea.   Genitourinary:  Negative for dysuria.   Musculoskeletal:  Negative for back pain.   Skin:  Negative for rash.   Neurological:  Negative for weakness.   Hematological:  Does not bruise/bleed easily.       Physical Exam     Initial Vitals   BP Pulse Resp Temp SpO2   10/14/23 1719 10/14/23 1719 10/14/23 1719 10/14/23 1719 10/14/23 1737   (!)  203/92 104 (!) 25 97.9 °F (36.6 °C) 99 %      MAP       --                Physical Exam    Nursing note and vitals reviewed.  Constitutional: He appears well-developed and well-nourished. He is not diaphoretic. No distress.   HENT:   Head: Normocephalic and atraumatic.   Mouth/Throat: Oropharynx is clear and moist.   Eyes: EOM are normal. Pupils are equal, round, and reactive to light.   Neck: No tracheal deviation present.   Cardiovascular:  Normal rate, regular rhythm, normal heart sounds and intact distal pulses.           Pulmonary/Chest: Breath sounds normal. No stridor. No respiratory distress.   Abdominal: Abdomen is soft. He exhibits no distension and no mass. There is no abdominal tenderness.   Musculoskeletal:         General: No edema. Normal range of motion.     Neurological: He is alert and oriented to person, place, and time. No cranial nerve deficit or sensory deficit.   Skin: Skin is warm and dry. Capillary refill takes less than 2 seconds. No rash noted.   Psychiatric: He has a normal mood and affect. His behavior is normal. Thought content normal.         ED Course   Procedures  Labs Reviewed   CBC W/ AUTO DIFFERENTIAL - Abnormal; Notable for the following components:       Result Value    Hemoglobin 13.1 (*)     MCHC 31.8 (*)     Platelets 142 (*)     All other components within normal limits   COMPREHENSIVE METABOLIC PANEL - Abnormal; Notable for the following components:    CO2 19 (*)     Glucose 388 (*)     BUN 29 (*)     Creatinine 2.1 (*)     Alkaline Phosphatase 147 (*)     eGFR 33 (*)     All other components within normal limits   TROPONIN I - Abnormal; Notable for the following components:    Troponin I 0.027 (*)     All other components within normal limits   B-TYPE NATRIURETIC PEPTIDE - Abnormal; Notable for the following components:     (*)     All other components within normal limits   TROPONIN I   POCT GLUCOSE MONITORING CONTINUOUS          Imaging Results              X-Ray  Chest 1 View (Final result)  Result time 10/14/23 18:12:10      Final result by Hernandez Myers MD (10/14/23 18:12:10)                   Impression:      Stable examination.      Electronically signed by: Hernandez Myers MD  Date:    10/14/2023  Time:    18:12               Narrative:    EXAMINATION:  XR CHEST 1 VIEW    CLINICAL HISTORY:  Chest pain, unspecified    TECHNIQUE:  Single frontal view of the chest was performed.    COMPARISON:  12/05/2022.    FINDINGS:  Monitoring EKG leads are present.  There are postop changes of median sternotomy.  The sternal wires are intact.    The trachea is unremarkable.  There are calcifications of the aortic knob.  The cardiomediastinal silhouette is unchanged.  There is no evidence of free air beneath the hemidiaphragms.  There are no pleural effusions.  There is no evidence of a pneumothorax.  There is no evidence of pneumomediastinum.  There are stable chronic interstitial opacities.  No new airspace disease.  There are degenerative changes in the osseous structures.                                       Medications   amLODIPine tablet 5 mg (has no administration in time range)   aspirin chewable tablet 81 mg (has no administration in time range)   atorvastatin tablet 80 mg (has no administration in time range)   clopidogreL tablet 75 mg (has no administration in time range)   ezetimibe tablet 10 mg (has no administration in time range)   levothyroxine tablet 100 mcg (has no administration in time range)   metoprolol succinate (TOPROL-XL) 24 hr tablet 100 mg (has no administration in time range)   0.9%  NaCl infusion (has no administration in time range)   nitroGLYCERIN SL tablet 0.4 mg (has no administration in time range)   enoxaparin injection 70 mg (70 mg Subcutaneous Given 10/14/23 2204)   glucose chewable tablet 16 g (has no administration in time range)   glucose chewable tablet 24 g (has no administration in time range)   glucagon (human recombinant) injection 1 mg (has  no administration in time range)   insulin aspart U-100 pen 0-10 Units (3 Units Subcutaneous Given 10/14/23 2205)   dextrose 10% bolus 125 mL 125 mL (has no administration in time range)   dextrose 10% bolus 250 mL 250 mL (has no administration in time range)   insulin detemir U-100 (Levemir) pen 25 Units (25 Units Subcutaneous Given 10/14/23 2204)   hydrALAZINE injection 10 mg (has no administration in time range)   aspirin EC tablet 325 mg (325 mg Oral Given 10/14/23 1742)   nitroGLYCERIN 2% TD oint ointment 1 inch (1 inch Topical (Top) Given 10/14/23 1742)     Medical Decision Making  Differential Diagnosis includes, but is not limited to:  ACS/MI, PE, aortic dissection, pneumothorax, cardiac tamponade, pericarditis/myocarditis, pneumonia, infection/abscess, lung mass, trauma/fracture, costochondritis/pleurisy, MSK pain/contusion, GERD, biliary disease, pancreatitis, anemia      Problems Addressed:  Chest pain:     Details: Patient with significant risk factors for ACS.  Given EKG changes history and blood work will plan to admit for further evaluation chest pain.    Amount and/or Complexity of Data Reviewed  Labs: ordered. Decision-making details documented in ED Course.  Radiology: ordered.    Risk  OTC drugs.  Prescription drug management.               ED Course as of 10/15/23 0207   Sat Oct 14, 2023   1719 EKG:  Rate 106.  Sinus tachycardia.  Mild elevation in AVR, ST depressions in 1 to AVF V4 V5 V6.  No STEMI.  Will repeat. [RN]   1723 Repeat EKG: Rate 96.  Normal sinus rhythm.  ST changes present without significant change from previous EKG   [RN]   1731 BP(!): 203/92 [RN]   1733 Dr. Parmar who reviewed EKG.  No STEMI/ indication for PCI. Will plan to admit for chest pain.  [RN]   1740 CBC auto differential(!)  No significant abnormality.    [RN]   Sun Oct 15, 2023   0205 Comprehensive metabolic panel(!)  Hyperglycemia, elevated bun/cr [RN]   0206 CBC auto differential(!)  No significant abnormality.     [RN]   0206 BNP(!)  Elevated   [RN]   0206 Troponin I #1(!)  Elevated   [RN]   0206 X-Ray Chest 1 View  Chest X ray reviewed - negative for infiltrate or pneumothorax by my independent interpretation. [RN]      ED Course User Index  [RN] Abe Mo Jr., MD                      Clinical Impression:   Final diagnoses:  [R07.9] Chest pain        ED Disposition Condition    Observation              Portions of this note were dictated using voice recognition software and may contain dictation related errors in spelling/grammar/syntax not found on text review       Abe Mo Jr., MD  10/15/23 0207

## 2023-10-14 NOTE — ED TRIAGE NOTES
Pt admitted to the ED for chest pain and shortness of breath. Pt reports he the pain began Thursday and has been intermittent in nature since. Pt states he has been taking nitroglycerin at home with relief in pain. Pt localizes the pain to the mid sternal area that also is in bilateral shoulders.  Pt wears supplemental O2 at 4L.

## 2023-10-15 PROBLEM — N17.9 AKI (ACUTE KIDNEY INJURY): Status: ACTIVE | Noted: 2023-01-01

## 2023-10-15 NOTE — PROGRESS NOTES
[facility] Medicine  Progress Note    Patient Name: Jose Antonio Allen  MRN: [id number]  Patient Class: OP- Observation   Admission Date: 10/14/2023  Length of Stay: 0 days  Attending Physician: Bernardo Velasquez,*  Primary Care Provider: Abilio Weber MD        Subjective:     Principal Problem:Chest pain        HPI:  Jose Antonio Allen is a 73 y.o. male who  has a past medical history of Angina pectoris, Arthritis, CKD (chronic kidney disease), Colon polyps (09/14/2018), Coronary artery disease, Diabetes mellitus, Diabetes mellitus, type 2, GERD (gastroesophageal reflux disease), History of tobacco use, Hyperlipidemia, Hypertension, S/P CABG (coronary artery bypass graft) (1996), and Thyroid disease.     He presented to the ED w/ c/o chest pain.  Patient has been having intermittent chest pain for the past 4 days but today it worsened.  Patient had an episode of chest pain this morning and then had another episode today approximately 1 hour prior to arrival.  He states it was temporarily relieved by nitroglycerin at home.  States it radiates to both of his shoulders and he has had occasional sweating with it.  He states he notices it more when he stands or moves and he reports associated shortness of breath.  He states he is very worried given his cardiac history, which includes CABG in 1996 and had a heart catheterization in March of this year with a stent placed.  He is followed by Dr. Parmar. He denies any fever, chills, nausea, vomiting, or abdominal pains         Overview/Hospital Course:  72 y/o with hx of CAD/CABG/PCI presented with chest pain and SOB in setting of hypertensive urgency. BP controlled. Trop 0.027> 0.555 likely secondary to demand. Patient denies active chest pain this morning. Cardiology following- recommend TTE and NPO after MN.             Interval History: patient lying in bed resting, denies complaints. POC reviewed.     Review of Systems   Constitutional:   Negative for chills and fever.   Respiratory:  Positive for shortness of breath (with exertion).    Cardiovascular:  Negative for chest pain.   Gastrointestinal:  Negative for abdominal pain, nausea and vomiting.     Objective:     Vital Signs (Most Recent):  Temp: 98.6 °F (37 °C) (10/15/23 1039)  Pulse: 79 (10/15/23 1141)  Resp: 18 (10/15/23 1039)  BP: (!) 163/81 (10/15/23 1039)  SpO2: 99 % (10/15/23 1039) Vital Signs (24h Range):  Temp:  [97.1 °F (36.2 °C)-98.6 °F (37 °C)] 98.6 °F (37 °C)  Pulse:  [] 79  Resp:  [18-29] 18  SpO2:  [98 %-100 %] 99 %  BP: (133-203)/(71-92) 163/81     Weight: 65 kg (143 lb 4.8 oz)  Body mass index is 24.6 kg/m².    Intake/Output Summary (Last 24 hours) at 10/15/2023 1147  Last data filed at 10/15/2023 0538  Gross per 24 hour   Intake --   Output 950 ml   Net -950 ml         Physical Exam  Vitals and nursing note reviewed.   Constitutional:       General: He is not in acute distress.     Appearance: He is well-developed.   HENT:      Head: Normocephalic and atraumatic.   Eyes:      Conjunctiva/sclera: Conjunctivae normal.      Pupils: Pupils are equal, round, and reactive to light.   Neck:      Vascular: No JVD.   Cardiovascular:      Rate and Rhythm: Normal rate and regular rhythm.      Heart sounds: Normal heart sounds.   Pulmonary:      Effort: Pulmonary effort is normal. No respiratory distress.      Breath sounds: Normal breath sounds. No wheezing.      Comments: On 4 L O2 per nasal cannula (patient reports on 4 L at home)   Abdominal:      General: Bowel sounds are normal. There is no distension.      Palpations: Abdomen is soft.      Tenderness: There is no abdominal tenderness. There is no guarding.   Musculoskeletal:         General: No tenderness. Normal range of motion.      Cervical back: Normal range of motion and neck supple.   Skin:     General: Skin is warm and dry.      Capillary Refill: Capillary refill takes less than 2 seconds.      Findings: No erythema.    Neurological:      Mental Status: He is alert and oriented to person, place, and time.   Psychiatric:         Behavior: Behavior normal.             Significant Labs: All pertinent labs within the past 24 hours have been reviewed.  CBC:   Recent Labs   Lab 10/14/23  1727 10/15/23  0625   WBC 5.55 5.89   HGB 13.1* 11.8*   HCT 41.2 35.9*   * 134*     CMP:   Recent Labs   Lab 10/14/23  1727 10/15/23  0625    145   K 4.7 4.3    112*   CO2 19* 24   * 133*   BUN 29* 25*   CREATININE 2.1* 1.4   CALCIUM 9.2 9.2   PROT 8.1  --    ALBUMIN 3.7  --    BILITOT 0.2  --    ALKPHOS 147*  --    AST 26  --    ALT 31  --    ANIONGAP 13 9     Troponin:   Recent Labs   Lab 10/14/23  1727 10/15/23  0624   TROPONINI 0.027* 0.555*       Significant Imaging: I have reviewed all pertinent imaging results/findings within the past 24 hours.      Assessment/Plan:      * Chest pain  · Midsternal CP w/ radiation into back between shoulders  · ASA, plavix, lovenox, statin  · PRN NTG for CP  · Cardiology consult ordered  · Trop 0.027  · EKG: sinus tachycardia, , ST and T wave abnormality but no STEMI      SAUL (acute kidney injury)  Patient with acute kidney injury/acute renal failure likely due to pre-renal azotemia due to dehydration SAUL is currently improving. Baseline creatinine 1.3-1.7 - Labs reviewed- Renal function/electrolytes with Estimated Creatinine Clearance: 39.3 mL/min (based on SCr of 1.4 mg/dL). according to latest data. Monitor urine output and serial BMP and adjust therapy as needed. Avoid nephrotoxins and renally dose meds for GFR listed above.      Chronic obstructive pulmonary disease, unspecified COPD type  · Wears 4L NC at home  · Maintain sats >88%        Type 2 diabetes mellitus with stage 3a chronic kidney disease, without long-term current use of insulin  · A1C 7.9 - last checked 2 years ago  · Levemir  · Moderate SSI  · Kidney function decreased from baseline  · See SAUL for  plan      Fibrosis of lung        Essential hypertension  · Resume home BP meds        VTE Risk Mitigation (From admission, onward)         Ordered     enoxaparin injection 70 mg  Every 24 hours (non-standard times)         10/14/23 1928     IP VTE HIGH RISK PATIENT  Once         10/14/23 1928     Place sequential compression device  Until discontinued         10/14/23 1928                Discharge Planning   KARLA: 10/16/23    Code Status: Full Code   Is the patient medically ready for discharge?:     Reason for patient still in hospital (select all that apply): Patient trending condition, Treatment, Consult recommendations and Pending disposition                     Cindy Voss NP  Department of Hospital Medicine   [facility]

## 2023-10-15 NOTE — SUBJECTIVE & OBJECTIVE
Interval History: patient lying in bed resting, denies complaints. POC reviewed.     Review of Systems   Constitutional:  Negative for chills and fever.   Respiratory:  Positive for shortness of breath (with exertion).    Cardiovascular:  Negative for chest pain.   Gastrointestinal:  Negative for abdominal pain, nausea and vomiting.     Objective:     Vital Signs (Most Recent):  Temp: 98.6 °F (37 °C) (10/15/23 1039)  Pulse: 79 (10/15/23 1141)  Resp: 18 (10/15/23 1039)  BP: (!) 163/81 (10/15/23 1039)  SpO2: 99 % (10/15/23 1039) Vital Signs (24h Range):  Temp:  [97.1 °F (36.2 °C)-98.6 °F (37 °C)] 98.6 °F (37 °C)  Pulse:  [] 79  Resp:  [18-29] 18  SpO2:  [98 %-100 %] 99 %  BP: (133-203)/(71-92) 163/81     Weight: 65 kg (143 lb 4.8 oz)  Body mass index is 24.6 kg/m².    Intake/Output Summary (Last 24 hours) at 10/15/2023 1147  Last data filed at 10/15/2023 0538  Gross per 24 hour   Intake --   Output 950 ml   Net -950 ml         Physical Exam  Vitals and nursing note reviewed.   Constitutional:       General: He is not in acute distress.     Appearance: He is well-developed.   HENT:      Head: Normocephalic and atraumatic.   Eyes:      Conjunctiva/sclera: Conjunctivae normal.      Pupils: Pupils are equal, round, and reactive to light.   Neck:      Vascular: No JVD.   Cardiovascular:      Rate and Rhythm: Normal rate and regular rhythm.      Heart sounds: Normal heart sounds.   Pulmonary:      Effort: Pulmonary effort is normal. No respiratory distress.      Breath sounds: Normal breath sounds. No wheezing.      Comments: On 4 L O2 per nasal cannula (patient reports on 4 L at home)   Abdominal:      General: Bowel sounds are normal. There is no distension.      Palpations: Abdomen is soft.      Tenderness: There is no abdominal tenderness. There is no guarding.   Musculoskeletal:         General: No tenderness. Normal range of motion.      Cervical back: Normal range of motion and neck supple.   Skin:     General:  Skin is warm and dry.      Capillary Refill: Capillary refill takes less than 2 seconds.      Findings: No erythema.   Neurological:      Mental Status: He is alert and oriented to person, place, and time.   Psychiatric:         Behavior: Behavior normal.             Significant Labs: All pertinent labs within the past 24 hours have been reviewed.  CBC:   Recent Labs   Lab 10/14/23  1727 10/15/23  0625   WBC 5.55 5.89   HGB 13.1* 11.8*   HCT 41.2 35.9*   * 134*     CMP:   Recent Labs   Lab 10/14/23  1727 10/15/23  0625    145   K 4.7 4.3    112*   CO2 19* 24   * 133*   BUN 29* 25*   CREATININE 2.1* 1.4   CALCIUM 9.2 9.2   PROT 8.1  --    ALBUMIN 3.7  --    BILITOT 0.2  --    ALKPHOS 147*  --    AST 26  --    ALT 31  --    ANIONGAP 13 9     Troponin:   Recent Labs   Lab 10/14/23  1727 10/15/23  0624   TROPONINI 0.027* 0.555*       Significant Imaging: I have reviewed all pertinent imaging results/findings within the past 24 hours.

## 2023-10-15 NOTE — ASSESSMENT & PLAN NOTE
Patient with acute kidney injury/acute renal failure likely due to pre-renal azotemia due to dehydration SAUL is currently improving. Baseline creatinine 1.3-1.7 - Labs reviewed- Renal function/electrolytes with Estimated Creatinine Clearance: 39.3 mL/min (based on SCr of 1.4 mg/dL). according to latest data. Monitor urine output and serial BMP and adjust therapy as needed. Avoid nephrotoxins and renally dose meds for GFR listed above.

## 2023-10-15 NOTE — H&P
[facility]  History & Physical    Patient Name: Jose Antonio Allen  MRN: [id number]  Patient Class: OP- Observation  Admission Date: 10/14/2023  Attending Physician: Bernardo Velasquez,*   Primary Care Provider: Abilio Weber MD         Patient information was obtained from patient, relative(s), past medical records and ER records.     Subjective:     Principal Problem:Chest pain    Chief Complaint:   Chief Complaint   Patient presents with    Chest Pain     Chest pain that started yesterday. Pain is relieved by nitro but returns shortly after a dose. Shortness of breath that is more severe than normal. Denies N/V.        HPI: Jose Antonio Allen is a 73 y.o. male who  has a past medical history of Angina pectoris, Arthritis, CKD (chronic kidney disease), Colon polyps (09/14/2018), Coronary artery disease, Diabetes mellitus, Diabetes mellitus, type 2, GERD (gastroesophageal reflux disease), History of tobacco use, Hyperlipidemia, Hypertension, S/P CABG (coronary artery bypass graft) (1996), and Thyroid disease.     He presented to the ED w/ c/o chest pain.  Patient has been having intermittent chest pain for the past 4 days but today it worsened.  Patient had an episode of chest pain this morning and then had another episode today approximately 1 hour prior to arrival.  He states it was temporarily relieved by nitroglycerin at home.  States it radiates to both of his shoulders and he has had occasional sweating with it.  He states he notices it more when he stands or moves and he reports associated shortness of breath.  He states he is very worried given his cardiac history, which includes CABG in 1996 and had a heart catheterization in March of this year with a stent placed.  He is followed by Dr. Parmar. He denies any fever, chills, nausea, vomiting, or abdominal pains         Past Medical History:   Diagnosis Date    Angina pectoris     Arthritis     CKD (chronic kidney  disease)     Colon polyps 09/14/2018    Coronary artery disease     Diabetes mellitus     Diabetes mellitus, type 2     GERD (gastroesophageal reflux disease)     History of tobacco use     Hyperlipidemia     Hypertension     S/P CABG (coronary artery bypass graft) 1996    Thyroid disease        Past Surgical History:   Procedure Laterality Date    CATARACT EXTRACTION Bilateral 3YRS    CLOSURE DEVICE  3/20/2023    Procedure: Placement of Closure Device;  Surgeon: Gordy Parmar MD;  Location: [facility] CATH LAB/EP;  Service: Cardiology;;    COLONOSCOPY W/ POLYPECTOMY  09/14/2018    CORONARY ANGIOGRAPHY N/A 4/11/2022    Procedure: ANGIOGRAM, CORONARY ARTERY;  Surgeon: Gordy Parmar MD;  Location: [facility] CATH LAB/EP;  Service: Cardiology;  Laterality: N/A;    CORONARY ANGIOGRAPHY INCLUDING BYPASS GRAFTS WITH CATHETERIZATION OF LEFT HEART N/A 4/11/2022    Procedure: ANGIOGRAM, CORONARY, INCLUDING BYPASS GRAFT, WITH LEFT HEART CATHETERIZATION;  Surgeon: Gordy Parmar MD;  Location: [facility] CATH LAB/EP;  Service: Cardiology;  Laterality: N/A;    CORONARY ARTERY BYPASS GRAFT  1996    CORONARY BYPASS GRAFT ANGIOGRAPHY  4/20/2022    Procedure: Bypass graft study;  Surgeon: Gordy Parmar MD;  Location: [facility] CATH LAB/EP;  Service: Cardiology;;    CORONARY BYPASS GRAFT ANGIOGRAPHY  3/20/2023    Procedure: Bypass graft study;  Surgeon: Gordy Parmar MD;  Location: [facility] CATH LAB/EP;  Service: Cardiology;;    EYELID SURGERY  03/2012    IVUS, CORONARY  3/20/2023    Procedure: IVUS, Coronary;  Surgeon: Gordy Parmar MD;  Location: [facility] CATH LAB/EP;  Service: Cardiology;;    LEFT HEART CATHETERIZATION N/A 4/11/2022    Procedure: Left heart cath;  Surgeon: Gordy Parmar MD;  Location: [facility] CATH LAB/EP;  Service: Cardiology;  Laterality: N/A;    LEFT HEART CATHETERIZATION N/A 3/20/2023    Procedure: Left heart cath;  Surgeon: Gordy Parmar MD;  Location: [facility] CATH LAB/EP;  Service:  Cardiology;  Laterality: N/A;    PERCUTANEOUS TRANSLUMINAL BALLOON ANGIOPLASTY OF CORONARY ARTERY  4/20/2022    Procedure: Angioplasty-coronary;  Surgeon: Gordy Parmar MD;  Location: [facility] CATH LAB/EP;  Service: Cardiology;;    PERCUTANEOUS TRANSLUMINAL BALLOON ANGIOPLASTY OF CORONARY ARTERY  3/20/2023    Procedure: Angioplasty-coronary;  Surgeon: Gordy Parmar MD;  Location: [facility] CATH LAB/EP;  Service: Cardiology;;    RIGHT HEART CATHETERIZATION Right 11/3/2021    Procedure: INSERTION, CATHETER, RIGHT HEART;  Surgeon: Temitope Rahman MD;  Location: [facility] CATH LAB/EP;  Service: Cardiology;  Laterality: Right;    STENT, DRUG ELUTING, SINGLE VESSEL, CORONARY  3/20/2023    Procedure: Stent, Drug Eluting, Single Vessel, Coronary;  Surgeon: Gordy Parmar MD;  Location: [facility] CATH LAB/EP;  Service: Cardiology;;    VEIN BYPASS SURGERY  1996    VEIN SURGERY Left 2017    PAD    VEIN SURGERY Right 2018    PAD       Review of patient's allergies indicates:  No Known Allergies    No current facility-administered medications on file prior to encounter.     Current Outpatient Medications on File Prior to Encounter   Medication Sig    amLODIPine (NORVASC) 5 MG tablet Take 1 tablet (5 mg total) by mouth once daily.    aspirin 81 MG Chew 81 mg nightly.    atorvastatin (LIPITOR) 80 MG tablet TAKE 1 TABLET EVERY DAY (Patient taking differently: Take 80 mg by mouth every evening.)    clopidogreL (PLAVIX) 75 mg tablet TAKE 1 TABLET EVERY DAY (Patient taking differently: Take 75 mg by mouth nightly.)    empagliflozin (JARDIANCE) 10 mg tablet Take 10 mg by mouth once daily.    ESBRIET 801 mg Tab 3 (three) times daily.    finerenone (KERENDIA) 10 mg Tab Take 10 mg by mouth Daily.    insulin degludec (TRESIBA FLEXTOUCH U-100) 100 unit/mL (3 mL) insulin pen Inject 50 Units into the skin every evening. F/u apt needed with PCP (Patient taking differently: Inject 35 Units into the skin once daily. F/u apt needed with  "PCP)    levothyroxine (SYNTHROID) 100 MCG tablet Take 1 tablet (100 mcg total) by mouth before breakfast.    lisinopriL (PRINIVIL,ZESTRIL) 2.5 MG tablet TAKE 1 TABLET EVERY DAY    metoprolol succinate (TOPROL-XL) 100 MG 24 hr tablet TAKE 1 TABLET ONE TIME DAILY (Patient taking differently: Take 100 mg by mouth nightly. TAKE 1 TABLET ONE TIME DAILY)    nitroGLYCERIN (NITROSTAT) 0.4 MG SL tablet Place 1 tablet (0.4 mg total) under the tongue every 5 (five) minutes as needed for Chest pain.    NOVOLOG 100 unit/mL injection Inject into the skin 3 (three) times daily after meals. Sliding scale    ORENCIA 125 mg/mL Syrg Inject into the skin once a week.     pantoprazole (PROTONIX) 40 MG tablet Take 40 mg by mouth once daily.    vitamin D (VITAMIN D3) 1000 units Tab Take 1,000 Units by mouth once daily.    ACCU-CHEK ADRIEN PLUS METER Misc 1 Product by Other route daily as needed.    ACCU-CHEK SMARTVIEW TEST STRIP Strp     ACCU-CHEK SOFTCLIX LANCETS Misc Inject 100 lancets into the skin daily as needed.    b complex vitamins tablet Take 1 tablet by mouth once daily.    DROPLET PEN NEEDLE 32 gauge x 5/32" Ndle Inject 1 Product into the skin daily as needed.    ezetimibe (ZETIA) 10 mg tablet TAKE 1 TABLET EVERY DAY    hydrocortisone 2.5 % cream Apply topically 2 (two) times daily as needed.    LUBRICANT EYE DROPS 0.5 % Dpet     mirtazapine (REMERON) 7.5 MG Tab TAKE 1 TABLET (7.5 MG TOTAL) BY MOUTH EVERY EVENING.     Family History       Problem Relation (Age of Onset)    Diabetes Mother, Father, Sister, Brother, Sister    Kidney disease Sister          Tobacco Use    Smoking status: Former     Current packs/day: 0.00     Average packs/day: 2.0 packs/day for 27.0 years (54.0 ttl pk-yrs)     Types: Cigarettes     Start date:      Quit date:      Years since quittin.8    Smokeless tobacco: Never   Substance and Sexual Activity    Alcohol use: Yes     Comment: Occasionally has a drink    " Drug use: No    Sexual activity: Yes     Partners: Female     Review of Systems   Constitutional:  Negative for chills and fever.   HENT:  Negative for trouble swallowing.         [place]   Respiratory:  Positive for shortness of breath.    Cardiovascular:  Positive for chest pain.   Gastrointestinal:  Negative for abdominal pain, nausea and vomiting.   Genitourinary:  Negative for difficulty urinating.   Musculoskeletal:  Negative for myalgias.   Skin:  Negative for color change.   Neurological:  Negative for dizziness, syncope, weakness, light-headedness and headaches.   Psychiatric/Behavioral:  Negative for agitation and confusion. The patient is not nervous/anxious.      Objective:     Vital Signs (Most Recent):  Temp: 97.8 °F (36.6 °C) (10/14/23 2012)  Pulse: 73 (10/14/23 2333)  Resp: 18 (10/14/23 2012)  BP: (!) 190/88 (10/14/23 2012)  SpO2: 98 % (10/14/23 2012) Vital Signs (24h Range):  Temp:  [97.8 °F (36.6 °C)-97.9 °F (36.6 °C)] 97.8 °F (36.6 °C)  Pulse:  [] 73  Resp:  [18-29] 18  SpO2:  [98 %-100 %] 98 %  BP: (148-203)/(73-92) 190/88     Weight: 65 kg (143 lb 4.8 oz)  Body mass index is 24.6 kg/m².     Physical Exam  Constitutional:       General: He is not in acute distress.     Appearance: He is ill-appearing.   HENT:      Right Ear: Decreased hearing noted.      Left Ear: Decreased hearing noted.      Mouth/Throat:      Mouth: Mucous membranes are moist.   Eyes:      Pupils: Pupils are equal, round, and reactive to light.   Cardiovascular:      Rate and Rhythm: Normal rate and regular rhythm.      Pulses: Normal pulses.      Heart sounds: Normal heart sounds.   Pulmonary:      Effort: Pulmonary effort is normal. No respiratory distress.      Breath sounds: Normal breath sounds. No wheezing, rhonchi or rales.   Abdominal:      General: Bowel sounds are normal. There is no distension.      Palpations: Abdomen is soft.      Tenderness: There is no abdominal tenderness. There is no guarding or rebound.    Musculoskeletal:      Right lower leg: No edema.      Left lower leg: No edema.   Skin:     General: Skin is warm.   Neurological:      Mental Status: He is alert and oriented to person, place, and time.   Psychiatric:         Mood and Affect: Mood normal.         Behavior: Behavior normal.         Thought Content: Thought content normal.         Judgment: Judgment normal.              CRANIAL NERVES     CN III, IV, VI   Pupils are equal, round, and reactive to light.       Significant Labs: All pertinent labs within the past 24 hours have been reviewed.    Significant Imaging: I have reviewed all pertinent imaging results/findings within the past 24 hours.    Assessment/Plan:     * Chest pain  · Midsternal CP w/ radiation into back between shoulders  · ASA, plavix, lovenox, statin  · PRN NTG for CP  · Cardiology consult ordered  · Trop 0.027  · EKG: sinus tachycardia, , ST and T wave abnormality but no STEMI      Chronic obstructive pulmonary disease, unspecified COPD type  · Wears 4L NC at home  · Maintain sats >88%        Type 2 diabetes mellitus with stage 3a chronic kidney disease, without long-term current use of insulin  · A1C 7.9 - last checked 2 years ago  · Levemir  · Moderate SSI  · Kidney function decreased from baseline  · See SAUL for plan      Fibrosis of lung        Essential hypertension  · Resume home BP meds        VTE Risk Mitigation (From admission, onward)         Ordered     enoxaparin injection 70 mg  Every 24 hours (non-standard times)         10/14/23 1928     IP VTE HIGH RISK PATIENT  Once         10/14/23 1928     Place sequential compression device  Until discontinued         10/14/23 1928                     On 10/15/2023, patient should be placed in hospital observation services under my care in collaboration with Bernardo Velasquez MD.      Marce Elder NP  Department of Hospital Medicine  [facility]

## 2023-10-15 NOTE — HOSPITAL COURSE
72 y/o with hx of CAD/CABG/PCI presented with chest pain and SOB in setting of hypertensive urgency. Has remained chest pain free since admission. Trop 0.027> 0.555 >> 0.257 likely secondary to demand. TTE with concentric remodeling, EF 55%, grade I DD. Cardiology followed with recommendations for aggressive BP control, no procedural intervention required. Home amlodipine uptitrated and metoprolol continued. Continue home oxygen supplementation and encouraged continuous wear especially with activity.  10/17-plan to DC later on today if blood pressure is within normal range and if there are no complaints with dizziness when ambulating.      Disposition plans: B/P is better controlled on this am. Will DC home with plans to follow up with the [facility] care [facility] outpatient. We have instructed the patient to check his b/p daily and keep a record. B/P meds changed are amlodipine 10 mg PO daily and will keep metoprolol 100 mg PO daily. We have stopped his lisinopril. Will also need a follow up with his PCP in 2 weeks.

## 2023-10-15 NOTE — PLAN OF CARE
Problem: Adult Inpatient Plan of Care  Goal: Plan of Care Review  Outcome: Ongoing, Progressing     VIRTUAL NURSE:  Cued into patient's room.  Permission received per patient to turn camera to view patient.  Introduced as VN for night shift that will be working with floor nurse and nursing assistant.  Educated patient on VN's role in patient care and  VIP model.  Plan of care reviewed with patient.  Education per flowsheet.   Informed patient that staff will round on them every 2 hours but to use call light for any other needs they may have; informed of fall risk and fall precautions.  Patient verbalized understanding.  Call light within reach; bed siderails up x2.  Opportunity given for questions and questions answered.  Admission assessment questions answered.  Instructed to call for assistance.  Will cont to monitor and intervene as needed.    Labs, notes, orders, and careplan initiated.       10/14/23 2054   Patient Request   Patient Requested NP Bridges at bedside.  patient is extremely HARD OF HEARING; speaks Danish and English. family member at bedside to assist with admission assessment questions because patient is having hearing difficulty. requesting extension for oxygen tubing. no complaints currently.   Admission   Initial VN Admission Questions Complete   Communication Issues? Patient Hearing;Technical Issue   Shift   Virtual Nurse - Rounding Complete   Pain Management Interventions pain management plan reviewed with patient/caregiver   Virtual Nurse - Patient Verbalized Approval Of Camera Use;VN Rounding   Type of Frequent Check   Type Patient Rounds   Safety/Activity   Patient Rounds bed in low position;placement of personal items at bedside;bed wheels locked;call light in patient/parent reach;visualized patient;clutter free environment maintained   Safety Promotion/Fall Prevention assistive device/personal item within reach;bed alarm set;commode/urinal/bedpan at bedside;high risk medications  identified;Fall Risk reviewed with patient/family;diversional activities provided;medications reviewed;nonskid shoes/socks when out of bed;room near unit station;supervised activity;Supervised toileting - stay within arms reach;instructed to call staff for mobility;family to remain at bedside;side rails raised x 2   Safety Precautions emergency equipment at bedside   Positioning   Body Position neutral body alignment;neutral head position;position changed independently   Head of Bed (HOB) Positioning HOB at 60-90 degrees   Pain/Comfort/Sleep   Preferred Pain Scale number (Numeric Rating Pain Scale)   Comfort/Acceptable Pain Level 0   Pain Rating (0-10): Rest 0   Sleep/Rest/Relaxation no problem identified;awake

## 2023-10-15 NOTE — SUBJECTIVE & OBJECTIVE
Past Medical History:   Diagnosis Date    Angina pectoris     Arthritis     CKD (chronic kidney disease)     Colon polyps 09/14/2018    Coronary artery disease     Diabetes mellitus     Diabetes mellitus, type 2     GERD (gastroesophageal reflux disease)     History of tobacco use     Hyperlipidemia     Hypertension     S/P CABG (coronary artery bypass graft) 1996    Thyroid disease        Past Surgical History:   Procedure Laterality Date    CATARACT EXTRACTION Bilateral 3YRS    CLOSURE DEVICE  3/20/2023    Procedure: Placement of Closure Device;  Surgeon: Gordy Parmar MD;  Location: [facility] CATH LAB/EP;  Service: Cardiology;;    COLONOSCOPY W/ POLYPECTOMY  09/14/2018    CORONARY ANGIOGRAPHY N/A 4/11/2022    Procedure: ANGIOGRAM, CORONARY ARTERY;  Surgeon: Gordy Parmar MD;  Location: [facility] CATH LAB/EP;  Service: Cardiology;  Laterality: N/A;    CORONARY ANGIOGRAPHY INCLUDING BYPASS GRAFTS WITH CATHETERIZATION OF LEFT HEART N/A 4/11/2022    Procedure: ANGIOGRAM, CORONARY, INCLUDING BYPASS GRAFT, WITH LEFT HEART CATHETERIZATION;  Surgeon: Gordy Parmar MD;  Location: [facility] CATH LAB/EP;  Service: Cardiology;  Laterality: N/A;    CORONARY ARTERY BYPASS GRAFT  1996    CORONARY BYPASS GRAFT ANGIOGRAPHY  4/20/2022    Procedure: Bypass graft study;  Surgeon: Gordy Parmar MD;  Location: [facility] CATH LAB/EP;  Service: Cardiology;;    CORONARY BYPASS GRAFT ANGIOGRAPHY  3/20/2023    Procedure: Bypass graft study;  Surgeon: Gordy Parmar MD;  Location: [facility] CATH LAB/EP;  Service: Cardiology;;    EYELID SURGERY  03/2012    IVUS, CORONARY  3/20/2023    Procedure: IVUS, Coronary;  Surgeon: Gordy Parmar MD;  Location: [facility] CATH LAB/EP;  Service: Cardiology;;    LEFT HEART CATHETERIZATION N/A 4/11/2022    Procedure: Left heart cath;  Surgeon: Gordy Parmar MD;  Location: [facility] CATH LAB/EP;  Service: Cardiology;  Laterality: N/A;    LEFT HEART CATHETERIZATION N/A 3/20/2023    Procedure: Left heart cath;   Surgeon: Gordy Parmar MD;  Location: [facility] CATH LAB/EP;  Service: Cardiology;  Laterality: N/A;    PERCUTANEOUS TRANSLUMINAL BALLOON ANGIOPLASTY OF CORONARY ARTERY  4/20/2022    Procedure: Angioplasty-coronary;  Surgeon: Gordy Parmar MD;  Location: [facility] CATH LAB/EP;  Service: Cardiology;;    PERCUTANEOUS TRANSLUMINAL BALLOON ANGIOPLASTY OF CORONARY ARTERY  3/20/2023    Procedure: Angioplasty-coronary;  Surgeon: Gordy Parmar MD;  Location: [facility] CATH LAB/EP;  Service: Cardiology;;    RIGHT HEART CATHETERIZATION Right 11/3/2021    Procedure: INSERTION, CATHETER, RIGHT HEART;  Surgeon: Temitope Rahman MD;  Location: [facility] CATH LAB/EP;  Service: Cardiology;  Laterality: Right;    STENT, DRUG ELUTING, SINGLE VESSEL, CORONARY  3/20/2023    Procedure: Stent, Drug Eluting, Single Vessel, Coronary;  Surgeon: Gordy Parmar MD;  Location: [facility] CATH LAB/EP;  Service: Cardiology;;    VEIN BYPASS SURGERY  1996    VEIN SURGERY Left 2017    PAD    VEIN SURGERY Right 2018    PAD       Review of patient's allergies indicates:  No Known Allergies    No current facility-administered medications on file prior to encounter.     Current Outpatient Medications on File Prior to Encounter   Medication Sig    amLODIPine (NORVASC) 5 MG tablet Take 1 tablet (5 mg total) by mouth once daily.    aspirin 81 MG Chew 81 mg nightly.    atorvastatin (LIPITOR) 80 MG tablet TAKE 1 TABLET EVERY DAY (Patient taking differently: Take 80 mg by mouth every evening.)    clopidogreL (PLAVIX) 75 mg tablet TAKE 1 TABLET EVERY DAY (Patient taking differently: Take 75 mg by mouth nightly.)    empagliflozin (JARDIANCE) 10 mg tablet Take 10 mg by mouth once daily.    ESBRIET 801 mg Tab 3 (three) times daily.    finerenone (KERENDIA) 10 mg Tab Take 10 mg by mouth Daily.    insulin degludec (TRESIBA FLEXTOUCH U-100) 100 unit/mL (3 mL) insulin pen Inject 50 Units into the skin every evening. F/u apt needed with PCP (Patient taking differently: Inject  "35 Units into the skin once daily. F/u apt needed with PCP)    levothyroxine (SYNTHROID) 100 MCG tablet Take 1 tablet (100 mcg total) by mouth before breakfast.    lisinopriL (PRINIVIL,ZESTRIL) 2.5 MG tablet TAKE 1 TABLET EVERY DAY    metoprolol succinate (TOPROL-XL) 100 MG 24 hr tablet TAKE 1 TABLET ONE TIME DAILY (Patient taking differently: Take 100 mg by mouth nightly. TAKE 1 TABLET ONE TIME DAILY)    nitroGLYCERIN (NITROSTAT) 0.4 MG SL tablet Place 1 tablet (0.4 mg total) under the tongue every 5 (five) minutes as needed for Chest pain.    NOVOLOG 100 unit/mL injection Inject into the skin 3 (three) times daily after meals. Sliding scale    ORENCIA 125 mg/mL Syrg Inject into the skin once a week.     pantoprazole (PROTONIX) 40 MG tablet Take 40 mg by mouth once daily.    vitamin D (VITAMIN D3) 1000 units Tab Take 1,000 Units by mouth once daily.    ACCU-CHEK ADRIEN PLUS METER Misc 1 Product by Other route daily as needed.    ACCU-CHEK SMARTVIEW TEST STRIP Strp     ACCU-CHEK SOFTCLIX LANCETS Misc Inject 100 lancets into the skin daily as needed.    b complex vitamins tablet Take 1 tablet by mouth once daily.    DROPLET PEN NEEDLE 32 gauge x 532" Ndle Inject 1 Product into the skin daily as needed.    ezetimibe (ZETIA) 10 mg tablet TAKE 1 TABLET EVERY DAY    hydrocortisone 2.5 % cream Apply topically 2 (two) times daily as needed.    LUBRICANT EYE DROPS 0.5 % Dpet     mirtazapine (REMERON) 7.5 MG Tab TAKE 1 TABLET (7.5 MG TOTAL) BY MOUTH EVERY EVENING.     Family History       Problem Relation (Age of Onset)    Diabetes Mother, Father, Sister, Brother, Sister    Kidney disease Sister          Tobacco Use    Smoking status: Former     Current packs/day: 0.00     Average packs/day: 2.0 packs/day for 27.0 years (54.0 ttl pk-yrs)     Types: Cigarettes     Start date:      Quit date:      Years since quittin.8    Smokeless tobacco: Never   Substance and Sexual Activity    Alcohol use: Yes     " Comment: Occasionally has a drink    Drug use: No    Sexual activity: Yes     Partners: Female     Review of Systems   Constitutional:  Negative for chills and fever.   HENT:  Negative for trouble swallowing.         [place]   Respiratory:  Positive for shortness of breath.    Cardiovascular:  Positive for chest pain.   Gastrointestinal:  Negative for abdominal pain, nausea and vomiting.   Genitourinary:  Negative for difficulty urinating.   Musculoskeletal:  Negative for myalgias.   Skin:  Negative for color change.   Neurological:  Negative for dizziness, syncope, weakness, light-headedness and headaches.   Psychiatric/Behavioral:  Negative for agitation and confusion. The patient is not nervous/anxious.      Objective:     Vital Signs (Most Recent):  Temp: 97.8 °F (36.6 °C) (10/14/23 2012)  Pulse: 73 (10/14/23 2333)  Resp: 18 (10/14/23 2012)  BP: (!) 190/88 (10/14/23 2012)  SpO2: 98 % (10/14/23 2012) Vital Signs (24h Range):  Temp:  [97.8 °F (36.6 °C)-97.9 °F (36.6 °C)] 97.8 °F (36.6 °C)  Pulse:  [] 73  Resp:  [18-29] 18  SpO2:  [98 %-100 %] 98 %  BP: (148-203)/(73-92) 190/88     Weight: 65 kg (143 lb 4.8 oz)  Body mass index is 24.6 kg/m².     Physical Exam  Constitutional:       General: He is not in acute distress.     Appearance: He is ill-appearing.   HENT:      Right Ear: Decreased hearing noted.      Left Ear: Decreased hearing noted.      Mouth/Throat:      Mouth: Mucous membranes are moist.   Eyes:      Pupils: Pupils are equal, round, and reactive to light.   Cardiovascular:      Rate and Rhythm: Normal rate and regular rhythm.      Pulses: Normal pulses.      Heart sounds: Normal heart sounds.   Pulmonary:      Effort: Pulmonary effort is normal. No respiratory distress.      Breath sounds: Normal breath sounds. No wheezing, rhonchi or rales.   Abdominal:      General: Bowel sounds are normal. There is no distension.      Palpations: Abdomen is soft.      Tenderness: There is no abdominal tenderness.  There is no guarding or rebound.   Musculoskeletal:      Right lower leg: No edema.      Left lower leg: No edema.   Skin:     General: Skin is warm.   Neurological:      Mental Status: He is alert and oriented to person, place, and time.   Psychiatric:         Mood and Affect: Mood normal.         Behavior: Behavior normal.         Thought Content: Thought content normal.         Judgment: Judgment normal.              CRANIAL NERVES     CN III, IV, VI   Pupils are equal, round, and reactive to light.       Significant Labs: All pertinent labs within the past 24 hours have been reviewed.    Significant Imaging: I have reviewed all pertinent imaging results/findings within the past 24 hours.

## 2023-10-15 NOTE — H&P (VIEW-ONLY)
[facility] - Telemetry  Cardiology  Consult Note    Patient Name: Jose Antonio Allen  MRN: [id number]  Admission Date: 10/14/2023  Hospital Length of Stay: 0 days  Code Status: Full Code   Attending Provider: Bernardo Velasquez,*   Consulting Provider: Gordy Parmar MD  Primary Care Physician: Abilio Weber MD  Principal Problem:Chest pain    Patient information was obtained from patient and ER records.     Consults  Subjective:     Chief Complaint:  CHest pain     HPI:   72 y/o male with hx of CAD s/p CABG s/p PCI, HTN, HLD, PAD who presented with CP over the last few days and diagnosed with HTN urgency, elevated trop, initial ECG with ischemic changes, which resolved with BP control. Last trop 0.5. BP has been stable, no recurrent CP. CKI improved with Cr 2.1 to 1.4.    Past Medical History:   Diagnosis Date    Angina pectoris     Arthritis     CKD (chronic kidney disease)     Colon polyps 09/14/2018    Coronary artery disease     Diabetes mellitus     Diabetes mellitus, type 2     GERD (gastroesophageal reflux disease)     History of tobacco use     Hyperlipidemia     Hypertension     S/P CABG (coronary artery bypass graft) 1996    Thyroid disease        Past Surgical History:   Procedure Laterality Date    CATARACT EXTRACTION Bilateral 3YRS    CLOSURE DEVICE  3/20/2023    Procedure: Placement of Closure Device;  Surgeon: Gordy Parmar MD;  Location: [facility] CATH LAB/EP;  Service: Cardiology;;    COLONOSCOPY W/ POLYPECTOMY  09/14/2018    CORONARY ANGIOGRAPHY N/A 4/11/2022    Procedure: ANGIOGRAM, CORONARY ARTERY;  Surgeon: Gordy Parmar MD;  Location: [facility] CATH LAB/EP;  Service: Cardiology;  Laterality: N/A;    CORONARY ANGIOGRAPHY INCLUDING BYPASS GRAFTS WITH CATHETERIZATION OF LEFT HEART N/A 4/11/2022    Procedure: ANGIOGRAM, CORONARY, INCLUDING BYPASS GRAFT, WITH LEFT HEART CATHETERIZATION;  Surgeon: Gordy Parmar MD;  Location: [facility] CATH LAB/EP;  Service: Cardiology;  Laterality: N/A;     CORONARY ARTERY BYPASS GRAFT  1996    CORONARY BYPASS GRAFT ANGIOGRAPHY  4/20/2022    Procedure: Bypass graft study;  Surgeon: Gordy Parmar MD;  Location: [facility] CATH LAB/EP;  Service: Cardiology;;    CORONARY BYPASS GRAFT ANGIOGRAPHY  3/20/2023    Procedure: Bypass graft study;  Surgeon: Gordy Parmar MD;  Location: [facility] CATH LAB/EP;  Service: Cardiology;;    EYELID SURGERY  03/2012    IVUS, CORONARY  3/20/2023    Procedure: IVUS, Coronary;  Surgeon: Gordy Parmar MD;  Location: [facility] CATH LAB/EP;  Service: Cardiology;;    LEFT HEART CATHETERIZATION N/A 4/11/2022    Procedure: Left heart cath;  Surgeon: Gordy Parmar MD;  Location: [facility] CATH LAB/EP;  Service: Cardiology;  Laterality: N/A;    LEFT HEART CATHETERIZATION N/A 3/20/2023    Procedure: Left heart cath;  Surgeon: Gordy Parmar MD;  Location: [facility] CATH LAB/EP;  Service: Cardiology;  Laterality: N/A;    PERCUTANEOUS TRANSLUMINAL BALLOON ANGIOPLASTY OF CORONARY ARTERY  4/20/2022    Procedure: Angioplasty-coronary;  Surgeon: Gordy Parmar MD;  Location: [facility] CATH LAB/EP;  Service: Cardiology;;    PERCUTANEOUS TRANSLUMINAL BALLOON ANGIOPLASTY OF CORONARY ARTERY  3/20/2023    Procedure: Angioplasty-coronary;  Surgeon: Gordy Parmar MD;  Location: [facility] CATH LAB/EP;  Service: Cardiology;;    RIGHT HEART CATHETERIZATION Right 11/3/2021    Procedure: INSERTION, CATHETER, RIGHT HEART;  Surgeon: Temitope Rahman MD;  Location: [facility] CATH LAB/EP;  Service: Cardiology;  Laterality: Right;    STENT, DRUG ELUTING, SINGLE VESSEL, CORONARY  3/20/2023    Procedure: Stent, Drug Eluting, Single Vessel, Coronary;  Surgeon: Gordy Parmar MD;  Location: [facility] CATH LAB/EP;  Service: Cardiology;;    VEIN BYPASS SURGERY  1996    VEIN SURGERY Left 2017    PAD    VEIN SURGERY Right 2018    PAD       Review of patient's allergies indicates:  No Known Allergies    No current facility-administered medications on file prior to encounter.     Current Outpatient  Medications on File Prior to Encounter   Medication Sig    amLODIPine (NORVASC) 5 MG tablet Take 1 tablet (5 mg total) by mouth once daily.    aspirin 81 MG Chew 81 mg nightly.    atorvastatin (LIPITOR) 80 MG tablet TAKE 1 TABLET EVERY DAY (Patient taking differently: Take 80 mg by mouth every evening.)    clopidogreL (PLAVIX) 75 mg tablet TAKE 1 TABLET EVERY DAY (Patient taking differently: Take 75 mg by mouth nightly.)    empagliflozin (JARDIANCE) 10 mg tablet Take 10 mg by mouth once daily.    ESBRIET 801 mg Tab 3 (three) times daily.    finerenone (KERENDIA) 10 mg Tab Take 10 mg by mouth Daily.    insulin degludec (TRESIBA FLEXTOUCH U-100) 100 unit/mL (3 mL) insulin pen Inject 50 Units into the skin every evening. F/u apt needed with PCP (Patient taking differently: Inject 35 Units into the skin once daily. F/u apt needed with PCP)    levothyroxine (SYNTHROID) 100 MCG tablet Take 1 tablet (100 mcg total) by mouth before breakfast.    lisinopriL (PRINIVIL,ZESTRIL) 2.5 MG tablet TAKE 1 TABLET EVERY DAY    metoprolol succinate (TOPROL-XL) 100 MG 24 hr tablet TAKE 1 TABLET ONE TIME DAILY (Patient taking differently: Take 100 mg by mouth nightly. TAKE 1 TABLET ONE TIME DAILY)    nitroGLYCERIN (NITROSTAT) 0.4 MG SL tablet Place 1 tablet (0.4 mg total) under the tongue every 5 (five) minutes as needed for Chest pain.    NOVOLOG 100 unit/mL injection Inject into the skin 3 (three) times daily after meals. Sliding scale    ORENCIA 125 mg/mL Syrg Inject into the skin once a week. Fridays    pantoprazole (PROTONIX) 40 MG tablet Take 40 mg by mouth once daily.    vitamin D (VITAMIN D3) 1000 units Tab Take 1,000 Units by mouth once daily.    ACCU-CHEK ADRIEN PLUS METER Misc 1 Product by Other route daily as needed.    ACCU-CHEK SMARTVIEW TEST STRIP Strp     ACCU-CHEK SOFTCLIX LANCETS Misc Inject 100 lancets into the skin daily as needed.    b complex vitamins tablet Take 1 tablet by mouth once daily.    DROPLET PEN NEEDLE 32  "gauge x 5/32" Ndle Inject 1 Product into the skin daily as needed.    ezetimibe (ZETIA) 10 mg tablet TAKE 1 TABLET EVERY DAY    hydrocortisone 2.5 % cream Apply topically 2 (two) times daily as needed.    LUBRICANT EYE DROPS 0.5 % Dpet     mirtazapine (REMERON) 7.5 MG Tab TAKE 1 TABLET (7.5 MG TOTAL) BY MOUTH EVERY EVENING.     Family History       Problem Relation (Age of Onset)    Diabetes Mother, Father, Sister, Brother, Sister    Kidney disease Sister          Tobacco Use    Smoking status: Former     Current packs/day: 0.00     Average packs/day: 2.0 packs/day for 27.0 years (54.0 ttl pk-yrs)     Types: Cigarettes     Start date:      Quit date:      Years since quittin.8    Smokeless tobacco: Never   Substance and Sexual Activity    Alcohol use: Yes     Comment: Occasionally has a drink    Drug use: No    Sexual activity: Yes     Partners: Female     Review of Systems   Constitutional: Negative for malaise/fatigue.   HENT:  Negative for congestion.    Eyes:  Negative for blurred vision.   Cardiovascular:  Positive for chest pain. Negative for claudication, cyanosis, dyspnea on exertion, irregular heartbeat, leg swelling, near-syncope, orthopnea, palpitations, paroxysmal nocturnal dyspnea and syncope.   Respiratory:  Negative for shortness of breath.    Endocrine: Negative for polyuria.   Hematologic/Lymphatic: Negative for bleeding problem.   Skin:  Negative for itching and rash.   Musculoskeletal:  Negative for joint swelling, muscle cramps and muscle weakness.   Gastrointestinal:  Negative for abdominal pain, hematemesis, hematochezia, melena, nausea and vomiting.   Genitourinary:  Negative for dysuria and hematuria.   Neurological:  Negative for dizziness, focal weakness, headaches, light-headedness, loss of balance and weakness.   Psychiatric/Behavioral:  Negative for depression. The patient is not nervous/anxious.      Objective:     Vital Signs (Most Recent):  Temp: 98.6 °F (37 °C) (10/15/23 " 1039)  Pulse: 79 (10/15/23 1141)  Resp: 18 (10/15/23 1039)  BP: (!) 163/81 (10/15/23 1039)  SpO2: 99 % (10/15/23 1039) Vital Signs (24h Range):  Temp:  [97.1 °F (36.2 °C)-98.6 °F (37 °C)] 98.6 °F (37 °C)  Pulse:  [] 79  Resp:  [18-29] 18  SpO2:  [98 %-100 %] 99 %  BP: (133-203)/(71-92) 163/81     Weight: 65 kg (143 lb 4.8 oz)  Body mass index is 24.6 kg/m².    SpO2: 99 %         Intake/Output Summary (Last 24 hours) at 10/15/2023 1551  Last data filed at 10/15/2023 0538  Gross per 24 hour   Intake --   Output 950 ml   Net -950 ml       Lines/Drains/Airways       Peripheral Intravenous Line  Duration                  Peripheral IV - Single Lumen 10/14/23 1720 20 G Left Antecubital <1 day                    Physical Exam  Constitutional:       Appearance: He is well-developed.   HENT:      Head: Normocephalic and atraumatic.   Neck:      Vascular: No JVD.   Cardiovascular:      Rate and Rhythm: Normal rate and regular rhythm.      Pulses:           Carotid pulses are 2+ on the right side and 2+ on the left side.       Radial pulses are 2+ on the right side and 2+ on the left side.        Femoral pulses are 2+ on the right side and 2+ on the left side.     Heart sounds: Normal heart sounds.   Pulmonary:      Effort: Pulmonary effort is normal.      Breath sounds: Normal breath sounds.   Abdominal:      General: Bowel sounds are normal.      Palpations: Abdomen is soft.   Musculoskeletal:      Cervical back: Neck supple.   Skin:     General: Skin is warm and dry.   Neurological:      Mental Status: He is alert and oriented to person, place, and time.   Psychiatric:         Behavior: Behavior normal.         Thought Content: Thought content normal.         Significant Labs:   Recent Lab Results  (Last 5 results in the past 24 hours)        10/15/23  1037   10/15/23  0625   10/15/23  0624   10/15/23  0537   10/14/23  2121        Anion Gap   9             aPTT   33.6  Comment: Refer to local heparin nomogram for  intensity/dose specific   therapeutic   range.  LOT^050^APTT [id number]               Baso #   0.03             Basophil %   0.5             BUN   25             Calcium   9.2             Chloride   112             Cholesterol Total   121  Comment: The National Cholesterol Education Program (NCEP) has set the  following guidelines (reference ranges) for Cholesterol:  Optimal.....................<200 mg/dL  Borderline High.............200-239 mg/dL  High........................> or = 240 mg/dL               CO2   24             Creatinine   1.4             Differential Method   Automated             eGFR   53             Eos #   0.3             Eosinophil %   4.4             Estimated Avg Glucose   180             Glucose   133             Gran # (ANC)   4.0             Gran %   68.4             Group & Rh   O POS             HDL   38  Comment: The National Cholesterol Education Program (NCEP) has set the  following guidelines (reference values) for HDL Cholesterol:  Low...............<40 mg/dL  Optimal...........>60 mg/dL               HDL/Cholesterol Ratio   31.4             Hematocrit   35.9             Hemoglobin   11.8             Hemoglobin A1C External   7.9  Comment: ADA Screening Guidelines:  5.7-6.4%  Consistent with prediabetes  >or=6.5%  Consistent with diabetes    High levels of fetal hemoglobin interfere with the HbA1C  assay. Heterozygous hemoglobin variants (HbS, HgC, etc)do  not significantly interfere with this assay.   However, presence of multiple variants may affect accuracy.               Immature Grans (Abs)   0.02  Comment: Mild elevation in immature granulocytes is non specific and   can be seen in a variety of conditions including stress response,   acute inflammation, trauma and pregnancy. Correlation with other   laboratory and clinical findings is essential.               Immature Granulocytes   0.3             INDIRECT LESLY   NEG             INR   1.0  Comment: Coumadin Therapy:  2.0 -  3.0 for INR for all indicators except mechanical heart valves  and antiphospholipid syndromes which should use 2.5 - 3.5.  LOT^040^PT Inn^[id number]               LDL Cholesterol External   66.6  Comment: The National Cholesterol Education Program (NCEP) has set the  following guidelines (reference values) for LDL Cholesterol:  Optimal.......................<130 mg/dL  Borderline High...............130-159 mg/dL  High..........................160-189 mg/dL  Very High.....................>190 mg/dL               Lymph #   1.1             Lymph %   18.3             MCH   28.0             MCHC   32.9             MCV   85             Mono #   0.5             Mono %   8.1             MPV   9.9             Non-HDL Cholesterol   83  Comment: Risk category and Non-HDL cholesterol goals:  Coronary heart disease (CHD)or equivalent (10-year risk of CHD >20%):  Non-HDL cholesterol goal     <130 mg/dL  Two or more CHD risk factors and 10-year risk of CHD <= 20%:  Non-HDL cholesterol goal     <160 mg/dL  0 to 1 CHD risk factor:  Non-HDL cholesterol goal     <190 mg/dL               nRBC   0             Platelet Count   134             POCT Glucose 103       131   294       Potassium   4.3             Protime   11.1             RBC   4.22             RDW   12.4             Sodium   145             Specimen Outdate   10/18/2023 23:59             Total Cholesterol/HDL Ratio   3.2             Triglycerides   82  Comment: The National Cholesterol Education Program (NCEP) has set the  following guidelines (reference values) for triglycerides:  Normal......................<150 mg/dL  Borderline High.............150-199 mg/dL  High........................200-499 mg/dL               Troponin I     0.555  Comment: The reference interval for Troponin I represents the 99th percentile   cutoff   for our facility and is consistent with 3rd generation assay   performance.             WBC   5.89                                  Assessment and Plan:      Active Diagnoses:    Diagnosis Date Noted POA    PRINCIPAL PROBLEM:  Chest pain [R07.9]  Yes    SAUL (acute kidney injury) [N17.9] 10/15/2023 Yes    Chronic obstructive pulmonary disease, unspecified COPD type [J44.9] 02/14/2022 Yes    Type 2 diabetes mellitus with stage 3a chronic kidney disease, without long-term current use of insulin [E11.22, N18.31] 06/12/2020 Yes    Essential hypertension [I10]  Yes      Problems Resolved During this Admission:     HTN emergency  -end organ damage with elevated trop and Cr  -COntinue aggressive BP control    Elevated Trop  -likely demand in the setting of HTN  -Cont DAPT  -check 2DE in the AM    CAD  -Cont DAPT/statin  -may consider LHC if trop continues to elevate and pt developed symptoms    DM  -glucose stable  -management per primary      VTE Risk Mitigation (From admission, onward)           Ordered     enoxaparin injection 70 mg  Every 24 hours (non-standard times)         10/14/23 1928     IP VTE HIGH RISK PATIENT  Once         10/14/23 1928     Place sequential compression device  Until discontinued         10/14/23 1928                    Thank you for your consult. I will follow-up with patient. Please contact us if you have any additional questions.    Gordy Parmar MD  Cardiology   [facility] - Telemetry

## 2023-10-15 NOTE — HPI
"Per admitting provider: "Jose Antonio Allen is a 73 y.o. male who  has a past medical history of Angina pectoris, Arthritis, CKD (chronic kidney disease), Colon polyps (09/14/2018), Coronary artery disease, Diabetes mellitus, Diabetes mellitus, type 2, GERD (gastroesophageal reflux disease), History of tobacco use, Hyperlipidemia, Hypertension, S/P CABG (coronary artery bypass graft) (1996), and Thyroid disease.     He presented to the ED w/ c/o chest pain.  Patient has been having intermittent chest pain for the past 4 days but today it worsened.  Patient had an episode of chest pain this morning and then had another episode today approximately 1 hour prior to arrival.  He states it was temporarily relieved by nitroglycerin at home.  States it radiates to both of his shoulders and he has had occasional sweating with it.  He states he notices it more when he stands or moves and he reports associated shortness of breath.  He states he is very worried given his cardiac history, which includes CABG in 1996 and had a heart catheterization in March of this year with a stent placed.  He is followed by Dr. Parmar. He denies any fever, chills, nausea, vomiting, or abdominal pains."     "

## 2023-10-15 NOTE — CARE UPDATE
Patient seen and examined and full note to follow.    Briefly, 74 y/o with hx of CAD/CABG/PCI who presented with CP, found to have HTN urgency with ECG changes, BP controlled and ECG changes resolved. Elevated trop likely2/2 demand. Trend trop until peak. 2DE in the AM and NPO after midnight.

## 2023-10-15 NOTE — CONSULTS
[place] - Telemetry  Cardiology  Consult Note    Patient Name: Jose Antonio Allen  MRN: [id number]  Admission Date: 10/14/2023  Hospital Length of Stay: 0 days  Code Status: Full Code   Attending Provider: Bernardo Velasquez,*   Consulting Provider: Gordy Parmar MD  Primary Care Physician: Abilio Weber MD  Principal Problem:Chest pain    Patient information was obtained from patient and ER records.     Consults  Subjective:     Chief Complaint:  CHest pain     HPI:   72 y/o male with hx of CAD s/p CABG s/p PCI, HTN, HLD, PAD who presented with CP over the last few days and diagnosed with HTN urgency, elevated trop, initial ECG with ischemic changes, which resolved with BP control. Last trop 0.5. BP has been stable, no recurrent CP. CKI improved with Cr 2.1 to 1.4.    Past Medical History:   Diagnosis Date    Angina pectoris     Arthritis     CKD (chronic kidney disease)     Colon polyps 09/14/2018    Coronary artery disease     Diabetes mellitus     Diabetes mellitus, type 2     GERD (gastroesophageal reflux disease)     History of tobacco use     Hyperlipidemia     Hypertension     S/P CABG (coronary artery bypass graft) 1996    Thyroid disease        Past Surgical History:   Procedure Laterality Date    CATARACT EXTRACTION Bilateral 3YRS    CLOSURE DEVICE  3/20/2023    Procedure: Placement of Closure Device;  Surgeon: Gordy Parmar MD;  Location: [facility] CATH LAB/EP;  Service: Cardiology;;    COLONOSCOPY W/ POLYPECTOMY  09/14/2018    CORONARY ANGIOGRAPHY N/A 4/11/2022    Procedure: ANGIOGRAM, CORONARY ARTERY;  Surgeon: Gordy Parmar MD;  Location: [facility] CATH LAB/EP;  Service: Cardiology;  Laterality: N/A;    CORONARY ANGIOGRAPHY INCLUDING BYPASS GRAFTS WITH CATHETERIZATION OF LEFT HEART N/A 4/11/2022    Procedure: ANGIOGRAM, CORONARY, INCLUDING BYPASS GRAFT, WITH LEFT HEART CATHETERIZATION;  Surgeon: Gordy Parmar MD;  Location: [facility] CATH LAB/EP;  Service: Cardiology;  Laterality: N/A;     CORONARY ARTERY BYPASS GRAFT  1996    CORONARY BYPASS GRAFT ANGIOGRAPHY  4/20/2022    Procedure: Bypass graft study;  Surgeon: Gordy Parmar MD;  Location: [facility] CATH LAB/EP;  Service: Cardiology;;    CORONARY BYPASS GRAFT ANGIOGRAPHY  3/20/2023    Procedure: Bypass graft study;  Surgeon: Gordy Parmar MD;  Location: [facility] CATH LAB/EP;  Service: Cardiology;;    EYELID SURGERY  03/2012    IVUS, CORONARY  3/20/2023    Procedure: IVUS, Coronary;  Surgeon: Gordy Parmar MD;  Location: [facility] CATH LAB/EP;  Service: Cardiology;;    LEFT HEART CATHETERIZATION N/A 4/11/2022    Procedure: Left heart cath;  Surgeon: Gordy Parmar MD;  Location: [facility] CATH LAB/EP;  Service: Cardiology;  Laterality: N/A;    LEFT HEART CATHETERIZATION N/A 3/20/2023    Procedure: Left heart cath;  Surgeon: Gordy Parmar MD;  Location: [facility] CATH LAB/EP;  Service: Cardiology;  Laterality: N/A;    PERCUTANEOUS TRANSLUMINAL BALLOON ANGIOPLASTY OF CORONARY ARTERY  4/20/2022    Procedure: Angioplasty-coronary;  Surgeon: Gordy Parmar MD;  Location: [facility] CATH LAB/EP;  Service: Cardiology;;    PERCUTANEOUS TRANSLUMINAL BALLOON ANGIOPLASTY OF CORONARY ARTERY  3/20/2023    Procedure: Angioplasty-coronary;  Surgeon: Gordy Parmar MD;  Location: [facility] CATH LAB/EP;  Service: Cardiology;;    RIGHT HEART CATHETERIZATION Right 11/3/2021    Procedure: INSERTION, CATHETER, RIGHT HEART;  Surgeon: Temitope Rahman MD;  Location: [facility] CATH LAB/EP;  Service: Cardiology;  Laterality: Right;    STENT, DRUG ELUTING, SINGLE VESSEL, CORONARY  3/20/2023    Procedure: Stent, Drug Eluting, Single Vessel, Coronary;  Surgeon: Gordy Parmar MD;  Location: [facility] CATH LAB/EP;  Service: Cardiology;;    VEIN BYPASS SURGERY  1996    VEIN SURGERY Left 2017    PAD    VEIN SURGERY Right 2018    PAD       Review of patient's allergies indicates:  No Known Allergies    No current facility-administered medications on file prior to encounter.     Current Outpatient  Medications on File Prior to Encounter   Medication Sig    amLODIPine (NORVASC) 5 MG tablet Take 1 tablet (5 mg total) by mouth once daily.    aspirin 81 MG Chew 81 mg nightly.    atorvastatin (LIPITOR) 80 MG tablet TAKE 1 TABLET EVERY DAY (Patient taking differently: Take 80 mg by mouth every evening.)    clopidogreL (PLAVIX) 75 mg tablet TAKE 1 TABLET EVERY DAY (Patient taking differently: Take 75 mg by mouth nightly.)    empagliflozin (JARDIANCE) 10 mg tablet Take 10 mg by mouth once daily.    ESBRIET 801 mg Tab 3 (three) times daily.    finerenone (KERENDIA) 10 mg Tab Take 10 mg by mouth Daily.    insulin degludec (TRESIBA FLEXTOUCH U-100) 100 unit/mL (3 mL) insulin pen Inject 50 Units into the skin every evening. F/u apt needed with PCP (Patient taking differently: Inject 35 Units into the skin once daily. F/u apt needed with PCP)    levothyroxine (SYNTHROID) 100 MCG tablet Take 1 tablet (100 mcg total) by mouth before breakfast.    lisinopriL (PRINIVIL,ZESTRIL) 2.5 MG tablet TAKE 1 TABLET EVERY DAY    metoprolol succinate (TOPROL-XL) 100 MG 24 hr tablet TAKE 1 TABLET ONE TIME DAILY (Patient taking differently: Take 100 mg by mouth nightly. TAKE 1 TABLET ONE TIME DAILY)    nitroGLYCERIN (NITROSTAT) 0.4 MG SL tablet Place 1 tablet (0.4 mg total) under the tongue every 5 (five) minutes as needed for Chest pain.    NOVOLOG 100 unit/mL injection Inject into the skin 3 (three) times daily after meals. Sliding scale    ORENCIA 125 mg/mL Syrg Inject into the skin once a week. Fridays    pantoprazole (PROTONIX) 40 MG tablet Take 40 mg by mouth once daily.    vitamin D (VITAMIN D3) 1000 units Tab Take 1,000 Units by mouth once daily.    ACCU-CHEK ADRIEN PLUS METER Misc 1 Product by Other route daily as needed.    ACCU-CHEK SMARTVIEW TEST STRIP Strp     ACCU-CHEK SOFTCLIX LANCETS Misc Inject 100 lancets into the skin daily as needed.    b complex vitamins tablet Take 1 tablet by mouth once daily.    DROPLET PEN NEEDLE 32  "gauge x 5/32" Ndle Inject 1 Product into the skin daily as needed.    ezetimibe (ZETIA) 10 mg tablet TAKE 1 TABLET EVERY DAY    hydrocortisone 2.5 % cream Apply topically 2 (two) times daily as needed.    LUBRICANT EYE DROPS 0.5 % Dpet     mirtazapine (REMERON) 7.5 MG Tab TAKE 1 TABLET (7.5 MG TOTAL) BY MOUTH EVERY EVENING.     Family History       Problem Relation (Age of Onset)    Diabetes Mother, Father, Sister, Brother, Sister    Kidney disease Sister          Tobacco Use    Smoking status: Former     Current packs/day: 0.00     Average packs/day: 2.0 packs/day for 27.0 years (54.0 ttl pk-yrs)     Types: Cigarettes     Start date:      Quit date:      Years since quittin.8    Smokeless tobacco: Never   Substance and Sexual Activity    Alcohol use: Yes     Comment: Occasionally has a drink    Drug use: No    Sexual activity: Yes     Partners: Female     Review of Systems   Constitutional: Negative for malaise/fatigue.   HENT:  Negative for congestion.    Eyes:  Negative for blurred vision.   Cardiovascular:  Positive for chest pain. Negative for claudication, cyanosis, dyspnea on exertion, irregular heartbeat, leg swelling, near-syncope, orthopnea, palpitations, paroxysmal nocturnal dyspnea and syncope.   Respiratory:  Negative for shortness of breath.    Endocrine: Negative for polyuria.   Hematologic/Lymphatic: Negative for bleeding problem.   Skin:  Negative for itching and rash.   Musculoskeletal:  Negative for joint swelling, muscle cramps and muscle weakness.   Gastrointestinal:  Negative for abdominal pain, hematemesis, hematochezia, melena, nausea and vomiting.   Genitourinary:  Negative for dysuria and hematuria.   Neurological:  Negative for dizziness, focal weakness, headaches, light-headedness, loss of balance and weakness.   Psychiatric/Behavioral:  Negative for depression. The patient is not nervous/anxious.      Objective:     Vital Signs (Most Recent):  Temp: 98.6 °F (37 °C) (10/15/23 " 1039)  Pulse: 79 (10/15/23 1141)  Resp: 18 (10/15/23 1039)  BP: (!) 163/81 (10/15/23 1039)  SpO2: 99 % (10/15/23 1039) Vital Signs (24h Range):  Temp:  [97.1 °F (36.2 °C)-98.6 °F (37 °C)] 98.6 °F (37 °C)  Pulse:  [] 79  Resp:  [18-29] 18  SpO2:  [98 %-100 %] 99 %  BP: (133-203)/(71-92) 163/81     Weight: 65 kg (143 lb 4.8 oz)  Body mass index is 24.6 kg/m².    SpO2: 99 %         Intake/Output Summary (Last 24 hours) at 10/15/2023 1551  Last data filed at 10/15/2023 0538  Gross per 24 hour   Intake --   Output 950 ml   Net -950 ml       Lines/Drains/Airways       Peripheral Intravenous Line  Duration                  Peripheral IV - Single Lumen 10/14/23 1720 20 G Left Antecubital <1 day                    Physical Exam  Constitutional:       Appearance: He is well-developed.   HENT:      Head: Normocephalic and atraumatic.   Neck:      Vascular: No JVD.   Cardiovascular:      Rate and Rhythm: Normal rate and regular rhythm.      Pulses:           Carotid pulses are 2+ on the right side and 2+ on the left side.       Radial pulses are 2+ on the right side and 2+ on the left side.        Femoral pulses are 2+ on the right side and 2+ on the left side.     Heart sounds: Normal heart sounds.   Pulmonary:      Effort: Pulmonary effort is normal.      Breath sounds: Normal breath sounds.   Abdominal:      General: Bowel sounds are normal.      Palpations: Abdomen is soft.   Musculoskeletal:      Cervical back: Neck supple.   Skin:     General: Skin is warm and dry.   Neurological:      Mental Status: He is alert and oriented to person, place, and time.   Psychiatric:         Behavior: Behavior normal.         Thought Content: Thought content normal.         Significant Labs:   Recent Lab Results  (Last 5 results in the past 24 hours)        10/15/23  1037   10/15/23  0625   10/15/23  0624   10/15/23  0537   10/14/23  2121        Anion Gap   9             aPTT   33.6  Comment: Refer to local heparin nomogram for  intensity/dose specific   therapeutic   range.  LOT^050^APTT FSL^[id number]               Baso #   0.03             Basophil %   0.5             BUN   25             Calcium   9.2             Chloride   112             Cholesterol Total   121  Comment: The National Cholesterol Education Program (NCEP) has set the  following guidelines (reference ranges) for Cholesterol:  Optimal.....................<200 mg/dL  Borderline High.............200-239 mg/dL  High........................> or = 240 mg/dL               CO2   24             Creatinine   1.4             Differential Method   Automated             eGFR   53             Eos #   0.3             Eosinophil %   4.4             Estimated Avg Glucose   180             Glucose   133             Gran # (ANC)   4.0             Gran %   68.4             Group & Rh   O POS             HDL   38  Comment: The National Cholesterol Education Program (NCEP) has set the  following guidelines (reference values) for HDL Cholesterol:  Low...............<40 mg/dL  Optimal...........>60 mg/dL               HDL/Cholesterol Ratio   31.4             Hematocrit   35.9             Hemoglobin   11.8             Hemoglobin A1C External   7.9  Comment: ADA Screening Guidelines:  5.7-6.4%  Consistent with prediabetes  >or=6.5%  Consistent with diabetes    High levels of fetal hemoglobin interfere with the HbA1C  assay. Heterozygous hemoglobin variants (HbS, HgC, etc)do  not significantly interfere with this assay.   However, presence of multiple variants may affect accuracy.               Immature Grans (Abs)   0.02  Comment: Mild elevation in immature granulocytes is non specific and   can be seen in a variety of conditions including stress response,   acute inflammation, trauma and pregnancy. Correlation with other   laboratory and clinical findings is essential.               Immature Granulocytes   0.3             INDIRECT LESLY   NEG             INR   1.0  Comment: Coumadin Therapy:  2.0 -  3.0 for INR for all indicators except mechanical heart valves  and antiphospholipid syndromes which should use 2.5 - 3.5.  LOT^040^PT Inn^[id number]               LDL Cholesterol External   66.6  Comment: The National Cholesterol Education Program (NCEP) has set the  following guidelines (reference values) for LDL Cholesterol:  Optimal.......................<130 mg/dL  Borderline High...............130-159 mg/dL  High..........................160-189 mg/dL  Very High.....................>190 mg/dL               Lymph #   1.1             Lymph %   18.3             MCH   28.0             MCHC   32.9             MCV   85             Mono #   0.5             Mono %   8.1             MPV   9.9             Non-HDL Cholesterol   83  Comment: Risk category and Non-HDL cholesterol goals:  Coronary heart disease (CHD)or equivalent (10-year risk of CHD >20%):  Non-HDL cholesterol goal     <130 mg/dL  Two or more CHD risk factors and 10-year risk of CHD <= 20%:  Non-HDL cholesterol goal     <160 mg/dL  0 to 1 CHD risk factor:  Non-HDL cholesterol goal     <190 mg/dL               nRBC   0             Platelet Count   134             POCT Glucose 103       131   294       Potassium   4.3             Protime   11.1             RBC   4.22             RDW   12.4             Sodium   145             Specimen Outdate   10/18/2023 23:59             Total Cholesterol/HDL Ratio   3.2             Triglycerides   82  Comment: The National Cholesterol Education Program (NCEP) has set the  following guidelines (reference values) for triglycerides:  Normal......................<150 mg/dL  Borderline High.............150-199 mg/dL  High........................200-499 mg/dL               Troponin I     0.555  Comment: The reference interval for Troponin I represents the 99th percentile   cutoff   for our facility and is consistent with 3rd generation assay   performance.             WBC   5.89                                  Assessment and Plan:      Active Diagnoses:    Diagnosis Date Noted POA    PRINCIPAL PROBLEM:  Chest pain [R07.9]  Yes    SAUL (acute kidney injury) [N17.9] 10/15/2023 Yes    Chronic obstructive pulmonary disease, unspecified COPD type [J44.9] 02/14/2022 Yes    Type 2 diabetes mellitus with stage 3a chronic kidney disease, without long-term current use of insulin [E11.22, N18.31] 06/12/2020 Yes    Essential hypertension [I10]  Yes      Problems Resolved During this Admission:     HTN emergency  -end organ damage with elevated trop and Cr  -COntinue aggressive BP control    Elevated Trop  -likely demand in the setting of HTN  -Cont DAPT  -check 2DE in the AM    CAD  -Cont DAPT/statin  -may consider LHC if trop continues to elevate and pt developed symptoms    DM  -glucose stable  -management per primary      VTE Risk Mitigation (From admission, onward)           Ordered     enoxaparin injection 70 mg  Every 24 hours (non-standard times)         10/14/23 1928     IP VTE HIGH RISK PATIENT  Once         10/14/23 1928     Place sequential compression device  Until discontinued         10/14/23 1928                    Thank you for your consult. I will follow-up with patient. Please contact us if you have any additional questions.    Gordy Parmar MD  Cardiology   [place] - Telemetry

## 2023-10-15 NOTE — ED NOTES
ER report given to SARAH Tellez for ongoing care of patient. Patient is Aox4, no sxs of acute distress and stable for transfer. Patient tranferred with 4L O2 NC (on home O2) with telemetry monitor intact.

## 2023-10-15 NOTE — ASSESSMENT & PLAN NOTE
· Midsternal CP w/ radiation into back between shoulders  · ASA, plavix, lovenox, statin  · PRN NTG for CP  · Cardiology consult ordered  · Trop 0.027  · EKG: sinus tachycardia, , ST and T wave abnormality but no STEMI

## 2023-10-15 NOTE — ASSESSMENT & PLAN NOTE
· A1C 7.9 - last checked 2 years ago  · Levemir  · Moderate SSI  · Kidney function decreased from baseline  · See SAUL for plan

## 2023-10-15 NOTE — ED NOTES
Handoff report rcvd from SARAH Curran. Pt care assumed. Pt AAOX4, speech clear, ambulatory, respirations even and unlabored. Pt denies CP 0/10 at this time. NAD.     Pt awaiting report to be called for floor admission.

## 2023-10-16 PROBLEM — Z99.81 CHRONIC HYPOXIC RESPIRATORY FAILURE, ON HOME OXYGEN THERAPY: Status: ACTIVE | Noted: 2023-01-01

## 2023-10-16 PROBLEM — J96.11 CHRONIC HYPOXIC RESPIRATORY FAILURE, ON HOME OXYGEN THERAPY: Status: ACTIVE | Noted: 2023-10-16

## 2023-10-16 PROBLEM — I24.89 DEMAND ISCHEMIA OF MYOCARDIUM: Status: ACTIVE | Noted: 2023-01-01

## 2023-10-16 NOTE — ASSESSMENT & PLAN NOTE
- s/p CABG s/p PCI  - admitted with chest pain with mildly elevated troponin  - will continue medical management with DAPT, statin, BB  - needs aggressive BP control; will not proceed with C today and will manage medically

## 2023-10-16 NOTE — PROGRESS NOTES
Pharmacist Renal Dose Adjustment Note    Jose Antonio Allen is a 73 y.o. male being treated with the medication enoxaparin    Patient Data:    Vital Signs (Most Recent):  Temp: 98.3 °F (36.8 °C) (10/16/23 0808)  Pulse: 72 (10/16/23 0808)  Resp: 18 (10/16/23 0808)  BP: (!) 162/77 (10/16/23 0808)  SpO2: 98 % (10/16/23 0824) Vital Signs (72h Range):  Temp:  [97.1 °F (36.2 °C)-98.6 °F (37 °C)]   Pulse:  []   Resp:  [18-29]   BP: (133-203)/(71-92)   SpO2:  [98 %-100 %]      Recent Labs   Lab 10/14/23  1727 10/15/23  0625 10/16/23  0738   CREATININE 2.1* 1.4 1.5*     Serum creatinine: 1.5 mg/dL (H) 10/16/23 0738  Estimated creatinine clearance: 36.7 mL/min (A)    Medication:enoxaparin dose: 70mg frequency q24 will be changed to medication:enoxaparin dose:70mg frequency:q12h    Pharmacist's Name: Kraig Rodriguez  Pharmacist's Extension: [phone]     Yes

## 2023-10-16 NOTE — CONSULTS
[place] - Telemetry  Cardiology  Consult Note    Patient Name: Jose Antonio Allen  MRN: [id number]  Admission Date: 10/14/2023  Hospital Length of Stay: 0 days  Code Status: Full Code   Attending Provider: Bernardo Velasquez,*   Consulting Provider: BREONNA Broussard, MEI  Primary Care Physician: Abilio Weber MD  Principal Problem:Chest pain        Inpatient consult to Cardiology-[facility]  Consult performed by: Joann De Jesus APRN, ANP  Consult ordered by: Marce Elder NP        See full consult note dated 10/15/2023 by Dr. Gordy Parmar

## 2023-10-16 NOTE — PLAN OF CARE
Problem: Physical Therapy  Goal: Physical Therapy Goal  Outcome: Adequate for Care Transition     PT evaluation completed; pt was previously Independent with occasional use of rollator as needed. At this time pt is SBA progressing to Independent with no AD. Pt has no complaints of chest pain with ambulation. Pt remains on 4L O2 during session with SpO2 93-98% during session with activity. Pt has no further skilled acute PT needs and is functionally safe from therapy stand-point to d/c home with family assistance as needed. No DME needed.

## 2023-10-16 NOTE — PROGRESS NOTES
[place] - Telemetry  Cardiology  Progress Note    Patient Name: Jose Antonio Allen  MRN: [id number]  Admission Date: 10/14/2023  Hospital Length of Stay: 0 days  Code Status: Full Code   Attending Physician: Bernardo Velasquez,*   Primary Care Physician: Abilio Weber MD  Expected Discharge Date:   Principal Problem:Chest pain    Subjective:     Hospital Course:   10/16/2023 per Dr. Parmar note from 10/15/2023 74 yo male with hx of CAD s/p CABG s/p PCI, HTN, HLD, PAD who presented with CP over the last few days and diagnosed with HTN urgency, elevated trop, initial ECG with ischemic changes, which resolved with BP control. Last trop 0.5. BP has been stable, no recurrent CP. CKI improved with Cr 2.1 to 1.4.    Chest pain free overnight. Troponin this AM down to .22. SBP 150s-180s overnight. Echo pending           Review of Systems   Constitutional: Negative for chills, decreased appetite, diaphoresis and fever.   Cardiovascular:  Negative for chest pain, claudication, cyanosis, dyspnea on exertion, irregular heartbeat, leg swelling, near-syncope, orthopnea, palpitations, paroxysmal nocturnal dyspnea and syncope.   Respiratory:  Negative for cough, hemoptysis, shortness of breath and wheezing.    Gastrointestinal:  Negative for bloating, abdominal pain, constipation, diarrhea, melena, nausea and vomiting.   Neurological:  Negative for dizziness and weakness.     Objective:     Vital Signs (Most Recent):  Temp: 98.3 °F (36.8 °C) (10/16/23 0808)  Pulse: 72 (10/16/23 0808)  Resp: 18 (10/16/23 0808)  BP: (!) 162/77 (10/16/23 0808)  SpO2: 98 % (10/16/23 0824) Vital Signs (24h Range):  Temp:  [98.1 °F (36.7 °C)-98.6 °F (37 °C)] 98.3 °F (36.8 °C)  Pulse:  [72-81] 72  Resp:  [18-20] 18  SpO2:  [98 %-99 %] 98 %  BP: (156-180)/(74-83) 162/77     Weight: 65 kg (143 lb 4.8 oz)  Body mass index is 24.6 kg/m².     SpO2: 98 %         Intake/Output Summary (Last 24 hours) at 10/16/2023 1008  Last data filed at 10/16/2023  0805  Gross per 24 hour   Intake --   Output 1075 ml   Net -1075 ml       Lines/Drains/Airways       Peripheral Intravenous Line  Duration                  Peripheral IV - Single Lumen 10/14/23 1720 20 G Left Antecubital 1 day                       Physical Exam  Constitutional:       General: He is not in acute distress.     Appearance: He is well-developed.   Cardiovascular:      Rate and Rhythm: Normal rate and regular rhythm.      Heart sounds: No murmur heard.     No gallop.   Pulmonary:      Effort: Pulmonary effort is normal. No respiratory distress.      Breath sounds: Normal breath sounds. No wheezing.   Abdominal:      General: Bowel sounds are normal. There is no distension.      Palpations: Abdomen is soft.      Tenderness: There is no abdominal tenderness.   Skin:     General: Skin is warm and dry.   Neurological:      Mental Status: He is alert and oriented to person, place, and time.            Significant Labs: BMP:   Recent Labs   Lab 10/14/23  1727 10/15/23  0625 10/16/23  0738   * 133* 142*    145 145   K 4.7 4.3 4.4    112* 110   CO2 19* 24 26   BUN 29* 25* 23   CREATININE 2.1* 1.4 1.5*   CALCIUM 9.2 9.2 9.2   MG  --   --  2.1   , CBC   Recent Labs   Lab 10/14/23  1727 10/15/23  0625 10/16/23  0738   WBC 5.55 5.89 6.38   HGB 13.1* 11.8* 13.3*   HCT 41.2 35.9* 40.9   * 134* 148*   , and Troponin   Recent Labs   Lab 10/14/23  1727 10/15/23  0624 10/16/23  0738   TROPONINI 0.027* 0.555* 0.257*       Significant Imaging: Echocardiogram: Transthoracic echo (TTE) complete (Cupid Only):   Results for orders placed or performed during the hospital encounter of 03/14/19   Transthoracic echo (TTE) complete (Cupid Only)   Result Value Ref Range    AV mean gradient 5.56 mmHg    Ao peak vaishali 1.65 m/s    Ao VTI 35.85 cm    IVRT 0.06 msec    IVS 1.30 0.6 - 1.1 cm    LA size 4.10 cm    Left Atrium Major Axis 4.34 cm    LVIDd 4.81 3.5 - 6.0 cm    LVIDs 3.32 2.1 - 4.0 cm    LVOT diameter  2.00 cm    LVOT peak VTI 23.49 cm    Posterior Wall 1.20 (A) 0.6 - 1.1 cm    MV Peak A Alberto 1.01 m/s    E wave deceleration time 136.00 msec    MV Peak E Alberto 0.97 m/s    PV Peak D Alberto 0.37 m/s    PV Peak S Alberto 0.40 m/s    RA Major Axis 4.22 cm    RVDD 2.90 cm    TR Max Alberto 1.93 m/s    LA WIDTH 2.98 cm    Ao root annulus 2.93 cm    AORTIC VALVE CUSP SEPERATION 1.27 cm    PV PEAK VELOCITY 0.99 cm/s    LV Diastolic Volume 108.17 mL    LV Systolic Volume 44.86 mL    LVOT peak alberto 0.6019753335 m/s    FS 31 %    LV mass 233.01 g    Left Ventricle Relative Wall Thickness 0.50 cm    AV valve area 2.06 cm2    AV Velocity Ratio 0.68     AV index (prosthetic) 0.66     E/A ratio 0.96     Pulm vein S/D ratio 1.08     LVOT area 3.14 cm2    LVOT stroke volume 73.76 cm3    AV peak gradient 10.89 mmHg    Triscuspid Valve Regurgitation Peak Gradient 14.90 mmHg    Right Atrial Pressure (from IVC) 3 mmHg    TV resting pulmonary artery pressure 18 mmHg    Narrative    · Normal right ventricular systolic function.  · Concentric left ventricular hypertrophy.  · Normal left ventricular systolic function. The estimated ejection   fraction is 55%  · Grade I (mild) left ventricular diastolic dysfunction consistent with   impaired relaxation.  · Normal left atrial pressure.  · Normal central venous pressure (3 mm Hg).  · The estimated PA systolic pressure is 18 mm Hg  · Mild left atrial enlargement.  · There is marked thickening and calcification and reduced mobility of the   non-coronary cusp of the aortic valve with no aortic stenosis and no   aortic regurgitation        Assessment and Plan:     Brief HPI: Seen this morning on AM rounds while resting in bed with daughter at the bedside. Denies any complaints. Discussed POC as detailed below-verbalized understanding and agrees with POC     * Chest pain  - presented with chest pain  - initial troponin .027 with trend up to .555and down to .257 this AM  - initial EKG abnormal with improvement  with trending down of BP  - currently suspect demand etiology in setting of uncontrolled BP given improvement in EKG with lowered BP and troponin trending down  - will proceed with echo today; recommend ambulation in the hallway; if no chest pain with ambulation, if  or less and echo not grossly abnormal then would be suitable for discharge     Demand ischemia of myocardium  - as detailed under chest pain     Essential hypertension  - HTN urgency upon admission  - /92 upon admission  - on Toprol XL and Norvasc as an outpatient  - SBP 150s-180s; BP goal less than 130/80;  may need up titration of Norvasc if BP remains above goal; reports previously being on Norvasc 10mg with down titration due to dizziness; if apprehensive to up titrate CCB then recommend transition from Toprol XL to Coreg     Coronary artery disease involving native coronary artery of native heart with refractory angina pectoris  - s/p CABG s/p PCI  - admitted with chest pain with mildly elevated troponin  - will continue medical management with DAPT, statin, BB  - needs aggressive BP control; will not proceed with LHC today and will manage medically         VTE Risk Mitigation (From admission, onward)         Ordered     enoxaparin injection 70 mg  Every 12 hours         10/16/23 0954     IP VTE HIGH RISK PATIENT  Once         10/14/23 1928     Place sequential compression device  Until discontinued         10/14/23 1928                BREONNA Broussard, ANP  Cardiology  [place] - Telemetry

## 2023-10-16 NOTE — ASSESSMENT & PLAN NOTE
· A1C 7.9 - last checked 2 years ago  · Levemir  · Moderate SSI  · -dose/medication adjustment as appropriate   · -monitor accuchecks AC/HS and PRN hypoglycemic protocol

## 2023-10-16 NOTE — ASSESSMENT & PLAN NOTE
- presented with chest pain  - initial troponin .027 with trend up to .555and down to .257 this AM  - initial EKG abnormal with improvement with trending down of BP  - currently suspect demand etiology in setting of uncontrolled BP given improvement in EKG with lowered BP and troponin trending down  - will proceed with echo today; recommend ambulation in the hallway; if no chest pain with ambulation, if  or less and echo not grossly abnormal then would be suitable for discharge

## 2023-10-16 NOTE — PLAN OF CARE
Recommendation:  1. Encourage intake at meals as tolerated.   2. Encourage Cardiac diet compliance.   3. Monitor weight/labs.   4. RD to follow to monitor po intake    Goals:   Pt will consume at least 50-75% intake at meals by RD follow up  Nutrition Goal Status: new

## 2023-10-16 NOTE — CONSULTS
"  [facility] - Telemetry  Adult Nutrition  Consult Note    SUMMARY     Recommendations    Recommendation:  1. Encourage intake at meals as tolerated.   2. Encourage Cardiac diet compliance.   3. Monitor weight/labs.   4. RD to follow to monitor po intake    Goals:   Pt will consume at least 50-75% intake at meals by RD follow up  Nutrition Goal Status: new  Communication of RD Recs: reviewed with RN Judi)    Assessment and Plan  Nutrition Problem  Food and Nutrition Related Knowledge Deficit    Related to (etiology):   Lack of prior exposure    Signs and Symptoms (as evidenced by):   DX: NSTEMI     Interventions:  Collaboration with other providers  Fat/sodium restricted diet    Nutrition Diagnosis Status:   New      Malnutrition Assessment  Unable to assess NFPE at visit    Reason for Assessment  Reason For Assessment: consult (NSTEMI)  Diagnosis:  (chest pain)  Relevant Medical History: CAD, CABG, HTN, HLD, DM, CKD, GERD, thyroid disease, arthritis, heart cath  General Information Comments: Admitted with NSTEMI. Pt on ADA Cardiac diet with fair intake at meals. Denies recent weight loss. Naresh 19-skin intact. Unable to assess NFPE at visit. Educated pt on Cardiac diet. Discussed was to lower his salt and fat intake. Handouts attached to d/c paperwork.  Nutrition Discharge Planning: pt to d/c on ADA Cardiac diet    Nutrition Risk Screen  Nutrition Risk Screen: no indicators present    Nutrition/Diet History  Food Preferences: no Congregation or cultural food prefs identified  Spiritual, Cultural Beliefs, Worship Practices, Values that Affect Care: no  Factors Affecting Nutritional Intake: None identified at this time    Anthropometrics  Temp: 97.4 °F (36.3 °C)  Height Method: Stated  Height: 5' 4" (162.6 cm)  Height (inches): 64 in  Weight Method: Bed Scale  Weight: 64.9 kg (143 lb 1.3 oz)  Weight (lb): 143.08 lb  Ideal Body Weight (IBW), Male: 130 lb  % Ideal Body Weight, Male (lb): 110.06 %  BMI (Calculated): " 24.5  BMI Grade: 18.5-24.9 - normal     Lab/Procedures/Meds  Pertinent Labs Reviewed: reviewed  Pertinent Labs Comments: Crea 1.5H, Glu 142H  Pertinent Medications Reviewed: reviewed  Pertinent Medications Comments: aspirin, insulin    Estimated/Assessed Needs  Weight Used For Calorie Calculations: 64.9 kg (143 lb 1.3 oz)  Energy Calorie Requirements (kcal): 1947 (30 kcal/kg)  Energy Need Method: Kcal/kg  Protein Requirements: 64-77g (1.0-1.2g/kg)  Weight Used For Protein Calculations: 64.9 kg (143 lb 1.3 oz)  Estimated Fluid Requirement Method: RDA Method  RDA Method (mL): 1947    Nutrition Prescription Ordered  Current Diet Order: ADA Cardiac    Evaluation of Received Nutrient/Fluid Intake  I/O: 0/675  Energy Calories Required: meeting needs  Protein Required: meeting needs  Fluid Required: meeting needs  Comments: LBM 10/14  % Intake of Estimated Energy Needs: 50 - 75 %  % Meal Intake: 50 - 75 %    Nutrition Risk  Level of Risk/Frequency of Follow-up:  (1xweekly)     Monitor and Evaluation  Food and Nutrient Intake: food and beverage intake  Food and Nutrient Adminstration: diet order  Physical Activity and Function: nutrition-related ADLs and IADLs  Anthropometric Measurements: weight  Biochemical Data, Medical Tests and Procedures: electrolyte and renal panel  Nutrition-Focused Physical Findings: overall appearance     Nutrition Follow-Up  RD Follow-up?: Yes

## 2023-10-16 NOTE — ASSESSMENT & PLAN NOTE
- HTN urgency upon admission  - /92 upon admission  - on Toprol XL and Norvasc as an outpatient  - SBP 150s-180s; BP goal less than 130/80;  may need up titration of Norvasc if BP remains above goal; reports previously being on Norvasc 10mg with down titration due to dizziness; if apprehensive to up titrate CCB then recommend transition from Toprol XL to Coreg

## 2023-10-16 NOTE — HOSPITAL COURSE
10/16/2023 per Dr. Parmar note from 10/15/2023 72 yo male with hx of CAD s/p CABG s/p PCI, HTN, HLD, PAD who presented with CP over the last few days and diagnosed with HTN urgency, elevated trop, initial ECG with ischemic changes, which resolved with BP control. Last trop 0.5. BP has been stable, no recurrent CP. CKI improved with Cr 2.1 to 1.4.    Chest pain free overnight. Troponin this AM down to .22. SBP 150s-180s overnight. Echo pending

## 2023-10-16 NOTE — SUBJECTIVE & OBJECTIVE
Review of Systems   Constitutional: Negative for chills, decreased appetite, diaphoresis and fever.   Cardiovascular:  Negative for chest pain, claudication, cyanosis, dyspnea on exertion, irregular heartbeat, leg swelling, near-syncope, orthopnea, palpitations, paroxysmal nocturnal dyspnea and syncope.   Respiratory:  Negative for cough, hemoptysis, shortness of breath and wheezing.    Gastrointestinal:  Negative for bloating, abdominal pain, constipation, diarrhea, melena, nausea and vomiting.   Neurological:  Negative for dizziness and weakness.     Objective:     Vital Signs (Most Recent):  Temp: 98.3 °F (36.8 °C) (10/16/23 0808)  Pulse: 72 (10/16/23 0808)  Resp: 18 (10/16/23 0808)  BP: (!) 162/77 (10/16/23 0808)  SpO2: 98 % (10/16/23 0824) Vital Signs (24h Range):  Temp:  [98.1 °F (36.7 °C)-98.6 °F (37 °C)] 98.3 °F (36.8 °C)  Pulse:  [72-81] 72  Resp:  [18-20] 18  SpO2:  [98 %-99 %] 98 %  BP: (156-180)/(74-83) 162/77     Weight: 65 kg (143 lb 4.8 oz)  Body mass index is 24.6 kg/m².     SpO2: 98 %         Intake/Output Summary (Last 24 hours) at 10/16/2023 1008  Last data filed at 10/16/2023 0805  Gross per 24 hour   Intake --   Output 1075 ml   Net -1075 ml       Lines/Drains/Airways       Peripheral Intravenous Line  Duration                  Peripheral IV - Single Lumen 10/14/23 1720 20 G Left Antecubital 1 day                       Physical Exam  Constitutional:       General: He is not in acute distress.     Appearance: He is well-developed.   Cardiovascular:      Rate and Rhythm: Normal rate and regular rhythm.      Heart sounds: No murmur heard.     No gallop.   Pulmonary:      Effort: Pulmonary effort is normal. No respiratory distress.      Breath sounds: Normal breath sounds. No wheezing.   Abdominal:      General: Bowel sounds are normal. There is no distension.      Palpations: Abdomen is soft.      Tenderness: There is no abdominal tenderness.   Skin:     General: Skin is warm and dry.    Neurological:      Mental Status: He is alert and oriented to person, place, and time.            Significant Labs: BMP:   Recent Labs   Lab 10/14/23  1727 10/15/23  0625 10/16/23  0738   * 133* 142*    145 145   K 4.7 4.3 4.4    112* 110   CO2 19* 24 26   BUN 29* 25* 23   CREATININE 2.1* 1.4 1.5*   CALCIUM 9.2 9.2 9.2   MG  --   --  2.1   , CBC   Recent Labs   Lab 10/14/23  1727 10/15/23  0625 10/16/23  0738   WBC 5.55 5.89 6.38   HGB 13.1* 11.8* 13.3*   HCT 41.2 35.9* 40.9   * 134* 148*   , and Troponin   Recent Labs   Lab 10/14/23  1727 10/15/23  0624 10/16/23  0738   TROPONINI 0.027* 0.555* 0.257*       Significant Imaging: Echocardiogram: Transthoracic echo (TTE) complete (Cupid Only):   Results for orders placed or performed during the hospital encounter of 03/14/19   Transthoracic echo (TTE) complete (Cupid Only)   Result Value Ref Range    AV mean gradient 5.56 mmHg    Ao peak alberto 1.65 m/s    Ao VTI 35.85 cm    IVRT 0.06 msec    IVS 1.30 0.6 - 1.1 cm    LA size 4.10 cm    Left Atrium Major Axis 4.34 cm    LVIDd 4.81 3.5 - 6.0 cm    LVIDs 3.32 2.1 - 4.0 cm    LVOT diameter 2.00 cm    LVOT peak VTI 23.49 cm    Posterior Wall 1.20 (A) 0.6 - 1.1 cm    MV Peak A Alberto 1.01 m/s    E wave deceleration time 136.00 msec    MV Peak E Alberto 0.97 m/s    PV Peak D Alberto 0.37 m/s    PV Peak S Alberto 0.40 m/s    RA Major Axis 4.22 cm    RVDD 2.90 cm    TR Max Alberto 1.93 m/s    LA WIDTH 2.98 cm    Ao root annulus 2.93 cm    AORTIC VALVE CUSP SEPERATION 1.27 cm    PV PEAK VELOCITY 0.99 cm/s    LV Diastolic Volume 108.17 mL    LV Systolic Volume 44.86 mL    LVOT peak alberto 3.6735012216 m/s    FS 31 %    LV mass 233.01 g    Left Ventricle Relative Wall Thickness 0.50 cm    AV valve area 2.06 cm2    AV Velocity Ratio 0.68     AV index (prosthetic) 0.66     E/A ratio 0.96     Pulm vein S/D ratio 1.08     LVOT area 3.14 cm2    LVOT stroke volume 73.76 cm3    AV peak gradient 10.89 mmHg    Triscuspid Valve  Regurgitation Peak Gradient 14.90 mmHg    Right Atrial Pressure (from IVC) 3 mmHg    TV resting pulmonary artery pressure 18 mmHg    Narrative    · Normal right ventricular systolic function.  · Concentric left ventricular hypertrophy.  · Normal left ventricular systolic function. The estimated ejection   fraction is 55%  · Grade I (mild) left ventricular diastolic dysfunction consistent with   impaired relaxation.  · Normal left atrial pressure.  · Normal central venous pressure (3 mm Hg).  · The estimated PA systolic pressure is 18 mm Hg  · Mild left atrial enlargement.  · There is marked thickening and calcification and reduced mobility of the   non-coronary cusp of the aortic valve with no aortic stenosis and no   aortic regurgitation

## 2023-10-16 NOTE — ASSESSMENT & PLAN NOTE
CKD, stage III  Patient with acute kidney injury/acute renal failure likely due to pre-renal azotemia due to dehydration SAUL is currently improving. Baseline creatinine 1.3-1.7 - Labs reviewed- Renal function/electrolytes with Estimated Creatinine Clearance: 36.7 mL/min (A) (based on SCr of 1.5 mg/dL (H)). according to latest data. Monitor urine output and serial BMP and adjust therapy as needed. Avoid nephrotoxins and renally dose meds for GFR listed above.    10/16-  Remains stable.   -monitor with current treatment  -avoid nephrotoxins and hypotension as able, renally dose medications

## 2023-10-16 NOTE — ASSESSMENT & PLAN NOTE
Demand ischemia of myocardium  Essential hypertension  CAD  S/P CABG  History of coronary stent placement  HLD  · Midsternal CP w/ radiation into back between shoulders  · ASA, plavix, lovenox, statin  · PRN NTG for CP  · Cardiology consult ordered  · Trop 0.027  · EKG: sinus tachycardia, , ST and T wave abnormality but no STEMI    10/16-  -presented with chest pain and SOB in setting of hypertensive urgency  -remained chest pain free since admission  -trop 0.027> 0.555 >> 0.257 likely secondary to demand  -TTE with concentric remodeling, EF 55%, grade I DD  -Cardiology followed with recommendations for aggressive BP control, no procedural intervention required   -goal  or less  -home amlodipine uptitrated and metoprolol continued  -continue statin and zetia   -dose/medication adjustment as appropriate   -continue home oxygen supplementation and encouraged continuous wear especially with activity  -continue tele monitoring  -EKG PRN concerns or chest pain  -SL NTG PRN chest pain  -trend labs, address/replete electrolytes as indicated  -monitor

## 2023-10-16 NOTE — PLAN OF CARE
[facility] - Telemetry  Discharge Final Note    Primary Care Provider: Abilio Weber MD    Expected Discharge Date:     Pharmacist will go over home medications and reasons for medications. VN and bedside nurse to reiterate final discharge instructions.     Cleared from CM . Bedside Nurse and VN notified.      Final Discharge Note (most recent)       Final Note - 10/16/23 1159          Final Note    Assessment Type Final Discharge Note (P)      Anticipated Discharge Disposition Home or Self Care (P)      Hospital Resources/Appts/Education Provided Appointments scheduled and added to AVS (P)         Post-Acute Status    Post-Acute Authorization Other (P)      Other Status Awaiting f/u Appts (P)    PCC appt requested    Discharge Delays Orders Needed (P)                    No future appointments.        Contact Info       Rey Connelly MD   Specialty: Cardiology    [address]  [address]  [id number]   Phone: [phone]       Next Steps: Schedule an appointment as soon as possible for a visit in 1 week(s)    Instructions: for hospital follow up and medication management. Goal systolic blood pressure 150 or less.    Abilio Weber MD   Specialty: Family Medicine   Relationship: PCP - General    [address] [place] [address]  [id number]   Phone: [phone]       Next Steps: Schedule an appointment as soon as possible for a visit in 1 week(s)    Instructions: for hospital follow up.             Medication List        ASK your doctor about these medications      ACCU-CHEK ADRIEN PLUS METER Misc  Generic drug: blood-glucose meter     ACCU-CHEK SMARTVIEW TEST STRIP Strp  Generic drug: blood sugar diagnostic     ACCU-CHEK SOFTCLIX LANCETS Misc  Generic drug: lancets     amLODIPine 5 MG tablet  Commonly known as: NORVASC  Take 1 tablet (5 mg total) by mouth once daily.     aspirin 81 MG Chew     atorvastatin 80 MG tablet  Commonly known as: LIPITOR  TAKE 1 TABLET EVERY DAY     b complex  "vitamins tablet     clopidogreL 75 mg tablet  Commonly known as: PLAVIX  TAKE 1 TABLET EVERY DAY     DROPLET PEN NEEDLE 32 gauge x 5/32" Ndle  Generic drug: pen needle, diabetic     ESBRIET 801 mg Tab  Generic drug: pirfenidone     ezetimibe 10 mg tablet  Commonly known as: ZETIA  TAKE 1 TABLET EVERY DAY     hydrocortisone 2.5 % cream     insulin degludec 100 unit/mL (3 mL) insulin pen  Commonly known as: TRESIBA FLEXTOUCH U-100  Inject 50 Units into the skin every evening. F/u apt needed with PCP     JARDIANCE 10 mg tablet  Generic drug: empagliflozin     KERENDIA 10 mg Tab  Generic drug: finerenone     levothyroxine 100 MCG tablet  Commonly known as: SYNTHROID  Take 1 tablet (100 mcg total) by mouth before breakfast.     lisinopriL 2.5 MG tablet  Commonly known as: PRINIVILZESTRIL  TAKE 1 TABLET EVERY DAY     LUBRICANT EYE DROPS 0.5 % Dpet  Generic drug: carboxymethylcellulose     metoprolol succinate 100 MG 24 hr tablet  Commonly known as: TOPROL-XL  TAKE 1 TABLET ONE TIME DAILY     mirtazapine 7.5 MG Tab  Commonly known as: REMERON  TAKE 1 TABLET (7.5 MG TOTAL) BY MOUTH EVERY EVENING.     nitroGLYCERIN 0.4 MG SL tablet  Commonly known as: NITROSTAT  Place 1 tablet (0.4 mg total) under the tongue every 5 (five) minutes as needed for Chest pain.     NovoLOG U-100 Insulin aspart 100 unit/mL injection  Generic drug: insulin aspart U-100     ORENCIA 125 mg/mL Syrg  Generic drug: abatacept     pantoprazole 40 MG tablet  Commonly known as: PROTONIX     vitamin D 1000 units Tab  Commonly known as: VITAMIN D3              "

## 2023-10-16 NOTE — SUBJECTIVE & OBJECTIVE
Interval History: Resting in bed, family at bedside. Denies CP or associated symptoms. Lengthy discussion on angina, causes of angina, home interventions, and when to seek medical treatment.     Review of Systems   Constitutional:  Negative for activity change, appetite change, chills, diaphoresis and fatigue.   HENT:  Negative for congestion, sore throat and trouble swallowing.    Respiratory:  Positive for shortness of breath. Negative for cough, chest tightness and wheezing.    Cardiovascular:  Negative for chest pain, palpitations and leg swelling.   Gastrointestinal:  Negative for abdominal distention, abdominal pain, constipation, diarrhea, nausea and vomiting.   Genitourinary:  Negative for decreased urine volume, difficulty urinating, dysuria and urgency.   Musculoskeletal:  Negative for arthralgias.   Neurological:  Negative for dizziness, seizures, syncope, weakness, light-headedness and headaches.     Objective:     Vital Signs (Most Recent):  Temp: 97.5 °F (36.4 °C) (10/16/23 1600)  Pulse: 88 (10/16/23 1731)  Resp: 18 (10/16/23 1600)  BP: (!) 173/79 (10/16/23 1731)  SpO2: 99 % (10/16/23 1600) Vital Signs (24h Range):  Temp:  [97.4 °F (36.3 °C)-98.3 °F (36.8 °C)] 97.5 °F (36.4 °C)  Pulse:  [72-88] 88  Resp:  [18-20] 18  SpO2:  [98 %-99 %] 99 %  BP: (129-180)/(74-83) 173/79     Weight: 64.9 kg (143 lb 1.3 oz)  Body mass index is 24.56 kg/m².    Intake/Output Summary (Last 24 hours) at 10/16/2023 1739  Last data filed at 10/16/2023 0805  Gross per 24 hour   Intake --   Output 1075 ml   Net -1075 ml         Physical Exam  Vitals and nursing note reviewed.   Constitutional:       General: He is not in acute distress.     Appearance: Normal appearance. He is well-developed and normal weight. He is not toxic-appearing.   HENT:      Head: Normocephalic and atraumatic.      Mouth/Throat:      Dentition: Normal dentition.   Eyes:      General: Lids are normal.      Extraocular Movements: Extraocular movements  intact.      Conjunctiva/sclera: Conjunctivae normal.   Cardiovascular:      Rate and Rhythm: Normal rate and regular rhythm.      Heart sounds: Murmur heard.   Pulmonary:      Effort: Pulmonary effort is normal.      Breath sounds: Normal breath sounds.      Comments: Appears comfortable on NC, no increased WOB or accessory muscle use. No conversational dyspnea or cough during interview/exam.  Abdominal:      Palpations: Abdomen is soft.   Musculoskeletal:      Cervical back: Neck supple.      Right lower leg: No edema.      Left lower leg: No edema.   Skin:     General: Skin is warm and dry.      Findings: No erythema or rash.   Neurological:      Mental Status: He is alert and oriented to person, place, and time.   Psychiatric:         Judgment: Judgment normal.         Significant Labs: All pertinent labs within the past 24 hours have been reviewed.  CBC:   Recent Labs   Lab 10/15/23  0625 10/16/23  0738   WBC 5.89 6.38   HGB 11.8* 13.3*   HCT 35.9* 40.9   * 148*     CMP:   Recent Labs   Lab 10/15/23  0625 10/16/23  0738    145   K 4.3 4.4   * 110   CO2 24 26   * 142*   BUN 25* 23   CREATININE 1.4 1.5*   CALCIUM 9.2 9.2   PROT  --  7.8   ALBUMIN  --  3.5   BILITOT  --  0.4   ALKPHOS  --  104   AST  --  24   ALT  --  29   ANIONGAP 9 9     Magnesium:   Recent Labs   Lab 10/16/23  0738   MG 2.1     Troponin:   Recent Labs   Lab 10/15/23  0624 10/16/23  0738   TROPONINI 0.555* 0.257*       Significant Imaging: I have reviewed all pertinent imaging results/findings within the past 24 hours.

## 2023-10-16 NOTE — PROGRESS NOTES
"[facility]  Progress Note    Patient Name: Jose Antonio Allen  MRN: [id number]  Patient Class: OP- Observation   Admission Date: 10/14/2023  Length of Stay: 0 days  Attending Physician: Bernardo Velasquez,*  Primary Care Provider: Abilio Weber MD    Subjective:     Principal Problem:Chest pain    HPI:  Per admitting provider: "Jose Antonio Allen is a 73 y.o. male who  has a past medical history of Angina pectoris, Arthritis, CKD (chronic kidney disease), Colon polyps (09/14/2018), Coronary artery disease, Diabetes mellitus, Diabetes mellitus, type 2, GERD (gastroesophageal reflux disease), History of tobacco use, Hyperlipidemia, Hypertension, S/P CABG (coronary artery bypass graft) (1996), and Thyroid disease.     He presented to the ED w/ c/o chest pain.  Patient has been having intermittent chest pain for the past 4 days but today it worsened.  Patient had an episode of chest pain this morning and then had another episode today approximately 1 hour prior to arrival.  He states it was temporarily relieved by nitroglycerin at home.  States it radiates to both of his shoulders and he has had occasional sweating with it.  He states he notices it more when he stands or moves and he reports associated shortness of breath.  He states he is very worried given his cardiac history, which includes CABG in 1996 and had a heart catheterization in March of this year with a stent placed.  He is followed by Dr. Parmar. He denies any fever, chills, nausea, vomiting, or abdominal pains."       Overview/Hospital Course:  74 y/o with hx of CAD/CABG/PCI presented with chest pain and SOB in setting of hypertensive urgency. Has remained chest pain free since admission. Trop 0.027> 0.555 >> 0.257 likely secondary to demand. TTE with concentric remodeling, EF 55%, grade I DD. Cardiology followed with recommendations for aggressive BP control, no procedural intervention required. Home amlodipine uptitrated " and metoprolol continued. Continue home oxygen supplementation and encouraged continuous wear especially with activity.     Disposition plans: home when BP well controlled.       Interval History: Resting in bed, family at bedside. Denies CP or associated symptoms. Lengthy discussion on angina, causes of angina, home interventions, and when to seek medical treatment.     Review of Systems   Constitutional:  Negative for activity change, appetite change, chills, diaphoresis and fatigue.   HENT:  Negative for congestion, sore throat and trouble swallowing.    Respiratory:  Positive for shortness of breath. Negative for cough, chest tightness and wheezing.    Cardiovascular:  Negative for chest pain, palpitations and leg swelling.   Gastrointestinal:  Negative for abdominal distention, abdominal pain, constipation, diarrhea, nausea and vomiting.   Genitourinary:  Negative for decreased urine volume, difficulty urinating, dysuria and urgency.   Musculoskeletal:  Negative for arthralgias.   Neurological:  Negative for dizziness, seizures, syncope, weakness, light-headedness and headaches.     Objective:     Vital Signs (Most Recent):  Temp: 97.5 °F (36.4 °C) (10/16/23 1600)  Pulse: 88 (10/16/23 1731)  Resp: 18 (10/16/23 1600)  BP: (!) 173/79 (10/16/23 1731)  SpO2: 99 % (10/16/23 1600) Vital Signs (24h Range):  Temp:  [97.4 °F (36.3 °C)-98.3 °F (36.8 °C)] 97.5 °F (36.4 °C)  Pulse:  [72-88] 88  Resp:  [18-20] 18  SpO2:  [98 %-99 %] 99 %  BP: (129-180)/(74-83) 173/79     Weight: 64.9 kg (143 lb 1.3 oz)  Body mass index is 24.56 kg/m².    Intake/Output Summary (Last 24 hours) at 10/16/2023 1739  Last data filed at 10/16/2023 0805  Gross per 24 hour   Intake --   Output 1075 ml   Net -1075 ml         Physical Exam  Vitals and nursing note reviewed.   Constitutional:       General: He is not in acute distress.     Appearance: Normal appearance. He is well-developed and normal weight. He is not toxic-appearing.   HENT:       Head: Normocephalic and atraumatic.      Mouth/Throat:      Dentition: Normal dentition.   Eyes:      General: Lids are normal.      Extraocular Movements: Extraocular movements intact.      Conjunctiva/sclera: Conjunctivae normal.   Cardiovascular:      Rate and Rhythm: Normal rate and regular rhythm.      Heart sounds: Murmur heard.   Pulmonary:      Effort: Pulmonary effort is normal.      Breath sounds: Normal breath sounds.      Comments: Appears comfortable on NC, no increased WOB or accessory muscle use. No conversational dyspnea or cough during interview/exam.  Abdominal:      Palpations: Abdomen is soft.   Musculoskeletal:      Cervical back: Neck supple.      Right lower leg: No edema.      Left lower leg: No edema.   Skin:     General: Skin is warm and dry.      Findings: No erythema or rash.   Neurological:      Mental Status: He is alert and oriented to person, place, and time.   Psychiatric:         Judgment: Judgment normal.         Significant Labs: All pertinent labs within the past 24 hours have been reviewed.  CBC:   Recent Labs   Lab 10/15/23  0625 10/16/23  0738   WBC 5.89 6.38   HGB 11.8* 13.3*   HCT 35.9* 40.9   * 148*     CMP:   Recent Labs   Lab 10/15/23  0625 10/16/23  0738    145   K 4.3 4.4   * 110   CO2 24 26   * 142*   BUN 25* 23   CREATININE 1.4 1.5*   CALCIUM 9.2 9.2   PROT  --  7.8   ALBUMIN  --  3.5   BILITOT  --  0.4   ALKPHOS  --  104   AST  --  24   ALT  --  29   ANIONGAP 9 9     Magnesium:   Recent Labs   Lab 10/16/23  0738   MG 2.1     Troponin:   Recent Labs   Lab 10/15/23  0624 10/16/23  0738   TROPONINI 0.555* 0.257*       Significant Imaging: I have reviewed all pertinent imaging results/findings within the past 24 hours.      Assessment/Plan:      * Chest pain  Demand ischemia of myocardium  Essential hypertension  CAD  S/P CABG  History of coronary stent placement  HLD  · Midsternal CP w/ radiation into back between shoulders  · ASA, plavix,  lovenox, statin  · PRN NTG for CP  · Cardiology consult ordered  · Trop 0.027  · EKG: sinus tachycardia, , ST and T wave abnormality but no STEMI    10/16-  -presented with chest pain and SOB in setting of hypertensive urgency  -remained chest pain free since admission  -trop 0.027> 0.555 >> 0.257 likely secondary to demand  -TTE with concentric remodeling, EF 55%, grade I DD  -Cardiology followed with recommendations for aggressive BP control, no procedural intervention required   -goal  or less  -home amlodipine uptitrated and metoprolol continued  -continue statin and zetia   -dose/medication adjustment as appropriate   -continue home oxygen supplementation and encouraged continuous wear especially with activity  -continue tele monitoring  -EKG PRN concerns or chest pain  -SL NTG PRN chest pain  -trend labs, address/replete electrolytes as indicated  -monitor     SAUL (acute kidney injury)  CKD, stage III  Patient with acute kidney injury/acute renal failure likely due to pre-renal azotemia due to dehydration SAUL is currently improving. Baseline creatinine 1.3-1.7 - Labs reviewed- Renal function/electrolytes with Estimated Creatinine Clearance: 36.7 mL/min (A) (based on SCr of 1.5 mg/dL (H)). according to latest data. Monitor urine output and serial BMP and adjust therapy as needed. Avoid nephrotoxins and renally dose meds for GFR listed above.    10/16-  Remains stable.   -monitor with current treatment  -avoid nephrotoxins and hypotension as able, renally dose medications    Chronic hypoxic respiratory failure, on home oxygen therapy  COPD  Fibrosis of lung  -chronic  -continue home oxygen supplementation  -monitor     Type 2 diabetes mellitus with stage 3a chronic kidney disease, without long-term current use of insulin  · A1C 7.9 - last checked 2 years ago  · Levemir  · Moderate SSI  · -dose/medication adjustment as appropriate   · -monitor accuchecks AC/HS and PRN hypoglycemic protocol      Acquired hypothyroidism  -chronic  -continue home levothyroxine         VTE Risk Mitigation (From admission, onward)         Ordered     enoxaparin injection 70 mg  Every 12 hours         10/16/23 0954     IP VTE HIGH RISK PATIENT  Once         10/14/23 1928     Place sequential compression device  Until discontinued         10/14/23 1928              Discharge Planning   KARLA:      Code Status: Full Code   Is the patient medically ready for discharge?:     Reason for patient still in hospital (select all that apply): Patient trending condition and Treatment  Discharge Plan A: Home, Home with family   Discharge Delays: Orders Needed      Lilian Millan, STEVE, AG-ACNP, BC  Department of Hospital Medicine  [facility]

## 2023-10-16 NOTE — PLAN OF CARE
"[facility] - Telemetry  Initial Discharge Assessment       Primary Care Provider: Abilio Weber MD    Admission Diagnosis: Non-ST elevation myocardial infarction (NSTEMI) [I21.4]  NSTEMI (non-ST elevation myocardial infarction) [I21.4]  Chest pain [R07.9]    Admission Date: 10/14/2023  Expected Discharge Date:     DCA done with patient and daughter at bedside. Pt ambulating with O2 with therapy. No therapy recs for DME reported by therapist. Pt independent with ADLs and Meds.         Payor: [employer] MEDICARE / Plan: [employer] MEDICARE HMO / Product Type: Capitation /     Extended Emergency Contact Information  Primary Emergency Contact: Fausto Allen  Address: [address] [place] [address]           [place] [address] [facility]  Home Phone: [phone]  Mobile Phone: [phone]  Relation: Spouse    Discharge Plan A: (P) Home, Home with family         [facility] - [place], [place] - [address]  [address] [facility] [address]  Phone: [phone] Fax: [phone]    [facility]pharmacy #[id number] - [place] - [address] [facility]  [address] [address]  Phone: [phone] Fax: [phone]         10/16/23 1138   Discharge Planning   Assessment Type Discharge Planning Brief Assessment   Resource/Environmental Concerns none   Support Systems Children;Spouse/significant other   Equipment Currently Used at Home walker, rolling;oxygen   Current Living Arrangements home   Patient/Family Anticipates Transition to home   Patient/Family Anticipated Services at Transition none   DME Needed Upon Discharge  none   Discharge Plan A Home;Home with family   Social Connections   Are you , , , , never , or living with a partner?        No future appointments.    /74 (BP Location: Left arm, Patient Position: Lying)   Pulse 76   Temp 97.4 °F (36.3 °C) (Oral)   Resp 20   Ht 5' 4" (1.626 m) "   Wt 64.9 kg (143 lb)   SpO2 99%   BMI 24.55 kg/m²      amLODIPine  5 mg Oral Daily    aspirin  81 mg Oral Daily    atorvastatin  80 mg Oral Daily    clopidogreL  75 mg Oral Daily    enoxparin  70 mg Subcutaneous Q12H (prophylaxis, 0900/2100)    ezetimibe  10 mg Oral Daily    insulin detemir U-100  25 Units Subcutaneous QHS    levothyroxine  100 mcg Oral Before breakfast    metoprolol succinate  100 mg Oral Daily     Consults (From admission, onward)          Status Ordering Provider     Inpatient consult to Registered Dietitian/Nutritionist  Once        Provider:  (Not yet assigned)    Acknowledged JACQUIE WILSON     Inpatient consult to Social Work/Case Management  Once        Provider:  (Not yet assigned)    Acknowledged JACQUIE WILSON     [facility]  Once        Provider:  Gordy Parmar MD    Completed JACQUIE WILSON

## 2023-10-16 NOTE — PROGRESS NOTES
"   10/16/23 1811 10/16/23 1812 10/16/23 1814   Vital Signs   Pulse 77 80 81   Resp (!) 22  --   --    SpO2 99 % 98 % 97 %   BP (!) 187/86 (!) 173/84 (!) 165/77   MAP (mmHg) 124 121 111   Patient Position Lying Sitting Standing     Pt c/o "dizziness" states this is how he felt when he was taking Amlodipine 10mg PO daily a few months ago. States cardiologist titrated dose down to Amlodipine 5mg PO daily. Orthostatics done. Notified GENEVIEVE Henry. hospitals will communicate with CARDS. No new orders at this time. NADN. Continue POC.   "

## 2023-10-16 NOTE — PT/OT/SLP EVAL
Physical Therapy Evaluation, treatment and Discharge Note    Patient Name:  Jose Antonio Allen   MRN:  [id number]    Recommendations:     Discharge Recommendations: No Therapy Indicated  Discharge Equipment Recommendations: none   Barriers to discharge: None    Assessment:     Jose Antonio Allen is a 73 y.o. male admitted with a medical diagnosis of Chest pain. .  At this time, patient is functioning at their prior level of function and does not require further acute PT services.     PT evaluation completed; pt was previously Independent with occasional use of rollator as needed. At this time pt is SBA progressing to Independent with no AD. Pt has no complaints of chest pain with ambulation. Pt remains on 4L O2 during session with SpO2 93-98% during session with activity. Pt has no further skilled acute PT needs and is functionally safe from therapy stand-point to d/c home with family assistance as needed. No DME needed.     Recent Surgery: * No surgery found *      Plan:     During this hospitalization, patient does not require further acute PT services.  Please re-consult if situation changes.      Subjective     Chief Complaint: no complaints  Patient/Family Comments/goals: to return home  Pain/Comfort:  Pain Rating 1: 0/10  Pain Rating Post-Intervention 1: 0/10    Patients cultural, spiritual, Congregation conflicts given the current situation: no    Living Environment:  Lives with wife in 1 story home with threshold step to enter. WIS with built in seat.   Prior to admission, patients level of function was Independent with occasional use of rollator as needed.  Equipment used at home: rollator, oxygen.  DME owned (not currently used): none.  Upon discharge, patient will have assistance from family.    Objective:     Communicated with Nurse prior to session.  Patient found sitting edge of bed with oxygen upon PT entry to room.    General Precautions: Standard, fall    Orthopedic Precautions:N/A   Braces: N/A  Respiratory  Status: Nasal cannula, flow 4 L/min    Exams:  Cognitive Exam:  Patient is oriented to Person, Place, Time, and Situation  Sensation:    -       Intact  light/touch to BLE  RLE ROM: WFL  RLE Strength: WFL  LLE ROM: WFL  LLE Strength: WFL    Functional Mobility: Pt sitting EOB upon PT entry.   Transfers:     Sit to Stand:  supervision with no AD  Gait: ~150ft with SPV progressing to Independent with no AD.     AM-PAC 6 CLICK MOBILITY  Total Score:21       Treatment and Education:  Pt educated on role of PT.   Pt performs mobility as stated above in functional mobility section of note with no AD.   Pt's SpO2 prior to ambulation 98% on 4L O2; declines to 93% post ambulation on 4L and returns to 98% on 4L within ~1 minute.   Pt has no complaints of chest pain during ambulation.   Pt has chronic L shoulder pain/decreased ROM; stating he has had OP PT twice (no benefits) and has found more relief when he goes work out with his wife at [employer] and not interested in further OP PT.   Pt states he is functioning at baseline concerning mobility.     AM-PAC 6 CLICK MOBILITY  Total Score:21     Patient left sitting edge of bed with all lines intact, call button in reach, nurse notified, and niece present.    GOALS:   Multidisciplinary Problems       Physical Therapy Goals          Problem: Physical Therapy    Goal Priority Disciplines Outcome Goal Variances Interventions   Physical Therapy Goal     PT, PT/OT Adequate for Care Transition                         History:     Past Medical History:   Diagnosis Date    Angina pectoris     Arthritis     CKD (chronic kidney disease)     Colon polyps 09/14/2018    Coronary artery disease     Diabetes mellitus     Diabetes mellitus, type 2     GERD (gastroesophageal reflux disease)     History of tobacco use     Hyperlipidemia     Hypertension     S/P CABG (coronary artery bypass graft) 1996    Thyroid disease        Past Surgical History:   Procedure Laterality Date    CATARACT  EXTRACTION Bilateral 3YRS    CLOSURE DEVICE  3/20/2023    Procedure: Placement of Closure Device;  Surgeon: Gordy Parmar MD;  Location: [facility] CATH LAB/EP;  Service: Cardiology;;    COLONOSCOPY W/ POLYPECTOMY  09/14/2018    CORONARY ANGIOGRAPHY N/A 4/11/2022    Procedure: ANGIOGRAM, CORONARY ARTERY;  Surgeon: Gordy Parmar MD;  Location: [facility] CATH LAB/EP;  Service: Cardiology;  Laterality: N/A;    CORONARY ANGIOGRAPHY INCLUDING BYPASS GRAFTS WITH CATHETERIZATION OF LEFT HEART N/A 4/11/2022    Procedure: ANGIOGRAM, CORONARY, INCLUDING BYPASS GRAFT, WITH LEFT HEART CATHETERIZATION;  Surgeon: Gordy Parmar MD;  Location: [facility] CATH LAB/EP;  Service: Cardiology;  Laterality: N/A;    CORONARY ARTERY BYPASS GRAFT  1996    CORONARY BYPASS GRAFT ANGIOGRAPHY  4/20/2022    Procedure: Bypass graft study;  Surgeon: Gordy Parmar MD;  Location: [facility] CATH LAB/EP;  Service: Cardiology;;    CORONARY BYPASS GRAFT ANGIOGRAPHY  3/20/2023    Procedure: Bypass graft study;  Surgeon: Gordy Parmar MD;  Location: [facility] CATH LAB/EP;  Service: Cardiology;;    EYELID SURGERY  03/2012    IVUS, CORONARY  3/20/2023    Procedure: IVUS, Coronary;  Surgeon: Gordy Parmar MD;  Location: [facility] CATH LAB/EP;  Service: Cardiology;;    LEFT HEART CATHETERIZATION N/A 4/11/2022    Procedure: Left heart cath;  Surgeon: Gordy Parmar MD;  Location: [facility] CATH LAB/EP;  Service: Cardiology;  Laterality: N/A;    LEFT HEART CATHETERIZATION N/A 3/20/2023    Procedure: Left heart cath;  Surgeon: Gordy Parmar MD;  Location: [facility] CATH LAB/EP;  Service: Cardiology;  Laterality: N/A;    PERCUTANEOUS TRANSLUMINAL BALLOON ANGIOPLASTY OF CORONARY ARTERY  4/20/2022    Procedure: Angioplasty-coronary;  Surgeon: Gordy Parmar MD;  Location: [facility] CATH LAB/EP;  Service: Cardiology;;    PERCUTANEOUS TRANSLUMINAL BALLOON ANGIOPLASTY OF CORONARY ARTERY  3/20/2023    Procedure: Angioplasty-coronary;  Surgeon: Gordy Parmar MD;  Location: [facility]  CATH LAB/EP;  Service: Cardiology;;    RIGHT HEART CATHETERIZATION Right 11/3/2021    Procedure: INSERTION, CATHETER, RIGHT HEART;  Surgeon: Temitope Rahman MD;  Location: [facility] CATH LAB/EP;  Service: Cardiology;  Laterality: Right;    STENT, DRUG ELUTING, SINGLE VESSEL, CORONARY  3/20/2023    Procedure: Stent, Drug Eluting, Single Vessel, Coronary;  Surgeon: Gordy Parmar MD;  Location: [facility] CATH LAB/EP;  Service: Cardiology;;    VEIN BYPASS SURGERY  1996    VEIN SURGERY Left 2017    PAD    VEIN SURGERY Right 2018    PAD       Time Tracking:     PT Received On: 10/16/23  PT Start Time: 1047     PT Stop Time: 1114  PT Total Time (min): 27 min     Billable Minutes: Evaluation 10 and Gait Training 17      10/16/2023

## 2023-10-17 NOTE — CONSULTS
Thank you for your consult to [facility]. We have reviewed the patient chart. This patient does not meet criteria for [facility] service at this time due to patient being discharged.  Will not assume care of the patient at this time.

## 2023-10-17 NOTE — PROGRESS NOTES
"[facility] Medicine  Progress Note    Patient Name: Jose Antonio Allen  MRN: [id number]  Patient Class: OP- Observation   Admission Date: 10/14/2023  Length of Stay: 0 days  Attending Physician: Greer Alves*  Primary Care Provider: Abilio Weber MD        Subjective:     Principal Problem:Chest pain        HPI:  Per admitting provider: "Jose Antonio Allen is a 73 y.o. male who  has a past medical history of Angina pectoris, Arthritis, CKD (chronic kidney disease), Colon polyps (09/14/2018), Coronary artery disease, Diabetes mellitus, Diabetes mellitus, type 2, GERD (gastroesophageal reflux disease), History of tobacco use, Hyperlipidemia, Hypertension, S/P CABG (coronary artery bypass graft) (1996), and Thyroid disease.     He presented to the ED w/ c/o chest pain.  Patient has been having intermittent chest pain for the past 4 days but today it worsened.  Patient had an episode of chest pain this morning and then had another episode today approximately 1 hour prior to arrival.  He states it was temporarily relieved by nitroglycerin at home.  States it radiates to both of his shoulders and he has had occasional sweating with it.  He states he notices it more when he stands or moves and he reports associated shortness of breath.  He states he is very worried given his cardiac history, which includes CABG in 1996 and had a heart catheterization in March of this year with a stent placed.  He is followed by Dr. Parmar. He denies any fever, chills, nausea, vomiting, or abdominal pains."         Overview/Hospital Course:  74 y/o with hx of CAD/CABG/PCI presented with chest pain and SOB in setting of hypertensive urgency. Has remained chest pain free since admission. Trop 0.027> 0.555 >> 0.257 likely secondary to demand. TTE with concentric remodeling, EF 55%, grade I DD. Cardiology followed with recommendations for aggressive BP control, no procedural intervention required. Home amlodipine " uptitrated and metoprolol continued. Continue home oxygen supplementation and encouraged continuous wear especially with activity.  10/17-plan to DC later on today if blood pressure is within normal range and if there are no complaints with dizziness when ambulating.      Disposition plans: home when BP well controlled.       Interval History:  No distress noted.  Patient is anxious about his discharge to home today.  States he is not resting well while in the hospital.  Will assess blood pressure and ambulate patient to determine if he is appropriate for discharge    Review of Systems   Constitutional:  Negative for activity change, appetite change, chills, diaphoresis and fatigue.   HENT:  Negative for congestion, sore throat and trouble swallowing.    Respiratory:  Negative for cough, chest tightness, shortness of breath and wheezing.    Cardiovascular:  Negative for chest pain, palpitations and leg swelling.   Gastrointestinal:  Negative for abdominal distention, abdominal pain, constipation, diarrhea, nausea and vomiting.   Genitourinary:  Negative for decreased urine volume, difficulty urinating, dysuria and urgency.   Musculoskeletal:  Negative for arthralgias.   Neurological:  Negative for dizziness, seizures, syncope, weakness, light-headedness and headaches.     Objective:     Vital Signs (Most Recent):  Temp: 97.9 °F (36.6 °C) (10/17/23 0808)  Pulse: 72 (10/17/23 0808)  Resp: 18 (10/17/23 0808)  BP: 132/66 (10/17/23 0808)  SpO2: 98 % (10/17/23 0808) Vital Signs (24h Range):  Temp:  [97.4 °F (36.3 °C)-98 °F (36.7 °C)] 97.9 °F (36.6 °C)  Pulse:  [69-88] 72  Resp:  [16-22] 18  SpO2:  [97 %-99 %] 98 %  BP: (129-187)/(66-86) 132/66     Weight: 64.9 kg (143 lb 1.3 oz)  Body mass index is 24.56 kg/m².    Intake/Output Summary (Last 24 hours) at 10/17/2023 1112  Last data filed at 10/17/2023 0539  Gross per 24 hour   Intake --   Output 775 ml   Net -775 ml           Physical Exam  Vitals and nursing note  reviewed.   Constitutional:       General: He is not in acute distress.     Appearance: Normal appearance. He is well-developed and normal weight. He is not toxic-appearing.   HENT:      Head: Normocephalic and atraumatic.      Mouth/Throat:      Dentition: Normal dentition.   Eyes:      General: Lids are normal.      Extraocular Movements: Extraocular movements intact.      Conjunctiva/sclera: Conjunctivae normal.   Cardiovascular:      Rate and Rhythm: Normal rate and regular rhythm.      Heart sounds: Murmur heard.   Pulmonary:      Effort: Pulmonary effort is normal.      Breath sounds: Normal breath sounds.      Comments: Appears comfortable on NC, no increased WOB or accessory muscle use. No conversational dyspnea or cough during interview/exam.  Abdominal:      General: There is no distension.      Palpations: Abdomen is soft.      Tenderness: There is no abdominal tenderness. There is no guarding.   Musculoskeletal:      Cervical back: Neck supple.      Right lower leg: No edema.      Left lower leg: No edema.   Skin:     General: Skin is warm and dry.      Findings: No erythema or rash.   Neurological:      Mental Status: He is alert and oriented to person, place, and time.   Psychiatric:         Judgment: Judgment normal.         Significant Labs: All pertinent labs within the past 24 hours have been reviewed.  CBC:   Recent Labs   Lab 10/16/23  0738 10/17/23  0401   WBC 6.38 7.37   HGB 13.3* 12.5*   HCT 40.9 38.9*   * 142*       CMP:   Recent Labs   Lab 10/16/23  0738 10/17/23  0401    142   K 4.4 4.5    109   CO2 26 26   * 127*   BUN 23 32*   CREATININE 1.5* 1.7*   CALCIUM 9.2 9.2   PROT 7.8 7.2   ALBUMIN 3.5 3.3*   BILITOT 0.4 0.3   ALKPHOS 104 95   AST 24 22   ALT 29 23   ANIONGAP 9 7*       Magnesium:   Recent Labs   Lab 10/16/23  0738 10/17/23  0401   MG 2.1 2.1       Troponin:   Recent Labs   Lab 10/16/23  0738   TROPONINI 0.257*         Significant Imaging: I have reviewed  all pertinent imaging results/findings within the past 24 hours.      Assessment/Plan:      * Chest pain  Demand ischemia of myocardium  Essential hypertension  CAD  S/P CABG  History of coronary stent placement  HLD  · Midsternal CP w/ radiation into back between shoulders  · ASA, plavix, lovenox, statin  · PRN NTG for CP  · Cardiology consult ordered  · Trop 0.027  · EKG: sinus tachycardia, , ST and T wave abnormality but no STEMI    10/16-  -presented with chest pain and SOB in setting of hypertensive urgency  -remained chest pain free since admission  -trop 0.027> 0.555 >> 0.257 likely secondary to demand  -TTE with concentric remodeling, EF 55%, grade I DD  -Cardiology followed with recommendations for aggressive BP control, no procedural intervention required   -goal  or less  -home amlodipine uptitrated and metoprolol continued  -continue statin and zetia   -dose/medication adjustment as appropriate   -continue home oxygen supplementation and encouraged continuous wear especially with activity  -continue tele monitoring  -EKG PRN concerns or chest pain  -SL NTG PRN chest pain  -trend labs, address/replete electrolytes as indicated  -monitor   10/17-continue to monitor blood pressure.  Planning DC home today if blood pressure is less than 150 systolic.    Chronic hypoxic respiratory failure, on home oxygen therapy  COPD  Fibrosis of lung  -chronic  -continue home oxygen supplementation  -monitor     SAUL (acute kidney injury)  CKD, stage III  Patient with acute kidney injury/acute renal failure likely due to pre-renal azotemia due to dehydration SAUL is currently improving. Baseline creatinine 1.3-1.7 - Labs reviewed- Renal function/electrolytes with Estimated Creatinine Clearance: 32.4 mL/min (A) (based on SCr of 1.7 mg/dL (H)). according to latest data. Monitor urine output and serial BMP and adjust therapy as needed. Avoid nephrotoxins and renally dose meds for GFR listed  above.    10/16-  Remains stable.   -monitor with current treatment  -avoid nephrotoxins and hypotension as able, renally dose medications  10/17-  -creatinine stable  -monitor    Chronic obstructive pulmonary disease, unspecified COPD type  · Wears 4L NC at home  · Maintain sats >88%        Type 2 diabetes mellitus with stage 3a chronic kidney disease, without long-term current use of insulin  · A1C 7.9 - last checked 2 years ago  · Levemir  · Moderate SSI  · -dose/medication adjustment as appropriate   · -monitor accuchecks AC/HS and PRN hypoglycemic protocol         Acquired hypothyroidism  -chronic  -continue home levothyroxine       Fibrosis of lung        CKD (chronic kidney disease) stage 3, GFR 30-59 ml/min  -creatinine 1.7 on today  -monitor    Hyperlipidemia  -continue statin      Essential hypertension  · Resume home BP meds        VTE Risk Mitigation (From admission, onward)         Ordered     enoxaparin injection 70 mg  Every 12 hours         10/16/23 0954     IP VTE HIGH RISK PATIENT  Once         10/14/23 1928     Place sequential compression device  Until discontinued         10/14/23 1928                Discharge Planning   KARLA:      Code Status: Full Code   Is the patient medically ready for discharge?:     Reason for patient still in hospital (select all that apply): Treatment, Imaging and Consult recommendations  Discharge Plan A: Home, Home with family   Discharge Delays: Orders Needed              Markie Harkins NP  Department of Hospital Medicine   [place] [facility]

## 2023-10-17 NOTE — PLAN OF CARE
"[facility] - Telemetry  Discharge Final Note    Primary Care Provider: Abilio Weber MD    Expected Discharge Date:     Pharmacist will go over home medications and reasons for medications. VN and bedside nurse to reiterate final discharge instructions.     Cleared from CM . Bedside Nurse and VN notified.      Final Discharge Note (most recent)       Final Note - 10/17/23 5107          Final Note    Assessment Type Final Discharge Note     Anticipated Discharge Disposition Home-Health Care [facility]     Hospital Resources/Appts/Education Provided Appointments scheduled and added to AVS        Post-Acute Status    Post-Acute Authorization Home Health     Home Health Status Pending medical clearance/testing   Pending medical clearance for DC. HH at DC noted.    Other Status Awaiting f/u Appts     Discharge Delays Orders Needed   Potential DC for today.                  No future appointments.    /70   Pulse 76   Temp 98.1 °F (36.7 °C)   Resp 18   Ht 5' 4" (1.626 m)   Wt 65.7 kg (144 lb 13.5 oz)   SpO2 99%   BMI 24.86 kg/m²       Contact Info       Rey Connelly MD   Specialty: Cardiology    [address]  [address]  [place] [address]   Phone: [phone]       Next Steps: Schedule an appointment as soon as possible for a visit in 1 week(s)    Instructions: for hospital follow up and medication management. Goal systolic blood pressure 150 or less.    Abilio Weber MD   Specialty: Family Medicine   Relationship: PCP - General    [address] [place] [address]  [place] [address]   Phone: [phone]       Next Steps: Schedule an appointment as soon as possible for a visit in 1 week(s)    Instructions: for hospital follow up.             Medication List        CHANGE how you take these medications      amLODIPine 10 MG tablet  Commonly known as: NORVASC  Take 1 tablet (10 mg total) by mouth once daily.  What changed:   medication strength  how much to take     atorvastatin 80 MG " "tablet  Commonly known as: LIPITOR  TAKE 1 TABLET EVERY DAY  What changed: when to take this     clopidogreL 75 mg tablet  Commonly known as: PLAVIX  TAKE 1 TABLET EVERY DAY  What changed: when to take this     insulin degludec 100 unit/mL (3 mL) insulin pen  Commonly known as: TRESIBA FLEXTOUCH U-100  Inject 50 Units into the skin every evening. F/u apt needed with PCP  What changed:   how much to take  when to take this     metoprolol succinate 100 MG 24 hr tablet  Commonly known as: TOPROL-XL  Take 1 tablet (100 mg total) by mouth once daily.  What changed:   how much to take  how to take this  when to take this  additional instructions            CONTINUE taking these medications      ACCU-CHEK ADRIEN PLUS METER Misc  Generic drug: blood-glucose meter     ACCU-CHEK SMARTVIEW TEST STRIP Strp  Generic drug: blood sugar diagnostic     ACCU-CHEK SOFTCLIX LANCETS Misc  Generic drug: lancets     aspirin 81 MG Chew     b complex vitamins tablet     DROPLET PEN NEEDLE 32 gauge x 5/32" Ndle  Generic drug: pen needle, diabetic     ESBRIET 801 mg Tab  Generic drug: pirfenidone     hydrocortisone 2.5 % cream     JARDIANCE 10 mg tablet  Generic drug: empagliflozin     levothyroxine 100 MCG tablet  Commonly known as: SYNTHROID  Take 1 tablet (100 mcg total) by mouth before breakfast.     LUBRICANT EYE DROPS 0.5 % Dpet  Generic drug: carboxymethylcellulose     mirtazapine 7.5 MG Tab  Commonly known as: REMERON  TAKE 1 TABLET (7.5 MG TOTAL) BY MOUTH EVERY EVENING.     nitroGLYCERIN 0.4 MG SL tablet  Commonly known as: NITROSTAT  Place 1 tablet (0.4 mg total) under the tongue every 5 (five) minutes as needed for Chest pain.     NovoLOG U-100 Insulin aspart 100 unit/mL injection  Generic drug: insulin aspart U-100     ORENCIA 125 mg/mL Syrg  Generic drug: abatacept     pantoprazole 40 MG tablet  Commonly known as: PROTONIX     vitamin D 1000 units Tab  Commonly known as: VITAMIN D3            STOP taking these medications  "     ezetimibe 10 mg tablet  Commonly known as: ZETIA     KERENDIA 10 mg Tab  Generic drug: finerenone     lisinopriL 2.5 MG tablet  Commonly known as: PRINIVILZESTRIL               Where to Get Your Medications        These medications were sent to [facility] [address] [id number]      Hours: Mon-Fri, 8a-5:30p Phone: [phone]   amLODIPine 10 MG tablet  metoprolol succinate 100 MG 24 hr tablet       No orders of the defined types were placed in this encounter.

## 2023-10-17 NOTE — ASSESSMENT & PLAN NOTE
CKD, stage III  Patient with acute kidney injury/acute renal failure likely due to pre-renal azotemia due to dehydration SAUL is currently improving. Baseline creatinine 1.3-1.7 - Labs reviewed- Renal function/electrolytes with Estimated Creatinine Clearance: 32.4 mL/min (A) (based on SCr of 1.7 mg/dL (H)). according to latest data. Monitor urine output and serial BMP and adjust therapy as needed. Avoid nephrotoxins and renally dose meds for GFR listed above.    10/16-  Remains stable.   -monitor with current treatment  -avoid nephrotoxins and hypotension as able, renally dose medications  10/17-  -creatinine stable  -monitor

## 2023-10-17 NOTE — ASSESSMENT & PLAN NOTE
Demand ischemia of myocardium  Essential hypertension  CAD  S/P CABG  History of coronary stent placement  HLD  · Midsternal CP w/ radiation into back between shoulders  · ASA, plavix, lovenox, statin  · PRN NTG for CP  · Cardiology consult ordered  · Trop 0.027  · EKG: sinus tachycardia, , ST and T wave abnormality but no STEMI    10/16-  -presented with chest pain and SOB in setting of hypertensive urgency  -remained chest pain free since admission  -trop 0.027> 0.555 >> 0.257 likely secondary to demand  -TTE with concentric remodeling, EF 55%, grade I DD  -Cardiology followed with recommendations for aggressive BP control, no procedural intervention required   -goal  or less  -home amlodipine uptitrated and metoprolol continued  -continue statin and zetia   -dose/medication adjustment as appropriate   -continue home oxygen supplementation and encouraged continuous wear especially with activity  -continue tele monitoring  -EKG PRN concerns or chest pain  -SL NTG PRN chest pain  -trend labs, address/replete electrolytes as indicated  -monitor   10/17-continue to monitor blood pressure.  Planning DC home today if blood pressure is less than 150 systolic.

## 2023-10-17 NOTE — SUBJECTIVE & OBJECTIVE
Interval History:  No distress noted.  Patient is anxious about his discharge to home today.  States he is not resting well while in the hospital.  Will assess blood pressure and ambulate patient to determine if he is appropriate for discharge    Review of Systems   Constitutional:  Negative for activity change, appetite change, chills, diaphoresis and fatigue.   HENT:  Negative for congestion, sore throat and trouble swallowing.    Respiratory:  Negative for cough, chest tightness, shortness of breath and wheezing.    Cardiovascular:  Negative for chest pain, palpitations and leg swelling.   Gastrointestinal:  Negative for abdominal distention, abdominal pain, constipation, diarrhea, nausea and vomiting.   Genitourinary:  Negative for decreased urine volume, difficulty urinating, dysuria and urgency.   Musculoskeletal:  Negative for arthralgias.   Neurological:  Negative for dizziness, seizures, syncope, weakness, light-headedness and headaches.     Objective:     Vital Signs (Most Recent):  Temp: 97.9 °F (36.6 °C) (10/17/23 0808)  Pulse: 72 (10/17/23 0808)  Resp: 18 (10/17/23 0808)  BP: 132/66 (10/17/23 0808)  SpO2: 98 % (10/17/23 0808) Vital Signs (24h Range):  Temp:  [97.4 °F (36.3 °C)-98 °F (36.7 °C)] 97.9 °F (36.6 °C)  Pulse:  [69-88] 72  Resp:  [16-22] 18  SpO2:  [97 %-99 %] 98 %  BP: (129-187)/(66-86) 132/66     Weight: 64.9 kg (143 lb 1.3 oz)  Body mass index is 24.56 kg/m².    Intake/Output Summary (Last 24 hours) at 10/17/2023 1112  Last data filed at 10/17/2023 0539  Gross per 24 hour   Intake --   Output 775 ml   Net -775 ml           Physical Exam  Vitals and nursing note reviewed.   Constitutional:       General: He is not in acute distress.     Appearance: Normal appearance. He is well-developed and normal weight. He is not toxic-appearing.   HENT:      Head: Normocephalic and atraumatic.      Mouth/Throat:      Dentition: Normal dentition.   Eyes:      General: Lids are normal.      Extraocular  Movements: Extraocular movements intact.      Conjunctiva/sclera: Conjunctivae normal.   Cardiovascular:      Rate and Rhythm: Normal rate and regular rhythm.      Heart sounds: Murmur heard.   Pulmonary:      Effort: Pulmonary effort is normal.      Breath sounds: Normal breath sounds.      Comments: Appears comfortable on NC, no increased WOB or accessory muscle use. No conversational dyspnea or cough during interview/exam.  Abdominal:      General: There is no distension.      Palpations: Abdomen is soft.      Tenderness: There is no abdominal tenderness. There is no guarding.   Musculoskeletal:      Cervical back: Neck supple.      Right lower leg: No edema.      Left lower leg: No edema.   Skin:     General: Skin is warm and dry.      Findings: No erythema or rash.   Neurological:      Mental Status: He is alert and oriented to person, place, and time.   Psychiatric:         Judgment: Judgment normal.         Significant Labs: All pertinent labs within the past 24 hours have been reviewed.  CBC:   Recent Labs   Lab 10/16/23  0738 10/17/23  0401   WBC 6.38 7.37   HGB 13.3* 12.5*   HCT 40.9 38.9*   * 142*       CMP:   Recent Labs   Lab 10/16/23  0738 10/17/23  0401    142   K 4.4 4.5    109   CO2 26 26   * 127*   BUN 23 32*   CREATININE 1.5* 1.7*   CALCIUM 9.2 9.2   PROT 7.8 7.2   ALBUMIN 3.5 3.3*   BILITOT 0.4 0.3   ALKPHOS 104 95   AST 24 22   ALT 29 23   ANIONGAP 9 7*       Magnesium:   Recent Labs   Lab 10/16/23  0738 10/17/23  0401   MG 2.1 2.1       Troponin:   Recent Labs   Lab 10/16/23  0738   TROPONINI 0.257*         Significant Imaging: I have reviewed all pertinent imaging results/findings within the past 24 hours.

## 2023-10-18 PROBLEM — N17.9 AKI (ACUTE KIDNEY INJURY): Status: RESOLVED | Noted: 2023-10-15 | Resolved: 2023-10-18

## 2023-10-18 NOTE — PLAN OF CARE
Chris - Telemetry      HOME HEALTH ORDERS  FACE TO FACE ENCOUNTER    Patient Name: Jose Antonio Allen  YOB: 1950    PCP: Abilio Weber MD   PCP Address: [address] [facility] / [place] [address]  PCP Phone Number: [phone]  PCP Fax: [phone]    Encounter Date: 10/14/23    Admit to Home Health    Diagnoses:  Active Hospital Problems    Diagnosis  POA    *Chest pain [R07.9]  Yes    Demand ischemia of myocardium [I24.89]  Yes    Chronic hypoxic respiratory failure, on home oxygen therapy [J96.11, Z99.81]  Not Applicable    History of coronary artery stent placement [Z95.5]  Not Applicable    Chronic obstructive pulmonary disease, unspecified COPD type [J44.9]  Yes    Type 2 diabetes mellitus with stage 3a chronic kidney disease, without long-term current use of insulin [E11.22, N18.31]  Yes    Acquired hypothyroidism [E03.9]  Yes    Fibrosis of lung [J84.10]  Yes    Essential hypertension [I10]  Yes    Coronary artery disease involving native coronary artery of native heart with refractory angina pectoris [I25.112]  Yes    S/P CABG (coronary artery bypass graft) [Z95.1]  Not Applicable    Hyperlipidemia [E78.5]  Yes    CKD (chronic kidney disease) stage 3, GFR 30-59 ml/min [N18.30]  Yes      Resolved Hospital Problems    Diagnosis Date Resolved POA    SAUL (acute kidney injury) [N17.9] 10/18/2023 Yes       Follow Up Appointments:  No future appointments.    Allergies:Review of patient's allergies indicates:  No Known Allergies    Medications: Review discharge medications with patient and family and provide education.    Current Facility-Administered Medications   Medication Dose Route Frequency Provider Last Rate Last Admin    0.9%  NaCl infusion   Intravenous Once PRN Marce Elder NP        acetaminophen tablet 650 mg  650 mg Oral Q6H PRN Cindy Voss NP   650 mg at 10/15/23 0854    amLODIPine tablet 10 mg  10 mg Oral Daily Lilian Millan NP   10 mg at 10/17/23 0900     aspirin chewable tablet 81 mg  81 mg Oral Daily Marce Elder NP   81 mg at 10/17/23 0951    atorvastatin tablet 80 mg  80 mg Oral Daily Marce Elder NP   80 mg at 10/17/23 0951    clopidogreL tablet 75 mg  75 mg Oral Daily Marce Elder NP   75 mg at 10/17/23 0951    dextrose 10% bolus 125 mL 125 mL  12.5 g Intravenous PRN Marce Elder NP        dextrose 10% bolus 250 mL 250 mL  25 g Intravenous PRN Marce Elder NP        enoxaparin injection 70 mg  70 mg Subcutaneous Q12H (prophylaxis, 0900/2100) Bernardo Velasquez MD   70 mg at 10/17/23 2106    ezetimibe tablet 10 mg  10 mg Oral Daily Marce Elder NP   10 mg at 10/17/23 0951    glucagon (human recombinant) injection 1 mg  1 mg Intramuscular PRN Marce Elder NP        glucose chewable tablet 16 g  16 g Oral PRN Marce Elder NP        glucose chewable tablet 24 g  24 g Oral PRN Marce Elder NP        hydrALAZINE injection 10 mg  10 mg Intravenous Q8H PRN Marce Elder NP        hydrOXYzine HCL tablet 25 mg  25 mg Oral TID PRN Markie Harkins NP   25 mg at 10/17/23 1448    insulin aspart U-100 pen 0-10 Units  0-10 Units Subcutaneous QID (AC + HS) PRN Marce Elder NP   2 Units at 10/18/23 0554    insulin detemir U-100 (Levemir) pen 25 Units  25 Units Subcutaneous QHS Marce Elder NP   25 Units at 10/17/23 2106    levothyroxine tablet 100 mcg  100 mcg Oral Before breakfast Marce Elder NP   100 mcg at 10/18/23 0542    metoprolol succinate (TOPROL-XL) 24 hr tablet 100 mg  100 mg Oral Daily Marce Elder NP   100 mg at 10/17/23 0951    nitroGLYCERIN SL tablet 0.4 mg  0.4 mg Sublingual Q5 Min PRN Marce Elder NP         Current Discharge Medication List        CONTINUE these medications which have CHANGED    Details   amLODIPine (NORVASC) 10 MG tablet Take 1 tablet (10 mg total) by mouth once daily.  Qty: 30 tablet, Refills: 11    Comments: .      metoprolol succinate (TOPROL-XL) 100 MG 24 hr tablet Take 1 tablet (100 mg  total) by mouth once daily.  Qty: 30 tablet, Refills: 11    Comments: .           CONTINUE these medications which have NOT CHANGED    Details   aspirin 81 MG Chew 81 mg nightly.      atorvastatin (LIPITOR) 80 MG tablet TAKE 1 TABLET EVERY DAY  Qty: 90 tablet, Refills: 3    Associated Diagnoses: Hyperlipidemia, unspecified hyperlipidemia type      clopidogreL (PLAVIX) 75 mg tablet TAKE 1 TABLET EVERY DAY  Qty: 90 tablet, Refills: 3      empagliflozin (JARDIANCE) 10 mg tablet Take 10 mg by mouth once daily.      ESBRIET 801 mg Tab 3 (three) times daily.      insulin degludec (TRESIBA FLEXTOUCH U-100) 100 unit/mL (3 mL) insulin pen Inject 50 Units into the skin every evening. F/u apt needed with PCP  Qty: 5 pen, Refills: 3    Associated Diagnoses: Type 2 diabetes mellitus with hyperglycemia, with long-term current use of insulin      levothyroxine (SYNTHROID) 100 MCG tablet Take 1 tablet (100 mcg total) by mouth before breakfast.  Qty: 90 tablet, Refills: 3    Associated Diagnoses: Acquired hypothyroidism      nitroGLYCERIN (NITROSTAT) 0.4 MG SL tablet Place 1 tablet (0.4 mg total) under the tongue every 5 (five) minutes as needed for Chest pain.  Qty: 25 tablet, Refills: 11    Associated Diagnoses: Coronary artery disease involving native coronary artery of native heart with unstable angina pectoris      NOVOLOG 100 unit/mL injection Inject into the skin 3 (three) times daily after meals. Sliding scale      ORENCIA 125 mg/mL Syrg Inject into the skin once a week. Fridays      pantoprazole (PROTONIX) 40 MG tablet Take 40 mg by mouth once daily.      vitamin D (VITAMIN D3) 1000 units Tab Take 1,000 Units by mouth once daily.      ACCU-CHEK ADRIEN PLUS METER Misc 1 Product by Other route daily as needed.      ACCU-CHEK SMARTVIEW TEST STRIP Strp       ACCU-CHEK SOFTCLIX LANCETS Misc Inject 100 lancets into the skin daily as needed.      b complex vitamins tablet Take 1 tablet by mouth once daily.      DROPLET PEN NEEDLE  "32 gauge x 5/32" Ndle Inject 1 Product into the skin daily as needed.      hydrocortisone 2.5 % cream Apply topically 2 (two) times daily as needed.      LUBRICANT EYE DROPS 0.5 % Dpet       mirtazapine (REMERON) 7.5 MG Tab TAKE 1 TABLET (7.5 MG TOTAL) BY MOUTH EVERY EVENING.  Qty: 90 tablet, Refills: 3    Associated Diagnoses: Loss of appetite           STOP taking these medications       finerenone (KERENDIA) 10 mg Tab Comments:   Reason for Stopping:         lisinopriL (PRINIVIL,ZESTRIL) 2.5 MG tablet Comments:   Reason for Stopping:         ezetimibe (ZETIA) 10 mg tablet Comments:   Reason for Stopping:                 I have seen and examined this patient within the last 30 days. My clinical findings that support the need for the home health skilled services and home bound status are the following:no   Weakness/numbness causing balance and gait disturbance due to Coronary Heart Disease and Weakness/Debility making it taxing to leave home.     Diet:   cardiac diet and diabetic diet 2000 calorie      Referrals/ Consults  Physical Therapy to evaluate and treat. Evaluate for home safety and equipment needs; Establish/upgrade home exercise program. Perform / instruct on therapeutic exercises, gait training, transfer training, and Range of Motion.  Occupational Therapy to evaluate and treat. Evaluate home environment for safety and equipment needs. Perform/Instruct on transfers, ADL training, ROM, and therapeutic exercises.   to evaluate for community resources/long-range planning.    Activities:   activity as tolerated    Nursing:   Agency to admit patient within 24 hours of hospital discharge unless specified on physician order or at patient request    SN to complete comprehensive assessment including routine vital signs. Instruct on disease process and s/s of complications to report to MD. Review/verify medication list sent home with the patient at time of discharge  and instruct patient/caregiver as " needed. Frequency may be adjusted depending on start of care date.     Skilled nurse to perform up to 3 visits PRN for symptoms related to diagnosis    Notify MD if SBP > 160 or < 90; DBP > 90 or < 50; HR > 120 or < 50; Temp > 101; O2 < 88%    Ok to schedule additional visits based on staff availability and patient request on consecutive days within the home health episode.    When multiple disciplines ordered:    Start of Care occurs on Sunday - Wednesday schedule remaining discipline evaluations as ordered on separate consecutive days following the start of care.    Thursday SOC -schedule subsequent evaluations Friday and Monday the following week.     Friday - Saturday SOC - schedule subsequent discipline evaluations on consecutive days starting Monday of the following week.    For all post-discharge communication and subsequent orders please contact patient's primary care physician. If unable to reach primary care physician or do not receive response within 30 minutes, please contact  for clinical staff order clarification    Miscellaneous   Diabetic Care:   SN to perform and educate Diabetic management with blood glucose monitoring:, Fingerstick blood sugar AC and HS, and Report CBG < 60 or > 350 to physician.    Home Health Aide:  Nursing Three times weekly, Physical Therapy Three times weekly, Occupational Therapy Three times weekly, and Medical Social Work Weekly        I certify that this patient is confined to his home and needs intermittent skilled nursing care, physical therapy, and occupational therapy.

## 2023-10-18 NOTE — NURSING
Discharge instruction and education packet provided. VN notified. IV site removed cath tip intact. Telemetry discontinued without adverse reaction. Patient shows no acute distress. Medications delivered to bedside. Sister will transport pt home.

## 2023-10-18 NOTE — PLAN OF CARE
Future Appointments   Date Time Provider Department Center   10/25/2023  1:00 PM Geovanna Bhakta MD [facility]

## 2023-10-18 NOTE — DISCHARGE SUMMARY
"[facility]  Discharge Summary      Patient Name: Jose Antonio Allen  MRN: [id number]  VAZQUEZ: [id number]  Patient Class: IP- Inpatient  Admission Date: 10/14/2023  Hospital Length of Stay: 1 days  Discharge Date and Time:  10/18/2023 10:11 AM  Attending Physician: Greer Alves*   Discharging Provider: Markie Harkins NP  Primary Care Provider: Abilio Weber MD    Primary Care Team: Networked reference to record PCT     HPI:   Per admitting provider: "Jose Antonio Allen is a 73 y.o. male who  has a past medical history of Angina pectoris, Arthritis, CKD (chronic kidney disease), Colon polyps (09/14/2018), Coronary artery disease, Diabetes mellitus, Diabetes mellitus, type 2, GERD (gastroesophageal reflux disease), History of tobacco use, Hyperlipidemia, Hypertension, S/P CABG (coronary artery bypass graft) (1996), and Thyroid disease.     He presented to the ED w/ c/o chest pain.  Patient has been having intermittent chest pain for the past 4 days but today it worsened.  Patient had an episode of chest pain this morning and then had another episode today approximately 1 hour prior to arrival.  He states it was temporarily relieved by nitroglycerin at home.  States it radiates to both of his shoulders and he has had occasional sweating with it.  He states he notices it more when he stands or moves and he reports associated shortness of breath.  He states he is very worried given his cardiac history, which includes CABG in 1996 and had a heart catheterization in March of this year with a stent placed.  He is followed by Dr. Parmar. He denies any fever, chills, nausea, vomiting, or abdominal pains."         * No surgery found *      Hospital Course:   74 y/o with hx of CAD/CABG/PCI presented with chest pain and SOB in setting of hypertensive urgency. Has remained chest pain free since admission. Trop 0.027> 0.555 >> 0.257 likely secondary to demand. TTE with concentric " remodeling, EF 55%, grade I DD. Cardiology followed with recommendations for aggressive BP control, no procedural intervention required. Home amlodipine uptitrated and metoprolol continued. Continue home oxygen supplementation and encouraged continuous wear especially with activity.  10/17-plan to DC later on today if blood pressure is within normal range and if there are no complaints with dizziness when ambulating.      Disposition plans: B/P is better controlled on this am. Will DC home with plans to follow up with the priority care clinic outpatient. We have instructed the patient to check his b/p daily and keep a record. B/P meds changed are amlodipine 10 mg PO daily and will keep metoprolol 100 mg PO daily. We have stopped his lisinopril. Will also need a follow up with his PCP in 2 weeks.        Goals of Care Treatment Preferences:  Code Status: Full Code      Consults:   Consults (From admission, onward)        Status Ordering Provider     Inpatient virtual consult to Hospital Medicine  Once        Provider:  (Not yet assigned)    Completed PHILIP COLLINS     Inpatient consult to Registered Dietitian/Nutritionist  Once        Provider:  (Not yet assigned)    JACQUIE Mckeon     Inpatient consult to Social Work/Case Management  Once        Provider:  (Not yet assigned)    Completed JACQUIE WILSON     Inpatient consult to Cardiology-[facility]  Once        Provider:  Gordy Parmar MD    Completed JACQUIE WILSON          No new Assessment & Plan notes have been filed under this hospital service since the last note was generated.  Service: Hospital Medicine    Final Active Diagnoses:    Diagnosis Date Noted POA    PRINCIPAL PROBLEM:  Chest pain [R07.9]  Yes    Demand ischemia of myocardium [I24.89] 10/16/2023 Yes    Chronic hypoxic respiratory failure, on home oxygen therapy [J96.11, Z99.81] 10/16/2023 Not Applicable    History of coronary artery stent placement [Z95.5] 03/09/2023 Not  Applicable    Chronic obstructive pulmonary disease, unspecified COPD type [J44.9] 02/14/2022 Yes    Type 2 diabetes mellitus with stage 3a chronic kidney disease, without long-term current use of insulin [E11.22, N18.31] 06/12/2020 Yes    Acquired hypothyroidism [E03.9] 11/21/2019 Yes    Fibrosis of lung [J84.10] 01/22/2016 Yes    Essential hypertension [I10]  Yes    Coronary artery disease involving native coronary artery of native heart with refractory angina pectoris [I25.112]  Yes    S/P CABG (coronary artery bypass graft) [Z95.1]  Not Applicable    Hyperlipidemia [E78.5]  Yes    CKD (chronic kidney disease) stage 3, GFR 30-59 ml/min [N18.30]  Yes      Problems Resolved During this Admission:    Diagnosis Date Noted Date Resolved POA    SAUL (acute kidney injury) [N17.9] 10/15/2023 10/18/2023 Yes       Discharged Condition: stable    Disposition: Home or Self Care    Follow Up:   Follow-up Information     Rey Connelly MD. Schedule an appointment as soon as possible for a visit in 1 week(s).    Specialty: Cardiology  Why: for hospital follow up and medication management. Goal systolic blood pressure 150 or less.  Contact information:  [address] [facility] [address]  [place] [address]  [phone]             Abilio Weber MD. Schedule an appointment as soon as possible for a visit in 1 week(s).    Specialty: Family Medicine  Why: for hospital follow up.  Contact information:  [address] [facility]  [place] [address]  [phone]                       Patient Instructions:      Diet Cardiac     Diet diabetic     Notify your health care provider if you experience any of the following:  temperature >100.4     Notify your health care provider if you experience any of the following:  persistent nausea and vomiting or diarrhea     Notify your health care provider if you experience any of the following:  severe uncontrolled pain     Notify your health care provider if you experience any of  the following:  redness, tenderness, or signs of infection (pain, swelling, redness, odor or green/yellow discharge around incision site)     Notify your health care provider if you experience any of the following:  difficulty breathing or increased cough     Notify your health care provider if you experience any of the following:  persistent dizziness, light-headedness, or visual disturbances     Notify your health care provider if you experience any of the following:  increased confusion or weakness     Activity as tolerated       Significant Diagnostic Studies: N/A    Pending Diagnostic Studies:     None         Medications:  Reconciled Home Medications:      Medication List      CHANGE how you take these medications    amLODIPine 10 MG tablet  Commonly known as: NORVASC  Take 1 tablet (10 mg total) by mouth once daily.  What changed:   · medication strength  · how much to take     atorvastatin 80 MG tablet  Commonly known as: LIPITOR  TAKE 1 TABLET EVERY DAY  What changed: when to take this     clopidogreL 75 mg tablet  Commonly known as: PLAVIX  TAKE 1 TABLET EVERY DAY  What changed: when to take this     insulin degludec 100 unit/mL (3 mL) insulin pen  Commonly known as: TRESIBA FLEXTOUCH U-100  Inject 50 Units into the skin every evening. F/u apt needed with PCP  What changed:   · how much to take  · when to take this     metoprolol succinate 100 MG 24 hr tablet  Commonly known as: TOPROL-XL  Take 1 tablet (100 mg total) by mouth once daily.  What changed:   · how much to take  · how to take this  · when to take this  · additional instructions        CONTINUE taking these medications    ACCU-CHEK ADRIEN PLUS METER Misc  Generic drug: blood-glucose meter  1 Product by Other route daily as needed.     ACCU-CHEK SMARTVIEW TEST STRIP Strp  Generic drug: blood sugar diagnostic     ACCU-CHEK SOFTCLIX LANCETS Misc  Generic drug: lancets  Inject 100 lancets into the skin daily as needed.     aspirin 81 MG Chew  81 mg  "nightly.     b complex vitamins tablet  Take 1 tablet by mouth once daily.     DROPLET PEN NEEDLE 32 gauge x 5/32" Ndle  Generic drug: pen needle, diabetic  Inject 1 Product into the skin daily as needed.     ESBRIET 801 mg Tab  Generic drug: pirfenidone  3 (three) times daily.     hydrocortisone 2.5 % cream  Apply topically 2 (two) times daily as needed.     JARDIANCE 10 mg tablet  Generic drug: empagliflozin  Take 10 mg by mouth once daily.     levothyroxine 100 MCG tablet  Commonly known as: SYNTHROID  Take 1 tablet (100 mcg total) by mouth before breakfast.     LUBRICANT EYE DROPS 0.5 % Dpet  Generic drug: carboxymethylcellulose     mirtazapine 7.5 MG Tab  Commonly known as: REMERON  TAKE 1 TABLET (7.5 MG TOTAL) BY MOUTH EVERY EVENING.     nitroGLYCERIN 0.4 MG SL tablet  Commonly known as: NITROSTAT  Place 1 tablet (0.4 mg total) under the tongue every 5 (five) minutes as needed for Chest pain.     NovoLOG U-100 Insulin aspart 100 unit/mL injection  Generic drug: insulin aspart U-100  Inject into the skin 3 (three) times daily after meals. Sliding scale     ORENCIA 125 mg/mL Syrg  Generic drug: abatacept  Inject into the skin once a week. Fridays     pantoprazole 40 MG tablet  Commonly known as: PROTONIX  Take 40 mg by mouth once daily.     vitamin D 1000 units Tab  Commonly known as: VITAMIN D3  Take 1,000 Units by mouth once daily.        STOP taking these medications    ezetimibe 10 mg tablet  Commonly known as: ZETIA     KERENDIA 10 mg Tab  Generic drug: finerenone     lisinopriL 2.5 MG tablet  Commonly known as: PRINIVIL,ZESTRIL            Indwelling Lines/Drains at time of discharge:   Lines/Drains/Airways     None                 Time spent on the discharge of patient: 45 minutes         Markie Harkins NP  Department of [facility]  [place] [facility]  "

## 2023-10-18 NOTE — PLAN OF CARE
Discharge orders noted. AVS prepared with medication list, importance of medication compliance, follow up appointments, diet, home care instructions, treatment plan, self management, and when to seek medical attention. Detailed clinical reference list attached. AVS printed and handed to patient by bedside nurse. VN reviewed discharge instructions with patient using teachback method.  Allowed time for questions, all questions answered.  Patient verbalized complete understanding of discharge instructions and voices no concerns.      Discharge instructions complete.  Bedside delivery complete.  Transport wheelchair requested.  Bedside nurse notified.

## 2023-10-18 NOTE — PLAN OF CARE
VN note: VN completed AVS and attachments and notified bedside nurse, Paolo. Will cont to be available and intervene prn.

## 2023-10-18 NOTE — PLAN OF CARE
Problem: Adult Inpatient Plan of Care  Goal: Plan of Care Review  Outcome: Ongoing, Progressing     Problem: Diabetes Comorbidity  Goal: Blood Glucose Level Within Targeted Range  Outcome: Ongoing, Progressing     Problem: COPD (Chronic Obstructive Pulmonary Disease) Comorbidity  Goal: Maintenance of COPD Symptom Control  Outcome: Ongoing, Progressing     Problem: Fall Injury Risk  Goal: Absence of Fall and Fall-Related Injury  Outcome: Ongoing, Progressing     Problem: Chest Pain  Goal: Resolution of Chest Pain Symptoms  Outcome: Ongoing, Progressing

## 2023-10-19 NOTE — PROGRESS NOTES
C3 nurse spoke with Jose Antonio Allen for a TCC post hospital discharge follow up call. The patient has a scheduled HOSFU appointment with Geovanna Bhakta MD on 10/25/23 @ 1300.

## 2023-10-25 PROBLEM — N17.9 AKI (ACUTE KIDNEY INJURY): Status: ACTIVE | Noted: 2023-10-25

## 2023-10-25 NOTE — ED NOTES
Patient arrived to ED after being told to come to ED by cardiologist after patient told them he had CP this morning. Reports one episode at 0400 that was relieved by nitro. Reports a 2nd episode of CP at 0930 that went away on own. Denies Current CP and SOB at this time. On 4 L NC. Home oxygen. Patient awake, alert, oriented x 4. Breathing unlabored and even. In no acute distress. Denies CP, SOB, NVD, abdominal pain.     APPEARANCE: Alert, oriented and in no acute distress.  CARDIAC: Normal rate and rhythm, no murmur heard.   PERIPHERAL VASCULAR: peripheral pulses present. Normal cap refill. No edema. Warm to touch.    RESPIRATORY: 4 L NC home oxygen use. Normal rate and effort, breath sounds clear bilaterally throughout chest. Respirations are equal and unlabored no obvious signs of distress.  GASTRO: soft, bowel sounds normal, no tenderness, no abdominal distention.  MUSC: Full ROM. No bony tenderness or soft tissue tenderness. No obvious deformity.  SKIN: Skin is warm and dry, normal skin turgor, mucous membranes moist.  NEURO: 5/5 strength major flexors/extensors bilaterally. Sensory intact to light touch bilaterally. Rubin coma scale: eyes open spontaneously-4, oriented & converses-5, obeys commands-6. No neurological abnormalities.   MENTAL STATUS: awake, alert and aware of environment.  EYE: PERRL, both eyes: pupils brisk and reactive to light. Normal size.  ENT: EARS: no obvious drainage. NOSE: no active bleeding.

## 2023-10-25 NOTE — ED PROVIDER NOTES
"Encounter Date: 10/25/2023       History     Chief Complaint   Patient presents with    Chest Pain     Cp two episodes one at 0430am midsternal patient took one nitro and got relief. Patient had more pain at 0930 did not take another nitro went to md office and sent here     Patient is a 73-year-old male with a past medical history of Angina pectoris, Arthritis, CKD (chronic kidney disease), Colon polyps (09/14/2018), Coronary artery disease, Diabetes mellitus, Diabetes mellitus, type 2, GERD (gastroesophageal reflux disease), History of tobacco use, Hyperlipidemia, Hypertension, S/P CABG (coronary artery bypass graft) (1996), and Thyroid disease who presents to emergency room for chest pain that onset this morning.  Patient states that 1st episode was at 4:30 a.m. this morning when he was awake.  He took a nitroglycerin with relief.  Chest pain returned at 9:30 a.m. Patient states "I felt like I was dying." Chest pain was associated with left arm weakness and numbness.  No nausea, vomiting, or diaphoresis.  He states this happens often, and believes that he has chest pain every day that relieves after lying down for a 7-8 minutes.  Today's chest pain was described as "just pain" to midsternum.  He was seen by Dr. Bhakta this morning who instructed patient to come to the emergency room for further evaluation of chest pain.    Of note, patient was recently discharged on 10/18 for angina in the setting of hypertensive emergency.  Blood pressure was controlled upon discharge.  However, patient believes that he was misdiagnosed since he is still having chest pain.    The history is provided by the patient. No  was used.     Review of patient's allergies indicates:  No Known Allergies  Past Medical History:   Diagnosis Date    Angina pectoris     Arthritis     CKD (chronic kidney disease)     Colon polyps 09/14/2018    Coronary artery disease     Diabetes mellitus     Diabetes mellitus, type 2     " GERD (gastroesophageal reflux disease)     History of tobacco use     Hyperlipidemia     Hypertension     S/P CABG (coronary artery bypass graft) 1996    Thyroid disease      Past Surgical History:   Procedure Laterality Date    CATARACT EXTRACTION Bilateral 3YRS    CLOSURE DEVICE  3/20/2023    Procedure: Placement of Closure Device;  Surgeon: Gordy Parmar MD;  Location: [facility] CATH LAB/EP;  Service: Cardiology;;    COLONOSCOPY W/ POLYPECTOMY  09/14/2018    CORONARY ANGIOGRAPHY N/A 4/11/2022    Procedure: ANGIOGRAM, CORONARY ARTERY;  Surgeon: Gordy Parmar MD;  Location: [facility] CATH LAB/EP;  Service: Cardiology;  Laterality: N/A;    CORONARY ANGIOGRAPHY INCLUDING BYPASS GRAFTS WITH CATHETERIZATION OF LEFT HEART N/A 4/11/2022    Procedure: ANGIOGRAM, CORONARY, INCLUDING BYPASS GRAFT, WITH LEFT HEART CATHETERIZATION;  Surgeon: Gordy Parmar MD;  Location: [facility] CATH LAB/EP;  Service: Cardiology;  Laterality: N/A;    CORONARY ARTERY BYPASS GRAFT  1996    CORONARY BYPASS GRAFT ANGIOGRAPHY  4/20/2022    Procedure: Bypass graft study;  Surgeon: Gordy Parmar MD;  Location: [facility] CATH LAB/EP;  Service: Cardiology;;    CORONARY BYPASS GRAFT ANGIOGRAPHY  3/20/2023    Procedure: Bypass graft study;  Surgeon: Gordy Parmar MD;  Location: [facility] CATH LAB/EP;  Service: Cardiology;;    EYELID SURGERY  03/2012    IVUS, CORONARY  3/20/2023    Procedure: IVUS, Coronary;  Surgeon: Gordy Parmar MD;  Location: [facility] CATH LAB/EP;  Service: Cardiology;;    LEFT HEART CATHETERIZATION N/A 4/11/2022    Procedure: Left heart cath;  Surgeon: Gordy Parmar MD;  Location: [facility] CATH LAB/EP;  Service: Cardiology;  Laterality: N/A;    LEFT HEART CATHETERIZATION N/A 3/20/2023    Procedure: Left heart cath;  Surgeon: Gordy Parmar MD;  Location: [facility] CATH LAB/EP;  Service: Cardiology;  Laterality: N/A;    PERCUTANEOUS TRANSLUMINAL BALLOON ANGIOPLASTY OF CORONARY ARTERY  4/20/2022    Procedure: Angioplasty-coronary;  Surgeon:  Gordy Parmar MD;  Location: [facility] CATH LAB/EP;  Service: Cardiology;;    PERCUTANEOUS TRANSLUMINAL BALLOON ANGIOPLASTY OF CORONARY ARTERY  3/20/2023    Procedure: Angioplasty-coronary;  Surgeon: Gordy Parmar MD;  Location: [facility] CATH LAB/EP;  Service: Cardiology;;    RIGHT HEART CATHETERIZATION Right 11/3/2021    Procedure: INSERTION, CATHETER, RIGHT HEART;  Surgeon: Temitope Rahman MD;  Location: [facility] CATH LAB/EP;  Service: Cardiology;  Laterality: Right;    STENT, DRUG ELUTING, SINGLE VESSEL, CORONARY  3/20/2023    Procedure: Stent, Drug Eluting, Single Vessel, Coronary;  Surgeon: Gordy Parmar MD;  Location: [facility] CATH LAB/EP;  Service: Cardiology;;    VEIN BYPASS SURGERY      VEIN SURGERY Left 2017    PAD    VEIN SURGERY Right 2018    PAD     Family History   Problem Relation Age of Onset    Diabetes Mother     Diabetes Father     Diabetes Sister     Kidney disease Sister     Diabetes Brother     Diabetes Sister     Blindness Neg Hx     Glaucoma Neg Hx     Macular degeneration Neg Hx     Retinal detachment Neg Hx     Strabismus Neg Hx     Stroke Neg Hx     Thyroid disease Neg Hx     Cancer Neg Hx     Cataracts Neg Hx     Hypertension Neg Hx      Social History     Tobacco Use    Smoking status: Former     Current packs/day: 0.00     Average packs/day: 2.0 packs/day for 27.0 years (54.0 ttl pk-yrs)     Types: Cigarettes     Start date:      Quit date:      Years since quittin.8    Smokeless tobacco: Never   Substance Use Topics    Alcohol use: Yes     Comment: Occasionally has a drink    Drug use: No     Review of Systems   Constitutional:  Negative for chills, diaphoresis, fatigue and fever.   HENT:  Negative for congestion, sore throat and trouble swallowing.    Respiratory:  Negative for cough and shortness of breath.    Cardiovascular:  Positive for chest pain (midsternum). Negative for palpitations.   Gastrointestinal:  Negative for abdominal pain, blood in stool, constipation,  diarrhea, nausea and vomiting.   Genitourinary:  Negative for difficulty urinating, dysuria, frequency and hematuria.   Musculoskeletal:  Negative for back pain and myalgias.   Skin:  Negative for rash and wound.   Neurological:  Positive for weakness (left arm) and numbness (left arm). Negative for light-headedness and headaches.       Physical Exam     Initial Vitals   BP Pulse Resp Temp SpO2   10/25/23 1341 10/25/23 1341 10/25/23 1601 10/25/23 1341 10/25/23 1341   139/65 64 (!) 22 97.9 °F (36.6 °C) 99 %      MAP       --                Physical Exam    Nursing note and vitals reviewed.  Constitutional: He appears well-developed and well-nourished. He is not diaphoretic. No distress.   HENT:   Head: Normocephalic and atraumatic.   Right Ear: External ear normal.   Left Ear: External ear normal.   Eyes: Conjunctivae and EOM are normal. Pupils are equal, round, and reactive to light. No scleral icterus.   Neck: Neck supple.   Normal range of motion.  Cardiovascular:  Normal rate, regular rhythm and normal heart sounds.     Exam reveals no friction rub.       No murmur heard.  Pulmonary/Chest: Breath sounds normal. No respiratory distress. He has no wheezes. He has no rhonchi. He has no rales.   Abdominal: Abdomen is soft. Bowel sounds are normal. He exhibits no distension. There is no abdominal tenderness.   Musculoskeletal:         General: No tenderness or edema. Normal range of motion.      Cervical back: Normal range of motion and neck supple.     Neurological: He is alert and oriented to person, place, and time. He has normal strength.   Strength 5/5 throughout bilateral upper and lower extremities.   Skin: Skin is warm and dry. No erythema.   Scar to midsternum from previous bypass surgery in 1996.   Psychiatric: He has a normal mood and affect. His behavior is normal. Thought content normal.         ED Course   Procedures  Labs Reviewed   CBC W/ AUTO DIFFERENTIAL - Abnormal; Notable for the following  "components:       Result Value    Hemoglobin 13.4 (*)     MCHC 31.7 (*)     All other components within normal limits   COMPREHENSIVE METABOLIC PANEL - Abnormal; Notable for the following components:    Glucose 226 (*)     Creatinine 1.8 (*)     Albumin 3.3 (*)     eGFR 39 (*)     Anion Gap 7 (*)     All other components within normal limits   TROPONIN I   B-TYPE NATRIURETIC PEPTIDE   TROPONIN I   COMPREHENSIVE METABOLIC PANEL          Imaging Results              X-Ray Chest AP Portable (Final result)  Result time 10/25/23 15:05:49      Final result by Scooby Rosas MD (10/25/23 15:05:49)                   Impression:      Chronic changes in the lungs presumably related to chronic interstitial lung disease.  No confluent area of consolidation or detrimental change when compared with 10/14/2023.      Electronically signed by: Scooby Rosas MD  Date:    10/25/2023  Time:    15:05               Narrative:    EXAMINATION:  XR CHEST AP PORTABLE    CLINICAL HISTORY:  Provided history is "Chest Pain;  ".    TECHNIQUE:  One view of the chest.    COMPARISON:  10/14/2023 and 12/05/2022.    FINDINGS:  Cardiomediastinal silhouette is stable, likely mildly enlarged.  Atherosclerotic calcifications overlie the aortic arch.  Postoperative changes of prior median sternotomy.  Chronic appearing bilateral interstitial and reticular opacities.  No confluent area of consolidation.  No sizable pleural effusion.  No pneumothorax.                                       Medications - No data to display  Medical Decision Making  Patient presents for chest pain.  Vital signs stable and within normal limits.  Physical exam as stated above.    Differential Diagnosis includes, but is not limited to ACS/MI, PE, aortic dissection, pneumothorax, cardiac tamponade, pericarditis/myocarditis, pneumonia, infection/abscess, lung mass, trauma/fracture, costochondritis/pleurisy, MSK pain/contusion, GERD, biliary disease, pancreatitis, or anemia.  " Patient with heart score of 7 and interstitial lung disease.  Clinical presentation and physical exam maybe suggestive lung disease exacerbation versus ACS.  Lab work unremarkable for MI. I would like to admit patient for further observation due to risk and persistent chest pain.  Last stent placement was this past March.  Patient not amenable to staying.  He has left AMA.    Amount and/or Complexity of Data Reviewed  Labs: ordered. Decision-making details documented in ED Course.  Radiology:  Decision-making details documented in ED Course.  ECG/medicine tests:  Decision-making details documented in ED Course.      Additional MDM:   Heart Score:    History:          Highly suspicious.  ECG:             Nonspecific repolarisation disturbance  Age:               >65 years  Risk factors: >= 3 risk factors or history of atherosclerotic disease  Troponin:       Less than or equal to normal limit  Heart Score = 7                ED Course as of 10/25/23 1818   Wed Oct 25, 2023   1629 CBC auto differential(!)  CBC relatively unremarkable.  Hemoglobin of 13.4.  Hematocrit within normal limits.  No increase in white or red blood cells. [BJ]   1630 EKG 12-lead  Independent interpretation of EKG with normal sinus rhythm at 69 beats per minute.  T-wave inversions noted in lead III.  No ST elevations noted.  No change in EKG when compared to last on 10/17. [BJ]   1632 X-Ray Chest AP Portable  Impression:     Chronic changes in the lungs presumably related to chronic interstitial lung disease.  No confluent area of consolidation or detrimental change when compared with 10/14/2023. [BJ]   1647 Discussed chest x-ray and EKG findings with patient.  Also discussed possibility of hospitalization.  Patient states he would not like to be admitted for further evaluation due to his experience last week.  He believes that he diagnosed wrongly while admitted to cardiology last week and would like to search for other cardiologist.  He states  he is amenable to staying if cardiologist can talk to him tonight. Otherwise, he would like to go home to feed dog.  [BJ]   1658 Troponin I #1  Troponin within normal limits. [BJ]   1659 BNP  BNP within normal limits. [BJ]   1707 Comprehensive Metabolic Panel(!)  CMP with glucose of 226.  Electrolytes within normal limits.  Creatinine of 1.8, which is seen on previous lab work.  LFTs within normal limits. [BJ]   1707 Troponin I #2 [BJ]   1722 Spoke with Dr. Johnson who recommended discharge with follow up with Dr. Parmar outpatient verses admit to observation to rule out ACS. With negative troponin or changes in EKG, less likely ACS. If consulted, cardiology will not be able to see patient tonight.  [BJ]   1744 Discussed recommendation to admit due to patient's high heart score and suspicious history. Dr. Vallejo at bedside.  Risks and benefits discussed.  Patient continues to deny staying.  He would like to leave AMA.  Paperwork filled out, but patient refusing to sign. [BJ]   1747 Secure chat from Dr. Johnson who states he prescribed patient isosorbide mononitrate and spoke to Dr. Parmar who will schedule patient for angiogram within the next week. This was discussed with patient prior to him leaving.  [BJ]      ED Course User Index  [BJ] Tina Chowdhury PA-C                    Clinical Impression:   Final diagnoses:  [R07.9] Chest pain        ED Disposition Condition    AMA Stable                Tina Chowdhury PA-C  10/25/23 6412

## 2023-10-25 NOTE — PROGRESS NOTES
[facility]   New Visit Progress Note   Recent Hospital Discharge     PRESENTING HISTORY     Chief Complaint/Reason for Admission:  Follow up Hospital Discharge   PCP: Abilio Weber MD    History of Present Illness:  Mr. Jose Antonio Allen is a 73 y.o. male who was recently admitted to the hospital.    [facility] Medicine  Discharge Summary        Patient Name: Jose Antonio Allen  MRN: [id number]  VAZQUEZ: [id number]  Patient Class: IP- Inpatient  Admission Date: 10/14/2023  Hospital Length of Stay: 1 days  Discharge Date and Time:  10/18/2023 10:11 AM  Attending Physician: Greer Alves   Discharging Provider: Markie Harkins NP  Primary Care Provider: Abilio Weber MD  ___________________________________________________________________    Today:  [facility] for [facility] hospital follow up.  Recently hospitalized for management of hypertensive emergency and SAUL.   Blood pressure 203/92 at presentation.   Admitted to [facility] Medicine service.   Additional concern for cardiac event in setting of EKG changes and elevated troponin which resolved with blood pressure control.   TTE with EF 55% and grade 1 Diastolic dysfunction.   Evaluated by Cardiology team and felt to have demand ischemia.  No further inpatient Cardiology work up recommended.  Home dose Lisinopril discontinued.  Amlodipine increased to 10 mg daily.  Home dose Toprol  to remain the same.  Patient discharged to home.     Patient accompanied today by family.  Ambulatory and independent with ADL's.  Reports compliance with all medication and brought medication bottles for review.   Brought home blood pressure log for review- SBP range 120 -150.    Patient reporting left sided chest pain this morning ~ 4:30 am- woke him from sleep, resolved with one dose SL NTG.  Recurrence of chest pain ~ 9 AM.  Intentsity 7/10.   Has associated left arm pain and subjective left arm  weakness.  No N/V or diaphoresis.     Review of Systems  General ROS: negative for chills, fever or weight loss  Psychological ROS: negative for hallucination, depression or suicidal ideation  Ophthalmic ROS: negative for blurry vision, photophobia or eye pain  ENT ROS: negative for epistaxis, sore throat or rhinorrhea  Respiratory ROS: no cough, shortness of breath, or wheezing  Cardiovascular ROS: + chest pain, Left arm pain   Gastrointestinal ROS: no abdominal pain, change in bowel habits, or black/ bloody stools  Genito-Urinary ROS: no dysuria, trouble voiding, or hematuria  Musculoskeletal ROS: negative for gait disturbance or muscular weakness  Neurological ROS: no syncope or seizures; no ataxia  Dermatological ROS: negative for pruritis, rash and jaundice      PAST HISTORY:     Past Medical History:   Diagnosis Date    Angina pectoris     Arthritis     CKD (chronic kidney disease)     Colon polyps 09/14/2018    Coronary artery disease     Diabetes mellitus     Diabetes mellitus, type 2     GERD (gastroesophageal reflux disease)     History of tobacco use     Hyperlipidemia     Hypertension     S/P CABG (coronary artery bypass graft) 1996    Thyroid disease        Past Surgical History:   Procedure Laterality Date    CATARACT EXTRACTION Bilateral 3YRS    CLOSURE DEVICE  3/20/2023    Procedure: Placement of Closure Device;  Surgeon: Gordy Parmar MD;  Location: [facility] CATH LAB/EP;  Service: Cardiology;;    COLONOSCOPY W/ POLYPECTOMY  09/14/2018    CORONARY ANGIOGRAPHY N/A 4/11/2022    Procedure: ANGIOGRAM, CORONARY ARTERY;  Surgeon: Gordy Parmar MD;  Location: [facility] CATH LAB/EP;  Service: Cardiology;  Laterality: N/A;    CORONARY ANGIOGRAPHY INCLUDING BYPASS GRAFTS WITH CATHETERIZATION OF LEFT HEART N/A 4/11/2022    Procedure: ANGIOGRAM, CORONARY, INCLUDING BYPASS GRAFT, WITH LEFT HEART CATHETERIZATION;  Surgeon: Gordy Parmar MD;  Location: [facility] CATH LAB/EP;  Service: Cardiology;  Laterality: N/A;     CORONARY ARTERY BYPASS GRAFT  1996    CORONARY BYPASS GRAFT ANGIOGRAPHY  4/20/2022    Procedure: Bypass graft study;  Surgeon: Gordy Parmar MD;  Location: [facility] CATH LAB/EP;  Service: Cardiology;;    CORONARY BYPASS GRAFT ANGIOGRAPHY  3/20/2023    Procedure: Bypass graft study;  Surgeon: Gordy Parmar MD;  Location: [facility] CATH LAB/EP;  Service: Cardiology;;    EYELID SURGERY  03/2012    IVUS, CORONARY  3/20/2023    Procedure: IVUS, Coronary;  Surgeon: Gordy Parmar MD;  Location: [facility] CATH LAB/EP;  Service: Cardiology;;    LEFT HEART CATHETERIZATION N/A 4/11/2022    Procedure: Left heart cath;  Surgeon: Gordy Parmar MD;  Location: [facility] CATH LAB/EP;  Service: Cardiology;  Laterality: N/A;    LEFT HEART CATHETERIZATION N/A 3/20/2023    Procedure: Left heart cath;  Surgeon: Gordy Parmar MD;  Location: [facility] CATH LAB/EP;  Service: Cardiology;  Laterality: N/A;    PERCUTANEOUS TRANSLUMINAL BALLOON ANGIOPLASTY OF CORONARY ARTERY  4/20/2022    Procedure: Angioplasty-coronary;  Surgeon: Gordy Parmar MD;  Location: [facility] CATH LAB/EP;  Service: Cardiology;;    PERCUTANEOUS TRANSLUMINAL BALLOON ANGIOPLASTY OF CORONARY ARTERY  3/20/2023    Procedure: Angioplasty-coronary;  Surgeon: Gordy Parmar MD;  Location: [facility] CATH LAB/EP;  Service: Cardiology;;    RIGHT HEART CATHETERIZATION Right 11/3/2021    Procedure: INSERTION, CATHETER, RIGHT HEART;  Surgeon: Temitope Rahman MD;  Location: [facility] CATH LAB/EP;  Service: Cardiology;  Laterality: Right;    STENT, DRUG ELUTING, SINGLE VESSEL, CORONARY  3/20/2023    Procedure: Stent, Drug Eluting, Single Vessel, Coronary;  Surgeon: Gordy Parmar MD;  Location: [facility] CATH LAB/EP;  Service: Cardiology;;    VEIN BYPASS SURGERY  1996    VEIN SURGERY Left 2017    PAD    VEIN SURGERY Right 2018    PAD       Family History   Problem Relation Age of Onset    Diabetes Mother     Diabetes Father     Diabetes Sister     Kidney disease Sister     Diabetes Brother      "Diabetes Sister     Blindness Neg Hx     Glaucoma Neg Hx     Macular degeneration Neg Hx     Retinal detachment Neg Hx     Strabismus Neg Hx     Stroke Neg Hx     Thyroid disease Neg Hx     Cancer Neg Hx     Cataracts Neg Hx     Hypertension Neg Hx          MEDICATIONS & ALLERGIES:     Current Outpatient Medications on File Prior to Visit   Medication Sig Dispense Refill    ACCU-CHEK ADRIEN PLUS METER Misc 1 Product by Other route daily as needed.      ACCU-CHEK SMARTVIEW TEST STRIP Strp       ACCU-CHEK SOFTCLIX LANCETS Misc Inject 100 lancets into the skin daily as needed.      amLODIPine (NORVASC) 10 MG tablet Take 1 tablet (10 mg total) by mouth once daily. 30 tablet 11    amLODIPine (NORVASC) 5 MG tablet TAKE 1 TABLET ONE TIME DAILY 90 tablet 10    aspirin 81 MG Chew 81 mg nightly.      atorvastatin (LIPITOR) 80 MG tablet TAKE 1 TABLET EVERY DAY (Patient taking differently: Take 80 mg by mouth every evening.) 90 tablet 3    b complex vitamins tablet Take 1 tablet by mouth once daily.      clopidogreL (PLAVIX) 75 mg tablet TAKE 1 TABLET EVERY DAY (Patient taking differently: Take 75 mg by mouth nightly.) 90 tablet 3    DROPLET PEN NEEDLE 32 gauge x 5/32" Ndle Inject 1 Product into the skin daily as needed.      empagliflozin (JARDIANCE) 10 mg tablet Take 10 mg by mouth once daily.      ESBRIET 801 mg Tab 3 (three) times daily.      hydrocortisone 2.5 % cream Apply topically 2 (two) times daily as needed.      insulin degludec (TRESIBA FLEXTOUCH U-100) 100 unit/mL (3 mL) insulin pen Inject 50 Units into the skin every evening. F/u apt needed with PCP 5 pen 3    levothyroxine (SYNTHROID) 100 MCG tablet Take 1 tablet (100 mcg total) by mouth before breakfast. 90 tablet 3    LUBRICANT EYE DROPS 0.5 % Dpet       metoprolol succinate (TOPROL-XL) 100 MG 24 hr tablet Take 1 tablet (100 mg total) by mouth once daily. 30 tablet 11    mirtazapine (REMERON) 7.5 MG Tab TAKE 1 TABLET (7.5 MG TOTAL) BY MOUTH EVERY EVENING. 90 " tablet 3    nitroGLYCERIN (NITROSTAT) 0.4 MG SL tablet Place 1 tablet (0.4 mg total) under the tongue every 5 (five) minutes as needed for Chest pain. 25 tablet 11    NOVOLOG 100 unit/mL injection Inject into the skin 3 (three) times daily after meals. Sliding scale      ORENCIA 125 mg/mL Syrg Inject into the skin once a week. Fridays      pantoprazole (PROTONIX) 40 MG tablet Take 40 mg by mouth once daily.      vitamin D (VITAMIN D3) 1000 units Tab Take 1,000 Units by mouth once daily.       No current facility-administered medications on file prior to visit.        Review of patient's allergies indicates:  No Known Allergies    OBJECTIVE:     Vital Signs:  /70 (BP Location: Left arm, Patient Position: Sitting, BP Method: Small (Automatic))   Pulse 74   Wt 65.4 kg (144 lb 2.9 oz)   SpO2 98%   BMI 24.75 kg/m²   Wt Readings from Last 3 Encounters:   10/25/23 1305 65.4 kg (144 lb 2.9 oz)   10/18/23 0600 65.7 kg (144 lb 13.5 oz)   10/16/23 1303 64.9 kg (143 lb 1.3 oz)   10/16/23 1028 64.9 kg (143 lb)   10/14/23 2012 65 kg (143 lb 4.8 oz)   10/14/23 1708 65.8 kg (145 lb)   03/30/23 1127 64 kg (141 lb)     Body mass index is 24.75 kg/m².        Physical Exam:  /70 (BP Location: Left arm, Patient Position: Sitting, BP Method: Small (Automatic))   Pulse 74   Wt 65.4 kg (144 lb 2.9 oz)   SpO2 98%   BMI 24.75 kg/m²   General appearance: alert, cooperative, no distress  Constitutional:Oriented to person, place, and time  + appears well-developed and well-nourished.   HEENT: Normocephalic, atraumatic, neck symmetrical, no nasal discharge   Eyes: conjunctivae/corneas clear, PERRL, EOM's intact  Lungs: clear to auscultation bilaterally, no dullness to percussion bilaterally  Heart: regular rate and rhythm without rub; no displacement of the PMI   Abdomen: soft, non-tender; bowel sounds normoactive; no organomegaly  Extremities: extremities symmetric; no clubbing, cyanosis, or edema  Integument: Skin color,  texture, turgor normal; no rashes; hair distrubution normal  Neurologic: Alert and oriented X 3, normal strength, normal coordination and gait  Psychiatric: no pressured speech; normal affect; no evidence of impaired cognition     Laboratory  Lab Results   Component Value Date    WBC 7.37 10/17/2023    HGB 12.5 (L) 10/17/2023    HCT 38.9 (L) 10/17/2023    MCV 85 10/17/2023     (L) 10/17/2023     BMP  Lab Results   Component Value Date     10/17/2023    K 4.5 10/17/2023     10/17/2023    CO2 26 10/17/2023    BUN 32 (H) 10/17/2023    CREATININE 1.7 (H) 10/17/2023    CALCIUM 9.2 10/17/2023    ANIONGAP 7 (L) 10/17/2023    EGFRNORACEVR 42 (A) 10/17/2023     Lab Results   Component Value Date    ALT 23 10/17/2023    AST 22 10/17/2023    ALKPHOS 95 10/17/2023    BILITOT 0.3 10/17/2023     Lab Results   Component Value Date    INR 1.0 10/15/2023    INR 1.0 07/10/2009    INR 1.3 (H) 07/09/2009     Lab Results   Component Value Date    HGBA1C 7.9 (H) 10/15/2023       Diagnostic Results:    2 D echo 10/14/23:    Left Ventricle: The left ventricle is normal in size. There is concentric remodeling. Normal wall motion. There is reduced systolic function. Ejection fraction by visual approximation is 55%. Grade I diastolic dysfunction.    Left Atrium: Left atrium is moderately dilated.    Right Ventricle: Systolic function is normal.    Aortic Valve: The aortic valve is a trileaflet valve. Moderately calcified noncoronary cusp. Mildly restricted motion.    Mitral Valve: There is mild regurgitation.    Pulmonary Artery: The estimated pulmonary artery systolic pressure is 32 mmHg.    IVC/SVC: Intermediate venous pressure at 8 mmHg.         TRANSITION OF CARE:     [facility] Contact Note: 10/19/23     Family and/or Caretaker present at visit?  Yes.  Diagnostic tests reviewed/disposition: I have reviewed all completed as well as pending diagnostic tests at the time of discharge.  Disease/illness education: Yes    Home health/community services discussion/referrals: Patient does not have home health established from hospital visit.  They do not need home health.  If needed, we will set up home health for the patient.   Establishment or re-establishment of referral orders for community resources: No other necessary community resources.   Discussion with other health care providers: No discussion with other health care providers necessary.     ASSESSMENT & PLAN:       Hypertensive emergency  - recent hospitalization as above  - blood pressure improved on current regimen   - consider re-starting Lisinopril after checking renal function     SAUL (acute kidney injury)  -     Basic Metabolic Panel; Future; Expected date: 10/25/2023    Chest pain, unspecified type  - patient with 2 episodes chest pain this AM while normotensive  - described as left chest pressure with L arm pain/ L arm weakness  - to ED now     Chronic obstructive pulmonary disease, unspecified COPD type  Fibrosis of lung  Chronic hypoxic respiratory failure, on home oxygen therapy  - continue current medication regimen and supplemental oxygen therapy     Patient brought to ED in wheelchair accompanied by clinic nurse.  ED nurse informed of pending arrival.   [facility] in [facility] to be arranged based on outcome of ED visit.       Instructions for the patient:      Scheduled Follow-up :  Future Appointments   Date Time Provider Department Center   4/2/2024  7:30 AM Rey Connelly MD [facility] CARDIO [place]       Post Visit Medication List:     Medication List            Accurate as of October 25, 2023  1:41 PM. If you have any questions, ask your nurse or doctor.                CHANGE how you take these medications      atorvastatin 80 MG tablet  Commonly known as: LIPITOR  TAKE 1 TABLET EVERY DAY  What changed: when to take this     clopidogreL 75 mg tablet  Commonly known as: PLAVIX  TAKE 1 TABLET EVERY DAY  What changed: when to take this         "    CONTINUE taking these medications      ACCU-CHEK ADRIEN PLUS METER Misc  Generic drug: blood-glucose meter     ACCU-CHEK SMARTVIEW TEST STRIP Strp  Generic drug: blood sugar diagnostic     ACCU-CHEK SOFTCLIX LANCETS Misc  Generic drug: lancets     amLODIPine 10 MG tablet  Commonly known as: NORVASC  Take 1 tablet (10 mg total) by mouth once daily.     aspirin 81 MG Chew     b complex vitamins tablet     DROPLET PEN NEEDLE 32 gauge x 5/32" Ndle  Generic drug: pen needle, diabetic     ESBRIET 801 mg Tab  Generic drug: pirfenidone     hydrocortisone 2.5 % cream     insulin degludec 100 unit/mL (3 mL) insulin pen  Commonly known as: TRESIBA FLEXTOUCH U-100  Inject 50 Units into the skin every evening. F/u apt needed with PCP     JARDIANCE 10 mg tablet  Generic drug: empagliflozin     levothyroxine 100 MCG tablet  Commonly known as: SYNTHROID  Take 1 tablet (100 mcg total) by mouth before breakfast.     LUBRICANT EYE DROPS 0.5 % Dpet  Generic drug: carboxymethylcellulose     metoprolol succinate 100 MG 24 hr tablet  Commonly known as: TOPROL-XL  Take 1 tablet (100 mg total) by mouth once daily.     mirtazapine 7.5 MG Tab  Commonly known as: REMERON  TAKE 1 TABLET (7.5 MG TOTAL) BY MOUTH EVERY EVENING.     nitroGLYCERIN 0.4 MG SL tablet  Commonly known as: NITROSTAT  Place 1 tablet (0.4 mg total) under the tongue every 5 (five) minutes as needed for Chest pain.     NovoLOG U-100 Insulin aspart 100 unit/mL injection  Generic drug: insulin aspart U-100     ORENCIA 125 mg/mL Syrg  Generic drug: abatacept     pantoprazole 40 MG tablet  Commonly known as: PROTONIX     vitamin D 1000 units Tab  Commonly known as: VITAMIN D3              Signing Physician:  Geovanna Bhakta MD    "

## 2023-10-25 NOTE — FIRST PROVIDER EVALUATION
Emergency Department TeleTriage Encounter Note      CHIEF COMPLAINT    Chief Complaint   Patient presents with    Chest Pain     Cp two episodes one at 0430am midsternal patient took one nitro and got relief. Patient had more pain at 0930 did not take another nitro went to md office and sent here       VITAL SIGNS   Initial Vitals [10/25/23 1341]   BP Pulse Resp Temp SpO2   139/65 64 -- 97.9 °F (36.6 °C) 99 %      MAP       --            ALLERGIES    Review of patient's allergies indicates:  No Known Allergies    PROVIDER TRIAGE NOTE  Patient presents with complaint of chest pain that started this morning.  Reports associated shortness of breath.  Reports recently discharge.  Denies lower extremity swelling.  Denies nausea.      Phy:   Constitutional: well nourished, well developed, appearing stated age, NAD        Initial orders will be placed and care will be transferred to an alternate provider when patient is roomed for a full evaluation. Any additional orders and the final disposition will be determined by that provider.        ORDERS  Labs Reviewed - No data to display    ED Orders (720h ago, onward)      None              Virtual Visit Note: The provider triage portion of this emergency department evaluation and documentation was performed via [employer], a HIPAA-compliant telemedicine application, in concert with a tele-presenter in the room. A face to face patient evaluation with one of my colleagues will occur once the patient is placed in an emergency department room.      DISCLAIMER: This note was prepared with [employer] voice recognition transcription software. Garbled syntax, mangled pronouns, and other bizarre constructions may be attributed to that software system.

## 2023-10-25 NOTE — TELEPHONE ENCOUNTER
Care Due:                  Date            Visit Type   Department     Provider  --------------------------------------------------------------------------------                                EP -                              PRIMARY      [facility]  Last Visit: 02-      CARE ([facility])   MEDICINE       Abilio Weber  Next Visit: None Scheduled  None         None Found                                                            Last  Test          Frequency    Reason                     Performed    Due Date  --------------------------------------------------------------------------------    Office Visit  15 months..  insulin, mirtazapine.....  02-   05-    [facility] Embedded Care Due Messages. Reference number: [id number].   10/25/2023 11:10:22 AM CDT

## 2023-10-25 NOTE — ED NOTES
Patient asking to speak with provider. Provider to bedside to speak with patient. Patient not wanting to spend the night in the hospital.

## 2023-10-31 NOTE — TELEPHONE ENCOUNTER
----- Message from Romelia Roman MA sent at 10/30/2023  9:04 AM CDT -----    ----- Message -----  From: Beatrice Gregory  Sent: 10/30/2023   8:46 AM CDT  To: [facility]ul[facility] Kaminski Staff    Type:  Needs Medical Advice    Who Called:  Pt    Would the patient rather a call back or a response via [facility]?  call  Best Call Back Number: [phone]  Additional Information:  Pt would like a call back from  regarding his upcoming appt.

## 2023-10-31 NOTE — TELEPHONE ENCOUNTER
Spoke with patient about angiogram/cath lab will be calling him to set an appointment with for it.  Patient verbalized understanding.

## 2023-11-10 NOTE — Clinical Note
The catheter was inserted into the RCA graft. An angiography was performed of the graft. The angiography was performed via hand injection with 10 mL of contrast.

## 2023-11-10 NOTE — PLAN OF CARE
Sheath removed from rt groin per [facility] Rad tech.  Patient tolerating well.  VSS.  Will conitnue to monitor closely.

## 2023-11-10 NOTE — DISCHARGE INSTRUCTIONS
Discharge Instructions:    Do not drive a car, operate heavy equipment, care for a young child, etc for the next 12-24 hours.   Avoid drinking alcohol for 24 hours.  Do not make any important decisions for 24 hours.    Drink fluids to keep hydrated. Resume your usual diet as tolerated.     Rest for today then activity as tolerated.   Do not lift anything over 5 pounds for the first 3 days after procedure.    Remove dressing tomorrow then may shower with warm soapy water. Do not scrub site. Pat dry.   May apply bandaid for 2 days.  No tub baths.  Do not submerge wound in water for 3 days.     Call MD for any unrelieved pain, excessive nausea or vomiting, redness around site, bleeding, or pus or foul smelling drainage, or any other questions or concerns.    Go to the ER for any difficulty breathing or chest pain.      If site swells or bleeds, hold direct pressure to area for 10 full minutes.   If site continues to bleed, continue to hold pressure to site and have someone bring you to the ER.      Follow any additional instructions given to you by MD.      Call MD to schedule a follow up appointment, or follow up as instructed.

## 2023-11-10 NOTE — PLAN OF CARE
Patient discharged to home as ordered after 4 hour recovery per Dr. Sow.     All discharge instructions, printed materials given.    Patient instructed on follow-up appointment as ordered.  Patient verbalized understanding and agreement with all discharge instructions given. Pt is AAOx3, VSS, denies any pain.    Left groin with gauze and tegaderm dressing c.d.i. No bleeding or hematoma noted.  Skin normal in color and warm to touch. Palpated bilateral radial pulses and dopplered  Pulses bilateral pedal pulses.  Pt voided without difficulty 600ml clear yellow urine per urinal. Pt ate about 50% of his ordered lunch along with bhumika crackers and apple juice.  No n/v. Pt got dressed and moving around without difficulty and no distress noted.  Pt in w/c.  Left AC 20 gauge iv dc'd as ordered with tip intact. avni and koban dressing applied c.d.i.  Pt discharged home via wheelchair to private vehicle by pt's spouse.

## 2023-11-10 NOTE — Clinical Note
The catheter was inserted into the LIMA graft. An angiography was performed of the graft. Multiple views were taken. The angiography was performed via hand injection with 20 mL of contrast.

## 2023-11-10 NOTE — Clinical Note
An angiography was performed of the graft. The angiography was performed via hand injection with 10 mL of contrast.

## 2023-11-10 NOTE — BRIEF OP NOTE
"POST CATH NOTE    HPI:   s/p catheterization secondary to:  Progressive angina, Extensive cardiac history       Cath Results:  Access: Rt CFA   LM:  70% heavily calcified   LAD:  100% . Patent LIMA-LAD  LCx:   Ostial 90%, mid   RCA:  100% proximal      LIMA- LAD patent fills LAD antegrade and retrograde  SVG-BARON with 99% recurrent ISR s/p PTCA   LVgram: LVED 22 mmHg    Intervention:   - s/p successful PTCA to SVG-BARON branch recurrent ISR. IVUS used. Under expanded stent. PTCA done with 3.0  scoring balloon, followed by PTCA with 3.0 shockwave balloon followed by 3.5 and 4.0 NC balloon at high pressure.  Improvement with MLA post intervention     Closure device: Manual pull     Patient tolerated procedure well, no complications    Post Cath Exam:  BP (!) 155/74   Pulse 80   Temp 97.8 °F (36.6 °C) (Temporal)   Resp 19   Ht 5' 4" (1.626 m)   Wt 65.8 kg (145 lb)   SpO2 100%   BMI 24.89 kg/m²     Assessment:   Severe native CAD   Patent LIMA-LAD  Recurrent ISR to SVG-BARON s/p successful intervention     Plan:   - Continue ASA/Plavix   - Patient at risk for recurrent ISR. I have discussed the possible option next time is to attempt opening native RCA   and coil the SVG graft given recurrent failures and recurrent attempts  - High intense statin   - Risk factors reduction     "

## 2023-11-10 NOTE — Clinical Note
The catheter was repositioned into the RCA graft. An angiography was performed of the graft. Multiple views were taken. The angiography was performed via hand injection with 20 mL of contrast.

## 2023-11-10 NOTE — PLAN OF CARE
Patient transferred to recovery cath lab slot 3 via stretcher with side rails up x2 .  Pt AAO X3 and able to follow commands. Pt is stable when connecting to cardiac monitors.  VSS. Right groin with sheath intact to pressure bag and dsg c.d.i. no bleeding or hematoma noted. +2 khushbu radial pulses palpated. Palpated  khushbu pedal pulses.  Palpated right pt pulse and dopplered right pt pulse.  Skin normal in color and warm to touch, <3 sec cap refill.  Fall risk precautions given and patient acknowledges.  AIDET completed to pt.  Will continue to monitor patient.  Updated pt's spouse via telephone.

## 2023-11-10 NOTE — INTERVAL H&P NOTE
The patient has been examined and the H&P has been reviewed:    I concur with the findings and changes have been noted since the H&P was written: patient continued to have chest pain hence was scheduled for angiogram given his cardiac history     Anesthesia/Surgery risks, benefits and alternative options discussed and understood by patient/family.          There are no hospital problems to display for this patient.

## 2023-11-10 NOTE — Clinical Note
125 ml of contrast were injected throughout the case. 25 mL of contrast was the total wasted during the case. 150 mL was the total amount used during the case.

## 2023-11-10 NOTE — PLAN OF CARE
Patient lying in bed with no complaints of pain or distress noted.  Monitors applied.  VSS.  Yellow non- skid socks placed on patient. Fall risk review with patient.  Procedure and recovery process explained to patient and all questions answered.  Patient verbalized understanding.  Will continue to monitor.

## 2023-11-11 NOTE — DISCHARGE SUMMARY
"[facility] - Cath Lab (Hospital)  Discharge Note  Short Stay    Procedure(s) (LRB):  Left heart cath (N/A)  ANGIOGRAM, CORONARY ARTERY (N/A)  Bypass graft study  IVUS, Coronary  PTCA, Single Vessel  Atherectomy-coronary. Shockwave balloon (N/A)      OUTCOME: Patient tolerated treatment/procedure well without complication and is now ready for discharge.    HPI:   s/p catheterization secondary to:  Progressive angina, Extensive cardiac history         Cath Results:  Access: Rt CFA   LM:  70% heavily calcified   LAD:  100% . Patent LIMA-LAD  LCx:   Ostial 90%, mid   RCA:  100% proximal       OGLESBY- LAD patent fills LAD antegrade and retrograde  SVG-BARON with 99% recurrent ISR s/p PTCA   LVgram: LVED 22 mmHg     Intervention:   - s/p successful PTCA to SVG-BARON branch recurrent ISR. IVUS used. Under expanded stent. PTCA done with 3.0  scoring balloon, followed by PTCA with 3.0 shockwave balloon followed by 3.5 and 4.0 NC balloon at high pressure.  Improvement with MLA post intervention      Closure device: Manual pull      Patient tolerated procedure well, no complications     Post Cath Exam:  BP (!) 155/74   Pulse 80   Temp 97.8 °F (36.6 °C) (Temporal)   Resp 19   Ht 5' 4" (1.626 m)   Wt 65.8 kg (145 lb)   SpO2 100%   BMI 24.89 kg/m²      Assessment:   Severe native CAD   Patent LIMA-LAD  Recurrent ISR to SVG-BARON s/p successful intervention      Plan:   - Continue ASA/Plavix   - Patient at risk for recurrent ISR. I have discussed the possible option next time is to attempt opening native RCA   and coil the SVG graft given recurrent failures and recurrent attempts  - High intense statin   - Risk factors reduction               DISPOSITION: Home or Self Care    FINAL DIAGNOSIS:  <principal problem not specified>    FOLLOWUP: In clinic    DISCHARGE INSTRUCTIONS:    Discharge Procedure Orders   CBC W/ AUTO DIFFERENTIAL   Standing Status: Future Number of Occurrences: 1 Standing Exp. Date: 12/31/24     BASIC " METABOLIC PANEL   Standing Status: Future Number of Occurrences: 1 Standing Exp. Date: 12/31/24         Clinical Reference Documents Added to Patient Instructions         Document    CARDIAC CATHETERIZATION (ENGLISH)    CARDIAC CATHETERIZATION DISCHARGE INSTRUCTIONS (ENGLISH)            TIME SPENT ON DISCHARGE: 35 minutes

## 2023-12-08 NOTE — TELEPHONE ENCOUNTER
Spoke with patient told him to cut back on the norvasc to 2.5 mg patient will update the office next week.

## 2023-12-08 NOTE — TELEPHONE ENCOUNTER
----- Message from Scooby Sow MD sent at 12/8/2023  9:56 AM CST -----  Please ask him to drop the Norvasc to 2.5 mg daily and update us. Thanks   ----- Message -----  From: Fiordaliza Sousa MA  Sent: 12/8/2023   9:00 AM CST  To: Scooby Sow MD    Patient called today and said that he is having dizziness and he believes that its the isosorbide.  He also is taking his 10 mg of Norvasc in the am with the isosorbide.  Please advise.  ----- Message -----  From: Chris Winkler  Sent: 12/8/2023   8:48 AM CST  To: [facility] Staff    Type:  Needs Medical Advice    Who Called: pt  Symptoms (please be specific): chest pain   How long has patient had these symptoms:  today   Pharmacy name and phone #:    Would the patient rather a call back or a response via [facility]? call  Best Call Back Number: [phone]  Additional Information: pt states he does not need to be seen in the Er please call

## 2023-12-30 PROBLEM — J96.21 ACUTE ON CHRONIC RESPIRATORY FAILURE WITH HYPOXIA: Status: ACTIVE | Noted: 2023-01-01

## 2023-12-30 PROBLEM — I21.4 NSTEMI (NON-ST ELEVATED MYOCARDIAL INFARCTION): Status: ACTIVE | Noted: 2023-01-01

## 2023-12-30 NOTE — ASSESSMENT & PLAN NOTE
CAD  HLD  PAD  History of CABG and stent placement  Patient presents with NSTEMI. Pt denies CP.  Patient is currently on NSTEMI Pathway.  ED discussed with cardiology and heparin drip initiated.    EKG reviewed. Troponins reviewed and results noted-   Recent Labs   Lab 12/30/23  1436   TROPONINI 0.387*   .     Lipid panel reviewed and shows-     Lab Results   Component Value Date    LDLCALC 66.6 10/15/2023     Lab Results   Component Value Date    TRIG 82 10/15/2023         Medical management includes; Anticoagulation and High Intensity Stain Echo has not been performed. Latest ECHO results are as follows- Results for orders placed during the hospital encounter of 10/14/23    Echo    Interpretation Summary    Left Ventricle: The left ventricle is normal in size. There is concentric remodeling. Normal wall motion. There is reduced systolic function. Ejection fraction by visual approximation is 55%. Grade I diastolic dysfunction.    Left Atrium: Left atrium is moderately dilated.    Right Ventricle: Systolic function is normal.    Aortic Valve: The aortic valve is a trileaflet valve. Moderately calcified noncoronary cusp. Mildly restricted motion.    Mitral Valve: There is mild regurgitation.    Pulmonary Artery: The estimated pulmonary artery systolic pressure is 32 mmHg.    IVC/SVC: Intermediate venous pressure at 8 mmHg.  .   Cardiology is consulted. Plan of care reviewed with cardiology team. Continue to monitor patient closely and adjust therapy as needed.   -Troponin trending next to be drawn 1730

## 2023-12-30 NOTE — ASSESSMENT & PLAN NOTE
"Patient's FSGs are uncontrolled due to hyperglycemia on current medication regimen.  Last A1c reviewed-   Lab Results   Component Value Date    HGBA1C 7.9 (H) 10/15/2023     Most recent fingerstick glucose reviewed- No results for input(s): "POCTGLUCOSE" in the last 24 hours.  Current correctional scale  Low  Maintain anti-hyperglycemic dose as follows-   Antihyperglycemics (From admission, onward)      Start     Stop Route Frequency Ordered    12/30/23 1801  insulin aspart U-100 pen 0-5 Units         -- SubQ Every 6 hours PRN 12/30/23 1702          Hold Oral hypoglycemics while patient is in the hospital.   "

## 2023-12-30 NOTE — H&P
[facility] Emergency Dept  [facility] Medicine  History & Physical    Patient Name: Jose Antonio Allen  MRN: [id number]  Patient Class: OP- Observation  Admission Date: 12/30/2023  Attending Physician: Greer Alves*   Primary Care Provider: Abilio Weber MD         Patient information was obtained from patient, spouse/SO, and ER records.     Subjective:     Principal Problem:NSTEMI (non-ST elevated myocardial infarction)    Chief Complaint:   Chief Complaint   Patient presents with    Shortness of Breath     Seen at Urgent care with c/o SOB. Sent here for further eval. States that he has been coughing at night, SOB today. Denies pain. Presents awake, alert with O2 via NRB - c/o nausea          HPI: 73 y.o. male with significant past medical history of ILD on 4L NC at home, Angina pectoris, Arthritis, CKD (chronic kidney disease), Colon polyps (09/14/2018), Coronary artery disease, Diabetes mellitus, Diabetes mellitus, type 2, GERD (gastroesophageal reflux disease), History of tobacco use, Hyperlipidemia, Hypertension, S/P CABG (coronary artery bypass graft) (1996), and Thyroid disease presented to the ER from urgent care for hypoxia and SOB.  COVID and flu negative at .  Pt also vomited a few times in route, denies abdominal pain or chest pain.  Pt's wife reported low grade fever at home.  No sick contacts.  Pt denies chills, diaphoresis, nausea, cough, vocal changes, globus sensation, leg swelling or pain, fatigue, weakness, confusion, syncope.  Of note 11/11 cath lab severe native CAD, patent LIMA-LAD, recurrent ISR to SVG-BARON s/p succesfful PTCA to SVG-BARON branch recurrent ISR.  IVUS used.  Under expanded stent.  PTCA done with 3.0 scoring balloon followed by PTCA with 2.0 shockwave balloon followed by 3.5 and 4.0 NC balloon.  Pt advised to continue asa/plavix noting pt at risk for recurrent ISR and cards discussed option next time is to attempt opening native RCA  and coil the SVG graft given  recurrent failures and recurrent attempts.    In the ED, T max 100.7 F.  Pt on 40L vapotherm 60% FiO2.  EKG Sinus Tach with PAC.  pH 7.368.  Troponin 0.387 compared to last (0.020 when admitted for HTN).   compared to last 90.  Pt given cefepime/vanc.  ED spoke with cardiology who recommended heparin drip.  CBC with thrombocytopenia.  CMP no significant electrolyte abnormalities and kidney function at baseline.  CXR Chronic appearing changes of the lungs which are overall more conspicuous from comparison radiograph 10/25/2023.  Additional consolidative opacity about the left lung base.  Considerations include atelectasis, noting that superimposed infectious or non infectious inflammatory infiltrate and pulmonary edema remain considerations.   CT chest/abdomen/pelvis pending.    Pt signed AMA form in ED but then he and his wife requested to resend the form.    Past Medical History:   Diagnosis Date    Angina pectoris     Arthritis     CKD (chronic kidney disease)     Colon polyps 09/14/2018    Coronary artery disease     Diabetes mellitus     Diabetes mellitus, type 2     GERD (gastroesophageal reflux disease)     History of tobacco use     Hyperlipidemia     Hypertension     S/P CABG (coronary artery bypass graft) 1996    Thyroid disease        Past Surgical History:   Procedure Laterality Date    ATHERECTOMY, CORONARY N/A 11/10/2023    Procedure: Atherectomy-coronary. Shockwave balloon;  Surgeon: Scooby Sow MD;  Location: [facility] CATH LAB/EP;  Service: Cardiology;  Laterality: N/A;    CATARACT EXTRACTION Bilateral 3YRS    CLOSURE DEVICE  3/20/2023    Procedure: Placement of Closure Device;  Surgeon: Gordy Parmar MD;  Location: [facility] CATH LAB/EP;  Service: Cardiology;;    COLONOSCOPY W/ POLYPECTOMY  09/14/2018    CORONARY ANGIOGRAPHY N/A 4/11/2022    Procedure: ANGIOGRAM, CORONARY ARTERY;  Surgeon: Gordy Parmar MD;  Location: [facility] CATH LAB/EP;  Service: Cardiology;  Laterality: N/A;    CORONARY  ANGIOGRAPHY N/A 11/10/2023    Procedure: ANGIOGRAM, CORONARY ARTERY;  Surgeon: Scooby Sow MD;  Location: [facility] CATH LAB/EP;  Service: Cardiology;  Laterality: N/A;    CORONARY ANGIOGRAPHY INCLUDING BYPASS GRAFTS WITH CATHETERIZATION OF LEFT HEART N/A 4/11/2022    Procedure: ANGIOGRAM, CORONARY, INCLUDING BYPASS GRAFT, WITH LEFT HEART CATHETERIZATION;  Surgeon: Gordy Parmar MD;  Location: [facility] CATH LAB/EP;  Service: Cardiology;  Laterality: N/A;    CORONARY ARTERY BYPASS GRAFT  1996    CORONARY BYPASS GRAFT ANGIOGRAPHY  4/20/2022    Procedure: Bypass graft study;  Surgeon: Gordy Parmar MD;  Location: [facility] CATH LAB/EP;  Service: Cardiology;;    CORONARY BYPASS GRAFT ANGIOGRAPHY  3/20/2023    Procedure: Bypass graft study;  Surgeon: Gordy Parmar MD;  Location: [facility] CATH LAB/EP;  Service: Cardiology;;    CORONARY BYPASS GRAFT ANGIOGRAPHY  11/10/2023    Procedure: Bypass graft study;  Surgeon: Scooby Sow MD;  Location: [facility] CATH LAB/EP;  Service: Cardiology;;    EYELID SURGERY  03/2012    IVUS, CORONARY  3/20/2023    Procedure: IVUS, Coronary;  Surgeon: Gordy Parmar MD;  Location: [facility] CATH LAB/EP;  Service: Cardiology;;    IVUS, CORONARY  11/10/2023    Procedure: IVUS, Coronary;  Surgeon: Scooby Sow MD;  Location: [facility] CATH LAB/EP;  Service: Cardiology;;    LEFT HEART CATHETERIZATION N/A 4/11/2022    Procedure: Left heart cath;  Surgeon: Gordy Parmar MD;  Location: [facility] CATH LAB/EP;  Service: Cardiology;  Laterality: N/A;    LEFT HEART CATHETERIZATION N/A 3/20/2023    Procedure: Left heart cath;  Surgeon: Gordy Parmar MD;  Location: [facility] CATH LAB/EP;  Service: Cardiology;  Laterality: N/A;    LEFT HEART CATHETERIZATION N/A 11/10/2023    Procedure: Left heart cath;  Surgeon: Scooby Sow MD;  Location: [facility] CATH LAB/EP;  Service: Cardiology;  Laterality: N/A;    PERCUTANEOUS TRANSLUMINAL BALLOON ANGIOPLASTY OF CORONARY ARTERY  4/20/2022    Procedure:  Angioplasty-coronary;  Surgeon: Gordy Parmar MD;  Location: [facility] CATH LAB/EP;  Service: Cardiology;;    PERCUTANEOUS TRANSLUMINAL BALLOON ANGIOPLASTY OF CORONARY ARTERY  3/20/2023    Procedure: Angioplasty-coronary;  Surgeon: Gordy Parmar MD;  Location: [facility] CATH LAB/EP;  Service: Cardiology;;    PTCA, SINGLE VESSEL  11/10/2023    Procedure: PTCA, Single Vessel;  Surgeon: Scooby Sow MD;  Location: [facility] CATH LAB/EP;  Service: Cardiology;;    RIGHT HEART CATHETERIZATION Right 11/3/2021    Procedure: INSERTION, CATHETER, RIGHT HEART;  Surgeon: Temitope Rahman MD;  Location: [facility] CATH LAB/EP;  Service: Cardiology;  Laterality: Right;    STENT, DRUG ELUTING, SINGLE VESSEL, CORONARY  3/20/2023    Procedure: Stent, Drug Eluting, Single Vessel, Coronary;  Surgeon: Gordy Parmar MD;  Location: [facility] CATH LAB/EP;  Service: Cardiology;;    VEIN BYPASS SURGERY  1996    VEIN SURGERY Left 2017    PAD    VEIN SURGERY Right 2018    PAD       Review of patient's allergies indicates:  No Known Allergies    Current Facility-Administered Medications on File Prior to Encounter   Medication    [COMPLETED] acetaminophen tablet 1,000 mg    sodium chloride 0.9% flush 10 mL     Current Outpatient Medications on File Prior to Encounter   Medication Sig    ACCU-CHEK ADRIEN PLUS METER Misc 1 Product by Other route daily as needed.    ACCU-CHEK SMARTVIEW TEST STRIP Strp     ACCU-CHEK SOFTCLIX LANCETS Misc Inject 100 lancets into the skin daily as needed.    amLODIPine (NORVASC) 10 MG tablet Take 1 tablet (10 mg total) by mouth once daily.    aspirin 81 MG Chew 81 mg nightly.    atorvastatin (LIPITOR) 80 MG tablet TAKE 1 TABLET EVERY DAY (Patient taking differently: Take 80 mg by mouth every evening.)    clopidogreL (PLAVIX) 75 mg tablet TAKE 1 TABLET EVERY DAY (Patient taking differently: Take 75 mg by mouth nightly.)    ESBRIET 801 mg Tab 3 (three) times daily.    insulin degludec (TRESIBA FLEXTOUCH U-100) 100 unit/mL (3  "mL) insulin pen Inject 50 Units into the skin every evening. F/u apt needed with PCP    isosorbide mononitrate (IMDUR) 30 MG 24 hr tablet Take 1 tablet (30 mg total) by mouth once daily.    levothyroxine (SYNTHROID) 100 MCG tablet Take 1 tablet (100 mcg total) by mouth before breakfast.    LUBRICANT EYE DROPS 0.5 % Dpet     metoprolol succinate (TOPROL-XL) 100 MG 24 hr tablet Take 1 tablet (100 mg total) by mouth once daily.    mirtazapine (REMERON) 7.5 MG Tab TAKE 1 TABLET BY MOUTH EVERY EVENING.    NOVOLOG 100 unit/mL injection Inject into the skin 3 (three) times daily after meals. Sliding scale    omeprazole (PRILOSEC OTC) 20 MG tablet Take 20 mg by mouth once daily.    ORENCIA 125 mg/mL Syrg Inject into the skin once a week.     vitamin D (VITAMIN D3) 1000 units Tab Take 1,000 Units by mouth once daily.    b complex vitamins tablet Take 1 tablet by mouth once daily.    DROPLET PEN NEEDLE 32 gauge x 5/32" Ndle Inject 1 Product into the skin daily as needed.    hydrocortisone 2.5 % cream Apply topically 2 (two) times daily as needed.    nitroGLYCERIN (NITROSTAT) 0.4 MG SL tablet Place 1 tablet (0.4 mg total) under the tongue every 5 (five) minutes as needed for Chest pain.    [DISCONTINUED] empagliflozin (JARDIANCE) 10 mg tablet Take 10 mg by mouth once daily.    [DISCONTINUED] pantoprazole (PROTONIX) 40 MG tablet Take 40 mg by mouth once daily.     Family History       Problem Relation (Age of Onset)    Diabetes Mother, Father, Sister, Brother, Sister    Kidney disease Sister          Tobacco Use    Smoking status: Former     Current packs/day: 0.00     Average packs/day: 2.0 packs/day for 27.0 years (54.0 ttl pk-yrs)     Types: Cigarettes     Start date:      Quit date:      Years since quittin.0    Smokeless tobacco: Never   Substance and Sexual Activity    Alcohol use: Yes     Comment: Occasionally has a drink    Drug use: No    Sexual activity: Yes     Partners: Female     Review of " Systems   Constitutional:  Positive for fever. Negative for chills, diaphoresis and fatigue.   Respiratory:  Positive for shortness of breath. Negative for cough.    Cardiovascular:  Negative for chest pain, palpitations and leg swelling.   Gastrointestinal:  Positive for vomiting. Negative for abdominal pain, diarrhea and nausea.   Genitourinary:  Negative for dysuria, flank pain and frequency.   Musculoskeletal:  Negative for arthralgias and gait problem.   Neurological:  Negative for dizziness, tremors, syncope, facial asymmetry, weakness, light-headedness, numbness and headaches.   Psychiatric/Behavioral:  Negative for confusion.      Objective:     Vital Signs (Most Recent):  Temp: 98.7 °F (37.1 °C) (12/30/23 1603)  Pulse: 105 (12/30/23 1402)  Resp: 20 (12/30/23 1333)  BP: (!) 135/58 (12/30/23 1402)  SpO2: 100 % (12/30/23 1402) Vital Signs (24h Range):  Temp:  [98.7 °F (37.1 °C)-100.7 °F (38.2 °C)] 98.7 °F (37.1 °C)  Pulse:  [105-120] 105  Resp:  [20-32] 20  SpO2:  [84 %-100 %] 100 %  BP: (135-193)/() 135/58     Weight: 65.8 kg (145 lb)  Body mass index is 24.89 kg/m².     Physical Exam  Vitals reviewed.   Constitutional:       General: He is not in acute distress.     Appearance: He is well-developed. He is ill-appearing. He is not diaphoretic.   HENT:      Head: Normocephalic and atraumatic.   Cardiovascular:      Rate and Rhythm: Regular rhythm. Tachycardia present.      Heart sounds: Normal heart sounds. No murmur heard.     No friction rub. No gallop.   Pulmonary:      Effort: No respiratory distress.      Breath sounds: No stridor. Rhonchi present. No wheezing or rales.      Comments: Speaking in complete sentences  Abdominal:      General: Bowel sounds are normal. There is no distension.      Palpations: Abdomen is soft. There is no mass.      Tenderness: There is no abdominal tenderness. There is no guarding.   Musculoskeletal:         General: Normal range of motion.      Cervical back: Normal  range of motion and neck supple.   Skin:     General: Skin is warm and dry.   Neurological:      Mental Status: He is alert and oriented to person, place, and time.                Significant Labs: All pertinent labs within the past 24 hours have been reviewed.    Significant Imaging: I have reviewed all pertinent imaging results/findings within the past 24 hours.  Assessment/Plan:     * NSTEMI (non-ST elevated myocardial infarction)  CAD  HLD  PAD  History of CABG and stent placement  Patient presents with NSTEMI. Pt denies CP.  Patient is currently on NSTEMI Pathway.  ED discussed with cardiology and heparin drip initiated.    EKG reviewed. Troponins reviewed and results noted-   Recent Labs   Lab 12/30/23  1436   TROPONINI 0.387*   .     Lipid panel reviewed and shows-     Lab Results   Component Value Date    LDLCALC 66.6 10/15/2023     Lab Results   Component Value Date    TRIG 82 10/15/2023         Medical management includes; Anticoagulation and High Intensity Stain Echo has not been performed. Latest ECHO results are as follows- Results for orders placed during the hospital encounter of 10/14/23    Echo    Interpretation Summary    Left Ventricle: The left ventricle is normal in size. There is concentric remodeling. Normal wall motion. There is reduced systolic function. Ejection fraction by visual approximation is 55%. Grade I diastolic dysfunction.    Left Atrium: Left atrium is moderately dilated.    Right Ventricle: Systolic function is normal.    Aortic Valve: The aortic valve is a trileaflet valve. Moderately calcified noncoronary cusp. Mildly restricted motion.    Mitral Valve: There is mild regurgitation.    Pulmonary Artery: The estimated pulmonary artery systolic pressure is 32 mmHg.    IVC/SVC: Intermediate venous pressure at 8 mmHg.  .   Cardiology is consulted. Plan of care reviewed with cardiology team. Continue to monitor patient closely and adjust therapy as needed.   -Troponin trending next  to be drawn 1730    Acute on chronic respiratory failure with hypoxia  ILD  See below  ABG reviewed, pH 7.368  Continue Vapotherm 40 L, 60% FiO2  -CT chest/abdomen/pelvis pending  -Pulmonology consulted  -COVID and flu negative at , repeating  -Discussed with Dr. Abraham and will give ceftriaxone and azithro for CAP coverage.  Procal in am.  Lasix x 1.  -Pt's wife is going to bring pirfenidone from home    Chronic hypoxic respiratory failure, on home oxygen therapy  Patient with Hypoxic Respiratory failure which is Acute on chronic.  he is on home oxygen at 4 LPM. Supplemental oxygen was provided and noted-      .   Signs/symptoms of respiratory failure include- increased work of breathing. Contributing diagnoses includes - Interstitial lung disease Labs and images were reviewed. Patient Has recent ABG, which has been reviewed. Will treat underlying causes and adjust management of respiratory failure as follows-NSTEMI treatment as above.  -Pulm consulted    CKD (chronic kidney disease) stage 3, GFR 30-59 ml/min  Creatine stable for now. BMP reviewed- noted Estimated Creatinine Clearance: 30.6 mL/min (A) (based on SCr of 1.8 mg/dL (H)). according to latest data. Monitor UOP and serial BMP and adjust therapy as needed. Renally dose meds. Avoid nephrotoxic medications and procedures.  -At baseline 1.5-1.8    Acquired hypothyroidism  Continue lt4  Repeat TSH      Essential hypertension  Chronic, uncontrolled. Latest blood pressure and vitals reviewed-     Temp:  [98.7 °F (37.1 °C)-100.7 °F (38.2 °C)]   Pulse:  [105-120]   Resp:  [20-32]   BP: (135-193)/()   SpO2:  [84 %-100 %] .   Home meds for hypertension were reviewed and noted below.   Hypertension Medications               amLODIPine (NORVASC) 5 MG tablet Take 5 mg by mouth once daily.    isosorbide mononitrate (IMDUR) 30 MG 24 hr tablet Take 1 tablet (30 mg total) by mouth once daily.    metoprolol succinate (TOPROL-XL) 100 MG 24 hr tablet Take 1 tablet (100  "mg total) by mouth once daily.    nitroGLYCERIN (NITROSTAT) 0.4 MG SL tablet Place 1 tablet (0.4 mg total) under the tongue every 5 (five) minutes as needed for Chest pain.            While in the hospital, will manage blood pressure as follows; Continue home antihypertensive regimen    Will utilize p.r.n. blood pressure medication only if patient's blood pressure greater than 180/110 and he develops symptoms such as worsening chest pain or shortness of breath.    Type 2 diabetes mellitus with stage 3a chronic kidney disease, without long-term current use of insulin  Patient's FSGs are uncontrolled due to hyperglycemia on current medication regimen.  Last A1c reviewed-   Lab Results   Component Value Date    HGBA1C 7.9 (H) 10/15/2023     Most recent fingerstick glucose reviewed- No results for input(s): "POCTGLUCOSE" in the last 24 hours.  Current correctional scale  Low  Maintain anti-hyperglycemic dose as follows-   Antihyperglycemics (From admission, onward)      Start     Stop Route Frequency Ordered    12/30/23 1801  insulin aspart U-100 pen 0-5 Units         -- SubQ Every 6 hours PRN 12/30/23 1702          Hold Oral hypoglycemics while patient is in the hospital.       VTE Risk Mitigation (From admission, onward)           Ordered     heparin 25,000 units in dextrose 5% (100 units/ml) IV bolus from bag - ADDITIONAL PRN BOLUS - 60 units/kg (max bolus 4000 units)  As needed (PRN)        Question:  Heparin Infusion Adjustment (DO NOT MODIFY ANSWER)  Answer:  \\[email or web address]\epic\Images\Pharmacy\HeparinInfusions\heparin LOW INTENSITY nomogram for [facility] [id number].pdf    12/30/23 1618     heparin 25,000 units in dextrose 5% (100 units/ml) IV bolus from bag - ADDITIONAL PRN BOLUS - 30 units/kg (max bolus 4000 units)  As needed (PRN)        Question:  Heparin Infusion Adjustment (DO NOT MODIFY ANSWER)  Answer:  [email or web address]\epic\Images\Pharmacy\HeparinInfusions\heparin LOW INTENSITY nomogram for [facility] [id number].pdf    12/30/23 " 1618     IP VTE HIGH RISK PATIENT  Once         12/30/23 1653     Place sequential compression device  Until discontinued         12/30/23 1653     Place sequential compression device  Until discontinued         12/30/23 1641     heparin 25,000 units in dextrose 5% (100 units/ml) IV bolus from bag INITIAL BOLUS (max bolus 4000 units)  Once        Question:  Heparin Infusion Adjustment (DO NOT MODIFY ANSWER)  Answer:  [email or web address]\epic\Images\Pharmacy\HeparinInfusions\heparin LOW INTENSITY nomogram for [facility] [id number].pdf    12/30/23 1618     heparin 25,000 units in dextrose 5% 250 mL (100 units/mL) infusion LOW INTENSITY nomogram - [facility]  Continuous        Question:  Begin at (units/kg/hr)  Answer:  12    12/30/23 1618                         On 12/30/2023, patient should be placed in hospital observation services under my care in collaboration with Dr. Alves.           Nancy Borges PA-C  Department of Hospital Medicine  [place] - Emergency Dept

## 2023-12-30 NOTE — PROGRESS NOTES
"Subjective:      Patient ID: Jose Antonio Allen is a 73 y.o. male.    Vitals:  height is 5' 4" (1.626 m) and weight is 65.8 kg (145 lb). His oral temperature is 100.7 °F (38.2 °C) (abnormal). His blood pressure is 140/102 (abnormal) and his pulse is 120 (abnormal). His respiration is 32 (abnormal) and oxygen saturation is 84% (abnormal).     Chief Complaint: Shortness of Breath    Pt with CAD, hx of CABG, COPD, pulmonary fibrosis, CKD3 who presents to clinic with spouse in respiratory distress, she states he woke up feeling sob and had a low grade fever, he denies any chest pain , she gave him tylenol this morning. On 4 L of at home O2, in clinic 76%. Patient presents to the clinic with his wife.     Shortness of Breath  This is a new problem. The current episode started today. The problem occurs constantly. The problem has been gradually worsening. Treatments tried: tylenol.       Respiratory:  Positive for shortness of breath.       Objective:     Physical Exam   Constitutional: He is oriented to person, place, and time. He appears well-developed. He is cooperative.  Non-toxic appearance. He does not appear ill. No distress.   HENT:   Head: Normocephalic and atraumatic.   Ears:   Right Ear: Hearing, tympanic membrane, external ear and ear canal normal.   Left Ear: Hearing, tympanic membrane, external ear and ear canal normal.   Nose: Nose normal. No mucosal edema, rhinorrhea or nasal deformity. No epistaxis. Right sinus exhibits no maxillary sinus tenderness and no frontal sinus tenderness. Left sinus exhibits no maxillary sinus tenderness and no frontal sinus tenderness.   Mouth/Throat: Uvula is midline, oropharynx is clear and moist and mucous membranes are normal. No trismus in the jaw. Normal dentition. No uvula swelling. No oropharyngeal exudate, posterior oropharyngeal edema or posterior oropharyngeal erythema.   Eyes: Conjunctivae and lids are normal. No scleral icterus.   Neck: Trachea normal and phonation " normal. Neck supple. No edema present. No erythema present. No neck rigidity present.   Cardiovascular: Normal rate, regular rhythm, normal heart sounds and normal pulses.   Pulmonary/Chest: No stridor. Tachypnea noted. He is in respiratory distress. He has decreased breath sounds. He has no wheezes. He has no rhonchi. He has no rales. He exhibits no tenderness.   Abdominal: Normal appearance.   Musculoskeletal: Normal range of motion.         General: No deformity. Normal range of motion.   Neurological: He is alert and oriented to person, place, and time. He exhibits normal muscle tone. Coordination normal.   Skin: Skin is warm, dry, intact, not diaphoretic and not pale.   Psychiatric: His speech is normal and behavior is normal. Judgment and thought content normal.   Nursing note and vitals reviewed.    Results for orders placed or performed in visit on 12/30/23   SARS Coronavirus 2 Antigen, POCT Manual Read   Result Value Ref Range    SARS Coronavirus 2 Antigen Negative Negative     Acceptable Yes    POCT Influenza A/B MOLECULAR   Result Value Ref Range    POC Molecular Influenza A Ag Negative Negative, Not Reported    POC Molecular Influenza B Ag Negative Negative, Not Reported     Acceptable Yes        Assessment:     1. Hypoxia    2. Fever, unspecified fever cause    3. Pulmonary fibrosis        Plan:   Pt with CAD, hx of CABG, COPD, pulmonary fibrosis, CKD3 who presents to clinic with spouse in respiratory distress, she states he woke up feeling sob and had a low grade fever, he denies any chest pain , she gave him tylenol this morning. On 4 L of at home O2, in clinic 76%. Patient presents to the clinic with his wife.   Flu and COVID test both negative.   Sending patient to the ED for further evaluation for hypoxia. Patient picked up by EMS at 1:00 pm.     Reviewed case with Dr. Mendez who agrees with plan.     Hypoxia  -     Refer to Emergency Dept.  -     SARS Coronavirus 2  Antigen, POCT Manual Read  -     POCT Influenza A/B MOLECULAR    Fever, unspecified fever cause  -     acetaminophen tablet 1,000 mg  -     Refer to Emergency Dept.  -     SARS Coronavirus 2 Antigen, POCT Manual Read  -     POCT Influenza A/B MOLECULAR    Pulmonary fibrosis  -     Refer to Emergency Dept.  -     SARS Coronavirus 2 Antigen, POCT Manual Read  -     POCT Influenza A/B MOLECULAR

## 2023-12-30 NOTE — SUBJECTIVE & OBJECTIVE
Past Medical History:   Diagnosis Date    Angina pectoris     Arthritis     CKD (chronic kidney disease)     Colon polyps 09/14/2018    Coronary artery disease     Diabetes mellitus     Diabetes mellitus, type 2     GERD (gastroesophageal reflux disease)     History of tobacco use     Hyperlipidemia     Hypertension     S/P CABG (coronary artery bypass graft) 1996    Thyroid disease        Past Surgical History:   Procedure Laterality Date    ATHERECTOMY, CORONARY N/A 11/10/2023    Procedure: Atherectomy-coronary. Shockwave balloon;  Surgeon: Scooby Sow MD;  Location: [facility] CATH LAB/EP;  Service: Cardiology;  Laterality: N/A;    CATARACT EXTRACTION Bilateral 3YRS    CLOSURE DEVICE  3/20/2023    Procedure: Placement of Closure Device;  Surgeon: Gordy Parmar MD;  Location: [facility] CATH LAB/EP;  Service: Cardiology;;    COLONOSCOPY W/ POLYPECTOMY  09/14/2018    CORONARY ANGIOGRAPHY N/A 4/11/2022    Procedure: ANGIOGRAM, CORONARY ARTERY;  Surgeon: Gordy Parmar MD;  Location: [facility] CATH LAB/EP;  Service: Cardiology;  Laterality: N/A;    CORONARY ANGIOGRAPHY N/A 11/10/2023    Procedure: ANGIOGRAM, CORONARY ARTERY;  Surgeon: Scooby Sow MD;  Location: [facility] CATH LAB/EP;  Service: Cardiology;  Laterality: N/A;    CORONARY ANGIOGRAPHY INCLUDING BYPASS GRAFTS WITH CATHETERIZATION OF LEFT HEART N/A 4/11/2022    Procedure: ANGIOGRAM, CORONARY, INCLUDING BYPASS GRAFT, WITH LEFT HEART CATHETERIZATION;  Surgeon: Gordy Parmar MD;  Location: [facility] CATH LAB/EP;  Service: Cardiology;  Laterality: N/A;    CORONARY ARTERY BYPASS GRAFT  1996    CORONARY BYPASS GRAFT ANGIOGRAPHY  4/20/2022    Procedure: Bypass graft study;  Surgeon: Gordy Parmar MD;  Location: [facility] CATH LAB/EP;  Service: Cardiology;;    CORONARY BYPASS GRAFT ANGIOGRAPHY  3/20/2023    Procedure: Bypass graft study;  Surgeon: Gordy Parmar MD;  Location: [facility] CATH LAB/EP;  Service: Cardiology;;    CORONARY BYPASS GRAFT ANGIOGRAPHY  11/10/2023     Procedure: Bypass graft study;  Surgeon: Scooby Sow MD;  Location: [facility] CATH LAB/EP;  Service: Cardiology;;    EYELID SURGERY  03/2012    IVUS, CORONARY  3/20/2023    Procedure: IVUS, Coronary;  Surgeon: Gordy Parmar MD;  Location: [facility] CATH LAB/EP;  Service: Cardiology;;    IVUS, CORONARY  11/10/2023    Procedure: IVUS, Coronary;  Surgeon: Scooby Sow MD;  Location: [facility] CATH LAB/EP;  Service: Cardiology;;    LEFT HEART CATHETERIZATION N/A 4/11/2022    Procedure: Left heart cath;  Surgeon: Gordy Parmar MD;  Location: [facility] CATH LAB/EP;  Service: Cardiology;  Laterality: N/A;    LEFT HEART CATHETERIZATION N/A 3/20/2023    Procedure: Left heart cath;  Surgeon: Gordy Parmar MD;  Location: [facility] CATH LAB/EP;  Service: Cardiology;  Laterality: N/A;    LEFT HEART CATHETERIZATION N/A 11/10/2023    Procedure: Left heart cath;  Surgeon: Scooby Sow MD;  Location: [facility] CATH LAB/EP;  Service: Cardiology;  Laterality: N/A;    PERCUTANEOUS TRANSLUMINAL BALLOON ANGIOPLASTY OF CORONARY ARTERY  4/20/2022    Procedure: Angioplasty-coronary;  Surgeon: Gordy Parmar MD;  Location: [facility] CATH LAB/EP;  Service: Cardiology;;    PERCUTANEOUS TRANSLUMINAL BALLOON ANGIOPLASTY OF CORONARY ARTERY  3/20/2023    Procedure: Angioplasty-coronary;  Surgeon: Gordy Parmar MD;  Location: [facility] CATH LAB/EP;  Service: Cardiology;;    PTCA, SINGLE VESSEL  11/10/2023    Procedure: PTCA, Single Vessel;  Surgeon: Scooby Sow MD;  Location: [facility] CATH LAB/EP;  Service: Cardiology;;    RIGHT HEART CATHETERIZATION Right 11/3/2021    Procedure: INSERTION, CATHETER, RIGHT HEART;  Surgeon: Temitope Rahman MD;  Location: [facility] CATH LAB/EP;  Service: Cardiology;  Laterality: Right;    STENT, DRUG ELUTING, SINGLE VESSEL, CORONARY  3/20/2023    Procedure: Stent, Drug Eluting, Single Vessel, Coronary;  Surgeon: Gordy Parmar MD;  Location: [facility] CATH LAB/EP;  Service: Cardiology;;    VEIN BYPASS SURGERY  1996     VEIN SURGERY Left 2017    PAD    VEIN SURGERY Right 2018    PAD       Review of patient's allergies indicates:  No Known Allergies    Current Facility-Administered Medications on File Prior to Encounter   Medication    [COMPLETED] acetaminophen tablet 1,000 mg    sodium chloride 0.9% flush 10 mL     Current Outpatient Medications on File Prior to Encounter   Medication Sig    ACCU-CHEK ADRIEN PLUS METER Misc 1 Product by Other route daily as needed.    ACCU-CHEK SMARTVIEW TEST STRIP Strp     ACCU-CHEK SOFTCLIX LANCETS Misc Inject 100 lancets into the skin daily as needed.    amLODIPine (NORVASC) 10 MG tablet Take 1 tablet (10 mg total) by mouth once daily.    aspirin 81 MG Chew 81 mg nightly.    atorvastatin (LIPITOR) 80 MG tablet TAKE 1 TABLET EVERY DAY (Patient taking differently: Take 80 mg by mouth every evening.)    clopidogreL (PLAVIX) 75 mg tablet TAKE 1 TABLET EVERY DAY (Patient taking differently: Take 75 mg by mouth nightly.)    ESBRIET 801 mg Tab 3 (three) times daily.    insulin degludec (TRESIBA FLEXTOUCH U-100) 100 unit/mL (3 mL) insulin pen Inject 50 Units into the skin every evening. F/u apt needed with PCP    isosorbide mononitrate (IMDUR) 30 MG 24 hr tablet Take 1 tablet (30 mg total) by mouth once daily.    levothyroxine (SYNTHROID) 100 MCG tablet Take 1 tablet (100 mcg total) by mouth before breakfast.    LUBRICANT EYE DROPS 0.5 % Dpet     metoprolol succinate (TOPROL-XL) 100 MG 24 hr tablet Take 1 tablet (100 mg total) by mouth once daily.    mirtazapine (REMERON) 7.5 MG Tab TAKE 1 TABLET BY MOUTH EVERY EVENING.    NOVOLOG 100 unit/mL injection Inject into the skin 3 (three) times daily after meals. Sliding scale    omeprazole (PRILOSEC OTC) 20 MG tablet Take 20 mg by mouth once daily.    ORENCIA 125 mg/mL Syrg Inject into the skin once a week. Fridays    vitamin D (VITAMIN D3) 1000 units Tab Take 1,000 Units by mouth once daily.    b complex vitamins tablet Take 1 tablet by mouth once daily.     "DROPLET PEN NEEDLE 32 gauge x 5/32" Ndle Inject 1 Product into the skin daily as needed.    hydrocortisone 2.5 % cream Apply topically 2 (two) times daily as needed.    nitroGLYCERIN (NITROSTAT) 0.4 MG SL tablet Place 1 tablet (0.4 mg total) under the tongue every 5 (five) minutes as needed for Chest pain.    [DISCONTINUED] empagliflozin (JARDIANCE) 10 mg tablet Take 10 mg by mouth once daily.    [DISCONTINUED] pantoprazole (PROTONIX) 40 MG tablet Take 40 mg by mouth once daily.     Family History       Problem Relation (Age of Onset)    Diabetes Mother, Father, Sister, Brother, Sister    Kidney disease Sister          Tobacco Use    Smoking status: Former     Current packs/day: 0.00     Average packs/day: 2.0 packs/day for 27.0 years (54.0 ttl pk-yrs)     Types: Cigarettes     Start date:      Quit date:      Years since quittin.0    Smokeless tobacco: Never   Substance and Sexual Activity    Alcohol use: Yes     Comment: Occasionally has a drink    Drug use: No    Sexual activity: Yes     Partners: Female     Review of Systems   Constitutional:  Positive for fever. Negative for chills, diaphoresis and fatigue.   Respiratory:  Positive for shortness of breath. Negative for cough.    Cardiovascular:  Negative for chest pain, palpitations and leg swelling.   Gastrointestinal:  Positive for vomiting. Negative for abdominal pain, diarrhea and nausea.   Genitourinary:  Negative for dysuria, flank pain and frequency.   Musculoskeletal:  Negative for arthralgias and gait problem.   Neurological:  Negative for dizziness, tremors, syncope, facial asymmetry, weakness, light-headedness, numbness and headaches.   Psychiatric/Behavioral:  Negative for confusion.      Objective:     Vital Signs (Most Recent):  Temp: 98.7 °F (37.1 °C) (23 1603)  Pulse: 105 (23 1402)  Resp: 20 (23 1333)  BP: (!) 135/58 (23 1402)  SpO2: 100 % (23 1402) Vital Signs (24h Range):  Temp:  [98.7 °F (37.1 " °C)-100.7 °F (38.2 °C)] 98.7 °F (37.1 °C)  Pulse:  [105-120] 105  Resp:  [20-32] 20  SpO2:  [84 %-100 %] 100 %  BP: (135-193)/() 135/58     Weight: 65.8 kg (145 lb)  Body mass index is 24.89 kg/m².     Physical Exam  Vitals reviewed.   Constitutional:       General: He is not in acute distress.     Appearance: He is well-developed. He is ill-appearing. He is not diaphoretic.   HENT:      Head: Normocephalic and atraumatic.   Cardiovascular:      Rate and Rhythm: Regular rhythm. Tachycardia present.      Heart sounds: Normal heart sounds. No murmur heard.     No friction rub. No gallop.   Pulmonary:      Effort: No respiratory distress.      Breath sounds: No stridor. Rhonchi present. No wheezing or rales.      Comments: Speaking in complete sentences  Abdominal:      General: Bowel sounds are normal. There is no distension.      Palpations: Abdomen is soft. There is no mass.      Tenderness: There is no abdominal tenderness. There is no guarding.   Musculoskeletal:         General: Normal range of motion.      Cervical back: Normal range of motion and neck supple.   Skin:     General: Skin is warm and dry.   Neurological:      Mental Status: He is alert and oriented to person, place, and time.                Significant Labs: All pertinent labs within the past 24 hours have been reviewed.    Significant Imaging: I have reviewed all pertinent imaging results/findings within the past 24 hours.

## 2023-12-30 NOTE — ASSESSMENT & PLAN NOTE
Creatine stable for now. BMP reviewed- noted Estimated Creatinine Clearance: 30.6 mL/min (A) (based on SCr of 1.8 mg/dL (H)). according to latest data. Monitor UOP and serial BMP and adjust therapy as needed. Renally dose meds. Avoid nephrotoxic medications and procedures.  -At baseline 1.5-1.8

## 2023-12-30 NOTE — PROGRESS NOTES
Subjective:      Patient ID: Jose Antonio Allen is a 73 y.o. male.    Chief Complaint: No chief complaint on file.    HPI  ROS  Objective:     Physical Exam   Assessment:      1. Hypoxia    2. Fever, unspecified fever cause    3. Pulmonary fibrosis      Plan:       Medications Ordered This Encounter   Medications    acetaminophen tablet 1,000 mg            No follow-ups on file.

## 2023-12-30 NOTE — ASSESSMENT & PLAN NOTE
Chronic, uncontrolled. Latest blood pressure and vitals reviewed-     Temp:  [98.7 °F (37.1 °C)-100.7 °F (38.2 °C)]   Pulse:  [105-120]   Resp:  [20-32]   BP: (135-193)/()   SpO2:  [84 %-100 %] .   Home meds for hypertension were reviewed and noted below.   Hypertension Medications               amLODIPine (NORVASC) 5 MG tablet Take 5 mg by mouth once daily.    isosorbide mononitrate (IMDUR) 30 MG 24 hr tablet Take 1 tablet (30 mg total) by mouth once daily.    metoprolol succinate (TOPROL-XL) 100 MG 24 hr tablet Take 1 tablet (100 mg total) by mouth once daily.    nitroGLYCERIN (NITROSTAT) 0.4 MG SL tablet Place 1 tablet (0.4 mg total) under the tongue every 5 (five) minutes as needed for Chest pain.            While in the hospital, will manage blood pressure as follows; Continue home antihypertensive regimen    Will utilize p.r.n. blood pressure medication only if patient's blood pressure greater than 180/110 and he develops symptoms such as worsening chest pain or shortness of breath.

## 2023-12-30 NOTE — HPI
73 y.o. male with significant past medical history of ILD on 4L NC at home, Angina pectoris, Arthritis, CKD (chronic kidney disease), Colon polyps (09/14/2018), Coronary artery disease, Diabetes mellitus, Diabetes mellitus, type 2, GERD (gastroesophageal reflux disease), History of tobacco use, Hyperlipidemia, Hypertension, S/P CABG (coronary artery bypass graft) (1996), and Thyroid disease presented to the ER from urgent care for hypoxia and SOB.  COVID and flu negative at .  Pt also vomited a few times in route, denies abdominal pain or chest pain.  Pt's wife reported low grade fever at home.  No sick contacts.  Pt denies chills, diaphoresis, nausea, cough, vocal changes, globus sensation, leg swelling or pain, fatigue, weakness, confusion, syncope.  Of note 11/11 cath lab severe native CAD, patent LIMA-LAD, recurrent ISR to SVG-ABRON s/p succesfful PTCA to SVG-BARON branch recurrent ISR.  IVUS used.  Under expanded stent.  PTCA done with 3.0 scoring balloon followed by PTCA with 2.0 shockwave balloon followed by 3.5 and 4.0 NC balloon.  Pt advised to continue asa/plavix noting pt at risk for recurrent ISR and cards discussed option next time is to attempt opening native RCA  and coil the SVG graft given recurrent failures and recurrent attempts.    In the ED, T max 100.7 F.  Pt on 40L vapotherm 60% FiO2.  EKG Sinus Tach with PAC.  pH 7.368.  Troponin 0.387 compared to last (0.020 when admitted for HTN).   compared to last 90.  Pt given cefepime/vanc.  ED spoke with cardiology who recommended heparin drip.  CBC with thrombocytopenia.  CMP no significant electrolyte abnormalities and kidney function at baseline.  CXR Chronic appearing changes of the lungs which are overall more conspicuous from comparison radiograph 10/25/2023.  Additional consolidative opacity about the left lung base.  Considerations include atelectasis, noting that superimposed infectious or non infectious inflammatory infiltrate and  pulmonary edema remain considerations.   CT chest/abdomen/pelvis pending.    Pt signed AMA form in ED but then he and his wife requested to resend the form.

## 2023-12-30 NOTE — PHARMACY MED REC
"[facility]           Pharmacy  Admission Medication History     The home medication history was taken by Peggy Yoon.      Medication history obtained from Medications listed below were obtained from: Patient/family    Based on information gathered for medication list, you may go to "Admission" then "Reconcile Home Medications" tabs to review and/or act upon those items.     The home medication list has been updated by the Pharmacy department.   Please read ALL comments highlighted in yellow.   Please address this information as you see fit.    Feel free to contact us if you have any questions or require assistance.    The medications listed below were removed from the home medication list.  Please reorder if appropriate:    Patient reports NOT TAKING the following medication(s):  B complex vitamin  Hydrocortisone cream 2%    Current Facility-Administered Medications on File Prior to Encounter   Medication Dose Route Frequency Provider Last Rate Last Admin    [COMPLETED] acetaminophen tablet 1,000 mg  1,000 mg Oral 1 time in Clinic/HOD Sharonda Erickson PA-C   1,000 mg at 12/30/23 1256    sodium chloride 0.9% flush 10 mL  10 mL Intravenous PRN Gordy Parmar MD         Current Outpatient Medications on File Prior to Encounter   Medication Sig Dispense Refill    amLODIPine (NORVASC) 5 MG tablet Take 5 mg by mouth once daily.      aspirin 81 MG Chew 81 mg nightly.      atorvastatin (LIPITOR) 80 MG tablet TAKE 1 TABLET EVERY DAY (Patient taking differently: Take 80 mg by mouth every evening.) 90 tablet 3    clopidogreL (PLAVIX) 75 mg tablet TAKE 1 TABLET EVERY DAY (Patient taking differently: Take 75 mg by mouth nightly.) 90 tablet 3    ESBRIET 801 mg Tab 3 (three) times daily.      insulin degludec (TRESIBA FLEXTOUCH U-100) 100 unit/mL (3 mL) insulin pen Inject 50 Units into the skin every evening. F/u apt needed with PCP 5 pen 3    isosorbide mononitrate (IMDUR) 30 MG 24 hr tablet Take 1 " "tablet (30 mg total) by mouth once daily. 30 tablet 11    levothyroxine (SYNTHROID) 100 MCG tablet Take 1 tablet (100 mcg total) by mouth before breakfast. 90 tablet 3    LUBRICANT EYE DROPS 0.5 % Dpet       metoprolol succinate (TOPROL-XL) 100 MG 24 hr tablet Take 1 tablet (100 mg total) by mouth once daily. 30 tablet 11    mirtazapine (REMERON) 7.5 MG Tab TAKE 1 TABLET BY MOUTH EVERY EVENING. 90 tablet 3    NOVOLOG 100 unit/mL injection Inject 12 Units into the skin 3 (three) times daily after meals. Plus Sliding scale      NOVOLOG FLEXPEN U-100 INSULIN 100 unit/mL (3 mL) InPn pen Inject 1-10 Units into the skin as needed. 110-150=1  151-200=2  201-250=4  251-300=6  301-350=8  351-400=10      omeprazole (PRILOSEC) 20 MG capsule Take 20 mg by mouth 2 (two) times daily.      ORENCIA 125 mg/mL Syrg Inject into the skin once a week. Fridays      tamsulosin (FLOMAX) 0.4 mg Cap Take 0.4 mg by mouth every evening.      vitamin D (VITAMIN D3) 1000 units Tab Take 1,000 Units by mouth once daily.      ACCU-CHEK ADRIEN PLUS METER Misc 1 Product by Other route daily as needed.      ACCU-CHEK SMARTVIEW TEST STRIP Strp       ACCU-CHEK SOFTCLIX LANCETS Misc Inject 100 lancets into the skin daily as needed.      DROPLET PEN NEEDLE 32 gauge x 5/32" Ndle Inject 1 Product into the skin daily as needed.      nitroGLYCERIN (NITROSTAT) 0.4 MG SL tablet Place 1 tablet (0.4 mg total) under the tongue every 5 (five) minutes as needed for Chest pain. 25 tablet 11       Please address this information as you see fit.  Feel free to contact us if you have any questions or require assistance.    Peggy Yoon  [phone]                  .          "

## 2023-12-30 NOTE — ED PROVIDER NOTES
Encounter Date: 12/30/2023       History     Chief Complaint   Patient presents with    Shortness of Breath     Seen at Urgent care with c/o SOB. Sent here for further eval. States that he has been coughing at night, SOB today. Denies pain. Presents awake, alert with O2 via NRB - c/o nausea       73 year old male with a history of ILD on 4L NC at home presents with worsening shortness of breath for the last week. He says it has been progressively worsening and when he was seen at , his SpO2 on 4L was in the low 80s, high 70s. No hx of DVT/PE. While en route, per wife, pt vomited multiple times.       Review of patient's allergies indicates:  No Known Allergies  Past Medical History:   Diagnosis Date    Angina pectoris     Arthritis     CKD (chronic kidney disease)     Colon polyps 09/14/2018    Coronary artery disease     Diabetes mellitus     Diabetes mellitus, type 2     GERD (gastroesophageal reflux disease)     History of tobacco use     Hyperlipidemia     Hypertension     S/P CABG (coronary artery bypass graft) 1996    Thyroid disease      Past Surgical History:   Procedure Laterality Date    ATHERECTOMY, CORONARY N/A 11/10/2023    Procedure: Atherectomy-coronary. Shockwave balloon;  Surgeon: Scooby Sow MD;  Location: [facility] CATH LAB/EP;  Service: Cardiology;  Laterality: N/A;    CATARACT EXTRACTION Bilateral 3YRS    CLOSURE DEVICE  3/20/2023    Procedure: Placement of Closure Device;  Surgeon: Gordy Parmar MD;  Location: [facility] CATH LAB/EP;  Service: Cardiology;;    COLONOSCOPY W/ POLYPECTOMY  09/14/2018    CORONARY ANGIOGRAPHY N/A 4/11/2022    Procedure: ANGIOGRAM, CORONARY ARTERY;  Surgeon: Gordy Parmar MD;  Location: [facility] CATH LAB/EP;  Service: Cardiology;  Laterality: N/A;    CORONARY ANGIOGRAPHY N/A 11/10/2023    Procedure: ANGIOGRAM, CORONARY ARTERY;  Surgeon: Scooby Sow MD;  Location: [facility] CATH LAB/EP;  Service: Cardiology;  Laterality: N/A;    CORONARY ANGIOGRAPHY INCLUDING  BYPASS GRAFTS WITH CATHETERIZATION OF LEFT HEART N/A 4/11/2022    Procedure: ANGIOGRAM, CORONARY, INCLUDING BYPASS GRAFT, WITH LEFT HEART CATHETERIZATION;  Surgeon: Gordy Parmar MD;  Location: [facility] CATH LAB/EP;  Service: Cardiology;  Laterality: N/A;    CORONARY ARTERY BYPASS GRAFT  1996    CORONARY BYPASS GRAFT ANGIOGRAPHY  4/20/2022    Procedure: Bypass graft study;  Surgeon: Gordy Parmar MD;  Location: [facility] CATH LAB/EP;  Service: Cardiology;;    CORONARY BYPASS GRAFT ANGIOGRAPHY  3/20/2023    Procedure: Bypass graft study;  Surgeon: Gordy Parmar MD;  Location: [facility] CATH LAB/EP;  Service: Cardiology;;    CORONARY BYPASS GRAFT ANGIOGRAPHY  11/10/2023    Procedure: Bypass graft study;  Surgeon: Scooby Sow MD;  Location: [facility] CATH LAB/EP;  Service: Cardiology;;    EYELID SURGERY  03/2012    IVUS, CORONARY  3/20/2023    Procedure: IVUS, Coronary;  Surgeon: Gordy Parmar MD;  Location: [facility] CATH LAB/EP;  Service: Cardiology;;    IVUS, CORONARY  11/10/2023    Procedure: IVUS, Coronary;  Surgeon: Scooby Sow MD;  Location: [facility] CATH LAB/EP;  Service: Cardiology;;    LEFT HEART CATHETERIZATION N/A 4/11/2022    Procedure: Left heart cath;  Surgeon: Godry Parmar MD;  Location: [facility] CATH LAB/EP;  Service: Cardiology;  Laterality: N/A;    LEFT HEART CATHETERIZATION N/A 3/20/2023    Procedure: Left heart cath;  Surgeon: Gordy Parmar MD;  Location: [facility] CATH LAB/EP;  Service: Cardiology;  Laterality: N/A;    LEFT HEART CATHETERIZATION N/A 11/10/2023    Procedure: Left heart cath;  Surgeon: Scooby Sow MD;  Location: [facility] CATH LAB/EP;  Service: Cardiology;  Laterality: N/A;    PERCUTANEOUS TRANSLUMINAL BALLOON ANGIOPLASTY OF CORONARY ARTERY  4/20/2022    Procedure: Angioplasty-coronary;  Surgeon: Gordy Parmar MD;  Location: [facility] CATH LAB/EP;  Service: Cardiology;;    PERCUTANEOUS TRANSLUMINAL BALLOON ANGIOPLASTY OF CORONARY ARTERY  3/20/2023    Procedure:  Angioplasty-coronary;  Surgeon: Gordy Parmar MD;  Location: [facility] CATH LAB/EP;  Service: Cardiology;;    PTCA, SINGLE VESSEL  11/10/2023    Procedure: PTCA, Single Vessel;  Surgeon: Scooby Sow MD;  Location: [facility] CATH LAB/EP;  Service: Cardiology;;    RIGHT HEART CATHETERIZATION Right 11/3/2021    Procedure: INSERTION, CATHETER, RIGHT HEART;  Surgeon: Temitope Rahman MD;  Location: [facility] CATH LAB/EP;  Service: Cardiology;  Laterality: Right;    STENT, DRUG ELUTING, SINGLE VESSEL, CORONARY  3/20/2023    Procedure: Stent, Drug Eluting, Single Vessel, Coronary;  Surgeon: Gordy Parmar MD;  Location: [facility] CATH LAB/EP;  Service: Cardiology;;    VEIN BYPASS SURGERY      VEIN SURGERY Left 2017    PAD    VEIN SURGERY Right 2018    PAD     Family History   Problem Relation Age of Onset    Diabetes Mother     Diabetes Father     Diabetes Sister     Kidney disease Sister     Diabetes Brother     Diabetes Sister     Blindness Neg Hx     Glaucoma Neg Hx     Macular degeneration Neg Hx     Retinal detachment Neg Hx     Strabismus Neg Hx     Stroke Neg Hx     Thyroid disease Neg Hx     Cancer Neg Hx     Cataracts Neg Hx     Hypertension Neg Hx      Social History     Tobacco Use    Smoking status: Former     Current packs/day: 0.00     Average packs/day: 2.0 packs/day for 27.0 years (54.0 ttl pk-yrs)     Types: Cigarettes     Start date:      Quit date:      Years since quittin.0    Smokeless tobacco: Never   Substance Use Topics    Alcohol use: Yes     Comment: Occasionally has a drink    Drug use: No     Review of Systems    Physical Exam     Initial Vitals [23 1333]   BP Pulse Resp Temp SpO2   (!) 193/105 109 20 99.9 °F (37.7 °C) 99 %      MAP       --         Physical Exam    Nursing note and vitals reviewed.  Constitutional:   Conversational, dyspneic, phonating   HENT:   Head: Normocephalic and atraumatic.   Eyes: EOM are normal. Pupils are equal, round, and reactive to light.   Neck:  Neck supple. No JVD present.   Normal range of motion.  Cardiovascular:  Normal rate and regular rhythm.           Pulmonary/Chest: No stridor. No respiratory distress. He has rhonchi.   Abdominal: Abdomen is soft. There is no abdominal tenderness.   Musculoskeletal:         General: No tenderness or edema. Normal range of motion.      Cervical back: Normal range of motion and neck supple.     Neurological: He is alert and oriented to person, place, and time. GCS score is 15. GCS eye subscore is 4. GCS verbal subscore is 5. GCS motor subscore is 6.   Skin: Skin is warm and dry. Capillary refill takes less than 2 seconds.   Psychiatric: He has a normal mood and affect. Thought content normal.         ED Course   Procedures  Labs Reviewed   CBC W/ AUTO DIFFERENTIAL - Abnormal; Notable for the following components:       Result Value    WBC 2.18 (*)     RBC 3.88 (*)     Hemoglobin 10.9 (*)     Hematocrit 33.2 (*)     Platelets 134 (*)     Lymph % 7.0 (*)     Mono % 23.0 (*)     All other components within normal limits   COMPREHENSIVE METABOLIC PANEL - Abnormal; Notable for the following components:    CO2 20 (*)     Glucose 256 (*)     BUN 24 (*)     Creatinine 1.8 (*)     Albumin 2.6 (*)     AST 43 (*)     ALT 46 (*)     eGFR 39 (*)     All other components within normal limits   TROPONIN I - Abnormal; Notable for the following components:    Troponin I 0.387 (*)     All other components within normal limits   B-TYPE NATRIURETIC PEPTIDE - Abnormal; Notable for the following components:     (*)     All other components within normal limits   TROPONIN I   APTT   PROTIME-INR   CBC W/ AUTO DIFFERENTIAL   SARS-COV-2 RDRP GENE   POCT INFLUENZA A/B MOLECULAR   TYPE & SCREEN          Imaging Results              CT Chest Abdomen Pelvis With IV Contrast (XPD) NO Oral Contrast (No Result on File)                      X-Ray Chest AP Portable (Final result)  Result time 12/30/23 15:14:35      Final result by Ned Montenegro  MD LIYA (12/30/23 15:14:35)                   Impression:      Chronic appearing changes of the lungs which are overall more conspicuous from comparison radiograph 10/25/2023.  Additional consolidative opacity about the left lung base.  Considerations include atelectasis, noting that superimposed infectious or non infectious inflammatory infiltrate and pulmonary edema remain considerations.  Correlation is advised.      Electronically signed by: Ned Montenegro  Date:    12/30/2023  Time:    15:14               Narrative:    EXAMINATION:  XR CHEST AP PORTABLE    CLINICAL HISTORY:  CHF;    TECHNIQUE:  Single frontal view of the chest was performed.    COMPARISON:  Chest radiograph 10/25/2023    FINDINGS:  Cardiac leads overlie the field of view.    Chronic appearing changes of the lungs noted diffuse scattered opacities and reticulation which is more conspicuous when comparison radiograph 10/25/2023.  Additional consolidative opacity about the left lung base, which may represent sequela of atelectasis, noting that infectious or non infectious inflammatory infiltrate and pulmonary edema remain considerations.  Suspected small bilateral pleural effusions.    Cardiomediastinal silhouette is unchanged in size.  Atherosclerosis of the visualized aorta.  Hilar borders are intact.    Osseous structures demonstrate no evidence for acute fracture or osseous destructive lesion.  Postoperative change including median sternotomy.                                       Medications   vancomycin (VANCOCIN) 1,000 mg in dextrose 5 % (D5W) 250 mL IVPB (Vial-Mate) (has no administration in time range)   ceFEPIme (MAXIPIME) 1 g in dextrose 5 % in water (D5W) 100 mL IVPB (MB+) (has no administration in time range)   sodium chloride 0.9% bolus 500 mL 500 mL (has no administration in time range)   iohexoL (OMNIPAQUE 350) injection 75 mL (has no administration in time range)   heparin 25,000 units in dextrose 5% (100 units/ml) IV bolus from bag  INITIAL BOLUS (max bolus 4000 units) (has no administration in time range)   heparin 25,000 units in dextrose 5% 250 mL (100 units/mL) infusion LOW INTENSITY nomogram - [facility] (has no administration in time range)   heparin 25,000 units in dextrose 5% (100 units/ml) IV bolus from bag - ADDITIONAL PRN BOLUS - 60 units/kg (max bolus 4000 units) (has no administration in time range)   heparin 25,000 units in dextrose 5% (100 units/ml) IV bolus from bag - ADDITIONAL PRN BOLUS - 30 units/kg (max bolus 4000 units) (has no administration in time range)   amLODIPine tablet 5 mg (has no administration in time range)   aspirin chewable tablet 81 mg (has no administration in time range)   atorvastatin tablet 80 mg (has no administration in time range)   clopidogreL tablet 75 mg (has no administration in time range)   pirfenidone Tab 801 mg (has no administration in time range)   isosorbide mononitrate 24 hr tablet 30 mg (has no administration in time range)   levothyroxine tablet 100 mcg (has no administration in time range)   metoprolol succinate (TOPROL-XL) 24 hr tablet 100 mg (has no administration in time range)   mirtazapine tablet 7.5 mg (has no administration in time range)   nitroGLYCERIN SL tablet 0.4 mg (has no administration in time range)   pantoprazole EC tablet 40 mg (has no administration in time range)   tamsulosin 24 hr capsule 0.4 mg (has no administration in time range)   vitamin D 1000 units tablet 1,000 Units (has no administration in time range)   aspirin chewable tablet 324 mg (has no administration in time range)   ondansetron injection 4 mg (4 mg Intravenous Given 12/30/23 6941)     Medical Decision Making  Hemodynamically stable. Afebrile. Phonating and protecting the airway spontaneously. No clinical evidence for cardiovascular instability or impending airway compromise. Examination as above. Additional historians include wife. Prior medical records reviewed. Prior admission note reviewed, patient with  "significant cardiac history and pulmonary fibrosis. Current co-morbidities considered that will impact clinical decision making include as above.    Plan:  Labs, CT chest/abd/pel due to worsening hypoxia and vomiting, abx, admission.       Amount and/or Complexity of Data Reviewed  Labs: ordered.  Radiology: ordered.    Risk  Prescription drug management.               ED Course as of 12/30/23 1648   Sat Dec 30, 2023   1349 Pmhx: "73-year-old male with a past medical history of Angina pectoris, Arthritis, CKD (chronic kidney disease), Colon polyps (09/14/2018), Coronary artery disease, Diabetes mellitus, Diabetes mellitus, type 2, GERD (gastroesophageal reflux disease), History of tobacco use, Hyperlipidemia, Hypertension, S/P CABG (coronary artery bypass graft) (1996), and Thyroid disease" [BG]   1528 Prior cath reviewed. Severe CAD. [BG]   1638 This patient has elected to leave against medical advice. In my opinion, the patient has the capacity to leave AMA. The patient is clinically sober, free from distracting injury, appears to have intact insight and judgment and reason, and in my opinion has capacity to make decisions. I explained to the patient that his symptoms may represent risk to their life and the patient verbalized understanding of my concerns.    I had a discussion with the patient about their workup and results, and that they may still have a life, limb and/or vision threatening illness without completion of this workup. I informed the patient what the next step in diagnosis and treatment would be and they verbalized understanding of this as well. I explained the risks of leaving without further workup or treatment, which included reasonably foreseeable complications such as death, serious injury, permanent disability, and detrimental effects to their health overall. I also offered alternatives to departing AMA such as assigning the patient a different provider or an alternate workup pathway.    The " patient is refusing any further care and is leaving against medical advice. I am unable to convince the patient to stay. I have asked them to return as soon as possible to complete their evaluation, and also explained that they were welcome to return to the ER for further evaluation whenever they choose. I have asked the patient to follow up with their primary doctor as soon as possible. I have answered all their questions. Patient did sign AMA paperwork. Conversational held with nursing staff at bedside and charge nurse nearby - confirmed she heard our discussion.      [BG]      ED Course User Index  [BG] Kun Gillespie MD                   In addition to the above, I told the patient that he may be suffering from signs of myocardial ischemia and given his severe coronary artery disease and high risk for restenosis, he may die if he leaves the hospital.  I told him that without monitoring, oxygen, his oxygen levels may lower again resulting in anoxic brain injury.  I told him that he may have a pulmonary embolism, worsening of his fibrotic illness, when pneumonia and multiple other etiologies all of which could kill him.  I explained at length all of the abnormal findings with the patient with his wife at bedside, nursing at bedside and my charge nurse nearby.  The patient is A&O x4, he has decisional capacity.  He electively signed out AMA and does not want to stay in the hospital.      Addendum:    Patient recanted his against medical advice forms and now wishes to stay in the hospital.  Will proceed with antibiotics and CT imaging as well as admission.        Clinical Impression:  Final diagnoses:  [R06.02] Shortness of breath  [R09.02] Hypoxia          ED Disposition Condition    Observation                 Kun Gillespie MD  12/30/23 1642       Kun Gillespie MD  12/30/23 1640

## 2023-12-30 NOTE — ED NOTES
Patient identifiers verified and correct for patient  C/C: SOB  APPEARANCE: awake and alert in respiratory distress wearing nonrebreather per EMS.    SKIN: warm, dry and intact. No breakdown or bruising.  MUSCULOSKELETAL: Patient moving all extremities spontaneously, no obvious swelling or deformities noted. Ambulates independently.  RESPIRATORY: Resp distress with tachypnea and hypoxia on home dose of 4L O2 via NC. Coarse crackles. Dx of Pulmonary Fibrosis.   CARDIAC: Denies CP,  distal pulses; no peripheral edema  ABDOMEN: vomitting.  : voids spontaneously, denies difficulty  Neurologic: AAO x 4; follows commands equal strength in all extremities; denies numbness/tingling. Denies dizziness

## 2023-12-30 NOTE — ASSESSMENT & PLAN NOTE
ILD  See below  ABG reviewed, pH 7.368  Continue Vapotherm 40 L, 60% FiO2  -CT chest/abdomen/pelvis pending  -Pulmonology consulted  -COVID and flu negative at , repeating  -Discussed with Dr. Abraham and will give ceftriaxone and azithro for CAP coverage.  Procal in am.  Lasix x 1.  -Pt's wife is going to bring pirfenidone from home

## 2023-12-30 NOTE — ASSESSMENT & PLAN NOTE
Patient with Hypoxic Respiratory failure which is Acute on chronic.  he is on home oxygen at 4 LPM. Supplemental oxygen was provided and noted-      .   Signs/symptoms of respiratory failure include- increased work of breathing. Contributing diagnoses includes - Interstitial lung disease Labs and images were reviewed. Patient Has recent ABG, which has been reviewed. Will treat underlying causes and adjust management of respiratory failure as follows-NSTEMI treatment as above.  -Pulm consulted

## 2023-12-31 PROBLEM — R50.9 FEVER: Status: ACTIVE | Noted: 2023-01-01

## 2023-12-31 NOTE — ED NOTES
Per heparin nomogram, infusion paused for 1 hour then restrarted at 9 units/kg/hr r/t aPTT of 78.1.

## 2023-12-31 NOTE — ED NOTES
Upon entering pts room, pt sitting on edge of bed, attempting to stand. Increased WOB noted with O2 sat in 80s. Pt instructed to remain in bed. Pt states he wants to sit in a chair. Pt instructed to remain in bed until WOB WNL. Pt placed on Vapotherm.

## 2023-12-31 NOTE — PROGRESS NOTES
After discussing case with Cardiology Dr. Choco Rod upgraded orders to ICU placed.  I then discussed case with ICU nurse and also discussed case with pulmonary critical care Dr. Piedra in ICU.  Review chest x-ray elevated procalcitonin level and Cardiology recommendations and plan to upgrade to ICU.  They will evaluate patient again.  CT chest has been ordered without contrast at this time and this still pending.  Discussed elevated procalcitonin level.  Patient currently on empiric antibiotics.  At this time plan is to continue current antibiotic regimen.  Lactic acid is within normal limits.  Patient afebrile currently.  He is sitting up in chair in no distress but still tachypneic.  Per Pulmonary critical Care team patient has been clinically is tachypneic for a long time.  Patient is not a candidate for lung transplant.  He is pretty significant lung disease.  They will evaluate patient again now.  I also discussed case with patient and patient's wife and discussed upgrade to ICU for closer monitoring.  Patient's wife is a retired psychiatric nurse

## 2023-12-31 NOTE — PROGRESS NOTES
[facility]  Progress Note    Patient Name: Jose Antonio Allen  MRN: [id number]  Patient Class: OP- Observation   Admission Date: 12/30/2023  Length of Stay: 0 days  Attending Physician: Greer Alves*  Primary Care Provider: Abilio Weber MD        Subjective:     Principal Problem:NSTEMI (non-ST elevated myocardial infarction)        HPI:  73 y.o. male with significant past medical history of ILD on 4L NC at home, Angina pectoris, Arthritis, CKD (chronic kidney disease), Colon polyps (09/14/2018), Coronary artery disease, Diabetes mellitus, Diabetes mellitus, type 2, GERD (gastroesophageal reflux disease), History of tobacco use, Hyperlipidemia, Hypertension, S/P CABG (coronary artery bypass graft) (1996), and Thyroid disease presented to the ER from urgent care for hypoxia and SOB.  COVID and flu negative at .  Pt also vomited a few times in route, denies abdominal pain or chest pain.  Pt's wife reported low grade fever at home.  No sick contacts.  Pt denies chills, diaphoresis, nausea, cough, vocal changes, globus sensation, leg swelling or pain, fatigue, weakness, confusion, syncope.  Of note 11/11 cath lab severe native CAD, patent LIMA-LAD, recurrent ISR to SVG-BARON s/p succesfful PTCA to SVG-BARON branch recurrent ISR.  IVUS used.  Under expanded stent.  PTCA done with 3.0 scoring balloon followed by PTCA with 2.0 shockwave balloon followed by 3.5 and 4.0 NC balloon.  Pt advised to continue asa/plavix noting pt at risk for recurrent ISR and cards discussed option next time is to attempt opening native RCA  and coil the SVG graft given recurrent failures and recurrent attempts.    In the ED, T max 100.7 F.  Pt on 40L vapotherm 60% FiO2.  EKG Sinus Tach with PAC.  pH 7.368.  Troponin 0.387 compared to last (0.020 when admitted for HTN).   compared to last 90.  Pt given cefepime/vanc.  ED spoke with cardiology who recommended heparin drip.  CBC with  thrombocytopenia.  CMP no significant electrolyte abnormalities and kidney function at baseline.  CXR Chronic appearing changes of the lungs which are overall more conspicuous from comparison radiograph 10/25/2023.  Additional consolidative opacity about the left lung base.  Considerations include atelectasis, noting that superimposed infectious or non infectious inflammatory infiltrate and pulmonary edema remain considerations.   CT chest/abdomen/pelvis pending.    Pt signed AMA form in ED but then he and his wife requested to resend the form.    Overview/Hospital Course:  No notes on file    Interval History:  Patient awake alert in no acute distress.  Remains afebrile.  Complains of shortness a breath and some cough.  Remains tachypneic.  Currently on Vapotherm.  Patient's wife by bedside.  Discussed plan of care with patient and patient's wife and patient's nurse in the room.  Patient currently sitting in chair and side of the bed.    Review of Systems   Constitutional: Negative.    HENT: Negative.     Eyes: Negative.    Respiratory:  Positive for cough and shortness of breath.    Cardiovascular: Negative.    Gastrointestinal: Negative.    Endocrine: Negative.    Genitourinary: Negative.    Musculoskeletal: Negative.    Skin: Negative.    Allergic/Immunologic: Negative.    Neurological: Negative.    Hematological: Negative.    Psychiatric/Behavioral: Negative.     All other systems reviewed and are negative.    Objective:     Vital Signs (Most Recent):  Temp: 99.3 °F (37.4 °C) (12/31/23 0503)  Pulse: 91 (12/31/23 1104)  Resp: (!) 33 (12/31/23 1104)  BP: 132/61 (12/31/23 0900)  SpO2: 95 % (12/31/23 1104) Vital Signs (24h Range):  Temp:  [96.2 °F (35.7 °C)-99.3 °F (37.4 °C)] 99.3 °F (37.4 °C)  Pulse:  [] 91  Resp:  [25-41] 33  SpO2:  [87 %-100 %] 95 %  BP: (103-140)/(50-64) 132/61     Weight: 65.8 kg (145 lb)  Body mass index is 24.89 kg/m².    Intake/Output Summary (Last 24 hours) at 12/31/2023 1410  Last  data filed at 12/31/2023 0509  Gross per 24 hour   Intake 84.72 ml   Output 300 ml   Net -215.28 ml         Physical Exam  Vitals and nursing note reviewed.   Constitutional:       General: He is not in acute distress.  HENT:      Head: Normocephalic and atraumatic.      Nose: Nose normal.      Mouth/Throat:      Mouth: Mucous membranes are moist.   Eyes:      Extraocular Movements: Extraocular movements intact.      Pupils: Pupils are equal, round, and reactive to light.   Cardiovascular:      Rate and Rhythm: Regular rhythm. Tachycardia present.      Pulses: Normal pulses.      Heart sounds: Normal heart sounds.   Pulmonary:      Effort: Pulmonary effort is normal. No respiratory distress.      Breath sounds: Wheezing and rhonchi present.      Comments: Tachypnea  Abdominal:      General: Bowel sounds are normal. There is no distension.      Palpations: Abdomen is soft.      Tenderness: There is no abdominal tenderness.   Musculoskeletal:         General: Normal range of motion.      Cervical back: Normal range of motion and neck supple.   Skin:     General: Skin is warm and dry.   Neurological:      General: No focal deficit present.      Mental Status: He is alert and oriented to person, place, and time.      Cranial Nerves: No cranial nerve deficit.   Psychiatric:         Mood and Affect: Mood normal.             Significant Labs: All pertinent labs within the past 24 hours have been reviewed.    Significant Imaging: I have reviewed all pertinent imaging results/findings within the past 24 hours.    Assessment/Plan:      * NSTEMI (non-ST elevated myocardial infarction)  CAD  HLD  PAD  History of CABG and stent placement  Patient presents with NSTEMI. Pt denies CP.  Patient is currently on NSTEMI Pathway.  ED discussed with cardiology and heparin drip initiated.    EKG reviewed. Troponins reviewed and results noted-   Recent Labs   Lab 12/31/23  1049   TROPONINI 2.966*     .     Lipid panel reviewed and shows-      Lab Results   Component Value Date    LDLCALC 35.4 (L) 12/31/2023     Lab Results   Component Value Date    TRIG 93 12/31/2023         Medical management includes; Anticoagulation and High Intensity Stain Echo has not been performed. Latest ECHO results are as follows- Results for orders placed during the hospital encounter of 10/14/23    Echo    Interpretation Summary    Left Ventricle: The left ventricle is normal in size. There is concentric remodeling. Normal wall motion. There is reduced systolic function. Ejection fraction by visual approximation is 55%. Grade I diastolic dysfunction.    Left Atrium: Left atrium is moderately dilated.    Right Ventricle: Systolic function is normal.    Aortic Valve: The aortic valve is a trileaflet valve. Moderately calcified noncoronary cusp. Mildly restricted motion.    Mitral Valve: There is mild regurgitation.    Pulmonary Artery: The estimated pulmonary artery systolic pressure is 32 mmHg.    IVC/SVC: Intermediate venous pressure at 8 mmHg.  .   Cardiology is consulted. Plan of care reviewed with cardiology team. Continue to monitor patient closely and adjust therapy as needed.   -serial cardiac enzymes on 12/30/2023= elevated troponin x3, currently decreasing  -continue beta-blocker, statin daily   -Continue aspirin and Plavix daily  -cardiac angiogram on 01/02/2023= pending    History of coronary artery stent placement        Coronary artery disease involving native coronary artery of native heart with refractory angina pectoris  -treat as above    Fever  -last low-grade fever spike on 12/30/2023 afternoon so far   -no leukocytosis   -influenza a and B and COVID-19 on 12/30/2023= negative   -chest x-ray on 12/30/2023=  -blood cultures x2 on 12/30/2023= pending   -urinalysis with reflex to culture on 12/31/2023= negative nitrites and leukocyte esterase, rare bacteria, 1 epithelial cell, 8 WBC  -monitor procalcitonin levels= elevated and increasing  -lactic acid on  12/31/2023= within normal limits   -Rocephin IV daily empirically.  Ordered on 12/30/2023   -Zithromax 500 mg p.o. daily empirically.  Ordered on 12/30/2023         Acute on chronic respiratory failure with hypoxia  ILD  See below  ABG reviewed, pH 7.368  Continue Vapotherm 40 L, 60% FiO2  -CT chest/abdomen/pelvis with contrast on 12/31/2023= pending  -Pulmonology consulted  -COVID and flu negative at   -COVID and influenza a and B on 12/30/2023= negative  -Discussed with Dr. Abraham and will give ceftriaxone and azithro for CAP coverage.  Procal in am.  Lasix x 1.  -pirfenidone 801 mg p.o. t.i.d. ( home med)    Chronic hypoxic respiratory failure, on home oxygen therapy  Patient with Hypoxic Respiratory failure which is Acute on chronic.  he is on home oxygen at 4 LPM. Supplemental oxygen was provided and noted- Oxygen Concentration (%):  [50-60] 50    .   Signs/symptoms of respiratory failure include- increased work of breathing. Contributing diagnoses includes - Interstitial lung disease Labs and images were reviewed. Patient Has recent ABG, which has been reviewed. Will treat underlying causes and adjust management of respiratory failure as follows-NSTEMI treatment as above.  -Pulm consulted    CKD (chronic kidney disease) stage 3, GFR 30-59 ml/min  Creatine stable for now. BMP reviewed- noted Estimated Creatinine Clearance: 27.5 mL/min (A) (based on SCr of 2 mg/dL (H)). according to latest data. Monitor UOP and serial BMP and adjust therapy as needed. Renally dose meds. Avoid nephrotoxic medications and procedures.  -At baseline 1.5-1.8  -nephrology consulted= follow recommendations (patient may be scheduled for angiogram on 01/02/2023)    Essential hypertension  Chronic, uncontrolled. Latest blood pressure and vitals reviewed-     Temp:  [96.2 °F (35.7 °C)-99.3 °F (37.4 °C)]   Pulse:  []   Resp:  [25-41]   BP: (103-140)/(50-64)   SpO2:  [87 %-100 %] .   Home meds for hypertension were reviewed and noted  below.   Hypertension Medications               amLODIPine (NORVASC) 5 MG tablet Take 5 mg by mouth once daily.    isosorbide mononitrate (IMDUR) 30 MG 24 hr tablet Take 1 tablet (30 mg total) by mouth once daily.    metoprolol succinate (TOPROL-XL) 100 MG 24 hr tablet Take 1 tablet (100 mg total) by mouth once daily.    nitroGLYCERIN (NITROSTAT) 0.4 MG SL tablet Place 1 tablet (0.4 mg total) under the tongue every 5 (five) minutes as needed for Chest pain.            While in the hospital, will manage blood pressure as follows; Continue home antihypertensive regimen    Will utilize p.r.n. blood pressure medication only if patient's blood pressure greater than 180/110 and he develops symptoms such as worsening chest pain or shortness of breath.    PAD (peripheral artery disease)  -continue antiplatelets and statin daily      Type 2 diabetes mellitus with stage 3a chronic kidney disease, without long-term current use of insulin  Patient's FSGs are uncontrolled due to hyperglycemia on current medication regimen.  Last A1c reviewed-   Lab Results   Component Value Date    HGBA1C 7.6 (H) 12/31/2023     Most recent fingerstick glucose reviewed-   Recent Labs   Lab 12/30/23  1712   POCTGLUCOSE 211*     Current correctional scale  Low  Maintain anti-hyperglycemic dose as follows-   Antihyperglycemics (From admission, onward)      Start     Stop Route Frequency Ordered    12/30/23 1801  insulin aspart U-100 pen 0-5 Units         -- SubQ Every 6 hours PRN 12/30/23 1702          Hold Oral hypoglycemics while patient is in the hospital.     Hyperlipidemia  -Lipitor 80 mg p.o. daily      Acquired hypothyroidism  Continue lt4  -TSH on 12/31/2023= within normal limits        VTE Risk Mitigation (From admission, onward)           Ordered     heparin 25,000 units in dextrose 5% (100 units/ml) IV bolus from bag - ADDITIONAL PRN BOLUS - 60 units/kg (max bolus 4000 units)  As needed (PRN)        Question:  Heparin Infusion Adjustment  (DO NOT MODIFY ANSWER)  Answer:  \\[email or web address]\epic\Images\Pharmacy\HeparinInfusions\heparin LOW INTENSITY nomogram for [facility] [id number]    12/30/23 1618     heparin 25,000 units in dextrose 5% (100 units/ml) IV bolus from bag - ADDITIONAL PRN BOLUS - 30 units/kg (max bolus 4000 units)  As needed (PRN)        Question:  Heparin Infusion Adjustment (DO NOT MODIFY ANSWER)  Answer:  \\[email or web address]\epic\Images\Pharmacy\HeparinInfusions\heparin LOW INTENSITY nomogram for [facility] [id number].pdf    12/30/23 1618     IP VTE HIGH RISK PATIENT  Once         12/30/23 1653     Place sequential compression device  Until discontinued         12/30/23 1653     Place sequential compression device  Until discontinued         12/30/23 1641     heparin 25,000 units in dextrose 5% 250 mL (100 units/mL) infusion LOW INTENSITY nomogram - [facility]  Continuous        Question:  Begin at (units/kg/hr)  Answer:  12    12/30/23 1618                    Discharge Planning   KARLA:      Code Status: Full Code   Is the patient medically ready for discharge?:     Reason for patient still in hospital (select all that apply): Patient trending condition, Laboratory test, Treatment, and Consult recommendations                     Shawn Sheikh MD  Department of Hospital Medicine   [facility]

## 2023-12-31 NOTE — CONSULTS
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 Upon arrival to ER he was found to be in hypoxic respiratory failure and was started on 40L vapotherm 60% FiO. Reported that he substernal chest pain, described as pressure, non radiating with variable intensity which is similar to his previous episodes when go his stents. Today, denies cp, sob, palpitations, dizziness. orthopnea, NVDC, fever, chills.    ED initial VS  Initial /105; 109,  99% on 15L non-rebreather, Tmax 100.7  Trops 0.387? 4.682  ECG NSR w PAC  CBC- HgB one month ago 12.6/40.3? 10.7/34.3 today  ?90  Cr 1.8; WBC 2.18; procal 5.87  Meets at least SIRS criteria    Cath Results: 11/10/23  Access: Rt CFA   LM:  70% heavily calcified   LAD:  100% . Patent LIMA-LAD  LCx:   Ostial 90%, mid   RCA:  100% proximal       OGLESBY- LAD patent fills LAD antegrade and retrograde  SVG-BARON with 99% recurrent ISR s/p PTCA   LVgram: LVED 22 mmHg     Intervention:   - s/p successful PTCA to SVG-BARON branch recurrent ISR. IVUS used. Under expanded stent. PTCA done with 3.0  scoring balloon, followed by PTCA with 3.0 shockwave balloon followed by 3.5 and 4.0 NC balloon at high pressure.  Improvement with MLA post intervention      Assessment:   1. Severe native CAD   2. Patent LIMA-LAD  3. Recurrent ISR to SVG-BARON s/p successful intervention     Review of patient's allergies indicates:  No Known Allergies    Past Medical History:   Diagnosis Date    Angina pectoris     Arthritis     CKD (chronic kidney  disease)     Colon polyps 09/14/2018    Coronary artery disease     Diabetes mellitus     Diabetes mellitus, type 2     GERD (gastroesophageal reflux disease)     History of tobacco use     Hyperlipidemia     Hypertension     S/P CABG (coronary artery bypass graft) 1996    Thyroid disease      Past Surgical History:   Procedure Laterality Date    ATHERECTOMY, CORONARY N/A 11/10/2023    Procedure: Atherectomy-coronary. Shockwave balloon;  Surgeon: Scooby Sow MD;  Location: [facility] CATH LAB/EP;  Service: Cardiology;  Laterality: N/A;    CATARACT EXTRACTION Bilateral 3YRS    CLOSURE DEVICE  3/20/2023    Procedure: Placement of Closure Device;  Surgeon: Gordy Parmar MD;  Location: [facility] CATH LAB/EP;  Service: Cardiology;;    COLONOSCOPY W/ POLYPECTOMY  09/14/2018    CORONARY ANGIOGRAPHY N/A 4/11/2022    Procedure: ANGIOGRAM, CORONARY ARTERY;  Surgeon: Gordy Parmar MD;  Location: [facility] CATH LAB/EP;  Service: Cardiology;  Laterality: N/A;    CORONARY ANGIOGRAPHY N/A 11/10/2023    Procedure: ANGIOGRAM, CORONARY ARTERY;  Surgeon: Scooby Sow MD;  Location: [facility] CATH LAB/EP;  Service: Cardiology;  Laterality: N/A;    CORONARY ANGIOGRAPHY INCLUDING BYPASS GRAFTS WITH CATHETERIZATION OF LEFT HEART N/A 4/11/2022    Procedure: ANGIOGRAM, CORONARY, INCLUDING BYPASS GRAFT, WITH LEFT HEART CATHETERIZATION;  Surgeon: Gordy Parmar MD;  Location: [facility] CATH LAB/EP;  Service: Cardiology;  Laterality: N/A;    CORONARY ARTERY BYPASS GRAFT  1996    CORONARY BYPASS GRAFT ANGIOGRAPHY  4/20/2022    Procedure: Bypass graft study;  Surgeon: Gordy Parmar MD;  Location: [facility] CATH LAB/EP;  Service: Cardiology;;    CORONARY BYPASS GRAFT ANGIOGRAPHY  3/20/2023    Procedure: Bypass graft study;  Surgeon: Gordy Parmar MD;  Location: [facility] CATH LAB/EP;  Service: Cardiology;;    CORONARY BYPASS GRAFT ANGIOGRAPHY  11/10/2023    Procedure: Bypass graft study;  Surgeon: Scooby Sow MD;  Location: [facility] CATH LAB/EP;   Service: Cardiology;;    EYELID SURGERY  03/2012    IVUS, CORONARY  3/20/2023    Procedure: IVUS, Coronary;  Surgeon: Gordy Parmar MD;  Location: [facility] CATH LAB/EP;  Service: Cardiology;;    IVUS, CORONARY  11/10/2023    Procedure: IVUS, Coronary;  Surgeon: Scooby Sow MD;  Location: [facility] CATH LAB/EP;  Service: Cardiology;;    LEFT HEART CATHETERIZATION N/A 4/11/2022    Procedure: Left heart cath;  Surgeon: Gordy Parmar MD;  Location: [facility] CATH LAB/EP;  Service: Cardiology;  Laterality: N/A;    LEFT HEART CATHETERIZATION N/A 3/20/2023    Procedure: Left heart cath;  Surgeon: Gordy Parmar MD;  Location: [facility] CATH LAB/EP;  Service: Cardiology;  Laterality: N/A;    LEFT HEART CATHETERIZATION N/A 11/10/2023    Procedure: Left heart cath;  Surgeon: Scooby Sow MD;  Location: [facility] CATH LAB/EP;  Service: Cardiology;  Laterality: N/A;    PERCUTANEOUS TRANSLUMINAL BALLOON ANGIOPLASTY OF CORONARY ARTERY  4/20/2022    Procedure: Angioplasty-coronary;  Surgeon: Gordy Parmar MD;  Location: [facility] CATH LAB/EP;  Service: Cardiology;;    PERCUTANEOUS TRANSLUMINAL BALLOON ANGIOPLASTY OF CORONARY ARTERY  3/20/2023    Procedure: Angioplasty-coronary;  Surgeon: Gordy Parmar MD;  Location: [facility] CATH LAB/EP;  Service: Cardiology;;    PTCA, SINGLE VESSEL  11/10/2023    Procedure: PTCA, Single Vessel;  Surgeon: Scooby Sow MD;  Location: [facility] CATH LAB/EP;  Service: Cardiology;;    RIGHT HEART CATHETERIZATION Right 11/3/2021    Procedure: INSERTION, CATHETER, RIGHT HEART;  Surgeon: Temitope Rahman MD;  Location: [facility] CATH LAB/EP;  Service: Cardiology;  Laterality: Right;    STENT, DRUG ELUTING, SINGLE VESSEL, CORONARY  3/20/2023    Procedure: Stent, Drug Eluting, Single Vessel, Coronary;  Surgeon: Gordy Parmar MD;  Location: [facility] CATH LAB/EP;  Service: Cardiology;;    VEIN BYPASS SURGERY  1996    VEIN SURGERY Left 2017    PAD    VEIN SURGERY Right 2018    PAD     Family History   Problem  Relation Age of Onset    Diabetes Mother     Diabetes Father     Diabetes Sister     Kidney disease Sister     Diabetes Brother     Diabetes Sister     Blindness Neg Hx     Glaucoma Neg Hx     Macular degeneration Neg Hx     Retinal detachment Neg Hx     Strabismus Neg Hx     Stroke Neg Hx     Thyroid disease Neg Hx     Cancer Neg Hx     Cataracts Neg Hx     Hypertension Neg Hx      Social History     Tobacco Use    Smoking status: Former     Current packs/day: 0.00     Average packs/day: 2.0 packs/day for 27.0 years (54.0 ttl pk-yrs)     Types: Cigarettes     Start date:      Quit date:      Years since quittin.0    Smokeless tobacco: Never   Substance Use Topics    Alcohol use: Yes     Comment: Occasionally has a drink    Drug use: No        Home meds:  Current Facility-Administered Medications on File Prior to Encounter   Medication Dose Route Frequency Provider Last Rate Last Admin    [COMPLETED] acetaminophen tablet 1,000 mg  1,000 mg Oral 1 time in Clinic/HOD Sharonda Erickson PA-C   1,000 mg at 23 1256    sodium chloride 0.9% flush 10 mL  10 mL Intravenous PRN Gordy Parmar MD         Current Outpatient Medications on File Prior to Encounter   Medication Sig Dispense Refill    amLODIPine (NORVASC) 5 MG tablet Take 5 mg by mouth once daily.      aspirin 81 MG Chew 81 mg nightly.      atorvastatin (LIPITOR) 80 MG tablet TAKE 1 TABLET EVERY DAY (Patient taking differently: Take 80 mg by mouth every evening.) 90 tablet 3    clopidogreL (PLAVIX) 75 mg tablet TAKE 1 TABLET EVERY DAY (Patient taking differently: Take 75 mg by mouth nightly.) 90 tablet 3    ESBRIET 801 mg Tab 3 (three) times daily.      insulin degludec (TRESIBA FLEXTOUCH U-100) 100 unit/mL (3 mL) insulin pen Inject 50 Units into the skin every evening. F/u apt needed with PCP 5 pen 3    isosorbide mononitrate (IMDUR) 30 MG 24 hr tablet Take 1 tablet (30 mg total) by mouth once daily. 30 tablet 11    levothyroxine (SYNTHROID)  "100 MCG tablet Take 1 tablet (100 mcg total) by mouth before breakfast. 90 tablet 3    LUBRICANT EYE DROPS 0.5 % Dpet       metoprolol succinate (TOPROL-XL) 100 MG 24 hr tablet Take 1 tablet (100 mg total) by mouth once daily. 30 tablet 11    mirtazapine (REMERON) 7.5 MG Tab TAKE 1 TABLET BY MOUTH EVERY EVENING. 90 tablet 3    NOVOLOG 100 unit/mL injection Inject 12 Units into the skin 3 (three) times daily after meals. Plus Sliding scale      NOVOLOG FLEXPEN U-100 INSULIN 100 unit/mL (3 mL) InPn pen Inject 1-10 Units into the skin as needed. 110-150=1  151-200=2  201-250=4  251-300=6  301-350=8  351-400=10      omeprazole (PRILOSEC) 20 MG capsule Take 20 mg by mouth 2 (two) times daily.      ORENCIA 125 mg/mL Syrg Inject into the skin once a week. Fridays      tamsulosin (FLOMAX) 0.4 mg Cap Take 0.4 mg by mouth every evening.      vitamin D (VITAMIN D3) 1000 units Tab Take 1,000 Units by mouth once daily.      ACCU-CHEK ADRIEN PLUS METER Misc 1 Product by Other route daily as needed.      ACCU-CHEK SMARTVIEW TEST STRIP Strp       ACCU-CHEK SOFTCLIX LANCETS Misc Inject 100 lancets into the skin daily as needed.      DROPLET PEN NEEDLE 32 gauge x 5/32" Ndle Inject 1 Product into the skin daily as needed.      nitroGLYCERIN (NITROSTAT) 0.4 MG SL tablet Place 1 tablet (0.4 mg total) under the tongue every 5 (five) minutes as needed for Chest pain. 25 tablet 11       Current meds:  Scheduled Meds:   amLODIPine  5 mg Oral Daily    aspirin  81 mg Oral Daily    atorvastatin  80 mg Oral QHS    azithromycin  500 mg Oral Daily    cefTRIAXone (ROCEPHIN) IVPB  1 g Intravenous Q24H    clopidogreL  75 mg Oral Nightly    isosorbide mononitrate  30 mg Oral Daily    levothyroxine  100 mcg Oral Before breakfast    metoprolol succinate  100 mg Oral Daily    mirtazapine  7.5 mg Oral QHS    pantoprazole  40 mg Oral Daily    pirfenidone  801 mg Oral TID    tamsulosin  0.4 mg Oral QHS    vitamin D  1,000 Units Oral Daily     Continuous " Infusions:   heparin (porcine) in D5W 9 Units/kg/hr (12/31/23 0509)     PRN Meds:.acetaminophen, acetaminophen, albuterol-ipratropium, bisacodyL, dextrose 10%, dextrose 10%, glucagon (human recombinant), heparin (PORCINE), heparin (PORCINE), insulin aspart U-100, iohexoL, nitroGLYCERIN, ondansetron, polyethylene glycol, sodium chloride 0.9%      OBJECTIVE:     Vital Signs (Most Recent)  Temp: 99.3 °F (37.4 °C) (12/31/23 0503)  Pulse: 101 (12/31/23 0821)  Resp: (!) 41 (12/31/23 0821)  BP: 125/64 (12/31/23 0821)  SpO2: 97 % (12/31/23 0821)    Vital Signs Range (Last 24H):  Temp:  [96.2 °F (35.7 °C)-100.7 °F (38.2 °C)]   Pulse:  []   Resp:  [20-41]   BP: (103-193)/()   SpO2:  [84 %-100 %]     Physical Exam:  General: No acute stress  HENT: EOM intact, PERRL, oropharynx clear, mucous membranes clear and moist   Pulmonary: Rhonchi +, speaking full sentences on HFNC  Cardiovascular: regular rhythm, tachycardia  Abdomen: soft, non-tender, no distended no splenomegaly or hepatomegaly   Skin: warm, dry, no erythema, no rashes    Extremities: periph pulses intact, no cyanosis or edema   Neuro: a/ox4, clear speech, follows commands, no weakness or focal neurologic  deficit   Psych: mood and behavior normal       Laboratory:  LABS  CBC  Recent Labs   Lab 12/30/23  1436 12/30/23  1904 12/31/23  0712   WBC 2.18* 2.49* 3.89*   RBC 3.88* 3.94* 3.89*   HGB 10.9* 10.7* 10.5*   HCT 33.2* 34.3* 34.2*   * 135* 127*   MCV 86 87 88   MCH 28.1 27.2 27.0   MCHC 32.8 31.2* 30.7*     BMP  Recent Labs   Lab 12/30/23  1436 12/31/23  0712    142   K 4.1 4.4   CO2 20* 24    107   BUN 24* 29*   CREATININE 1.8* 2.0*   * 164*       Recent Labs   Lab 12/30/23  1436 12/31/23  0712   CALCIUM 9.0 8.9   MG  --  1.9       LFT  Recent Labs   Lab 12/30/23  1436 12/31/23  0712   PROT 7.3 7.2   ALBUMIN 2.6* 2.4*   BILITOT 0.6 0.5   AST 43* 45*   ALKPHOS 114 102   ALT 46* 40       COAGS  Recent Labs   Lab 12/30/23  1904  12/31/23  0300 12/31/23  0712   INR 1.1  --   --    APTT 33.7* 78.1* 53.2*     CE  Recent Labs   Lab 12/30/23  1436 12/30/23  1904   TROPONINI 0.387* 4.682*     BNP  Recent Labs   Lab 12/30/23  1436   *     Lipid panel:  Lab Results   Component Value Date    CHOL 89 (L) 12/31/2023    CHOL 121 10/15/2023    CHOL 131 09/15/2022     Lab Results   Component Value Date    HDL 35 (L) 12/31/2023    HDL 38 (L) 10/15/2023    HDL 44 09/15/2022     Lab Results   Component Value Date    LDLCALC 35.4 (L) 12/31/2023    LDLCALC 66.6 10/15/2023    LDLCALC 64.2 09/15/2022     Lab Results   Component Value Date    TRIG 93 12/31/2023    TRIG 82 10/15/2023    TRIG 114 09/15/2022     Lab Results   Component Value Date    CHOLHDL 39.3 12/31/2023    CHOLHDL 31.4 10/15/2023    CHOLHDL 33.6 09/15/2022         Chart review:  Echo: 10/16/23    Left Ventricle: The left ventricle is normal in size. There is concentric remodeling. Normal wall motion. There is reduced systolic function. Ejection fraction by visual approximation is 55%. Grade I diastolic dysfunction.    Left Atrium: Left atrium is moderately dilated.    Right Ventricle: Systolic function is normal.    Aortic Valve: The aortic valve is a trileaflet valve. Moderately calcified noncoronary cusp. Mildly restricted motion.    Mitral Valve: There is mild regurgitation.    Pulmonary Artery: The estimated pulmonary artery systolic pressure is 32 mmHg.    IVC/SVC: Intermediate venous pressure at 8 mmHg.     Cath:  11/02/23  Cath Results:  Access: Rt CFA   LM:  70% heavily calcified   LAD:  100% . Patent LIMA-LAD  LCx:   Ostial 90%, mid   RCA:  100% proximal       OGLESBY- LAD patent fills LAD antegrade and retrograde  SVG-BARON with 99% recurrent ISR s/p PTCA   LVgram: LVED 22 mmHg     Intervention:   - s/p successful PTCA to SVG-BARON branch recurrent ISR. IVUS used. Under expanded stent. PTCA done with 3.0  scoring balloon, followed by PTCA with 3.0 shockwave balloon followed by  3.5 and 4.0 NC balloon at high pressure.  Improvement with MLA post intervention      Assessment:   Severe native CAD   Patent LIMA-LAD  Recurrent ISR to SVG-BARON s/p successful intervention      Cath 3/10/23    Patent LIMA-LAD with faint collaterals to PDA    Patent SVG-PDA with proximal moderate to severe ISR and distal de jaden moderate to severe lesion distal to distal stent    Successful VIUS guided PTA to prox SVG stent ISR and PCI with 3.5 x 8 EDILMA to distal SVG lesion with excellent results    <40 cc total contrast used for case, given CKD    Procedure performed for progressive angina on 2 anti-anginal medications    Unable to pass filter distally, however, adenosine used     Cath 4/12/22  Severe SVG-PLB stenosis proximally severely calcified with what appears to be a posterior take off  MP guide used and IVUS guided PCI with 3.5 EDILMA x 2 (mid/distally and proximally) post dilated with 3.5 NCB with excellent results  Post intervention retrograde filling of PDA and distal RCA noted (previously from left sided collaterals)  Procedure performed for abnormal stress test and progressive angina  Continue Imdur for another week and then D/C and observe for response  Cardiac rehab       EKGs: NSR w PACs      ASSESSMENT/PLAN:     CAD s/p 4V CABG 1996 at [facility] (LIMA-LAD, SVG-OM, SVG-RCA, SVG-PDA), Recurrent ISR to SVG-BARON s/p successful intervention. PAD  NSTEMI- elevated troponin could be multifactorial Type 1 vs type 2 but given his typical chest pain which was similar to his previous stents and significant change in troponin will schedule a coronary angiogram for Tuesday if Cr is stable. NPO Monday MN.  Uncontrolled HTN  CKD 3 per primary team    - ACS protocol heparin drip (monitor H/H/Plts)  - Trend Troponin  - continue DAPT, Imdur  - Continue statin  - NPO at Monday Midnight and pending creatinine will schedule for coronary angiogram  - Monitor on telemetry  - Echo  - Continue Toprol 100 mg qd, uptitrate to keep HR  ~60  - Closely monitor BP, goal SBP <120   - Monitor Ezequiel Rod MD

## 2023-12-31 NOTE — SUBJECTIVE & OBJECTIVE
Interval History:  Patient awake alert in no acute distress.  Remains afebrile.  Complains of shortness a breath and some cough.  Remains tachypneic.  Currently on Vapotherm.  Patient's wife by bedside.  Discussed plan of care with patient and patient's wife and patient's nurse in the room.  Patient currently sitting in chair and side of the bed.    Review of Systems   Constitutional: Negative.    HENT: Negative.     Eyes: Negative.    Respiratory:  Positive for cough and shortness of breath.    Cardiovascular: Negative.    Gastrointestinal: Negative.    Endocrine: Negative.    Genitourinary: Negative.    Musculoskeletal: Negative.    Skin: Negative.    Allergic/Immunologic: Negative.    Neurological: Negative.    Hematological: Negative.    Psychiatric/Behavioral: Negative.     All other systems reviewed and are negative.    Objective:     Vital Signs (Most Recent):  Temp: 99.3 °F (37.4 °C) (12/31/23 0503)  Pulse: 91 (12/31/23 1104)  Resp: (!) 33 (12/31/23 1104)  BP: 132/61 (12/31/23 0900)  SpO2: 95 % (12/31/23 1104) Vital Signs (24h Range):  Temp:  [96.2 °F (35.7 °C)-99.3 °F (37.4 °C)] 99.3 °F (37.4 °C)  Pulse:  [] 91  Resp:  [25-41] 33  SpO2:  [87 %-100 %] 95 %  BP: (103-140)/(50-64) 132/61     Weight: 65.8 kg (145 lb)  Body mass index is 24.89 kg/m².    Intake/Output Summary (Last 24 hours) at 12/31/2023 1410  Last data filed at 12/31/2023 0509  Gross per 24 hour   Intake 84.72 ml   Output 300 ml   Net -215.28 ml         Physical Exam  Vitals and nursing note reviewed.   Constitutional:       General: He is not in acute distress.  HENT:      Head: Normocephalic and atraumatic.      Nose: Nose normal.      Mouth/Throat:      Mouth: Mucous membranes are moist.   Eyes:      Extraocular Movements: Extraocular movements intact.      Pupils: Pupils are equal, round, and reactive to light.   Cardiovascular:      Rate and Rhythm: Regular rhythm. Tachycardia present.      Pulses: Normal pulses.      Heart sounds:  Normal heart sounds.   Pulmonary:      Effort: Pulmonary effort is normal. No respiratory distress.      Breath sounds: Wheezing and rhonchi present.      Comments: Tachypnea  Abdominal:      General: Bowel sounds are normal. There is no distension.      Palpations: Abdomen is soft.      Tenderness: There is no abdominal tenderness.   Musculoskeletal:         General: Normal range of motion.      Cervical back: Normal range of motion and neck supple.   Skin:     General: Skin is warm and dry.   Neurological:      General: No focal deficit present.      Mental Status: He is alert and oriented to person, place, and time.      Cranial Nerves: No cranial nerve deficit.   Psychiatric:         Mood and Affect: Mood normal.             Significant Labs: All pertinent labs within the past 24 hours have been reviewed.    Significant Imaging: I have reviewed all pertinent imaging results/findings within the past 24 hours.

## 2023-12-31 NOTE — PROGRESS NOTES
I discussed case with cardiologist Dr. Choco Rod and discussed my findings with him as well as concern for sudden decompensation which he agreed.  At this time for closer monitoring patient will be upgraded to ICU.  Orders placed

## 2023-12-31 NOTE — NURSING
"RAPID RESPONSE NURSE PROACTIVE ROUNDING NOTE     Time of Visit: 1700    Admit Date: 2023  LOS: 0  Code Status: Full Code   Date of Visit: 2023  : 1950  Age: 73 y.o.  Sex: male  Race:   Bed: K469/K469 A:   MRN: [id number]  Was the patient discharged from an ICU this admission? No   Was the patient discharged from a PACU within last 24 hours?  No  Did the patient receive conscious sedation/general anesthesia in last 24 hours?  No  Was the patient in the ED within the past 24 hours?  Yes  Was the patient started on NIPPV within the past 24 hours?  No  Attending Physician: Greer Alves  Primary Service: Networked reference to record PCT     ASSESSMENT     Notified by  MEWS [id number] .  Reason for alert: increased respiratory rate    Diagnosis: NSTEMI (non-ST elevated myocardial infarction)    Abnormal Vital Signs: BP (!) 92/53 (BP Location: Right arm, Patient Position: Sitting)   Pulse 83   Temp (!) 36.7 °F (2.6 °C) (Oral)   Resp 18   Ht 5' 4" (1.626 m)   Wt 65.8 kg (145 lb)   SpO2 98%   BMI 24.89 kg/m²      Clinical Issues: Respiratory    Patient  has a past medical history of Angina pectoris, Arthritis, CKD (chronic kidney disease), Colon polyps, Coronary artery disease, Diabetes mellitus, Diabetes mellitus, type 2, GERD (gastroesophageal reflux disease), History of tobacco use, Hyperlipidemia, Hypertension, S/P CABG (coronary artery bypass graft), and Thyroid disease.      Upon entering patient's room, patient sitting in chair, slightly tachypnic, but speaking in full sentences and appears comfortable. Expresses that he did not get a lot of sleep and is very tired. Wife states patient has not gotten worse since they came into the ER. Vapotherm at 30L 60%.     INTERVENTIONS/ RECOMMENDATIONS     Recommend to add continuous pulse ox to patient's care. Continue to monitor for worsening SOB or increasing O2 requirement. Dr. Sheikh speaking with pulm CC team.     Discussed plan of care " with RN, Marilee.    PHYSICIAN ESCALATION     Yes/No  Yes    Orders received and case discussed with Dr. Sheikh .    Disposition:  awaiting pulm CC to see patient    FOLLOW-UP     Call back the Rapid Response Nurse, Chelita Mo RN at [facility] Phone: [phone] for additional questions or concerns.

## 2023-12-31 NOTE — PLAN OF CARE
12/31/23 1416   Nurse Notification   Charge Nurse Notified? Yes   Name of Charge Nurse elyssa   Bedside Nurse Notified? Yes   Name of Bedside Nurse qasim   Nurse Notfication Method Secure Chat   Provider Notification   Provider Notified? Yes   Name of Provider dr perez, dr holden   Provider Notification Method Secure Chat   Provider Notified Of AI Deterioration Alert

## 2023-12-31 NOTE — ASSESSMENT & PLAN NOTE
-last low-grade fever spike on 12/30/2023 afternoon so far   -no leukocytosis   -influenza a and B and COVID-19 on 12/30/2023= negative   -chest x-ray on 12/30/2023=  -blood cultures x2 on 12/30/2023= pending   -urinalysis with reflex to culture on 12/31/2023= negative nitrites and leukocyte esterase, rare bacteria, 1 epithelial cell, 8 WBC  -monitor procalcitonin levels= elevated and increasing  -lactic acid on 12/31/2023= within normal limits   -Rocephin IV daily empirically.  Ordered on 12/30/2023   -Zithromax 500 mg p.o. daily empirically.  Ordered on 12/30/2023

## 2023-12-31 NOTE — PLAN OF CARE
12/31/23 1548   Admission   Initial VN Admission Questions Complete   Communication Issues? Technical Issue  (vidyo camera with loss of visual and auditory components)   Shift   Pain Management Interventions care clustered;diversional activity provided;quiet environment facilitated;relaxation techniques promoted   Virtual Nurse - Patient Verbalized Approval Of Camera Use;VN Rounding   Safety/Activity   Safety Promotion/Fall Prevention Fall Risk reviewed with patient/family;medications reviewed;room near unit station;instructed to call staff for mobility   Pain/Comfort/Sleep   Preferred Pain Scale number (Numeric Rating Pain Scale)     VN called into patient's room for introduction with patient's permission.  Spoke with wife at bedside. VN role explained and informed patient's wife that VN would be working with bedside nurse and rest of care team.  Plan of care reviewed. Fall risk and bed alarm protocol education provided.  Instructed patient's wife to call for assistance.  Wife aware and agreeable.  Patient's chart, labs and vital signs reviewed.  Allowed time for questions.  Will continue to be available as needed.

## 2023-12-31 NOTE — ASSESSMENT & PLAN NOTE
CAD  HLD  PAD  History of CABG and stent placement  Patient presents with NSTEMI. Pt denies CP.  Patient is currently on NSTEMI Pathway.  ED discussed with cardiology and heparin drip initiated.    EKG reviewed. Troponins reviewed and results noted-   Recent Labs   Lab 12/31/23  1049   TROPONINI 2.966*     .     Lipid panel reviewed and shows-     Lab Results   Component Value Date    LDLCALC 35.4 (L) 12/31/2023     Lab Results   Component Value Date    TRIG 93 12/31/2023         Medical management includes; Anticoagulation and High Intensity Stain Echo has not been performed. Latest ECHO results are as follows- Results for orders placed during the hospital encounter of 10/14/23    Echo    Interpretation Summary    Left Ventricle: The left ventricle is normal in size. There is concentric remodeling. Normal wall motion. There is reduced systolic function. Ejection fraction by visual approximation is 55%. Grade I diastolic dysfunction.    Left Atrium: Left atrium is moderately dilated.    Right Ventricle: Systolic function is normal.    Aortic Valve: The aortic valve is a trileaflet valve. Moderately calcified noncoronary cusp. Mildly restricted motion.    Mitral Valve: There is mild regurgitation.    Pulmonary Artery: The estimated pulmonary artery systolic pressure is 32 mmHg.    IVC/SVC: Intermediate venous pressure at 8 mmHg.  .   Cardiology is consulted. Plan of care reviewed with cardiology team. Continue to monitor patient closely and adjust therapy as needed.   -serial cardiac enzymes on 12/30/2023= elevated troponin x3, currently decreasing  -continue beta-blocker, statin daily   -Continue aspirin and Plavix daily  -cardiac angiogram on 01/02/2023= pending

## 2023-12-31 NOTE — ASSESSMENT & PLAN NOTE
Patient with Hypoxic Respiratory failure which is Acute on chronic.  he is on home oxygen at 4 LPM. Supplemental oxygen was provided and noted- Oxygen Concentration (%):  [50-60] 50    .   Signs/symptoms of respiratory failure include- increased work of breathing. Contributing diagnoses includes - Interstitial lung disease Labs and images were reviewed. Patient Has recent ABG, which has been reviewed. Will treat underlying causes and adjust management of respiratory failure as follows-NSTEMI treatment as above.  -Pulm consulted

## 2023-12-31 NOTE — ASSESSMENT & PLAN NOTE
Patient's FSGs are uncontrolled due to hyperglycemia on current medication regimen.  Last A1c reviewed-   Lab Results   Component Value Date    HGBA1C 7.6 (H) 12/31/2023     Most recent fingerstick glucose reviewed-   Recent Labs   Lab 12/30/23  1712   POCTGLUCOSE 211*     Current correctional scale  Low  Maintain anti-hyperglycemic dose as follows-   Antihyperglycemics (From admission, onward)    Start     Stop Route Frequency Ordered    12/30/23 1801  insulin aspart U-100 pen 0-5 Units         -- SubQ Every 6 hours PRN 12/30/23 1702        Hold Oral hypoglycemics while patient is in the hospital.

## 2023-12-31 NOTE — PLAN OF CARE
12/31/23 1513   Admission   Initial VN Admission Questions Incomplete   Communication Issues? Technical Issue   Shift   Virtual Nurse - Rounding Incomplete     VN attempted to cue into patient's room for introduction. Vidyo screen black with no sound. Called patient's room and wife answered. VN role explained and informed patient's wife that VN would be working with bedside nurse and rest of care team and was calling to complete  admission assessment. Wife handed the telephone to the patient to answer questions. VN could not hear patient due to high oxygen flow. Patient agreed to allow wife to answer questions but upon getting wife back on the phone wife reported that the lung doctors were at the bedside. Will call back shortly to complete admission assessment questions.

## 2023-12-31 NOTE — ED NOTES
Pt assisted from recliner to ED bed. Cardiac monitor, pulse ox, and BP cuff in place. Vapotherm in place. Pt denies needs at this time. CB within reach and bedside urinal within reach.

## 2023-12-31 NOTE — AI DETERIORATION ALERT
Artificial Intelligence Notification  [place]      Admit Date: 2023  LOS: 0  Code Status: Full Code   Date of Consult: 2023  : 1950  Age: 73 y.o.  Weight:   Wt Readings from Last 1 Encounters:   23 65.8 kg (145 lb)     Sex: male  Bed: [facility]/69 A:   MRN: [id number]  Attending Physician: Greer Alves  Primary Service: Networked reference to record PCT   Time AI Alert Received: ***  Time at Bedside: ***           ***      Vital Signs (Most Recent):  Temp: 99.3 °F (37.4 °C) (23 0503)  Pulse: 91 (23 1104)  Resp: (!) 33 (23 1104)  BP: 132/61 (23 0900)  SpO2: 95 % (23 1104) Vital Signs (24h Range):  Temp:  [96.2 °F (35.7 °C)-99.3 °F (37.4 °C)] 99.3 °F (37.4 °C)  Pulse:  [] 91  Resp:  [25-41] 33  SpO2:  [87 %-100 %] 95 %  BP: (103-140)/(50-64) 132/61         This encounter was triggered by an Artificial Intelligence Notification.     Artificial Intelligence alert discussed with Primary team:  Name ***      Evaluation: ***    Disposition: ***

## 2023-12-31 NOTE — ASSESSMENT & PLAN NOTE
ILD  See below  ABG reviewed, pH 7.368  Continue Vapotherm 40 L, 60% FiO2  -CT chest/abdomen/pelvis with contrast on 12/31/2023= pending  -Pulmonology consulted  -COVID and flu negative at   -COVID and influenza a and B on 12/30/2023= negative  -Discussed with Dr. Abraham and will give ceftriaxone and azithro for CAP coverage.  Procal in am.  Lasix x 1.  -pirfenidone 801 mg p.o. t.i.d. ( home med)

## 2023-12-31 NOTE — ASSESSMENT & PLAN NOTE
Chronic, uncontrolled. Latest blood pressure and vitals reviewed-     Temp:  [96.2 °F (35.7 °C)-99.3 °F (37.4 °C)]   Pulse:  []   Resp:  [25-41]   BP: (103-140)/(50-64)   SpO2:  [87 %-100 %] .   Home meds for hypertension were reviewed and noted below.   Hypertension Medications               amLODIPine (NORVASC) 5 MG tablet Take 5 mg by mouth once daily.    isosorbide mononitrate (IMDUR) 30 MG 24 hr tablet Take 1 tablet (30 mg total) by mouth once daily.    metoprolol succinate (TOPROL-XL) 100 MG 24 hr tablet Take 1 tablet (100 mg total) by mouth once daily.    nitroGLYCERIN (NITROSTAT) 0.4 MG SL tablet Place 1 tablet (0.4 mg total) under the tongue every 5 (five) minutes as needed for Chest pain.            While in the hospital, will manage blood pressure as follows; Continue home antihypertensive regimen    Will utilize p.r.n. blood pressure medication only if patient's blood pressure greater than 180/110 and he develops symptoms such as worsening chest pain or shortness of breath.

## 2023-12-31 NOTE — ED NOTES
Spoke to provider regarding patient's increase effort to breath and decrease o2 sat 88% on 5l o2.  Instructed to start vapotherm therapy at 60% fi02.  Notified RT of new orders.

## 2023-12-31 NOTE — ASSESSMENT & PLAN NOTE
Creatine stable for now. BMP reviewed- noted Estimated Creatinine Clearance: 27.5 mL/min (A) (based on SCr of 2 mg/dL (H)). according to latest data. Monitor UOP and serial BMP and adjust therapy as needed. Renally dose meds. Avoid nephrotoxic medications and procedures.  -At baseline 1.5-1.8  -nephrology consulted= follow recommendations (patient may be scheduled for angiogram on 01/02/2023)

## 2023-12-31 NOTE — ED NOTES
Pts O2 sat in high 90s on Vapotherm. WOB WNL. Pt assisted to recliner at bedside. CB within reach.

## 2023-12-31 NOTE — CONSULTS
[facility] Consult Note    Primary Attending Physician: Dr. Noel  Consultant Attending: Dr. Hein   Consultant Fellow: Jaylin Elaine    Reason for Consult:     RA-ILD, acute on chronic respiratory failure    Subjective:      History of Present Illness:  Jose Antonio Allen is a 73 y.o.  male who  has a past medical history of Angina pectoris, Arthritis, CKD (chronic kidney disease), Colon polyps (09/14/2018), Coronary artery disease, Diabetes mellitus, Diabetes mellitus, type 2, GERD (gastroesophageal reflux disease), History of tobacco use, Hyperlipidemia, Hypertension, S/P CABG (coronary artery bypass graft) (1996), and Thyroid disease.. The patient presented to the [facility] on 12/30/2023 with a primary complaint of Shortness of Breath (Seen at Urgent care with c/o SOB. Sent here for further eval. States that he has been coughing at night, SOB today. Denies pain. Presents awake, alert with O2 via NRB - c/o nausea/)    He states that he feels short of breath at baseline but for the last several days he feels more short of breath than usual. He denies having a fever, and endorses having some cough. He used to be a smoker but he quit after CABG in 1996. He states that he is vaccinated for flu and covid but not RSV. He knows of no recent sick contacts. Denies any abdominal pain. Does endorse some orthopnea.    He follows with Dr. Barahona at [facility] for his pulmonary issues as an outpatient. He states that he has been on different treatments and that most of them have failed or caused significant side effects. He is currently taking pirfenidone and denies any major side effects to it. He wa referred to [facility] for Lung transplant evaluation but deemed not to be a candidate.     During this admission, XR revealed his chronic ILD changes but also some worsening LLL opacity. Reports he had a flu and covid test at an urgent care yesterday that was negative. CT was ordered but  not yet performed. Was found to be hypoxic on nasal cannula and put on HFNC. Pulmonology was consulted in the context of his ILD and acute worsening of his o2 requirements.       Past Medical History:  Past Medical History:   Diagnosis Date    Angina pectoris     Arthritis     CKD (chronic kidney disease)     Colon polyps 09/14/2018    Coronary artery disease     Diabetes mellitus     Diabetes mellitus, type 2     GERD (gastroesophageal reflux disease)     History of tobacco use     Hyperlipidemia     Hypertension     S/P CABG (coronary artery bypass graft) 1996    Thyroid disease        Past Surgical History:  Past Surgical History:   Procedure Laterality Date    ATHERECTOMY, CORONARY N/A 11/10/2023    Procedure: Atherectomy-coronary. Shockwave balloon;  Surgeon: Scooby Sow MD;  Location: [facility] CATH LAB/EP;  Service: Cardiology;  Laterality: N/A;    CATARACT EXTRACTION Bilateral 3YRS    CLOSURE DEVICE  3/20/2023    Procedure: Placement of Closure Device;  Surgeon: Gordy Parmar MD;  Location: [facility] CATH LAB/EP;  Service: Cardiology;;    COLONOSCOPY W/ POLYPECTOMY  09/14/2018    CORONARY ANGIOGRAPHY N/A 4/11/2022    Procedure: ANGIOGRAM, CORONARY ARTERY;  Surgeon: Gordy Parmar MD;  Location: [facility] CATH LAB/EP;  Service: Cardiology;  Laterality: N/A;    CORONARY ANGIOGRAPHY N/A 11/10/2023    Procedure: ANGIOGRAM, CORONARY ARTERY;  Surgeon: Scooby Sow MD;  Location: [facility] CATH LAB/EP;  Service: Cardiology;  Laterality: N/A;    CORONARY ANGIOGRAPHY INCLUDING BYPASS GRAFTS WITH CATHETERIZATION OF LEFT HEART N/A 4/11/2022    Procedure: ANGIOGRAM, CORONARY, INCLUDING BYPASS GRAFT, WITH LEFT HEART CATHETERIZATION;  Surgeon: Gordy Parmar MD;  Location: [facility] CATH LAB/EP;  Service: Cardiology;  Laterality: N/A;    CORONARY ARTERY BYPASS GRAFT  1996    CORONARY BYPASS GRAFT ANGIOGRAPHY  4/20/2022    Procedure: Bypass graft study;  Surgeon: Gordy Parmar MD;  Location: [facility] CATH LAB/EP;  Service:  Cardiology;;    CORONARY BYPASS GRAFT ANGIOGRAPHY  3/20/2023    Procedure: Bypass graft study;  Surgeon: Gordy Parmar MD;  Location: [facility] CATH LAB/EP;  Service: Cardiology;;    CORONARY BYPASS GRAFT ANGIOGRAPHY  11/10/2023    Procedure: Bypass graft study;  Surgeon: Scooby Sow MD;  Location: [facility] CATH LAB/EP;  Service: Cardiology;;    EYELID SURGERY  03/2012    IVUS, CORONARY  3/20/2023    Procedure: IVUS, Coronary;  Surgeon: Gordy Parmar MD;  Location: [facility] CATH LAB/EP;  Service: Cardiology;;    IVUS, CORONARY  11/10/2023    Procedure: IVUS, Coronary;  Surgeon: Scooby Sow MD;  Location: [facility] CATH LAB/EP;  Service: Cardiology;;    LEFT HEART CATHETERIZATION N/A 4/11/2022    Procedure: Left heart cath;  Surgeon: Gordy Parmar MD;  Location: [facility] CATH LAB/EP;  Service: Cardiology;  Laterality: N/A;    LEFT HEART CATHETERIZATION N/A 3/20/2023    Procedure: Left heart cath;  Surgeon: Gordy Parmar MD;  Location: [facility] CATH LAB/EP;  Service: Cardiology;  Laterality: N/A;    LEFT HEART CATHETERIZATION N/A 11/10/2023    Procedure: Left heart cath;  Surgeon: Scooby Sow MD;  Location: [facility] CATH LAB/EP;  Service: Cardiology;  Laterality: N/A;    PERCUTANEOUS TRANSLUMINAL BALLOON ANGIOPLASTY OF CORONARY ARTERY  4/20/2022    Procedure: Angioplasty-coronary;  Surgeon: Gordy Parmar MD;  Location: [facility] CATH LAB/EP;  Service: Cardiology;;    PERCUTANEOUS TRANSLUMINAL BALLOON ANGIOPLASTY OF CORONARY ARTERY  3/20/2023    Procedure: Angioplasty-coronary;  Surgeon: Gordy Parmar MD;  Location: [facility] CATH LAB/EP;  Service: Cardiology;;    PTCA, SINGLE VESSEL  11/10/2023    Procedure: PTCA, Single Vessel;  Surgeon: Scooby Sow MD;  Location: [facility] CATH LAB/EP;  Service: Cardiology;;    RIGHT HEART CATHETERIZATION Right 11/3/2021    Procedure: INSERTION, CATHETER, RIGHT HEART;  Surgeon: Temitope Rahman MD;  Location: [facility] CATH LAB/EP;  Service: Cardiology;  Laterality: Right;     STENT, DRUG ELUTING, SINGLE VESSEL, CORONARY  3/20/2023    Procedure: Stent, Drug Eluting, Single Vessel, Coronary;  Surgeon: Gordy Parmar MD;  Location: [facility] CATH LAB/EP;  Service: Cardiology;;    VEIN BYPASS SURGERY  1996    VEIN SURGERY Left 2017    PAD    VEIN SURGERY Right 2018    PAD       Allergies:  Review of patient's allergies indicates:  No Known Allergies    Medications:   In-Hospital Scheduled Medications:   amLODIPine  5 mg Oral Daily    aspirin  81 mg Oral Daily    atorvastatin  80 mg Oral QHS    azithromycin  500 mg Oral Daily    cefTRIAXone (ROCEPHIN) IVPB  1 g Intravenous Q24H    clopidogreL  75 mg Oral Nightly    isosorbide mononitrate  30 mg Oral Daily    levothyroxine  100 mcg Oral Before breakfast    metoprolol succinate  100 mg Oral Daily    mirtazapine  7.5 mg Oral QHS    pantoprazole  40 mg Oral Daily    pirfenidone  801 mg Oral TID    tamsulosin  0.4 mg Oral QHS    vitamin D  1,000 Units Oral Daily      In-Hospital PRN Medications:  acetaminophen, acetaminophen, albuterol-ipratropium, bisacodyL, dextrose 10%, dextrose 10%, glucagon (human recombinant), guaiFENesin 100 mg/5 ml, heparin (PORCINE), heparin (PORCINE), insulin aspart U-100, iohexoL, nitroGLYCERIN, ondansetron, polyethylene glycol   In-Hospital IV Infusion Medications:   heparin (porcine) in D5W 9 Units/kg/hr (12/31/23 1114)      Home Medications:  Prior to Admission medications    Medication Sig Start Date End Date Taking? Authorizing Provider   amLODIPine (NORVASC) 5 MG tablet Take 5 mg by mouth once daily. 10/23/23  Yes Provider, Historical   aspirin 81 MG Chew 81 mg nightly. 10/19/15  Yes Provider, Historical   atorvastatin (LIPITOR) 80 MG tablet TAKE 1 TABLET EVERY DAY  Patient taking differently: Take 80 mg by mouth every evening. 10/4/23  Yes Gordy Parmar MD   clopidogreL (PLAVIX) 75 mg tablet TAKE 1 TABLET EVERY DAY  Patient taking differently: Take 75 mg by mouth nightly. 4/17/23  Yes Gordy Parmar MD    ESBRIET 801 mg Tab 3 (three) times daily. 11/12/20  Yes Provider, Historical   insulin degludec (TRESIBA FLEXTOUCH U-100) 100 unit/mL (3 mL) insulin pen Inject 50 Units into the skin every evening. F/u apt needed with PCP 5/1/23 4/30/24 Yes Abilio Weber MD   isosorbide mononitrate (IMDUR) 30 MG 24 hr tablet Take 1 tablet (30 mg total) by mouth once daily. 10/25/23 10/24/24 Yes Michael oJhnson III, MD   levothyroxine (SYNTHROID) 100 MCG tablet Take 1 tablet (100 mcg total) by mouth before breakfast. 4/17/23  Yes Rey Connelly MD   LUBRICANT EYE DROPS 0.5 % Dpet  7/19/17  Yes Provider, Historical   metoprolol succinate (TOPROL-XL) 100 MG 24 hr tablet Take 1 tablet (100 mg total) by mouth once daily. 10/18/23 10/17/24 Yes Markie Harkins NP   mirtazapine (REMERON) 7.5 MG Tab TAKE 1 TABLET BY MOUTH EVERY EVENING. 10/25/23  Yes Abilio Weber MD   NOVOLOG 100 unit/mL injection Inject 12 Units into the skin 3 (three) times daily after meals. Plus Sliding scale 1/1/16  Yes Provider, Historical   NOVOLOG FLEXPEN U-100 INSULIN 100 unit/mL (3 mL) InPn pen Inject 1-10 Units into the skin as needed. 110-150=1  151-200=2  201-250=4  251-300=6  301-350=8  351-400=10   Yes Provider, Historical   omeprazole (PRILOSEC) 20 MG capsule Take 20 mg by mouth 2 (two) times daily. 11/22/23  Yes Provider, Historical   ORENCIA 125 mg/mL Syrg Inject into the skin once a week. Fridays 1/13/21  Yes Provider, Historical   tamsulosin (FLOMAX) 0.4 mg Cap Take 0.4 mg by mouth every evening. 12/4/23  Yes Provider, Historical   vitamin D (VITAMIN D3) 1000 units Tab Take 1,000 Units by mouth once daily.   Yes Provider, Historical   ACCU-CHEK ADRIEN PLUS METER Misc 1 Product by Other route daily as needed. 4/15/20   Provider, Historical   ACCU-CHEK SMARTVIEW TEST STRIP Strp  12/17/19   Provider, Historical   ACCU-CHEK SOFTCLIX LANCETS Misc Inject 100 lancets into the skin daily as needed. 4/15/20    "Provider, Historical   DROPLET PEN NEEDLE 32 gauge x " Ndle Inject 1 Product into the skin daily as needed. 20   Provider, Historical   nitroGLYCERIN (NITROSTAT) 0.4 MG SL tablet Place 1 tablet (0.4 mg total) under the tongue every 5 (five) minutes as needed for Chest pain. 23   Rey Connelly MD       Family History:  Family History   Problem Relation Age of Onset    Diabetes Mother     Diabetes Father     Diabetes Sister     Kidney disease Sister     Diabetes Brother     Diabetes Sister     Blindness Neg Hx     Glaucoma Neg Hx     Macular degeneration Neg Hx     Retinal detachment Neg Hx     Strabismus Neg Hx     Stroke Neg Hx     Thyroid disease Neg Hx     Cancer Neg Hx     Cataracts Neg Hx     Hypertension Neg Hx        Social History:  Social History     Tobacco Use    Smoking status: Former     Current packs/day: 0.00     Average packs/day: 2.0 packs/day for 27.0 years (54.0 ttl pk-yrs)     Types: Cigarettes     Start date:      Quit date:      Years since quittin.0    Smokeless tobacco: Never   Substance Use Topics    Alcohol use: Yes     Comment: Occasionally has a drink    Drug use: No       Review of Systems:  Pertinent items are noted in HPI. All other systems are reviewed and are negative.     Objective:   Last 24 Hour Vital Signs:  BP  Min: 103/50  Max: 140/62  Temp  Av °F (36.7 °C)  Min: 96.2 °F (35.7 °C)  Max: 99.3 °F (37.4 °C)  Pulse  Av.5  Min: 77  Max: 106  Resp  Av.5  Min: 20  Max: 41  SpO2  Av.7 %  Min: 87 %  Max: 100 %  I/O last 3 completed shifts:  In: 84.7 [I.V.:84.7]  Out: 300 [Urine:300]    Physical Examination:  BP (!) 93/50 (BP Location: Left arm, Patient Position: Sitting)   Pulse 89   Temp (!) 36.7 °F (2.6 °C) (Oral)   Resp 20   Ht 5' 4" (1.626 m)   Wt 65.8 kg (145 lb)   SpO2 98%   BMI 24.89 kg/m²   General appearance: alert, cooperative, and mild distress  Neck: no JVD and supple, symmetrical, trachea midline  Lungs: diminished " breath sounds LLL and coarse lung sounds throughout  Chest wall: no tenderness  Heart: regular rate and rhythm, S1, S2 normal, no murmur, click, rub or gallop  Abdomen: soft, non-tender; bowel sounds normal; no masses,  no organomegaly  Extremities: extremities normal, atraumatic, no cyanosis or edema  Pulses: 2+ and symmetric  Neurologic: Grossly normal      Laboratory:  Trended Lab Data:  Recent Labs     12/30/23  1436 12/30/23  1904 12/31/23  0712   WBC 2.18* 2.49* 3.89*   HGB 10.9* 10.7* 10.5*   HCT 33.2* 34.3* 34.2*   * 135* 127*     --  142   K 4.1  --  4.4     --  107   CO2 20*  --  24   BUN 24*  --  29*   CREATININE 1.8*  --  2.0*   *  --  164*   BILITOT 0.6  --  0.5   AST 43*  --  45*   ALT 46*  --  40   ALKPHOS 114  --  102   CALCIUM 9.0  --  8.9   ALBUMIN 2.6*  --  2.4*   PROT 7.3  --  7.2   MG  --   --  1.9   INR  --  1.1  --        Cardiac:   Recent Labs   Lab 12/30/23  1436 12/30/23  1904 12/31/23  1049   TROPONINI 0.387* 4.682* 2.966*   *  --   --        Urinalysis:   Lab Results   Component Value Date    LABURIN  04/23/2018     Multiple organisms isolated. None in predominance.  Repeat if    LABURIN clinically necessary. 04/23/2018    COLORU Yellow 12/31/2023    SPECGRAV 1.030 12/31/2023    NITRITE Negative 12/31/2023    KETONESU Negative 12/31/2023    UROBILINOGEN 2.0-3.0 (A) 12/31/2023       Microbiology:  Microbiology Results (last 7 days)       Procedure Component Value Units Date/Time    Blood Culture #2 **CANNOT BE ORDERED STAT** [[id number]] Collected: 12/30/23 1905    Order Status: Completed Specimen: Blood from Peripheral, Antecubital, Right Updated: 12/31/23 0515     Blood Culture, Routine No Growth to date    Blood Culture #1 **CANNOT BE ORDERED STAT** [[id number]] Collected: 12/30/23 1906    Order Status: Completed Specimen: Blood from Peripheral, Forearm, Left Updated: 12/31/23 0515     Blood Culture, Routine No Growth to date             Radiology:  Imaging Results              CT Chest Abdomen Pelvis With IV Contrast (XPD) NO Oral Contrast (No Result on File)                      X-Ray Chest AP Portable (Final result)  Result time 12/30/23 15:14:35      Final result by Ned Montenegro MD (12/30/23 15:14:35)                   Impression:      Chronic appearing changes of the lungs which are overall more conspicuous from comparison radiograph 10/25/2023.  Additional consolidative opacity about the left lung base.  Considerations include atelectasis, noting that superimposed infectious or non infectious inflammatory infiltrate and pulmonary edema remain considerations.  Correlation is advised.      Electronically signed by: Ned Montenegro  Date:    12/30/2023  Time:    15:14               Narrative:    EXAMINATION:  XR CHEST AP PORTABLE    CLINICAL HISTORY:  CHF;    TECHNIQUE:  Single frontal view of the chest was performed.    COMPARISON:  Chest radiograph 10/25/2023    FINDINGS:  Cardiac leads overlie the field of view.    Chronic appearing changes of the lungs noted diffuse scattered opacities and reticulation which is more conspicuous when comparison radiograph 10/25/2023.  Additional consolidative opacity about the left lung base, which may represent sequela of atelectasis, noting that infectious or non infectious inflammatory infiltrate and pulmonary edema remain considerations.  Suspected small bilateral pleural effusions.    Cardiomediastinal silhouette is unchanged in size.  Atherosclerosis of the visualized aorta.  Hilar borders are intact.    Osseous structures demonstrate no evidence for acute fracture or osseous destructive lesion.  Postoperative change including median sternotomy.                                        I have personally reviewed the above labs and imaging.    Current Medications:     Infusions:   heparin (porcine) in D5W 9 Units/kg/hr (12/31/23 1114)        Scheduled:   amLODIPine  5 mg Oral Daily    aspirin  81 mg Oral  Daily    atorvastatin  80 mg Oral QHS    azithromycin  500 mg Oral Daily    cefTRIAXone (ROCEPHIN) IVPB  1 g Intravenous Q24H    clopidogreL  75 mg Oral Nightly    isosorbide mononitrate  30 mg Oral Daily    levothyroxine  100 mcg Oral Before breakfast    metoprolol succinate  100 mg Oral Daily    mirtazapine  7.5 mg Oral QHS    pantoprazole  40 mg Oral Daily    pirfenidone  801 mg Oral TID    tamsulosin  0.4 mg Oral QHS    vitamin D  1,000 Units Oral Daily        PRN:  acetaminophen, acetaminophen, albuterol-ipratropium, bisacodyL, dextrose 10%, dextrose 10%, glucagon (human recombinant), guaiFENesin 100 mg/5 ml, heparin (PORCINE), heparin (PORCINE), insulin aspart U-100, iohexoL, nitroGLYCERIN, ondansetron, polyethylene glycol     Assessment:     Jose Antonio Allen is a 73 y.o. male with:  Patient Active Problem List    Diagnosis Date Noted    Fever 12/31/2023    Acute on chronic respiratory failure with hypoxia 12/30/2023    NSTEMI (non-ST elevated myocardial infarction) 12/30/2023    SAUL (acute kidney injury) 10/25/2023    Demand ischemia of myocardium 10/16/2023    Chronic hypoxic respiratory failure, on home oxygen therapy 10/16/2023    History of coronary artery stent placement 03/09/2023    Left shoulder pain 03/03/2023    Stiffness of left shoulder joint 03/03/2023    Coronary artery disease involving native coronary artery of native heart without angina pectoris 09/20/2022    Rheumatoid arthritis with rheumatoid factor, unspecified 02/14/2022    Chronic obstructive pulmonary disease, unspecified COPD type 02/14/2022    Pulmonary hypertension 01/25/2022    Abdominal aortic atherosclerosis 06/22/2021    Internal carotid artery occlusion, right 06/22/2021    Proteinuria 12/14/2020    Calculus of kidney and ureter 12/14/2020    Intermittent claudication 06/12/2020    Type 2 diabetes mellitus with stage 3a chronic kidney disease, without long-term current use of insulin 06/12/2020    GI bleeding 06/12/2020     Diverticular disease 06/12/2020    Overweight (BMI 25.0-29.9) 06/12/2020    Acquired hypothyroidism 11/21/2019    PAD (peripheral artery disease) 07/09/2018    Bilateral carotid artery disease 02/07/2017    Right carotid bruit 02/07/2017    Heart murmur 02/07/2017    Fibrosis of lung 01/22/2016    History of GI diverticular bleed 01/22/2016    Coronary artery disease involving native coronary artery of native heart with refractory angina pectoris     S/P CABG (coronary artery bypass graft)     Chest pain     Essential hypertension     Hyperlipidemia     CKD (chronic kidney disease) stage 3, GFR 30-59 ml/min     Blepharoptosis 01/30/2013    Dermatochalasis 01/30/2013        Plan:     #RA-ILD (follows with [facility] Pulmonology; currently on pirfenidone, previously on Nintedanib, but did not tolerate; previously referred for Lung transplant eval at [facility] but deemed not to be a candidate d/t his age/performance status)   # Pulmonary Hypertension (likely Group 3 in the context of his ILD, reports to previously have been tried on inhaled tyvaso but did not tolerate; no RHC or Echo available to assess severity of PH)  #Acute on chronic Hypoxic Respiratory Failure on 4L at home (in the context of the above)  #?ILD Exacerbation  -Reviewed CXR from this admission and it does show some areas consistent with pulmonary fibrosis and traction bronchiectasis consistent with his history of ILD. He does however have some LLL opacity which could represent infection.   - Agree with obtaining CT chest to evaluate extent of his ILD and look for signs of acute exacerbations. No previous CT imaging on [facility] EMR, [place] [facility] [place] available there either as he seems to follow at [facility] with Dr. Barahona.  - Obtain triple viral swab (COVID, Flu and RSV) to rule out those particular viral etiologies.  - Continue to provide Supplemental O2 with HFNC and wean as able.   - Agree with treating for CAP as  potential etiology for his acute decompensation.  - Continue pirfenidone  - Agree with Echo, hopefully will be able to assess RV function/ PA pressure     #ACS/NSTEMI in the context of CAD s/p 4-vessel CABG in 1996.   - Cardiology following and plan appears to be Coronary angiogram on Tuesday if renal function stable. Additional medical management per cards/hospitalist.   #SAUL on CKD    Thank you for allowing us to participate in the care of this patient. Please contact me if you have any questions regarding this consult.    Jaylin Elaine MD  [facility] Fellow    Pt seen and examined with Pulmonary/Critical Care team and this note reviewed and validated with the following additional comments: Discussed with primary team.  Progressively worsening SOB over many months.  WOrse over last few days.  New fever.  CXR about the same.  CT pending.  May have acute viral or bacterial superinfection on his ILD. Cultures and viral panel pending.  Tep down with empiric abx. Also BNP elevated.  Known CAD.  Slight bump in creatinine.  May have cardiorenal syndrome. Will discuss pros and cons of coronary angiogram with Cardiology. Pt has opined that he does not want end-of-life heroics.  Wife interested in discussing home hospice.  We will follow.    Medical Decision Making (MDM) was complex.  The number and complexity of problems addressed was high.  The amount and complexity of data reviewed was high.  The risk of complications and/or morbidity/mortality was high.  The tests ordered were CT, viral panel.  I communicated with the following providers HM.  Time spent in the care of this patient was 45 minutes    James Hein MD  Phone [phone]

## 2023-12-31 NOTE — AI DETERIORATION ALERT
Artificial Intelligence Notification  [facility]  [address]  [place] [address]  Phone: [phone]    This documentation was triggered by an Artificial Intelligence Notification:    Admit Date: 2023   LOS: 0  Code Status: Full Code  : 1950  Age: 73 y.o.  Weight:   Wt Readings from Last 1 Encounters:   23 65.8 kg (145 lb)        Sex: male  Bed: K469/K469 A  MRN: [id number]  Attending Physician: Greer Alves     Date of Alert: 2023  Time AI Alert Received:  3:13 p.m.            Vitals:    23 1459   BP: (!) 93/50   Pulse: 89   Resp:    Temp:      SpO2: 98 %      Artificial Intelligence alert discussed with Provider:     Name: Shawn Sheikh MD   Date/Time of Provider Notification:  2023 AT 3:13 P.M.      Patient Condition:  STABLE

## 2024-01-01 ENCOUNTER — TELEPHONE (OUTPATIENT)
Dept: CARDIOLOGY | Facility: CLINIC | Age: 74
End: 2024-01-01
Payer: MEDICARE

## 2024-01-01 VITALS
DIASTOLIC BLOOD PRESSURE: 72 MMHG | HEIGHT: 64 IN | WEIGHT: 143 LBS | BODY MASS INDEX: 24.41 KG/M2 | HEART RATE: 98 BPM | SYSTOLIC BLOOD PRESSURE: 154 MMHG | RESPIRATION RATE: 19 BRPM | OXYGEN SATURATION: 95 % | TEMPERATURE: 99 F

## 2024-01-01 DIAGNOSIS — I21.4 NSTEMI (NON-ST ELEVATION MYOCARDIAL INFARCTION): ICD-10-CM

## 2024-01-01 DIAGNOSIS — Z95.1 S/P CABG (CORONARY ARTERY BYPASS GRAFT): ICD-10-CM

## 2024-01-01 DIAGNOSIS — I25.110 CORONARY ARTERY DISEASE INVOLVING NATIVE CORONARY ARTERY OF NATIVE HEART WITH UNSTABLE ANGINA PECTORIS: ICD-10-CM

## 2024-01-01 DIAGNOSIS — I20.89 STABLE ANGINA: ICD-10-CM

## 2024-01-01 DIAGNOSIS — E03.9 ACQUIRED HYPOTHYROIDISM: ICD-10-CM

## 2024-01-01 DIAGNOSIS — I25.110 CORONARY ARTERY DISEASE INVOLVING NATIVE CORONARY ARTERY OF NATIVE HEART WITH UNSTABLE ANGINA PECTORIS: Primary | ICD-10-CM

## 2024-01-01 PROBLEM — J84.9 ILD (INTERSTITIAL LUNG DISEASE): Status: ACTIVE | Noted: 2024-01-01

## 2024-01-01 LAB
ALBUMIN SERPL BCP-MCNC: 2.1 G/DL (ref 3.5–5.2)
ALBUMIN SERPL BCP-MCNC: 2.2 G/DL (ref 3.5–5.2)
ALP SERPL-CCNC: 108 U/L (ref 55–135)
ALP SERPL-CCNC: 113 U/L (ref 55–135)
ALT SERPL W/O P-5'-P-CCNC: 31 U/L (ref 10–44)
ALT SERPL W/O P-5'-P-CCNC: 34 U/L (ref 10–44)
ANION GAP SERPL CALC-SCNC: 13 MMOL/L (ref 8–16)
ANION GAP SERPL CALC-SCNC: 14 MMOL/L (ref 8–16)
ANION GAP SERPL CALC-SCNC: 15 MMOL/L (ref 8–16)
ANION GAP SERPL CALC-SCNC: 15 MMOL/L (ref 8–16)
APTT PPP: 45.1 SEC (ref 21–32)
APTT PPP: 48.9 SEC (ref 21–32)
ASCENDING AORTA: 3.22 CM
AST SERPL-CCNC: 34 U/L (ref 10–40)
AST SERPL-CCNC: 40 U/L (ref 10–40)
AV INDEX (PROSTH): 0.58
AV MEAN GRADIENT: 6 MMHG
AV PEAK GRADIENT: 11 MMHG
AV VALVE AREA BY VELOCITY RATIO: 1.58 CM²
AV VALVE AREA: 1.85 CM²
AV VELOCITY RATIO: 0.5
BACTERIA BLD CULT: NORMAL
BACTERIA BLD CULT: NORMAL
BASOPHILS # BLD AUTO: ABNORMAL K/UL (ref 0–0.2)
BASOPHILS NFR BLD: 0 % (ref 0–1.9)
BASOPHILS NFR BLD: 1 % (ref 0–1.9)
BILIRUB SERPL-MCNC: 0.4 MG/DL (ref 0.1–1)
BILIRUB SERPL-MCNC: 0.4 MG/DL (ref 0.1–1)
BSA FOR ECHO PROCEDURE: 1.71 M2
BUN SERPL-MCNC: 29 MG/DL (ref 8–23)
BUN SERPL-MCNC: 30 MG/DL (ref 8–23)
BUN SERPL-MCNC: 30 MG/DL (ref 8–23)
BUN SERPL-MCNC: 44 MG/DL (ref 8–23)
CALCIUM SERPL-MCNC: 8.9 MG/DL (ref 8.7–10.5)
CALCIUM SERPL-MCNC: 9.1 MG/DL (ref 8.7–10.5)
CALCIUM SERPL-MCNC: 9.2 MG/DL (ref 8.7–10.5)
CALCIUM SERPL-MCNC: 9.4 MG/DL (ref 8.7–10.5)
CHLORIDE SERPL-SCNC: 102 MMOL/L (ref 95–110)
CHLORIDE SERPL-SCNC: 103 MMOL/L (ref 95–110)
CHLORIDE SERPL-SCNC: 104 MMOL/L (ref 95–110)
CHLORIDE SERPL-SCNC: 106 MMOL/L (ref 95–110)
CK SERPL-CCNC: 251 U/L (ref 20–200)
CO2 SERPL-SCNC: 21 MMOL/L (ref 23–29)
CO2 SERPL-SCNC: 21 MMOL/L (ref 23–29)
CO2 SERPL-SCNC: 22 MMOL/L (ref 23–29)
CO2 SERPL-SCNC: 22 MMOL/L (ref 23–29)
CREAT SERPL-MCNC: 1.8 MG/DL (ref 0.5–1.4)
CREAT SERPL-MCNC: 1.8 MG/DL (ref 0.5–1.4)
CREAT SERPL-MCNC: 2 MG/DL (ref 0.5–1.4)
CREAT SERPL-MCNC: 2.6 MG/DL (ref 0.5–1.4)
CREAT UR-MCNC: 52.2 MG/DL (ref 23–375)
CV ECHO LV RWT: 0.35 CM
DIFFERENTIAL METHOD BLD: ABNORMAL
DIFFERENTIAL METHOD BLD: ABNORMAL
DOHLE BOD BLD QL SMEAR: PRESENT
DOHLE BOD BLD QL SMEAR: PRESENT
DOP CALC AO PEAK VEL: 1.63 M/S
DOP CALC AO VTI: 26.7 CM
DOP CALC LVOT AREA: 3.2 CM2
DOP CALC LVOT DIAMETER: 2.01 CM
DOP CALC LVOT PEAK VEL: 0.81 M/S
DOP CALC LVOT STROKE VOLUME: 49.48 CM3
DOP CALCLVOT PEAK VEL VTI: 15.6 CM
E WAVE DECELERATION TIME: 72.68 MSEC
E/A RATIO: 0.82
E/E' RATIO: 14.31 M/S
ECHO LV POSTERIOR WALL: 0.86 CM (ref 0.6–1.1)
EOSINOPHIL # BLD AUTO: ABNORMAL K/UL (ref 0–0.5)
EOSINOPHIL NFR BLD: 0 % (ref 0–8)
EOSINOPHIL NFR BLD: 2 % (ref 0–8)
ERYTHROCYTE [DISTWIDTH] IN BLOOD BY AUTOMATED COUNT: 14.1 % (ref 11.5–14.5)
ERYTHROCYTE [DISTWIDTH] IN BLOOD BY AUTOMATED COUNT: 14.1 % (ref 11.5–14.5)
EST. GFR  (NO RACE VARIABLE): 25 ML/MIN/1.73 M^2
EST. GFR  (NO RACE VARIABLE): 35 ML/MIN/1.73 M^2
EST. GFR  (NO RACE VARIABLE): 39 ML/MIN/1.73 M^2
EST. GFR  (NO RACE VARIABLE): 39 ML/MIN/1.73 M^2
FERRITIN SERPL-MCNC: 736 NG/ML (ref 20–300)
FRACTIONAL SHORTENING: 21 % (ref 28–44)
GLUCOSE SERPL-MCNC: 180 MG/DL (ref 70–110)
GLUCOSE SERPL-MCNC: 308 MG/DL (ref 70–110)
GLUCOSE SERPL-MCNC: 309 MG/DL (ref 70–110)
GLUCOSE SERPL-MCNC: 374 MG/DL (ref 70–110)
HCT VFR BLD AUTO: 32.2 % (ref 40–54)
HCT VFR BLD AUTO: 33.2 % (ref 40–54)
HGB BLD-MCNC: 10.1 G/DL (ref 14–18)
HGB BLD-MCNC: 10.1 G/DL (ref 14–18)
IMM GRANULOCYTES # BLD AUTO: ABNORMAL K/UL (ref 0–0.04)
IMM GRANULOCYTES # BLD AUTO: ABNORMAL K/UL (ref 0–0.04)
IMM GRANULOCYTES NFR BLD AUTO: ABNORMAL % (ref 0–0.5)
IMM GRANULOCYTES NFR BLD AUTO: ABNORMAL % (ref 0–0.5)
INTERVENTRICULAR SEPTUM: 0.89 CM (ref 0.6–1.1)
IRON SERPL-MCNC: 11 UG/DL (ref 45–160)
IVRT: 98.95 MSEC
LA MAJOR: 5.16 CM
LA MINOR: 5.23 CM
LEFT ATRIUM SIZE: 4.26 CM
LEFT ATRIUM VOLUME INDEX MOD: 43.4 ML/M2
LEFT ATRIUM VOLUME MOD: 73.85 CM3
LEFT INTERNAL DIMENSION IN SYSTOLE: 3.9 CM (ref 2.1–4)
LEFT VENTRICLE DIASTOLIC VOLUME INDEX: 67.73 ML/M2
LEFT VENTRICLE DIASTOLIC VOLUME: 115.14 ML
LEFT VENTRICLE MASS INDEX: 88 G/M2
LEFT VENTRICLE SYSTOLIC VOLUME INDEX: 38.7 ML/M2
LEFT VENTRICLE SYSTOLIC VOLUME: 65.78 ML
LEFT VENTRICULAR INTERNAL DIMENSION IN DIASTOLE: 4.94 CM (ref 3.5–6)
LEFT VENTRICULAR MASS: 149.42 G
LV LATERAL E/E' RATIO: 11.63 M/S
LV SEPTAL E/E' RATIO: 18.6 M/S
LVOT MG: 1.51 MMHG
LVOT MV: 0.6 CM/S
LYMPHOCYTES # BLD AUTO: ABNORMAL K/UL (ref 1–4.8)
LYMPHOCYTES NFR BLD: 10 % (ref 18–48)
LYMPHOCYTES NFR BLD: 8 % (ref 18–48)
MAGNESIUM SERPL-MCNC: 2 MG/DL (ref 1.6–2.6)
MAGNESIUM SERPL-MCNC: 2.2 MG/DL (ref 1.6–2.6)
MCH RBC QN AUTO: 26.5 PG (ref 27–31)
MCH RBC QN AUTO: 27.2 PG (ref 27–31)
MCHC RBC AUTO-ENTMCNC: 30.4 G/DL (ref 32–36)
MCHC RBC AUTO-ENTMCNC: 31.4 G/DL (ref 32–36)
MCV RBC AUTO: 87 FL (ref 82–98)
MCV RBC AUTO: 87 FL (ref 82–98)
METAMYELOCYTES NFR BLD MANUAL: 1 %
METAMYELOCYTES NFR BLD MANUAL: 1 %
MONOCYTES # BLD AUTO: ABNORMAL K/UL (ref 0.3–1)
MONOCYTES NFR BLD: 7 % (ref 4–15)
MONOCYTES NFR BLD: 8 % (ref 4–15)
MV PEAK A VEL: 1.14 M/S
MV PEAK E VEL: 0.93 M/S
MV STENOSIS PRESSURE HALF TIME: 21.08 MS
MV VALVE AREA P 1/2 METHOD: 10.44 CM2
NEUTROPHILS NFR BLD: 50 % (ref 38–73)
NEUTROPHILS NFR BLD: 72 % (ref 38–73)
NEUTS BAND NFR BLD MANUAL: 10 %
NEUTS BAND NFR BLD MANUAL: 30 %
NRBC BLD-RTO: 0 /100 WBC
NRBC BLD-RTO: 0 /100 WBC
OHS LV EJECTION FRACTION SIMPSONS BIPLANE MOD: 48 %
PHOSPHATE SERPL-MCNC: 2 MG/DL (ref 2.7–4.5)
PHOSPHATE SERPL-MCNC: 2.2 MG/DL (ref 2.7–4.5)
PHOSPHATE SERPL-MCNC: 4 MG/DL (ref 2.7–4.5)
PISA TR MAX VEL: 3.88 M/S
PLATELET # BLD AUTO: 129 K/UL (ref 150–450)
PLATELET # BLD AUTO: 155 K/UL (ref 150–450)
PLATELET BLD QL SMEAR: ABNORMAL
PLATELET BLD QL SMEAR: ABNORMAL
PMV BLD AUTO: 10.8 FL (ref 9.2–12.9)
PMV BLD AUTO: 11.3 FL (ref 9.2–12.9)
POCT GLUCOSE: 178 MG/DL (ref 70–110)
POCT GLUCOSE: 203 MG/DL (ref 70–110)
POCT GLUCOSE: 266 MG/DL (ref 70–110)
POCT GLUCOSE: 309 MG/DL (ref 70–110)
POCT GLUCOSE: 333 MG/DL (ref 70–110)
POCT GLUCOSE: 386 MG/DL (ref 70–110)
POCT GLUCOSE: 388 MG/DL (ref 70–110)
POCT GLUCOSE: 392 MG/DL (ref 70–110)
POCT GLUCOSE: 396 MG/DL (ref 70–110)
POCT GLUCOSE: 399 MG/DL (ref 70–110)
POCT GLUCOSE: 421 MG/DL (ref 70–110)
POTASSIUM SERPL-SCNC: 4 MMOL/L (ref 3.5–5.1)
POTASSIUM SERPL-SCNC: 4.1 MMOL/L (ref 3.5–5.1)
POTASSIUM SERPL-SCNC: 4.2 MMOL/L (ref 3.5–5.1)
POTASSIUM SERPL-SCNC: 4.2 MMOL/L (ref 3.5–5.1)
PROT SERPL-MCNC: 7.1 G/DL (ref 6–8.4)
PROT SERPL-MCNC: 7.3 G/DL (ref 6–8.4)
PROT UR-MCNC: 117 MG/DL (ref 0–15)
PROT/CREAT UR: 2.24 MG/G{CREAT} (ref 0–0.2)
RA MAJOR: 4.46 CM
RA WIDTH: 3.15 CM
RBC # BLD AUTO: 3.71 M/UL (ref 4.6–6.2)
RBC # BLD AUTO: 3.81 M/UL (ref 4.6–6.2)
RIGHT VENTRICULAR END-DIASTOLIC DIMENSION: 3.33 CM
RV TISSUE DOPPLER FREE WALL SYSTOLIC VELOCITY 1 (APICAL 4 CHAMBER VIEW): 12.03 CM/S
SATURATED IRON: 6 % (ref 20–50)
SINUS: 3.39 CM
SODIUM SERPL-SCNC: 139 MMOL/L (ref 136–145)
SODIUM SERPL-SCNC: 141 MMOL/L (ref 136–145)
STJ: 2.39 CM
TDI LATERAL: 0.08 M/S
TDI SEPTAL: 0.05 M/S
TDI: 0.07 M/S
TOTAL IRON BINDING CAPACITY: 182 UG/DL (ref 250–450)
TR MAX PG: 60 MMHG
TRANSFERRIN SERPL-MCNC: 123 MG/DL (ref 200–375)
TRICUSPID ANNULAR PLANE SYSTOLIC EXCURSION: 1.18 CM
URATE SERPL-MCNC: 7.2 MG/DL (ref 3.4–7)
WBC # BLD AUTO: 4.27 K/UL (ref 3.9–12.7)
WBC # BLD AUTO: 5.56 K/UL (ref 3.9–12.7)
WBC TOXIC VACUOLES BLD QL SMEAR: PRESENT
Z-SCORE OF LEFT VENTRICULAR DIMENSION IN END DIASTOLE: 0.44
Z-SCORE OF LEFT VENTRICULAR DIMENSION IN END SYSTOLE: 2.27

## 2024-01-01 PROCEDURE — 93005 ELECTROCARDIOGRAM TRACING: CPT | Mod: HCNC

## 2024-01-01 PROCEDURE — 99900035 HC TECH TIME PER 15 MIN (STAT): Mod: HCNC

## 2024-01-01 PROCEDURE — 94799 UNLISTED PULMONARY SVC/PX: CPT | Mod: HCNC,XB

## 2024-01-01 PROCEDURE — 25000003 PHARM REV CODE 250: Mod: HCNC | Performed by: PHYSICIAN ASSISTANT

## 2024-01-01 PROCEDURE — 85730 THROMBOPLASTIN TIME PARTIAL: CPT | Mod: HCNC | Performed by: STUDENT IN AN ORGANIZED HEALTH CARE EDUCATION/TRAINING PROGRAM

## 2024-01-01 PROCEDURE — 63700000 PHARM REV CODE 250 ALT 637 W/O HCPCS: Mod: HCNC | Performed by: PHYSICIAN ASSISTANT

## 2024-01-01 PROCEDURE — 36415 COLL VENOUS BLD VENIPUNCTURE: CPT | Mod: HCNC | Performed by: PHYSICIAN ASSISTANT

## 2024-01-01 PROCEDURE — 63600175 PHARM REV CODE 636 W HCPCS: Mod: HCNC | Performed by: PHYSICIAN ASSISTANT

## 2024-01-01 PROCEDURE — 94761 N-INVAS EAR/PLS OXIMETRY MLT: CPT | Mod: HCNC

## 2024-01-01 PROCEDURE — 84100 ASSAY OF PHOSPHORUS: CPT | Mod: HCNC | Performed by: INTERNAL MEDICINE

## 2024-01-01 PROCEDURE — 85007 BL SMEAR W/DIFF WBC COUNT: CPT | Mod: HCNC | Performed by: STUDENT IN AN ORGANIZED HEALTH CARE EDUCATION/TRAINING PROGRAM

## 2024-01-01 PROCEDURE — 11000001 HC ACUTE MED/SURG PRIVATE ROOM: Mod: HCNC

## 2024-01-01 PROCEDURE — 99223 1ST HOSP IP/OBS HIGH 75: CPT | Mod: HCNC,,,

## 2024-01-01 PROCEDURE — 83735 ASSAY OF MAGNESIUM: CPT | Mod: HCNC | Performed by: PHYSICIAN ASSISTANT

## 2024-01-01 PROCEDURE — 99291 CRITICAL CARE FIRST HOUR: CPT | Mod: HCNC,,, | Performed by: STUDENT IN AN ORGANIZED HEALTH CARE EDUCATION/TRAINING PROGRAM

## 2024-01-01 PROCEDURE — 99233 SBSQ HOSP IP/OBS HIGH 50: CPT | Mod: HCNC,,,

## 2024-01-01 PROCEDURE — 84550 ASSAY OF BLOOD/URIC ACID: CPT | Mod: HCNC | Performed by: STUDENT IN AN ORGANIZED HEALTH CARE EDUCATION/TRAINING PROGRAM

## 2024-01-01 PROCEDURE — 85027 COMPLETE CBC AUTOMATED: CPT | Mod: HCNC | Performed by: STUDENT IN AN ORGANIZED HEALTH CARE EDUCATION/TRAINING PROGRAM

## 2024-01-01 PROCEDURE — 25000242 PHARM REV CODE 250 ALT 637 W/ HCPCS: Mod: HCNC | Performed by: PHYSICIAN ASSISTANT

## 2024-01-01 PROCEDURE — 25000003 PHARM REV CODE 250: Mod: HCNC

## 2024-01-01 PROCEDURE — 36415 COLL VENOUS BLD VENIPUNCTURE: CPT | Mod: HCNC | Performed by: STUDENT IN AN ORGANIZED HEALTH CARE EDUCATION/TRAINING PROGRAM

## 2024-01-01 PROCEDURE — 93010 ELECTROCARDIOGRAM REPORT: CPT | Mod: HCNC,76,, | Performed by: INTERNAL MEDICINE

## 2024-01-01 PROCEDURE — 82570 ASSAY OF URINE CREATININE: CPT | Mod: HCNC | Performed by: STUDENT IN AN ORGANIZED HEALTH CARE EDUCATION/TRAINING PROGRAM

## 2024-01-01 PROCEDURE — 99497 ADVNCD CARE PLAN 30 MIN: CPT | Mod: HCNC,25,,

## 2024-01-01 PROCEDURE — 99498 ADVNCD CARE PLAN ADDL 30 MIN: CPT | Mod: HCNC,,,

## 2024-01-01 PROCEDURE — 80053 COMPREHEN METABOLIC PANEL: CPT | Mod: HCNC | Performed by: PHYSICIAN ASSISTANT

## 2024-01-01 PROCEDURE — 94799 UNLISTED PULMONARY SVC/PX: CPT | Mod: HCNC

## 2024-01-01 PROCEDURE — 27100171 HC OXYGEN HIGH FLOW UP TO 24 HOURS: Mod: HCNC

## 2024-01-01 PROCEDURE — 27000249 HC VAPOTHERM CIRCUIT: Mod: HCNC

## 2024-01-01 PROCEDURE — 25000003 PHARM REV CODE 250: Mod: HCNC | Performed by: INTERNAL MEDICINE

## 2024-01-01 PROCEDURE — 63600175 PHARM REV CODE 636 W HCPCS: Mod: HCNC | Performed by: STUDENT IN AN ORGANIZED HEALTH CARE EDUCATION/TRAINING PROGRAM

## 2024-01-01 PROCEDURE — 80069 RENAL FUNCTION PANEL: CPT | Mod: HCNC | Performed by: STUDENT IN AN ORGANIZED HEALTH CARE EDUCATION/TRAINING PROGRAM

## 2024-01-01 PROCEDURE — 27000221 HC OXYGEN, UP TO 24 HOURS: Mod: HCNC

## 2024-01-01 PROCEDURE — 25000003 PHARM REV CODE 250: Mod: HCNC | Performed by: STUDENT IN AN ORGANIZED HEALTH CARE EDUCATION/TRAINING PROGRAM

## 2024-01-01 PROCEDURE — 83540 ASSAY OF IRON: CPT | Mod: HCNC | Performed by: STUDENT IN AN ORGANIZED HEALTH CARE EDUCATION/TRAINING PROGRAM

## 2024-01-01 PROCEDURE — 93010 ELECTROCARDIOGRAM REPORT: CPT | Mod: HCNC,,, | Performed by: INTERNAL MEDICINE

## 2024-01-01 PROCEDURE — 82550 ASSAY OF CK (CPK): CPT | Mod: HCNC | Performed by: STUDENT IN AN ORGANIZED HEALTH CARE EDUCATION/TRAINING PROGRAM

## 2024-01-01 PROCEDURE — 82728 ASSAY OF FERRITIN: CPT | Mod: HCNC | Performed by: STUDENT IN AN ORGANIZED HEALTH CARE EDUCATION/TRAINING PROGRAM

## 2024-01-01 RX ORDER — METOPROLOL SUCCINATE 100 MG/1
TABLET, EXTENDED RELEASE ORAL
Qty: 90 TABLET | Refills: 3 | Status: SHIPPED | OUTPATIENT
Start: 2024-01-01

## 2024-01-01 RX ORDER — LORAZEPAM 0.5 MG/1
0.5 TABLET ORAL EVERY 6 HOURS PRN
Status: DISCONTINUED | OUTPATIENT
Start: 2024-01-01 | End: 2024-01-01 | Stop reason: HOSPADM

## 2024-01-01 RX ORDER — LEVOTHYROXINE SODIUM 100 UG/1
100 TABLET ORAL
Qty: 90 TABLET | Refills: 3 | Status: SHIPPED | OUTPATIENT
Start: 2024-01-01

## 2024-01-01 RX ORDER — MORPHINE SULFATE ORAL SOLUTION 10 MG/5ML
2.5 SOLUTION ORAL
Status: DISCONTINUED | OUTPATIENT
Start: 2024-01-01 | End: 2024-01-01 | Stop reason: HOSPADM

## 2024-01-01 RX ORDER — METOPROLOL SUCCINATE 100 MG/1
TABLET, EXTENDED RELEASE ORAL
Qty: 90 TABLET | Refills: 3 | Status: SHIPPED | OUTPATIENT
Start: 2024-01-01 | End: 2024-01-01 | Stop reason: SDUPTHER

## 2024-01-01 RX ADMIN — PIRFENIDONE 801 MG: 801 TABLET, FILM COATED ORAL at 03:01

## 2024-01-01 RX ADMIN — INSULIN ASPART 1 UNITS: 100 INJECTION, SOLUTION INTRAVENOUS; SUBCUTANEOUS at 01:01

## 2024-01-01 RX ADMIN — NITROGLYCERIN 0.4 MG: 0.4 TABLET, ORALLY DISINTEGRATING SUBLINGUAL at 09:01

## 2024-01-01 RX ADMIN — CEFTRIAXONE SODIUM 1 G: 1 INJECTION, POWDER, FOR SOLUTION INTRAMUSCULAR; INTRAVENOUS at 11:01

## 2024-01-01 RX ADMIN — MIRTAZAPINE 7.5 MG: 7.5 TABLET ORAL at 11:01

## 2024-01-01 RX ADMIN — PIRFENIDONE 801 MG: 801 TABLET, FILM COATED ORAL at 10:01

## 2024-01-01 RX ADMIN — PANTOPRAZOLE SODIUM 40 MG: 40 TABLET, DELAYED RELEASE ORAL at 10:01

## 2024-01-01 RX ADMIN — TAMSULOSIN HYDROCHLORIDE 0.4 MG: 0.4 CAPSULE ORAL at 11:01

## 2024-01-01 RX ADMIN — INSULIN ASPART 5 UNITS: 100 INJECTION, SOLUTION INTRAVENOUS; SUBCUTANEOUS at 11:01

## 2024-01-01 RX ADMIN — ASPIRIN 81 MG CHEWABLE TABLET 81 MG: 81 TABLET CHEWABLE at 10:01

## 2024-01-01 RX ADMIN — METOPROLOL SUCCINATE 25 MG: 25 TABLET, EXTENDED RELEASE ORAL at 10:01

## 2024-01-01 RX ADMIN — AZITHROMYCIN DIHYDRATE 500 MG: 250 TABLET ORAL at 10:01

## 2024-01-01 RX ADMIN — AZITHROMYCIN DIHYDRATE 500 MG: 250 TABLET ORAL at 09:01

## 2024-01-01 RX ADMIN — Medication 1000 UNITS: at 10:01

## 2024-01-01 RX ADMIN — NITROGLYCERIN 0.4 MG: 0.4 TABLET, ORALLY DISINTEGRATING SUBLINGUAL at 11:01

## 2024-01-01 RX ADMIN — LEVOTHYROXINE SODIUM 100 MCG: 50 TABLET ORAL at 06:01

## 2024-01-01 RX ADMIN — INSULIN ASPART 4 UNITS: 100 INJECTION, SOLUTION INTRAVENOUS; SUBCUTANEOUS at 06:01

## 2024-01-01 RX ADMIN — INSULIN ASPART 5 UNITS: 100 INJECTION, SOLUTION INTRAVENOUS; SUBCUTANEOUS at 04:01

## 2024-01-01 RX ADMIN — INSULIN ASPART 5 UNITS: 100 INJECTION, SOLUTION INTRAVENOUS; SUBCUTANEOUS at 12:01

## 2024-01-01 RX ADMIN — INSULIN ASPART 3 UNITS: 100 INJECTION, SOLUTION INTRAVENOUS; SUBCUTANEOUS at 12:01

## 2024-01-01 RX ADMIN — INSULIN ASPART 1 UNITS: 100 INJECTION, SOLUTION INTRAVENOUS; SUBCUTANEOUS at 11:01

## 2024-01-01 RX ADMIN — NITROGLYCERIN 0.4 MG: 0.4 TABLET, ORALLY DISINTEGRATING SUBLINGUAL at 08:01

## 2024-01-01 RX ADMIN — ASPIRIN 81 MG CHEWABLE TABLET 81 MG: 81 TABLET CHEWABLE at 09:01

## 2024-01-01 RX ADMIN — MIRTAZAPINE 7.5 MG: 7.5 TABLET ORAL at 08:01

## 2024-01-01 RX ADMIN — INSULIN ASPART 5 UNITS: 100 INJECTION, SOLUTION INTRAVENOUS; SUBCUTANEOUS at 05:01

## 2024-01-01 RX ADMIN — CEFTRIAXONE SODIUM 1 G: 1 INJECTION, POWDER, FOR SOLUTION INTRAMUSCULAR; INTRAVENOUS at 08:01

## 2024-01-01 RX ADMIN — ATORVASTATIN CALCIUM 80 MG: 40 TABLET, FILM COATED ORAL at 11:01

## 2024-01-01 RX ADMIN — PIRFENIDONE 801 MG: 801 TABLET, FILM COATED ORAL at 08:01

## 2024-01-01 RX ADMIN — PIRFENIDONE 801 MG: 801 TABLET, FILM COATED ORAL at 11:01

## 2024-01-01 RX ADMIN — LORAZEPAM 0.5 MG: 0.5 TABLET ORAL at 10:01

## 2024-01-01 RX ADMIN — ATORVASTATIN CALCIUM 80 MG: 40 TABLET, FILM COATED ORAL at 08:01

## 2024-01-01 RX ADMIN — CLOPIDOGREL BISULFATE 75 MG: 75 TABLET ORAL at 08:01

## 2024-01-01 RX ADMIN — LEVOTHYROXINE SODIUM 100 MCG: 50 TABLET ORAL at 05:01

## 2024-01-01 RX ADMIN — PANTOPRAZOLE SODIUM 40 MG: 40 TABLET, DELAYED RELEASE ORAL at 09:01

## 2024-01-01 RX ADMIN — TAMSULOSIN HYDROCHLORIDE 0.4 MG: 0.4 CAPSULE ORAL at 08:01

## 2024-01-01 RX ADMIN — CLOPIDOGREL BISULFATE 75 MG: 75 TABLET ORAL at 11:01

## 2024-01-01 RX ADMIN — HEPARIN SODIUM 9 UNITS/KG/HR: 10000 INJECTION, SOLUTION INTRAVENOUS at 07:01

## 2024-01-01 RX ADMIN — METOPROLOL SUCCINATE 25 MG: 25 TABLET, EXTENDED RELEASE ORAL at 09:01

## 2024-01-01 RX ADMIN — MORPHINE SULFATE 2.5 MG: 10 SOLUTION ORAL at 03:01

## 2024-01-01 RX ADMIN — Medication 1000 UNITS: at 09:01

## 2024-01-01 NOTE — ASSESSMENT & PLAN NOTE
Patient's FSGs are uncontrolled due to hyperglycemia on current medication regimen.  Last A1c reviewed-   Lab Results   Component Value Date    HGBA1C 7.6 (H) 12/31/2023     Most recent fingerstick glucose reviewed-   Recent Labs   Lab 01/01/24  0042 01/01/24  0623   POCTGLUCOSE 203* 178*       Current correctional scale  Low  Maintain anti-hyperglycemic dose as follows-   Antihyperglycemics (From admission, onward)    Start     Stop Route Frequency Ordered    12/30/23 1801  insulin aspart U-100 pen 0-5 Units         -- SubQ Every 6 hours PRN 12/30/23 1702        Hold Oral hypoglycemics while patient is in the hospital.

## 2024-01-01 NOTE — PROGRESS NOTES
[facility] Consult Note    Primary Attending Physician: Dr. Noel  Consultant Attending: Dr. Hein   Consultant Fellow: Jaylin Elaine    Reason for Consult:     RA-ILD, acute on chronic respiratory failure    Subjective:      History of Present Illness:  Jose Antonio Allen is a 73 y.o.  male who  has a past medical history of Angina pectoris, Arthritis, CKD (chronic kidney disease), Colon polyps (09/14/2018), Coronary artery disease, Diabetes mellitus, Diabetes mellitus, type 2, GERD (gastroesophageal reflux disease), History of tobacco use, Hyperlipidemia, Hypertension, S/P CABG (coronary artery bypass graft) (1996), and Thyroid disease.. The patient presented to the [facility] on 12/30/2023 with a primary complaint of Shortness of Breath (Seen at Urgent care with c/o SOB. Sent here for further eval. States that he has been coughing at night, SOB today. Denies pain. Presents awake, alert with O2 via NRB - c/o nausea/)    He states that he feels short of breath at baseline but for the last several days he feels more short of breath than usual. He denies having a fever, and endorses having some cough. He used to be a smoker but he quit after CABG in 1996. He states that he is vaccinated for flu and covid but not RSV. He knows of no recent sick contacts. Denies any abdominal pain. Does endorse some orthopnea.    He follows with Dr. Barahona at [facility] for his pulmonary issues as an outpatient. He states that he has been on different treatments and that most of them have failed or caused significant side effects. He is currently taking pirfenidone and denies any major side effects to it. He wa referred to [facility] for Lung transplant evaluation but deemed not to be a candidate.     During this admission, XR revealed his chronic ILD changes but also some worsening LLL opacity. Reports he had a flu and covid test at an urgent care yesterday that was negative. CT was ordered but  "not yet performed. Was found to be hypoxic on nasal cannula and put on HFNC. Pulmonology was consulted in the context of his ILD and acute worsening of his o2 requirements.     Interval History:  Down to 40% Fio2, and feeling worse. Stated to his wife that he would like to go home as he feel he is close to dying. Reviewed CT and tests ordered yesterday, as well as labs from this morning. Wife at bedside.     Review of Systems:  Pertinent items are noted in HPI. All other systems are reviewed and are negative.     Objective:   Last 24 Hour Vital Signs:  BP  Min: 86/48  Max: 162/75  Temp  Av.3 °F (32.4 °C)  Min: 36.7 °F (2.6 °C)  Max: 98.6 °F (37 °C)  Pulse  Av.7  Min: 83  Max: 116  Resp  Av.2  Min: 17  Max: 40  SpO2  Av.8 %  Min: 94 %  Max: 100 %  Weight  Av.1 kg (143 lb 8.3 oz)  Min: 65.1 kg (143 lb 8.3 oz)  Max: 65.1 kg (143 lb 8.3 oz)  I/O last 3 completed shifts:  In: 84.7 [I.V.:84.7]  Out: 300 [Urine:300]    Physical Examination:  BP (!) 144/73 (BP Location: Left arm, Patient Position: Sitting)   Pulse (!) 116   Temp 98 °F (36.7 °C) (Oral)   Resp 18   Ht 5' 4" (1.626 m)   Wt 65.1 kg (143 lb 8.3 oz)   SpO2 95%   BMI 24.64 kg/m²   General appearance: alert, cooperative, and moderate distress, sitting in chair  Neck: no JVD and supple, symmetrical, trachea midline  Lungs: diminished breath sounds LLL and coarse lung sounds throughout  Heart: regular rate and rhythm, S1, S2 normal, no murmur, click, rub or gallop  Abdomen: soft, non-tender; bowel sounds normal; no masses,  no organomegaly  Extremities: extremities normal, atraumatic, no cyanosis or edema  Pulses: 2+ and symmetric  Neurologic: Grossly normal      Laboratory:  Trended Lab Data:  Recent Labs     23  1436 23  1904 23  0712 24   WBC 2.18* 2.49* 3.89*  --  4.27   HGB 10.9* 10.7* 10.5*  --  10.1*   HCT 33.2* 34.3* 34.2*  --  32.2*   * 135* 127*  --  129*     --  142 " 141  --    K 4.1  --  4.4 4.2  --      --  107 106  --    CO2 20*  --  24 22*  --    BUN 24*  --  29* 44*  --    CREATININE 1.8*  --  2.0* 2.6*  --    *  --  164* 180*  --    BILITOT 0.6  --  0.5 0.4  --    AST 43*  --  45* 34  --    ALT 46*  --  40 34  --    ALKPHOS 114  --  102 108  --    CALCIUM 9.0  --  8.9 8.9  --    ALBUMIN 2.6*  --  2.4* 2.2*  --    PROT 7.3  --  7.2 7.1  --    MG  --   --  1.9 2.0  --    PHOS  --   --   --  4.0  --    INR  --  1.1  --   --   --        Cardiac:   Recent Labs   Lab 12/30/23  1436 12/30/23  1904 12/31/23  1049   TROPONINI 0.387* 4.682* 2.966*   *  --   --        Urinalysis:   Lab Results   Component Value Date    LABURIN  04/23/2018     Multiple organisms isolated. None in predominance.  Repeat if    LABURIN clinically necessary. 04/23/2018    COLORU Yellow 12/31/2023    SPECGRAV 1.030 12/31/2023    NITRITE Negative 12/31/2023    KETONESU Negative 12/31/2023    UROBILINOGEN 2.0-3.0 (A) 12/31/2023       Microbiology:  Microbiology Results (last 7 days)       Procedure Component Value Units Date/Time    Blood Culture #2 **CANNOT BE ORDERED STAT** [[id number]] Collected: 12/30/23 1905    Order Status: Completed Specimen: Blood from Peripheral, Antecubital, Right Updated: 12/31/23 2222     Blood Culture, Routine No Growth to date      No Growth to date    Blood Culture #1 **CANNOT BE ORDERED STAT** [[id number]] Collected: 12/30/23 1906    Order Status: Completed Specimen: Blood from Peripheral, Forearm, Left Updated: 12/31/23 2222     Blood Culture, Routine No Growth to date      No Growth to date    Respiratory Infection Panel (PCR), Nasopharyngeal [[id number]]     Order Status: No result Specimen: Nasopharyngeal Swab     Culture, Respiratory with Gram Stain [[id number]]     Order Status: No result Specimen: Respiratory from Sputum, Induced             Radiology:  Imaging Results              X-Ray Chest AP Portable (Final result)  Result time 12/30/23 15:14:35       Final result by Ned Montenegro MD (12/30/23 15:14:35)                   Impression:      Chronic appearing changes of the lungs which are overall more conspicuous from comparison radiograph 10/25/2023.  Additional consolidative opacity about the left lung base.  Considerations include atelectasis, noting that superimposed infectious or non infectious inflammatory infiltrate and pulmonary edema remain considerations.  Correlation is advised.      Electronically signed by: Ned Montenegro  Date:    12/30/2023  Time:    15:14               Narrative:    EXAMINATION:  XR CHEST AP PORTABLE    CLINICAL HISTORY:  CHF;    TECHNIQUE:  Single frontal view of the chest was performed.    COMPARISON:  Chest radiograph 10/25/2023    FINDINGS:  Cardiac leads overlie the field of view.    Chronic appearing changes of the lungs noted diffuse scattered opacities and reticulation which is more conspicuous when comparison radiograph 10/25/2023.  Additional consolidative opacity about the left lung base, which may represent sequela of atelectasis, noting that infectious or non infectious inflammatory infiltrate and pulmonary edema remain considerations.  Suspected small bilateral pleural effusions.    Cardiomediastinal silhouette is unchanged in size.  Atherosclerosis of the visualized aorta.  Hilar borders are intact.    Osseous structures demonstrate no evidence for acute fracture or osseous destructive lesion.  Postoperative change including median sternotomy.                                        I have personally reviewed the above labs and imaging.    Current Medications:     Infusions:   heparin (porcine) in D5W 9 Units/kg/hr (01/01/24 0744)        Scheduled:   aspirin  81 mg Oral Daily    atorvastatin  80 mg Oral QHS    azithromycin  500 mg Oral Daily    cefTRIAXone (ROCEPHIN) IVPB  1 g Intravenous Q24H    clopidogreL  75 mg Oral Nightly    levothyroxine  100 mcg Oral Before breakfast    metoprolol succinate  25 mg Oral Daily     mirtazapine  7.5 mg Oral QHS    pantoprazole  40 mg Oral Daily    pirfenidone  801 mg Oral TID    tamsulosin  0.4 mg Oral QHS    vitamin D  1,000 Units Oral Daily        PRN:  acetaminophen, acetaminophen, albuterol-ipratropium, bisacodyL, dextrose 10%, dextrose 10%, glucagon (human recombinant), guaiFENesin 100 mg/5 ml, heparin (PORCINE), heparin (PORCINE), insulin aspart U-100, iohexoL, nitroGLYCERIN, ondansetron, polyethylene glycol     Assessment:     Jose Antonio Allen is a 73 y.o. male with:  Patient Active Problem List    Diagnosis Date Noted    ILD (interstitial lung disease) 01/01/2024    Fever 12/31/2023    Acute on chronic respiratory failure with hypoxia 12/30/2023    NSTEMI (non-ST elevated myocardial infarction) 12/30/2023    SAUL (acute kidney injury) 10/25/2023    Demand ischemia of myocardium 10/16/2023    Chronic hypoxic respiratory failure, on home oxygen therapy 10/16/2023    History of coronary artery stent placement 03/09/2023    Left shoulder pain 03/03/2023    Stiffness of left shoulder joint 03/03/2023    Coronary artery disease involving native coronary artery of native heart without angina pectoris 09/20/2022    Rheumatoid arthritis with rheumatoid factor, unspecified 02/14/2022    Chronic obstructive pulmonary disease, unspecified COPD type 02/14/2022    Pulmonary hypertension 01/25/2022    Abdominal aortic atherosclerosis 06/22/2021    Internal carotid artery occlusion, right 06/22/2021    Proteinuria 12/14/2020    Calculus of kidney and ureter 12/14/2020    Intermittent claudication 06/12/2020    Type 2 diabetes mellitus with stage 3a chronic kidney disease, without long-term current use of insulin 06/12/2020    GI bleeding 06/12/2020    Diverticular disease 06/12/2020    Overweight (BMI 25.0-29.9) 06/12/2020    Acquired hypothyroidism 11/21/2019    PAD (peripheral artery disease) 07/09/2018    Bilateral carotid artery disease 02/07/2017    Right carotid bruit 02/07/2017    Heart murmur  02/07/2017    Fibrosis of lung 01/22/2016    History of GI diverticular bleed 01/22/2016    Coronary artery disease involving native coronary artery of native heart with refractory angina pectoris     S/P CABG (coronary artery bypass graft)     Chest pain     Essential hypertension     Hyperlipidemia     CKD (chronic kidney disease) stage 3, GFR 30-59 ml/min     Blepharoptosis 01/30/2013    Dermatochalasis 01/30/2013        Plan:     #RA-ILD (follows with [facility] Pulmonology; currently on pirfenidone, previously on Nintedanib, but did not tolerate; previously referred for Lung transplant eval at [facility] but deemed not to be a candidate d/t his age/performance status)   # Pulmonary Hypertension (likely Group 3 in the context of his ILD, reports to previously have been tried on inhaled tyvaso but did not tolerate; no RHC or Echo available to assess severity of PH)  #Acute on chronic Hypoxic Respiratory Failure on 4L at home (in the context of the above)  #?ILD Exacerbation  -Reviewed CXR from this admission and it does show some areas consistent with pulmonary fibrosis and traction bronchiectasis consistent with his history of ILD. He does however have some LLL opacity which could represent infection.   - Agree with obtaining CT chest to evaluate extent of his ILD and look for signs of acute exacerbations. No previous CT imaging on [facility] EMR, was able to look him up on [facility] EMR but none available there either as he seems to follow at [facility] with Dr. Barahona.  - Reviewed Flu, covid and rsv swab which was negative.  - Continue to provide Supplemental O2   - Agree with treating for CAP as potential etiology for his acute decompensation.  - Continue pirfenidone  - Reviewed CT chest, no convincing evidence focal consolidation suggestive of bacterial pneumonia or other overt signs of ILD exacerbation.   - Despite being on slightly lower settings on the HFNC, he symptomatically feels worse and tired. Had a  discussion with wife at bedside and she states the patient would like to go home with hospice.   - Notified Palliative care team but not in house until tomorrow.   - We'll continue to support him until he goes home with adequate support from home hospice services.     #ACS/NSTEMI in the context of CAD s/p 4-vessel CABG in 1996.   - Cardiology following, discussed with team and given change in patient's goals, will hold off on coronary angiogram.     Thank you for allowing us to participate in the care of this patient. Please contact me if you have any questions regarding this consult.    Jaylin Elaine MD  [facility] Pulmonary & Critical Care Medicine Fellow    Pt seen and examined with Pulmonary/Critical Care team and this note reviewed and validated with the following additional comments: Looks worse today.  I spoke with Cardiology.  No plans for cath.  Pt states that he wants to go home to die.  Wife requesting Palliative Care Consult.    Medical Decision Making (MDM) was complex.  The number and complexity of problems addressed was high.  The amount and complexity of data reviewed was high.  The risk of complications and/or morbidity/mortality was high.  The tests ordered were none.  I communicated with the following providers HM, Palliative Care.  Time spent in the care of this patient was 30 minutes    James Hein MD  Phone [phone]

## 2024-01-01 NOTE — ASSESSMENT & PLAN NOTE
-defer to Pulmonary  -per Pulmonary= patient follows with [facility] pulmonology, currently on pirfenidone, previously on Nintedanib, but did not tolerate; previously referred for Lung transplant eval at [facility] but deemed not to be a candidate due to his age/performance status

## 2024-01-01 NOTE — PROGRESS NOTES
[facility] Medicine  Progress Note    Patient Name: Jose Antonio Allen  MRN: [id number]  Patient Class: IP- Inpatient   Admission Date: 12/30/2023  Length of Stay: 1 days  Attending Physician: Shawn Sheikh MD  Primary Care Provider: Abilio Weber MD        Subjective:     Principal Problem:NSTEMI (non-ST elevated myocardial infarction)        HPI:  73 y.o. male with significant past medical history of ILD on 4L NC at home, Angina pectoris, Arthritis, CKD (chronic kidney disease), Colon polyps (09/14/2018), Coronary artery disease, Diabetes mellitus, Diabetes mellitus, type 2, GERD (gastroesophageal reflux disease), History of tobacco use, Hyperlipidemia, Hypertension, S/P CABG (coronary artery bypass graft) (1996), and Thyroid disease presented to the ER from urgent care for hypoxia and SOB.  COVID and flu negative at .  Pt also vomited a few times in route, denies abdominal pain or chest pain.  Pt's wife reported low grade fever at home.  No sick contacts.  Pt denies chills, diaphoresis, nausea, cough, vocal changes, globus sensation, leg swelling or pain, fatigue, weakness, confusion, syncope.  Of note 11/11 cath lab severe native CAD, patent LIMA-LAD, recurrent ISR to SVG-BARON s/p succesfful PTCA to SVG-BARON branch recurrent ISR.  IVUS used.  Under expanded stent.  PTCA done with 3.0 scoring balloon followed by PTCA with 2.0 shockwave balloon followed by 3.5 and 4.0 NC balloon.  Pt advised to continue asa/plavix noting pt at risk for recurrent ISR and cards discussed option next time is to attempt opening native RCA  and coil the SVG graft given recurrent failures and recurrent attempts.    In the ED, T max 100.7 F.  Pt on 40L vapotherm 60% FiO2.  EKG Sinus Tach with PAC.  pH 7.368.  Troponin 0.387 compared to last (0.020 when admitted for HTN).   compared to last 90.  Pt given cefepime/vanc.  ED spoke with cardiology who recommended heparin drip.  CBC with thrombocytopenia.   CMP no significant electrolyte abnormalities and kidney function at baseline.  CXR Chronic appearing changes of the lungs which are overall more conspicuous from comparison radiograph 10/25/2023.  Additional consolidative opacity about the left lung base.  Considerations include atelectasis, noting that superimposed infectious or non infectious inflammatory infiltrate and pulmonary edema remain considerations.   CT chest/abdomen/pelvis pending.    Pt signed AMA form in ED but then he and his wife requested to resend the form.    Overview/Hospital Course:  No notes on file    Interval History:  Patient awake alert in no acute distress.  Remains afebrile.    Complained of chest pain with some relief with nitro.  Still with dyspnea on exertion.  Still mildly tachypneic but in no acute distress.  Sitting in chair on side of the bed. Remains on Vapotherm.  Had an extensive discussion with pulmonologist last night who has spoken to patient and patient's wife.  Patient has pretty advanced pulmonary disease and is likely not a candidate for lung transplant either.  Pulmonary had Goals of care discussion with patient and patient's wife last night.  Family was open to the concept of home hospice and palliative care.  Patient's wife was apprehensive about transferred to the ICU last night and wanted to avoid that.  Patient also wanted to wait ICU transfer last night.  Therefore patient did not get upgraded.  Today I have had discussions with cardiologist Dr. Rod who has decided against left heart catheterization due to elevated creatinine and fear of patient not getting off the ventilator if intubated for procedure.  Patient may end up on dialysis.  Patient was also seen and examined along with nephrologist by bedside.  At this time conservative measures will be followed.  Discussed all of this with patient and patient's wife.  At this time patient's wife is amenable to a palliative care consult which was placed.         Review of Systems   Constitutional:  Positive for fatigue.   HENT: Negative.     Eyes: Negative.    Respiratory:  Positive for shortness of breath.    Cardiovascular:  Positive for chest pain.   Gastrointestinal: Negative.    Endocrine: Negative.    Genitourinary: Negative.    Musculoskeletal: Negative.    Skin: Negative.    Allergic/Immunologic: Negative.    Neurological: Negative.    Hematological: Negative.    Psychiatric/Behavioral: Negative.     All other systems reviewed and are negative.    Objective:     Vital Signs (Most Recent):  Temp: 98.6 °F (37 °C) (01/01/24 0803)  Pulse: (!) 113 (01/01/24 0952)  Resp: 17 (01/01/24 0851)  BP: (!) 114/58 (01/01/24 0952)  SpO2: (!) 94 % (01/01/24 0952) Vital Signs (24h Range):  Temp:  [36.7 °F (2.6 °C)-98.6 °F (37 °C)] 98.6 °F (37 °C)  Pulse:  [] 113  Resp:  [17-40] 17  SpO2:  [94 %-100 %] 94 %  BP: ()/(48-75) 114/58     Weight: 65.1 kg (143 lb 8.3 oz)  Body mass index is 24.64 kg/m².    Intake/Output Summary (Last 24 hours) at 1/1/2024 1024  Last data filed at 1/1/2024 0950  Gross per 24 hour   Intake 200 ml   Output 500 ml   Net -300 ml         Physical Exam  Vitals and nursing note reviewed.   Constitutional:       General: He is not in acute distress.     Appearance: Normal appearance.   HENT:      Head: Normocephalic and atraumatic.      Nose: Nose normal.      Mouth/Throat:      Mouth: Mucous membranes are moist.   Eyes:      Extraocular Movements: Extraocular movements intact.      Pupils: Pupils are equal, round, and reactive to light.   Cardiovascular:      Rate and Rhythm: Regular rhythm. Tachycardia present.      Pulses: Normal pulses.      Heart sounds: Normal heart sounds.   Pulmonary:      Effort: No respiratory distress.      Breath sounds: Wheezing and rhonchi present.      Comments: Tachypneic.  Course breath sounds bilaterally generalized  Abdominal:      General: Bowel sounds are normal. There is no distension.      Palpations: Abdomen  is soft.      Tenderness: There is no abdominal tenderness.   Musculoskeletal:         General: Normal range of motion.      Cervical back: Neck supple.      Right lower leg: No edema.      Left lower leg: No edema.   Skin:     General: Skin is warm and dry.   Neurological:      General: No focal deficit present.      Mental Status: He is alert and oriented to person, place, and time. Mental status is at baseline.      Cranial Nerves: No cranial nerve deficit.   Psychiatric:         Mood and Affect: Mood normal.             Significant Labs: All pertinent labs within the past 24 hours have been reviewed.    Significant Imaging: I have reviewed all pertinent imaging results/findings within the past 24 hours.    Assessment/Plan:      * NSTEMI (non-ST elevated myocardial infarction)  CAD  HLD  PAD  History of CABG and stent placement  Patient presents with NSTEMI. Pt denies CP.  Patient is currently on NSTEMI Pathway.  ED discussed with cardiology and heparin drip initiated.    EKG reviewed. Troponins reviewed and results noted-   Recent Labs   Lab 12/31/23  1049   TROPONINI 2.966*     .     Lipid panel reviewed and shows-     Lab Results   Component Value Date    LDLCALC 35.4 (L) 12/31/2023     Lab Results   Component Value Date    TRIG 93 12/31/2023         Medical management includes; Anticoagulation and High Intensity Stain Echo has not been performed. Latest ECHO results are as follows- Results for orders placed during the hospital encounter of 10/14/23    Echo    Interpretation Summary    Left Ventricle: The left ventricle is normal in size. There is concentric remodeling. Normal wall motion. There is reduced systolic function. Ejection fraction by visual approximation is 55%. Grade I diastolic dysfunction.    Left Atrium: Left atrium is moderately dilated.    Right Ventricle: Systolic function is normal.    Aortic Valve: The aortic valve is a trileaflet valve. Moderately calcified noncoronary cusp. Mildly  restricted motion.    Mitral Valve: There is mild regurgitation.    Pulmonary Artery: The estimated pulmonary artery systolic pressure is 32 mmHg.    IVC/SVC: Intermediate venous pressure at 8 mmHg.  .   Cardiology is consulted. Plan of care reviewed with cardiology team. Continue to monitor patient closely and adjust therapy as needed.   -serial cardiac enzymes on 12/30/2023= elevated troponin x3, currently decreasing  -continue beta-blocker, statin daily   -Continue aspirin and Plavix daily  - continue heparin IV infusion  -cardiac angiogram  initially planned on 01/02/2023= canceled due to elevation in creatinine and concern that patient may not get off the ventilator if intubated.  Case discussed with Dr. Rod on 01/01/2024    History of coronary artery stent placement        Coronary artery disease involving native coronary artery of native heart with refractory angina pectoris  -treat as above    ILD (interstitial lung disease)  -defer to Pulmonary  -per Pulmonary= patient follows with [facility] pulmonology, currently on pirfenidone, previously on Nintedanib, but did not tolerate; previously referred for Lung transplant eval at [facility] but deemed not to be a candidate due to his age/performance status       Fever  -last low-grade fever spike on 12/30/2023 afternoon so far   -no leukocytosis   -influenza a and B and COVID-19 on 12/30/2023= negative   -blood cultures x2 on 12/30/2023= remains negative so far  -urinalysis with reflex to culture on 12/31/2023= negative nitrites and leukocyte esterase, rare bacteria, 1 epithelial cell, 8 WBC  -monitor procalcitonin levels= elevated and increasing  -lactic acid on 12/31/2023= within normal limits   -Rocephin IV daily empirically.  Ordered on 12/30/2023   -Zithromax 500 mg p.o. daily empirically.  Ordered on 12/30/2023         Acute on chronic respiratory failure with hypoxia  ILD  See below  ABG reviewed, pH 7.368  Continue Vapotherm 40 L, 60%  FiO2  -chest x-ray on 12/30/2023=-chest x-ray on 12/30/2023=Chronic appearing changes of the lungs which are overall more conspicuous from comparison radiograph 10/25/2023.  Additional consolidative opacity about the left lung base.  Considerations include atelectasis, noting that superimposed infectious or non infectious inflammatory infiltrate and pulmonary edema remain considerations.  Correlation is advised   -CT chest without contrast on 12/21/2023=1. Interstitial lung disease, UIP pattern, with honeycombing in the lung bases.2. Suspected airspace opacities in the right greater than left lower lobes, concerning for aspiration or overlying infection.3. Mild to moderate emphysematous changes of the lung apices.4. Cholelithiasis  -Pulmonology consulted= appreciate input and follow recommendations  -COVID and flu negative at   -COVID and influenza a and B on 12/30/2023= negative  -pirfenidone 801 mg p.o. t.i.d. ( home med)    -on 12/31/2023=Had an extensive discussion with pulmonologist who has spoken to patient and patient's wife.  Patient has pretty advanced pulmonary disease and is likely not a candidate for lung transplant either.  Pulmonary had Goals of care discussion with patient and patient's wife.  Family was open to the concept of home hospice and palliative care.  Patient's wife was apprehensive about transferred to the ICU last night and wanted to avoid that.  Patient also wanted to wait ICU transfer last night.  Therefore patient did not get upgraded.  - long-term prognosis appears guarded  - wife amenable to a palliative care consult to discuss goals of care  - palliative care consulted= follow recommendations    Chronic hypoxic respiratory failure, on home oxygen therapy  Patient with Hypoxic Respiratory failure which is Acute on chronic.  he is on home oxygen at 4 LPM. Supplemental oxygen was provided and noted- Oxygen Concentration (%):  [50-60] 60    .   Signs/symptoms of respiratory failure  include- increased work of breathing. Contributing diagnoses includes - Interstitial lung disease Labs and images were reviewed. Patient Has recent ABG, which has been reviewed. Will treat underlying causes and adjust management of respiratory failure as follows-NSTEMI treatment as above.  -Pulm consulted    CKD (chronic kidney disease) stage 3, GFR 30-59 ml/min  -monitor creatinine= increased   -Monitor UOP and serial BMP and adjust therapy as needed. Renally dose meds. Avoid nephrotoxic medications and procedures.  -At baseline 1.5-1.8  -renal ultrasound on 01/01/2024= pending  -nephrology consulted= follow recommendations   - cardiac angiogram has been canceled by Cardiology    Essential hypertension  Chronic, uncontrolled. Latest blood pressure and vitals reviewed-     Temp:  [36.7 °F (2.6 °C)-98.6 °F (37 °C)]   Pulse:  []   Resp:  [18-40]   BP: ()/(48-72)   SpO2:  [95 %-100 %] .   Home meds for hypertension were reviewed and noted below.   Hypertension Medications               amLODIPine (NORVASC) 5 MG tablet Take 5 mg by mouth once daily.    isosorbide mononitrate (IMDUR) 30 MG 24 hr tablet Take 1 tablet (30 mg total) by mouth once daily.    metoprolol succinate (TOPROL-XL) 100 MG 24 hr tablet Take 1 tablet (100 mg total) by mouth once daily.    nitroGLYCERIN (NITROSTAT) 0.4 MG SL tablet Place 1 tablet (0.4 mg total) under the tongue every 5 (five) minutes as needed for Chest pain.             Adjust BP medications as needed    Will utilize p.r.n. blood pressure medication only if patient's blood pressure greater than 180/110 and he develops symptoms such as worsening chest pain or shortness of breath.    PAD (peripheral artery disease)  -continue antiplatelets and statin daily      Type 2 diabetes mellitus with stage 3a chronic kidney disease, without long-term current use of insulin  Patient's FSGs are uncontrolled due to hyperglycemia on current medication regimen.  Last A1c reviewed-   Lab  Results   Component Value Date    HGBA1C 7.6 (H) 12/31/2023     Most recent fingerstick glucose reviewed-   Recent Labs   Lab 01/01/24  0042 01/01/24  0623   POCTGLUCOSE 203* 178*       Current correctional scale  Low  Maintain anti-hyperglycemic dose as follows-   Antihyperglycemics (From admission, onward)      Start     Stop Route Frequency Ordered    12/30/23 1801  insulin aspart U-100 pen 0-5 Units         -- SubQ Every 6 hours PRN 12/30/23 1702          Hold Oral hypoglycemics while patient is in the hospital.     Hyperlipidemia  -Lipitor 80 mg p.o. daily      Acquired hypothyroidism  Continue lt4  -TSH on 12/31/2023= within normal limits        VTE Risk Mitigation (From admission, onward)           Ordered     heparin 25,000 units in dextrose 5% (100 units/ml) IV bolus from bag - ADDITIONAL PRN BOLUS - 60 units/kg (max bolus 4000 units)  As needed (PRN)        Question:  Heparin Infusion Adjustment (DO NOT MODIFY ANSWER)  Answer:  \\[email or web address]\epic\Images\Pharmacy\HeparinInfusions\heparin LOW INTENSITY nomogram for [facility] [id number].pdf    12/30/23 1618     heparin 25,000 units in dextrose 5% (100 units/ml) IV bolus from bag - ADDITIONAL PRN BOLUS - 30 units/kg (max bolus 4000 units)  As needed (PRN)        Question:  Heparin Infusion Adjustment (DO NOT MODIFY ANSWER)  Answer:  \[email or web address]\[email or web address]\Images\Pharmacy\HeparinInfusions\heparin LOW INTENSITY nomogram for [facility] [id number].pdf    12/30/23 1618     IP VTE HIGH RISK PATIENT  Once         12/30/23 1653     Place sequential compression device  Until discontinued         12/30/23 1653     Place sequential compression device  Until discontinued         12/30/23 1641     heparin 25,000 units in dextrose 5% 250 mL (100 units/mL) infusion LOW INTENSITY nomogram - [facility]  Continuous        Question:  Begin at (units/kg/hr)  Answer:  12    12/30/23 1618                    Discharge Planning   KARLA:      Code Status: Full Code   Is the patient medically ready for discharge?:      Reason for patient still in hospital (select all that apply): Patient trending condition, Treatment, and Consult recommendations             Shawn Sheikh MD  Department of [facility] Medicine   [facility]

## 2024-01-01 NOTE — PROGRESS NOTES
Cardiology      Reason for Consult: NSTEMI    SUBJECTIVE:       Patient seen and examined at bedside- Wife at bedside. Extensive conversation about the patient current guarded condition. Patient with known significant CAD with recurrent chest pain in setting of Acute on Chronic Hypoxic respiratory failure, fever, tachycardic and uncontrolled HTN- trops 0.387--> 4.6--> 2.9 which is likely multifactorial in setting of significant CAD/CABG/ISR. Now Creatinine 2.96 (trending up) and oxygen requirement increased from baseline- we had a long discussion with his wife about the possibility of coronary angiogram and the risks of dialysis is significant increased and possibility of intubation. We have decided for medical management given his guarded prognosis and DNR status. Palliative consult placed.    Cath Results: 11/10/23  Access: Rt CFA   LM:  70% heavily calcified   LAD:  100% . Patent LIMA-LAD  LCx:   Ostial 90%, mid   RCA:  100% proximal       OGLESBY- LAD patent fills LAD antegrade and retrograde  SVG-BARON with 99% recurrent ISR s/p PTCA   LVgram: LVED 22 mmHg     Intervention:   - s/p successful PTCA to SVG-BARON branch recurrent ISR. IVUS used. Under expanded stent. PTCA done with 3.0  scoring balloon, followed by PTCA with 3.0 shockwave balloon followed by 3.5 and 4.0 NC balloon at high pressure.  Improvement with MLA post intervention      Assessment:   1. Severe native CAD   2. Patent LIMA-LAD  3. Recurrent ISR to SVG-BARON s/p successful intervention     Review of patient's allergies indicates:  No Known Allergies    Past Medical History:   Diagnosis Date    Angina pectoris     Arthritis     CKD (chronic kidney disease)     Colon polyps 09/14/2018    Coronary artery disease     Diabetes mellitus     Diabetes mellitus, type 2     GERD (gastroesophageal reflux disease)     History of tobacco use     Hyperlipidemia     Hypertension     S/P CABG (coronary artery bypass graft) 1996    Thyroid disease      Past  Surgical History:   Procedure Laterality Date    ATHERECTOMY, CORONARY N/A 11/10/2023    Procedure: Atherectomy-coronary. Shockwave balloon;  Surgeon: Scooby Sow MD;  Location: [facility] CATH LAB/EP;  Service: Cardiology;  Laterality: N/A;    CATARACT EXTRACTION Bilateral 3YRS    CLOSURE DEVICE  3/20/2023    Procedure: Placement of Closure Device;  Surgeon: Gordy Parmar MD;  Location: [facility] CATH LAB/EP;  Service: Cardiology;;    COLONOSCOPY W/ POLYPECTOMY  09/14/2018    CORONARY ANGIOGRAPHY N/A 4/11/2022    Procedure: ANGIOGRAM, CORONARY ARTERY;  Surgeon: Gordy Parmar MD;  Location: [facility] CATH LAB/EP;  Service: Cardiology;  Laterality: N/A;    CORONARY ANGIOGRAPHY N/A 11/10/2023    Procedure: ANGIOGRAM, CORONARY ARTERY;  Surgeon: Scooby Sow MD;  Location: [facility] CATH LAB/EP;  Service: Cardiology;  Laterality: N/A;    CORONARY ANGIOGRAPHY INCLUDING BYPASS GRAFTS WITH CATHETERIZATION OF LEFT HEART N/A 4/11/2022    Procedure: ANGIOGRAM, CORONARY, INCLUDING BYPASS GRAFT, WITH LEFT HEART CATHETERIZATION;  Surgeon: Gordy Parmar MD;  Location: [facility] CATH LAB/EP;  Service: Cardiology;  Laterality: N/A;    CORONARY ARTERY BYPASS GRAFT  1996    CORONARY BYPASS GRAFT ANGIOGRAPHY  4/20/2022    Procedure: Bypass graft study;  Surgeon: Gordy Parmar MD;  Location: [facility] CATH LAB/EP;  Service: Cardiology;;    CORONARY BYPASS GRAFT ANGIOGRAPHY  3/20/2023    Procedure: Bypass graft study;  Surgeon: Gordy Parmar MD;  Location: [facility] CATH LAB/EP;  Service: Cardiology;;    CORONARY BYPASS GRAFT ANGIOGRAPHY  11/10/2023    Procedure: Bypass graft study;  Surgeon: Scooby Sow MD;  Location: [facility] CATH LAB/EP;  Service: Cardiology;;    EYELID SURGERY  03/2012    IVUS, CORONARY  3/20/2023    Procedure: IVUS, Coronary;  Surgeon: Gordy Parmar MD;  Location: [facility] CATH LAB/EP;  Service: Cardiology;;    IVUS, CORONARY  11/10/2023    Procedure: IVUS, Coronary;  Surgeon: Scooby Sow MD;  Location: [facility]  CATH LAB/EP;  Service: Cardiology;;    LEFT HEART CATHETERIZATION N/A 4/11/2022    Procedure: Left heart cath;  Surgeon: Gordy Parmar MD;  Location: [facility] CATH LAB/EP;  Service: Cardiology;  Laterality: N/A;    LEFT HEART CATHETERIZATION N/A 3/20/2023    Procedure: Left heart cath;  Surgeon: Gordy Parmar MD;  Location: [facility] CATH LAB/EP;  Service: Cardiology;  Laterality: N/A;    LEFT HEART CATHETERIZATION N/A 11/10/2023    Procedure: Left heart cath;  Surgeon: Scooby Sow MD;  Location: [facility] CATH LAB/EP;  Service: Cardiology;  Laterality: N/A;    PERCUTANEOUS TRANSLUMINAL BALLOON ANGIOPLASTY OF CORONARY ARTERY  4/20/2022    Procedure: Angioplasty-coronary;  Surgeon: Gordy Parmar MD;  Location: [facility] CATH LAB/EP;  Service: Cardiology;;    PERCUTANEOUS TRANSLUMINAL BALLOON ANGIOPLASTY OF CORONARY ARTERY  3/20/2023    Procedure: Angioplasty-coronary;  Surgeon: Gordy Parmar MD;  Location: [facility] CATH LAB/EP;  Service: Cardiology;;    PTCA, SINGLE VESSEL  11/10/2023    Procedure: PTCA, Single Vessel;  Surgeon: Scooby Sow MD;  Location: [facility] CATH LAB/EP;  Service: Cardiology;;    RIGHT HEART CATHETERIZATION Right 11/3/2021    Procedure: INSERTION, CATHETER, RIGHT HEART;  Surgeon: Temitope Rahman MD;  Location: [facility] CATH LAB/EP;  Service: Cardiology;  Laterality: Right;    STENT, DRUG ELUTING, SINGLE VESSEL, CORONARY  3/20/2023    Procedure: Stent, Drug Eluting, Single Vessel, Coronary;  Surgeon: Gordy Parmar MD;  Location: [facility] CATH LAB/EP;  Service: Cardiology;;    VEIN BYPASS SURGERY  1996    VEIN SURGERY Left 2017    PAD    VEIN SURGERY Right 2018    PAD     Family History   Problem Relation Age of Onset    Diabetes Mother     Diabetes Father     Diabetes Sister     Kidney disease Sister     Diabetes Brother     Diabetes Sister     Blindness Neg Hx     Glaucoma Neg Hx     Macular degeneration Neg Hx     Retinal detachment Neg Hx     Strabismus Neg Hx     Stroke Neg Hx     Thyroid  disease Neg Hx     Cancer Neg Hx     Cataracts Neg Hx     Hypertension Neg Hx      Social History     Tobacco Use    Smoking status: Former     Current packs/day: 0.00     Average packs/day: 2.0 packs/day for 27.0 years (54.0 ttl pk-yrs)     Types: Cigarettes     Start date:      Quit date:      Years since quittin.0    Smokeless tobacco: Never   Substance Use Topics    Alcohol use: Yes     Comment: Occasionally has a drink    Drug use: No        Home meds:  No current facility-administered medications on file prior to encounter.     Current Outpatient Medications on File Prior to Encounter   Medication Sig Dispense Refill    amLODIPine (NORVASC) 5 MG tablet Take 5 mg by mouth once daily.      aspirin 81 MG Chew 81 mg nightly.      atorvastatin (LIPITOR) 80 MG tablet TAKE 1 TABLET EVERY DAY (Patient taking differently: Take 80 mg by mouth every evening.) 90 tablet 3    clopidogreL (PLAVIX) 75 mg tablet TAKE 1 TABLET EVERY DAY (Patient taking differently: Take 75 mg by mouth nightly.) 90 tablet 3    ESBRIET 801 mg Tab 3 (three) times daily.      insulin degludec (TRESIBA FLEXTOUCH U-100) 100 unit/mL (3 mL) insulin pen Inject 50 Units into the skin every evening. F/u apt needed with PCP 5 pen 3    isosorbide mononitrate (IMDUR) 30 MG 24 hr tablet Take 1 tablet (30 mg total) by mouth once daily. 30 tablet 11    levothyroxine (SYNTHROID) 100 MCG tablet Take 1 tablet (100 mcg total) by mouth before breakfast. 90 tablet 3    LUBRICANT EYE DROPS 0.5 % Dpet       metoprolol succinate (TOPROL-XL) 100 MG 24 hr tablet Take 1 tablet (100 mg total) by mouth once daily. 30 tablet 11    mirtazapine (REMERON) 7.5 MG Tab TAKE 1 TABLET BY MOUTH EVERY EVENING. 90 tablet 3    NOVOLOG 100 unit/mL injection Inject 12 Units into the skin 3 (three) times daily after meals. Plus Sliding scale      NOVOLOG FLEXPEN U-100 INSULIN 100 unit/mL (3 mL) InPn pen Inject 1-10 Units into the skin as needed.  "110-150=1  151-200=2  201-250=4  251-300=6  301-350=8  351-400=10      omeprazole (PRILOSEC) 20 MG capsule Take 20 mg by mouth 2 (two) times daily.      ORENCIA 125 mg/mL Syrg Inject into the skin once a week. Fridays      tamsulosin (FLOMAX) 0.4 mg Cap Take 0.4 mg by mouth every evening.      vitamin D (VITAMIN D3) 1000 units Tab Take 1,000 Units by mouth once daily.      ACCU-CHEK ADRIEN PLUS METER Misc 1 Product by Other route daily as needed.      ACCU-CHEK SMARTVIEW TEST STRIP Strp       ACCU-CHEK SOFTCLIX LANCETS Misc Inject 100 lancets into the skin daily as needed.      DROPLET PEN NEEDLE 32 gauge x 5/32" Ndle Inject 1 Product into the skin daily as needed.      nitroGLYCERIN (NITROSTAT) 0.4 MG SL tablet Place 1 tablet (0.4 mg total) under the tongue every 5 (five) minutes as needed for Chest pain. 25 tablet 11       Current meds:  Scheduled Meds:   aspirin  81 mg Oral Daily    atorvastatin  80 mg Oral QHS    azithromycin  500 mg Oral Daily    cefTRIAXone (ROCEPHIN) IVPB  1 g Intravenous Q24H    clopidogreL  75 mg Oral Nightly    levothyroxine  100 mcg Oral Before breakfast    metoprolol succinate  25 mg Oral Daily    mirtazapine  7.5 mg Oral QHS    pantoprazole  40 mg Oral Daily    pirfenidone  801 mg Oral TID    tamsulosin  0.4 mg Oral QHS    vitamin D  1,000 Units Oral Daily     Continuous Infusions:   heparin (porcine) in D5W 9 Units/kg/hr (01/01/24 0744)     PRN Meds:.acetaminophen, acetaminophen, albuterol-ipratropium, bisacodyL, dextrose 10%, dextrose 10%, glucagon (human recombinant), guaiFENesin 100 mg/5 ml, heparin (PORCINE), heparin (PORCINE), insulin aspart U-100, iohexoL, nitroGLYCERIN, ondansetron, polyethylene glycol      OBJECTIVE:     Vital Signs (Most Recent)  Temp: 98.6 °F (37 °C) (01/01/24 0803)  Pulse: (!) 113 (01/01/24 0952)  Resp: 17 (01/01/24 0851)  BP: (!) 114/58 (01/01/24 0952)  SpO2: (!) 94 % (01/01/24 0952)    Vital Signs Range (Last 24H):  Temp:  [36.7 °F (2.6 °C)-98.6 °F (37 °C)] "   Pulse:  []   Resp:  [17-40]   BP: ()/(48-75)   SpO2:  [94 %-100 %]     Physical Exam:  General: No acute stress  HENT: EOM intact, PERRL, oropharynx clear, mucous membranes clear and moist   Pulmonary: Rhonchi +, speaking full sentences on HFNC  Cardiovascular: regular rhythm, tachycardia  Abdomen: soft, non-tender, no distended no splenomegaly or hepatomegaly   Skin: warm, dry, no erythema, no rashes    Extremities: periph pulses intact, no cyanosis or edema   Neuro: a/ox4, clear speech, follows commands, no weakness or focal neurologic  deficit   Psych: mood and behavior normal       Laboratory:  LABS  CBC  Recent Labs   Lab 12/30/23  1904 12/31/23  0712 01/01/24  0311   WBC 2.49* 3.89* 4.27   RBC 3.94* 3.89* 3.71*   HGB 10.7* 10.5* 10.1*   HCT 34.3* 34.2* 32.2*   * 127* 129*   MCV 87 88 87   MCH 27.2 27.0 27.2   MCHC 31.2* 30.7* 31.4*     BMP  Recent Labs   Lab 12/30/23  1436 12/31/23  0712 01/01/24  0310    142 141   K 4.1 4.4 4.2   CO2 20* 24 22*    107 106   BUN 24* 29* 44*   CREATININE 1.8* 2.0* 2.6*   * 164* 180*       Recent Labs   Lab 12/30/23  1436 12/31/23  0712 01/01/24  0310   CALCIUM 9.0 8.9 8.9   MG  --  1.9 2.0   PHOS  --   --  4.0       LFT  Recent Labs   Lab 12/30/23  1436 12/31/23  0712 01/01/24  0310   PROT 7.3 7.2 7.1   ALBUMIN 2.6* 2.4* 2.2*   BILITOT 0.6 0.5 0.4   AST 43* 45* 34   ALKPHOS 114 102 108   ALT 46* 40 34       COAGS  Recent Labs   Lab 12/30/23  1904 12/31/23  0300 12/31/23  1049 12/31/23  1735 01/01/24  0310   INR 1.1  --   --   --   --    APTT 33.7*   < > 51.3* 48.0* 45.1*    < > = values in this interval not displayed.     CE  Recent Labs   Lab 12/30/23  1436 12/30/23  1904 12/31/23  1049   TROPONINI 0.387* 4.682* 2.966*     BNP  Recent Labs   Lab 12/30/23  1436   *     Lipid panel:  Lab Results   Component Value Date    CHOL 89 (L) 12/31/2023    CHOL 121 10/15/2023    CHOL 131 09/15/2022     Lab Results   Component Value Date     HDL 35 (L) 12/31/2023    HDL 38 (L) 10/15/2023    HDL 44 09/15/2022     Lab Results   Component Value Date    LDLCALC 35.4 (L) 12/31/2023    LDLCALC 66.6 10/15/2023    LDLCALC 64.2 09/15/2022     Lab Results   Component Value Date    TRIG 93 12/31/2023    TRIG 82 10/15/2023    TRIG 114 09/15/2022     Lab Results   Component Value Date    CHOLHDL 39.3 12/31/2023    CHOLHDL 31.4 10/15/2023    CHOLHDL 33.6 09/15/2022         Chart review:  Echo: 10/16/23    Left Ventricle: The left ventricle is normal in size. There is concentric remodeling. Normal wall motion. There is reduced systolic function. Ejection fraction by visual approximation is 55%. Grade I diastolic dysfunction.    Left Atrium: Left atrium is moderately dilated.    Right Ventricle: Systolic function is normal.    Aortic Valve: The aortic valve is a trileaflet valve. Moderately calcified noncoronary cusp. Mildly restricted motion.    Mitral Valve: There is mild regurgitation.    Pulmonary Artery: The estimated pulmonary artery systolic pressure is 32 mmHg.    IVC/SVC: Intermediate venous pressure at 8 mmHg.     Cath:  11/02/23  Cath Results:  Access: Rt CFA   LM:  70% heavily calcified   LAD:  100% . Patent LIMA-LAD  LCx:   Ostial 90%, mid   RCA:  100% proximal       OGLESBY- LAD patent fills LAD antegrade and retrograde  SVG-BARON with 99% recurrent ISR s/p PTCA   LVgram: LVED 22 mmHg     Intervention:   - s/p successful PTCA to SVG-BARON branch recurrent ISR. IVUS used. Under expanded stent. PTCA done with 3.0  scoring balloon, followed by PTCA with 3.0 shockwave balloon followed by 3.5 and 4.0 NC balloon at high pressure.  Improvement with MLA post intervention      Assessment:   Severe native CAD   Patent LIMA-LAD  Recurrent ISR to SVG-BARON s/p successful intervention      Cath 3/10/23    Patent LIMA-LAD with faint collaterals to PDA    Patent SVG-PDA with proximal moderate to severe ISR and distal de jaden moderate to severe lesion distal to distal  stent    Successful VIUS guided PTA to prox SVG stent ISR and PCI with 3.5 x 8 EDILMA to distal SVG lesion with excellent results    <40 cc total contrast used for case, given CKD    Procedure performed for progressive angina on 2 anti-anginal medications    Unable to pass filter distally, however, adenosine used     Cath 4/12/22  Severe SVG-PLB stenosis proximally severely calcified with what appears to be a posterior take off  MP guide used and IVUS guided PCI with 3.5 EDILMA x 2 (mid/distally and proximally) post dilated with 3.5 NCB with excellent results  Post intervention retrograde filling of PDA and distal RCA noted (previously from left sided collaterals)  Procedure performed for abnormal stress test and progressive angina  Continue Imdur for another week and then D/C and observe for response  Cardiac rehab       EKGs: NSR w PACs      ASSESSMENT/PLAN:     CAD s/p 4V CABG 1996 at [facility] (LIMA-LAD, SVG-OM, SVG-RCA, SVG-PDA), Recurrent ISR to SVG-BARON s/p successful intervention. PAD  NSTEMI- elevated troponin could be multifactorial Type 1 vs type 2 but given his typical chest pain which was similar to his previous stents and significant change in troponin will schedule a coronary angiogram for Tuesday if Cr is stable. NPO Monday MN.  Uncontrolled HTN  CKD 3 per primary team    - ACS protocol heparin drip (48 hrs)   - continue DAPT, Imdur  - Continue statin  - Hold coronary angiogram  - Monitor on telemetry  - Echo  - Continue Toprol 100 mg qd, uptitrate to keep HR ~60  - Closely monitor BP, goal SBP <120   - Monitor Cr         Choco Rod MD  Medical decision was complex. The number of complexity addressed was high. Time spent in the care of this patient was 45 minutes. Interviewing the patient, reviewing chart, counseling and coordinating care

## 2024-01-01 NOTE — ASSESSMENT & PLAN NOTE
ILD  See below  ABG reviewed, pH 7.368  Continue Vapotherm 40 L, 60% FiO2  -chest x-ray on 12/30/2023=-chest x-ray on 12/30/2023=Chronic appearing changes of the lungs which are overall more conspicuous from comparison radiograph 10/25/2023.  Additional consolidative opacity about the left lung base.  Considerations include atelectasis, noting that superimposed infectious or non infectious inflammatory infiltrate and pulmonary edema remain considerations.  Correlation is advised   -CT chest without contrast on 12/21/2023=1. Interstitial lung disease, UIP pattern, with honeycombing in the lung bases.2. Suspected airspace opacities in the right greater than left lower lobes, concerning for aspiration or overlying infection.3. Mild to moderate emphysematous changes of the lung apices.4. Cholelithiasis  -Pulmonology consulted= appreciate input and follow recommendations  -COVID and flu negative at   -COVID and influenza a and B on 12/30/2023= negative  -pirfenidone 801 mg p.o. t.i.d. ( home med)    -on 12/31/2023=Had an extensive discussion with pulmonologist who has spoken to patient and patient's wife.  Patient has pretty advanced pulmonary disease and is likely not a candidate for lung transplant either.  Pulmonary had Goals of care discussion with patient and patient's wife.  Family was open to the concept of home hospice and palliative care.  Patient's wife was apprehensive about transferred to the ICU last night and wanted to avoid that.  Patient also wanted to wait ICU transfer last night.  Therefore patient did not get upgraded.  - long-term prognosis appears guarded  - wife amenable to a palliative care consult to discuss goals of care  - palliative care consulted= follow recommendations

## 2024-01-01 NOTE — CODE/ RAPID DOCUMENTATION
Rapid Response Nurse Follow-up Note     Followed up with patient for proactive rounding. Pt resting in bed NADN with vapotherm 30L/60%. Introduce self to wife and informed her about following pt overnight with rounding.   No acute issues at this time. Reviewed plan of care with bedside RNMarlene .   Team will continue to follow.  Please call Rapid Response RN, Janiya Lan RN with any questions or concerns at N Phone [phone].

## 2024-01-01 NOTE — CODE/ RAPID DOCUMENTATION
Rapid Response Nurse Follow-up Note     RN called for PIV placement due to dislodgement by pt. PIV place in R hand 20. Pt c/o continued lateral rib pain due to cough. Informed RN with guaifenesin previously given and tylenol to be given. Reviewed plan of care with bedside RNMarlene .   Team will continue to follow.  Please call Rapid Response RN, Janiya Lan RN with any questions or concerns at N Phone [phone].

## 2024-01-01 NOTE — ASSESSMENT & PLAN NOTE
Patient with Hypoxic Respiratory failure which is Acute on chronic.  he is on home oxygen at 4 LPM. Supplemental oxygen was provided and noted- Oxygen Concentration (%):  [50-60] 60    .   Signs/symptoms of respiratory failure include- increased work of breathing. Contributing diagnoses includes - Interstitial lung disease Labs and images were reviewed. Patient Has recent ABG, which has been reviewed. Will treat underlying causes and adjust management of respiratory failure as follows-NSTEMI treatment as above.  -Pulm consulted

## 2024-01-01 NOTE — ASSESSMENT & PLAN NOTE
CAD  HLD  PAD  History of CABG and stent placement  Patient presents with NSTEMI. Pt denies CP.  Patient is currently on NSTEMI Pathway.  ED discussed with cardiology and heparin drip initiated.    EKG reviewed. Troponins reviewed and results noted-   Recent Labs   Lab 12/31/23  1049   TROPONINI 2.966*     .     Lipid panel reviewed and shows-     Lab Results   Component Value Date    LDLCALC 35.4 (L) 12/31/2023     Lab Results   Component Value Date    TRIG 93 12/31/2023         Medical management includes; Anticoagulation and High Intensity Stain Echo has not been performed. Latest ECHO results are as follows- Results for orders placed during the hospital encounter of 10/14/23    Echo    Interpretation Summary    Left Ventricle: The left ventricle is normal in size. There is concentric remodeling. Normal wall motion. There is reduced systolic function. Ejection fraction by visual approximation is 55%. Grade I diastolic dysfunction.    Left Atrium: Left atrium is moderately dilated.    Right Ventricle: Systolic function is normal.    Aortic Valve: The aortic valve is a trileaflet valve. Moderately calcified noncoronary cusp. Mildly restricted motion.    Mitral Valve: There is mild regurgitation.    Pulmonary Artery: The estimated pulmonary artery systolic pressure is 32 mmHg.    IVC/SVC: Intermediate venous pressure at 8 mmHg.  .   Cardiology is consulted. Plan of care reviewed with cardiology team. Continue to monitor patient closely and adjust therapy as needed.   -serial cardiac enzymes on 12/30/2023= elevated troponin x3, currently decreasing  -continue beta-blocker, statin daily   -Continue aspirin and Plavix daily  - continue heparin IV infusion  -cardiac angiogram  initially planned on 01/02/2023= canceled due to elevation in creatinine and concern that patient may not get off the ventilator if intubated.  Case discussed with Dr. Rod on 01/01/2024

## 2024-01-01 NOTE — CONSULTS
Nephrology Consult  H&P      Consult Requested By: Shawn Sheikh MD  Reason for Consult: SAUL on CKD  3    SUBJECTIVE:     History of Present Illness:  Jose Antonio Allen is a 73 y.o.   male who  has a past medical history of Angina pectoris, Arthritis, CKD (chronic kidney disease), Colon polyps (09/14/2018), Coronary artery disease, Diabetes mellitus, Diabetes mellitus, type 2, GERD (gastroesophageal reflux disease), History of tobacco use, Hyperlipidemia, Hypertension, S/P CABG (coronary artery bypass graft) (1996), and Thyroid disease.. The patient presented to the [facility] on 12/30/2023 with a primary complaint of SOB and CP SOB worsening past few weeks on HFNC  Know to have Lung disease and also know to have CAD. Now With SAUL nephrology consulted for evaluation.   ?    Review of Systems   Constitutional:  Positive for malaise/fatigue. Negative for chills and fever.   HENT:  Negative for congestion and sore throat.    Eyes:  Negative for blurred vision, double vision and photophobia.   Respiratory:  Positive for shortness of breath. Negative for cough.    Cardiovascular:  Positive for chest pain. Negative for palpitations and leg swelling.   Gastrointestinal:  Negative for abdominal pain, diarrhea, nausea and vomiting.   Genitourinary:  Negative for dysuria and urgency.   Musculoskeletal:  Negative for joint pain and myalgias.   Skin:  Negative for itching and rash.   Neurological:  Positive for weakness. Negative for dizziness, sensory change and headaches.   Endo/Heme/Allergies:  Negative for polydipsia. Does not bruise/bleed easily.   Psychiatric/Behavioral:  Negative for depression.        Past Medical History:   Diagnosis Date    Angina pectoris     Arthritis     CKD (chronic kidney disease)     Colon polyps 09/14/2018    Coronary artery disease     Diabetes mellitus     Diabetes mellitus, type 2     GERD (gastroesophageal reflux disease)     History of tobacco use     Hyperlipidemia     Hypertension      S/P CABG (coronary artery bypass graft) 1996    Thyroid disease      Past Surgical History:   Procedure Laterality Date    ATHERECTOMY, CORONARY N/A 11/10/2023    Procedure: Atherectomy-coronary. Shockwave balloon;  Surgeon: Scooby Sow MD;  Location: [facility] CATH LAB/EP;  Service: Cardiology;  Laterality: N/A;    CATARACT EXTRACTION Bilateral 3YRS    CLOSURE DEVICE  3/20/2023    Procedure: Placement of Closure Device;  Surgeon: Gordy Parmar MD;  Location: [facility] CATH LAB/EP;  Service: Cardiology;;    COLONOSCOPY W/ POLYPECTOMY  09/14/2018    CORONARY ANGIOGRAPHY N/A 4/11/2022    Procedure: ANGIOGRAM, CORONARY ARTERY;  Surgeon: Gordy Parmar MD;  Location: [facility] CATH LAB/EP;  Service: Cardiology;  Laterality: N/A;    CORONARY ANGIOGRAPHY N/A 11/10/2023    Procedure: ANGIOGRAM, CORONARY ARTERY;  Surgeon: Scooby Sow MD;  Location: [facility] CATH LAB/EP;  Service: Cardiology;  Laterality: N/A;    CORONARY ANGIOGRAPHY INCLUDING BYPASS GRAFTS WITH CATHETERIZATION OF LEFT HEART N/A 4/11/2022    Procedure: ANGIOGRAM, CORONARY, INCLUDING BYPASS GRAFT, WITH LEFT HEART CATHETERIZATION;  Surgeon: Gordy Parmar MD;  Location: [facility] CATH LAB/EP;  Service: Cardiology;  Laterality: N/A;    CORONARY ARTERY BYPASS GRAFT  1996    CORONARY BYPASS GRAFT ANGIOGRAPHY  4/20/2022    Procedure: Bypass graft study;  Surgeon: Gordy Parmar MD;  Location: [facility] CATH LAB/EP;  Service: Cardiology;;    CORONARY BYPASS GRAFT ANGIOGRAPHY  3/20/2023    Procedure: Bypass graft study;  Surgeon: Gordy Parmar MD;  Location: [facility] CATH LAB/EP;  Service: Cardiology;;    CORONARY BYPASS GRAFT ANGIOGRAPHY  11/10/2023    Procedure: Bypass graft study;  Surgeon: Scooby Sow MD;  Location: [facility] CATH LAB/EP;  Service: Cardiology;;    EYELID SURGERY  03/2012    IVUS, CORONARY  3/20/2023    Procedure: IVUS, Coronary;  Surgeon: Gordy Parmar MD;  Location: [facility] CATH LAB/EP;  Service: Cardiology;;    IVUS, CORONARY  11/10/2023     Procedure: IVUS, Coronary;  Surgeon: Scooby Sow MD;  Location: [facility] CATH LAB/EP;  Service: Cardiology;;    LEFT HEART CATHETERIZATION N/A 4/11/2022    Procedure: Left heart cath;  Surgeon: Gordy Parmar MD;  Location: [facility] CATH LAB/EP;  Service: Cardiology;  Laterality: N/A;    LEFT HEART CATHETERIZATION N/A 3/20/2023    Procedure: Left heart cath;  Surgeon: Gordy Parmar MD;  Location: [facility] CATH LAB/EP;  Service: Cardiology;  Laterality: N/A;    LEFT HEART CATHETERIZATION N/A 11/10/2023    Procedure: Left heart cath;  Surgeon: Scooby Sow MD;  Location: [facility] CATH LAB/EP;  Service: Cardiology;  Laterality: N/A;    PERCUTANEOUS TRANSLUMINAL BALLOON ANGIOPLASTY OF CORONARY ARTERY  4/20/2022    Procedure: Angioplasty-coronary;  Surgeon: Gordy Parmar MD;  Location: [facility] CATH LAB/EP;  Service: Cardiology;;    PERCUTANEOUS TRANSLUMINAL BALLOON ANGIOPLASTY OF CORONARY ARTERY  3/20/2023    Procedure: Angioplasty-coronary;  Surgeon: Gordy Parmar MD;  Location: [facility] CATH LAB/EP;  Service: Cardiology;;    PTCA, SINGLE VESSEL  11/10/2023    Procedure: PTCA, Single Vessel;  Surgeon: Scooby Sow MD;  Location: [facility] CATH LAB/EP;  Service: Cardiology;;    RIGHT HEART CATHETERIZATION Right 11/3/2021    Procedure: INSERTION, CATHETER, RIGHT HEART;  Surgeon: Temitope Rahman MD;  Location: [facility] CATH LAB/EP;  Service: Cardiology;  Laterality: Right;    STENT, DRUG ELUTING, SINGLE VESSEL, CORONARY  3/20/2023    Procedure: Stent, Drug Eluting, Single Vessel, Coronary;  Surgeon: Gordy Parmar MD;  Location: [facility] CATH LAB/EP;  Service: Cardiology;;    VEIN BYPASS SURGERY  1996    VEIN SURGERY Left 2017    PAD    VEIN SURGERY Right 2018    PAD     Family History   Problem Relation Age of Onset    Diabetes Mother     Diabetes Father     Diabetes Sister     Kidney disease Sister     Diabetes Brother     Diabetes Sister     Blindness Neg Hx     Glaucoma Neg Hx     Macular degeneration Neg Hx      Retinal detachment Neg Hx     Strabismus Neg Hx     Stroke Neg Hx     Thyroid disease Neg Hx     Cancer Neg Hx     Cataracts Neg Hx     Hypertension Neg Hx      Social History     Tobacco Use    Smoking status: Former     Current packs/day: 0.00     Average packs/day: 2.0 packs/day for 27.0 years (54.0 ttl pk-yrs)     Types: Cigarettes     Start date:      Quit date:      Years since quittin.0    Smokeless tobacco: Never   Substance Use Topics    Alcohol use: Yes     Comment: Occasionally has a drink    Drug use: No       Review of patient's allergies indicates:  No Known Allergies         OBJECTIVE:     Vital Signs (Most Recent)  Vitals:    24 0851 24 0942 24 0949 24 0952   BP: (!) 144/69 (!) 162/75 120/60 (!) 114/58   BP Location:       Patient Position:       Pulse: (!) 111 (!) 112 (!) 115 (!) 113   Resp: 17      Temp:       TempSrc:       SpO2: 96%  (!) 94% (!) 94%   Weight:       Height:             Date 24 0700 - 24 0659   Shift 5963-1483 1875-9688 7674-1000 24 Hour Total   INTAKE   P.O. 200   200   Shift Total(mL/kg) 200(3.1)   200(3.1)   OUTPUT   Urine(mL/kg/hr) 500   500   Shift Total(mL/kg) 500(7.7)   500(7.7)   Weight (kg) 65.1 65.1 65.1 65.1           Medications:   aspirin  81 mg Oral Daily    atorvastatin  80 mg Oral QHS    azithromycin  500 mg Oral Daily    cefTRIAXone (ROCEPHIN) IVPB  1 g Intravenous Q24H    clopidogreL  75 mg Oral Nightly    levothyroxine  100 mcg Oral Before breakfast    metoprolol succinate  25 mg Oral Daily    mirtazapine  7.5 mg Oral QHS    pantoprazole  40 mg Oral Daily    pirfenidone  801 mg Oral TID    tamsulosin  0.4 mg Oral QHS    vitamin D  1,000 Units Oral Daily           Physical Exam  Vitals and nursing note reviewed.   Constitutional:       General: He is not in acute distress.     Appearance: He is ill-appearing. He is not diaphoretic.   HENT:      Head: Normocephalic and atraumatic.      Mouth/Throat:      Pharynx: No  oropharyngeal exudate.   Eyes:      General: No scleral icterus.     Conjunctiva/sclera: Conjunctivae normal.      Pupils: Pupils are equal, round, and reactive to light.   Cardiovascular:      Rate and Rhythm: Normal rate and regular rhythm.      Heart sounds: Normal heart sounds. No murmur heard.  Pulmonary:      Effort: Pulmonary effort is normal. No respiratory distress.      Breath sounds: Wheezing, rhonchi and rales present.   Abdominal:      General: Bowel sounds are normal. There is no distension.      Palpations: Abdomen is soft.      Tenderness: There is no abdominal tenderness.   Musculoskeletal:         General: Normal range of motion.      Cervical back: Normal range of motion and neck supple.   Skin:     General: Skin is warm and dry.      Findings: No erythema.   Neurological:      Mental Status: He is alert and oriented to person, place, and time.      Cranial Nerves: No cranial nerve deficit.   Psychiatric:         Mood and Affect: Affect normal.         Cognition and Memory: Memory normal.         Judgment: Judgment normal.         Laboratory:  Recent Labs   Lab 12/30/23  1904 12/31/23  0712 01/01/24  0311   WBC 2.49* 3.89* 4.27   HGB 10.7* 10.5* 10.1*   HCT 34.3* 34.2* 32.2*   * 127* 129*   MONO 18.0*  CANCELED 8.0 7.0   EOSINOPHIL 0.0 0.0 2.0     Recent Labs   Lab 12/30/23  1436 12/31/23  0712 01/01/24  0310    142 141   K 4.1 4.4 4.2    107 106   CO2 20* 24 22*   BUN 24* 29* 44*   CREATININE 1.8* 2.0* 2.6*   CALCIUM 9.0 8.9 8.9   PHOS  --   --  4.0       Diagnostic Results:  X-Ray: Reviewed  US: Reviewed  Echo: Reviewed  ASSESSMENT/PLAN:     1. SAUL - on CKD3 DM/HT/CAD   --  UA + cast  most likely  ischemic ATN due to CAD.   Now with active CP on Heparin drip and medical management only due to high risk no LHC per cardiology.  -- Check US kidney   Bladder scan/US to rule out obstruction post void >250ml place tello.  -- Daily Renal Function Panel  -- Avoid Hypotension.  --  Renally dose all meds  -- Please avoid nephrotoxins, including NSAIDs, aminoglycosides, IV contrast (unless absolutely necessary), gadolinium, fleets and other phosphorous-based laxatives. Caution with antibiotics.    2. HTN (I10) - controlled   3. Anemia of chronic kidney disease       Recent Labs   Lab 12/30/23  1904 12/31/23  0712 01/01/24  0311   HGB 10.7* 10.5* 10.1*   HCT 34.3* 34.2* 32.2*   * 127* 129*       Iron -check levels   Lab Results   Component Value Date    IRON 51 10/12/2009    TIBC 373 10/12/2009    FERRITIN 62 10/12/2009       4. MBD    Recent Labs   Lab 01/01/24  0310   CALCIUM 8.9   PHOS 4.0     Recent Labs   Lab 12/31/23  0712 01/01/24  0310   MG 1.9 2.0       Lab Results   Component Value Date    PTH 23 10/05/2017    CALCIUM 8.9 01/01/2024    PHOS 4.0 01/01/2024     Lab Results   Component Value Date    [id number] 70 04/18/2018       Lab Results   Component Value Date    CO2 22 (L) 01/01/2024       5. Nutrition/Hypoalbuminemia   Recent Labs   Lab 12/30/23  1904 12/31/23  0712 01/01/24  0310   LABPROT 12.4  --   --    ALBUMIN  --  2.4* 2.2*     Nepro with meals TID       Thank you for allowing me to participate in care of your patient  With any question please call   Mable David MD     [facility]  PAULINE Pool MD,   OMD INDIA Zmaora MD E. V. Harmon, NP  [address]   [place], [address] [phone]  After hours answering service: [phone]

## 2024-01-01 NOTE — PLAN OF CARE
"Re-evaluated patient with Dr. Crisostomo at bedside to assess if he would benefit from being in the ICU. He remains on 30L and 60% FiO2, he does not feel worse as compared to earlier in the afternoon when we first saw him. He is sitting at the side of the bed but states he does that because of orthopnea and this is his baseline. Wife at bedside validates that.   We had an initial Goals of care discussion with wife who states that over Christmas, the whole family was gathered and they talked a little bit about the declining health of Mr. Allen (these discussions involved the patient).  Per the wife, she states Mr. Allen made it clear that he would not like to be sent to a nursing home and that he would also like no "heroic measures" to be made if his clinical status were to deteriorate.   We did not specifically breech the topic of intubation or CPR, but we did introduce the concept of home hospice service and palliative care and they were open to the idea of seeing them while inpatient.   Patient's wife was apprehensive about transfer to the ICU and would like to avoid that if possible, patient concurs. Our assessment is that despite the severity of his disease processes, his respiratory failure has been stable since this afternoon and he is still appropriate for the floor. We did discuss this with Dr. Sheikh and charge nurse on the floor. If any deterioration in clinical status, will have to re-address.   Please refer to consult note from earlier for detail on the medical plan from pulmonology.    Jaylin Elaine MD  [facility] Pulmonary Critical Care Medicine Fellow     "

## 2024-01-01 NOTE — ASSESSMENT & PLAN NOTE
Chronic, uncontrolled. Latest blood pressure and vitals reviewed-     Temp:  [36.7 °F (2.6 °C)-98.6 °F (37 °C)]   Pulse:  []   Resp:  [18-40]   BP: ()/(48-72)   SpO2:  [95 %-100 %] .   Home meds for hypertension were reviewed and noted below.   Hypertension Medications               amLODIPine (NORVASC) 5 MG tablet Take 5 mg by mouth once daily.    isosorbide mononitrate (IMDUR) 30 MG 24 hr tablet Take 1 tablet (30 mg total) by mouth once daily.    metoprolol succinate (TOPROL-XL) 100 MG 24 hr tablet Take 1 tablet (100 mg total) by mouth once daily.    nitroGLYCERIN (NITROSTAT) 0.4 MG SL tablet Place 1 tablet (0.4 mg total) under the tongue every 5 (five) minutes as needed for Chest pain.             Adjust BP medications as needed    Will utilize p.r.n. blood pressure medication only if patient's blood pressure greater than 180/110 and he develops symptoms such as worsening chest pain or shortness of breath.

## 2024-01-01 NOTE — ASSESSMENT & PLAN NOTE
-monitor creatinine= increased   -Monitor UOP and serial BMP and adjust therapy as needed. Renally dose meds. Avoid nephrotoxic medications and procedures.  -At baseline 1.5-1.8  -renal ultrasound on 01/01/2024= pending  -nephrology consulted= follow recommendations   - cardiac angiogram has been canceled by Cardiology

## 2024-01-01 NOTE — NURSING
Rapid Response Nurse Follow-up Note     Followed up with patient for proactive rounding.   No acute issues at this time. Patient resting in bedside chair. Patient reports improvement in SOB. O2 saturation 95% on Vapotherm 30L/50%. No chest pain at this time.   Plans for palliative care/hospice consult tomorrow AM.     Reviewed plan of care with bedside RNMonik .   Team will continue to follow.  Please call Rapid Response RN, Melvi Baird RN with any questions or concerns at [phone].

## 2024-01-01 NOTE — ASSESSMENT & PLAN NOTE
-last low-grade fever spike on 12/30/2023 afternoon so far   -no leukocytosis   -influenza a and B and COVID-19 on 12/30/2023= negative   -blood cultures x2 on 12/30/2023= remains negative so far  -urinalysis with reflex to culture on 12/31/2023= negative nitrites and leukocyte esterase, rare bacteria, 1 epithelial cell, 8 WBC  -monitor procalcitonin levels= elevated and increasing  -lactic acid on 12/31/2023= within normal limits   -Rocephin IV daily empirically.  Ordered on 12/30/2023   -Zithromax 500 mg p.o. daily empirically.  Ordered on 12/30/2023

## 2024-01-01 NOTE — SUBJECTIVE & OBJECTIVE
Interval History:  Patient awake alert in no acute distress.  Remains afebrile.    Complained of chest pain with some relief with nitro.  Still with dyspnea on exertion.  Still mildly tachypneic but in no acute distress.  Sitting in chair on side of the bed. Remains on Vapotherm.  Had an extensive discussion with pulmonologist last night who has spoken to patient and patient's wife.  Patient has pretty advanced pulmonary disease and is likely not a candidate for lung transplant either.  Pulmonary had Goals of care discussion with patient and patient's wife last night.  Family was open to the concept of home hospice and palliative care.  Patient's wife was apprehensive about transferred to the ICU last night and wanted to avoid that.  Patient also wanted to wait ICU transfer last night.  Therefore patient did not get upgraded.  Today I have had discussions with cardiologist Dr. Rod who has decided against left heart catheterization due to elevated creatinine and fear of patient not getting off the ventilator if intubated for procedure.  Patient may end up on dialysis.  Patient was also seen and examined along with nephrologist by bedside.  At this time conservative measures will be followed.  Discussed all of this with patient and patient's wife.  At this time patient's wife is amenable to a palliative care consult which was placed.        Review of Systems   Constitutional:  Positive for fatigue.   HENT: Negative.     Eyes: Negative.    Respiratory:  Positive for shortness of breath.    Cardiovascular:  Positive for chest pain.   Gastrointestinal: Negative.    Endocrine: Negative.    Genitourinary: Negative.    Musculoskeletal: Negative.    Skin: Negative.    Allergic/Immunologic: Negative.    Neurological: Negative.    Hematological: Negative.    Psychiatric/Behavioral: Negative.     All other systems reviewed and are negative.    Objective:     Vital Signs (Most Recent):  Temp: 98.6 °F (37 °C) (01/01/24  0803)  Pulse: (!) 113 (01/01/24 0952)  Resp: 17 (01/01/24 0851)  BP: (!) 114/58 (01/01/24 0952)  SpO2: (!) 94 % (01/01/24 0952) Vital Signs (24h Range):  Temp:  [36.7 °F (2.6 °C)-98.6 °F (37 °C)] 98.6 °F (37 °C)  Pulse:  [] 113  Resp:  [17-40] 17  SpO2:  [94 %-100 %] 94 %  BP: ()/(48-75) 114/58     Weight: 65.1 kg (143 lb 8.3 oz)  Body mass index is 24.64 kg/m².    Intake/Output Summary (Last 24 hours) at 1/1/2024 1024  Last data filed at 1/1/2024 0950  Gross per 24 hour   Intake 200 ml   Output 500 ml   Net -300 ml         Physical Exam  Vitals and nursing note reviewed.   Constitutional:       General: He is not in acute distress.     Appearance: Normal appearance.   HENT:      Head: Normocephalic and atraumatic.      Nose: Nose normal.      Mouth/Throat:      Mouth: Mucous membranes are moist.   Eyes:      Extraocular Movements: Extraocular movements intact.      Pupils: Pupils are equal, round, and reactive to light.   Cardiovascular:      Rate and Rhythm: Regular rhythm. Tachycardia present.      Pulses: Normal pulses.      Heart sounds: Normal heart sounds.   Pulmonary:      Effort: No respiratory distress.      Breath sounds: Wheezing and rhonchi present.      Comments: Tachypneic.  Course breath sounds bilaterally generalized  Abdominal:      General: Bowel sounds are normal. There is no distension.      Palpations: Abdomen is soft.      Tenderness: There is no abdominal tenderness.   Musculoskeletal:         General: Normal range of motion.      Cervical back: Neck supple.      Right lower leg: No edema.      Left lower leg: No edema.   Skin:     General: Skin is warm and dry.   Neurological:      General: No focal deficit present.      Mental Status: He is alert and oriented to person, place, and time. Mental status is at baseline.      Cranial Nerves: No cranial nerve deficit.   Psychiatric:         Mood and Affect: Mood normal.             Significant Labs: All pertinent labs within the past  24 hours have been reviewed.    Significant Imaging: I have reviewed all pertinent imaging results/findings within the past 24 hours.

## 2024-01-01 NOTE — PLAN OF CARE
Problem: Adult Inpatient Plan of Care  Goal: Plan of Care Review  Outcome: Ongoing, Progressing  Goal: Absence of Hospital-Acquired Illness or Injury  Outcome: Ongoing, Progressing  Goal: Optimal Comfort and Wellbeing  Outcome: Ongoing, Progressing     Problem: Diabetes Comorbidity  Goal: Blood Glucose Level Within Targeted Range  Outcome: Ongoing, Progressing     Problem: Fall Injury Risk  Goal: Absence of Fall and Fall-Related Injury  Outcome: Ongoing, Progressing     Problem: Gas Exchange Impaired  Goal: Optimal Gas Exchange  Outcome: Ongoing, Progressing     POC reviewed with patient. All questions and concerns reviewed. Vital signs stable throughout shift. For full assessment please refer to flowsheet. Fall/ safety precautions implemented & maintained. Bed locked in lowest position, bed alarm activated & audible, & call light in reach. Will continue to monitor.     Appointment is 09/20 will need a refill    Sent refill for patient.   29.5

## 2024-01-02 PROBLEM — Z51.5 PALLIATIVE CARE ENCOUNTER: Status: ACTIVE | Noted: 2024-01-01

## 2024-01-02 NOTE — PLAN OF CARE
Care plan reviewed with patient, Priyank, spouse at bedside, no respiratory distress noted, on room air.  per cardiac monitor, pt presents with 9 run of Vtach per telemetry monitor, Dr. Williamson notified, states to note Cardiology will evaluate pt tomorrow (1/2/24); pt denies any chest pain or any discomfort at this time. Education provided on medication effect and side effect, including insulin administration for hyperglycemia, voices understanding. Call light within his reach, no apparent distress noted, bed in low position, bed alarm on, educated on the importance of calling as needed, voices understanding, stable at this time.    Problem: Adult Inpatient Plan of Care  Goal: Plan of Care Review  Outcome: Ongoing, Progressing     Problem: Adult Inpatient Plan of Care  Goal: Absence of Hospital-Acquired Illness or Injury  Outcome: Ongoing, Progressing     Problem: Dysrhythmia (Acute Coronary Syndrome)  Goal: Normalized Cardiac Rhythm  Outcome: Ongoing, Progressing

## 2024-01-02 NOTE — SUBJECTIVE & OBJECTIVE
Interval History:  Patient awake alert in no acute distress.  Remains afebrile.    Still with dyspnea on exertion.  Still mildly tachypneic but in no acute distress. Family decided on hospice      Review of Systems   Constitutional:  Positive for fatigue.   HENT: Negative.     Eyes: Negative.    Respiratory:  Positive for shortness of breath.    Cardiovascular:  Positive for chest pain.   Gastrointestinal: Negative.    Endocrine: Negative.    Genitourinary: Negative.    Musculoskeletal: Negative.    Skin: Negative.    Allergic/Immunologic: Negative.    Neurological: Negative.    Hematological: Negative.    Psychiatric/Behavioral: Negative.     All other systems reviewed and are negative.    Objective:     Vital Signs (Most Recent):  Temp: 98.6 °F (37 °C) (01/02/24 1209)  Pulse: 110 (01/02/24 1442)  Resp: 20 (01/02/24 1505)  BP: (!) 146/76 (01/02/24 1442)  SpO2: 95 % (01/02/24 1209) Vital Signs (24h Range):  Temp:  [97.6 °F (36.4 °C)-100.5 °F (38.1 °C)] 98.6 °F (37 °C)  Pulse:  [104-167] 110  Resp:  [18-20] 20  SpO2:  [91 %-98 %] 95 %  BP: (122-157)/(63-76) 146/76     Weight: 64.9 kg (143 lb)  Body mass index is 24.55 kg/m².    Intake/Output Summary (Last 24 hours) at 1/2/2024 1632  Last data filed at 1/2/2024 0502  Gross per 24 hour   Intake 240 ml   Output 650 ml   Net -410 ml           Physical Exam  Vitals and nursing note reviewed.   Constitutional:       General: He is not in acute distress.     Appearance: Normal appearance.   HENT:      Head: Normocephalic and atraumatic.      Nose: Nose normal.      Mouth/Throat:      Mouth: Mucous membranes are moist.   Eyes:      Extraocular Movements: Extraocular movements intact.      Pupils: Pupils are equal, round, and reactive to light.   Cardiovascular:      Rate and Rhythm: Regular rhythm. Tachycardia present.      Pulses: Normal pulses.      Heart sounds: Normal heart sounds.   Pulmonary:      Effort: No respiratory distress.      Breath sounds: Wheezing and  rhonchi present.      Comments: Tachypneic.  Course breath sounds bilaterally generalized  Abdominal:      General: Bowel sounds are normal. There is no distension.      Palpations: Abdomen is soft.      Tenderness: There is no abdominal tenderness.   Musculoskeletal:         General: Normal range of motion.      Cervical back: Neck supple.      Right lower leg: No edema.      Left lower leg: No edema.   Skin:     General: Skin is warm and dry.   Neurological:      General: No focal deficit present.      Mental Status: He is alert and oriented to person, place, and time. Mental status is at baseline.      Cranial Nerves: No cranial nerve deficit.   Psychiatric:         Mood and Affect: Mood normal.             Significant Labs: All pertinent labs within the past 24 hours have been reviewed.    Significant Imaging: I have reviewed all pertinent imaging results/findings within the past 24 hours.

## 2024-01-02 NOTE — NURSING
RAPID RESPONSE NURSE PROACTIVE ROUNDING NOTE       Time of Visit: 0835    Patient up in chair, vapotherm 30L 60%, SpO2 >92%. Plan for palliative consult this AM and discharge home to hospice per family member at bedside. Will continue to monitor via chart checks.        Diagnosis: NSTEMI (non-ST elevated myocardial infarction)   has a past medical history of Angina pectoris, Arthritis, CKD (chronic kidney disease), Colon polyps, Coronary artery disease, Diabetes mellitus, Diabetes mellitus, type 2, GERD (gastroesophageal reflux disease), History of tobacco use, Hyperlipidemia, Hypertension, S/P CABG (coronary artery bypass graft), and Thyroid disease.    Last Vitals:  Temp: 99.5 °F (37.5 °C) (01/02 0458)  Pulse: 112 (01/02 0458)  Resp: 20 (01/02 0458)  BP: 147/70 (01/02 0458)  SpO2: 96 % (01/02 0458)    24 Hour Vitals Range:  Temp:  [97.6 °F (36.4 °C)-100.5 °F (38.1 °C)]   Pulse:  [110-167]   Resp:  [17-28]   BP: (114-162)/(58-75)   SpO2:  [91 %-98 %]         FOLLOW UP    Call back the Rapid Response NurseYenni at [phone] for additional questions or concerns.

## 2024-01-02 NOTE — SUBJECTIVE & OBJECTIVE
Interval History: Episode of severe CP overnight- resolved with nitro x2.  GOC discussion with pt and family at bedside.  Pt agreed to try morphine PRN dyspnea.     Past Medical History:   Diagnosis Date    Angina pectoris     Arthritis     CKD (chronic kidney disease)     Colon polyps 09/14/2018    Coronary artery disease     Diabetes mellitus     Diabetes mellitus, type 2     GERD (gastroesophageal reflux disease)     History of tobacco use     Hyperlipidemia     Hypertension     S/P CABG (coronary artery bypass graft) 1996    Thyroid disease        Past Surgical History:   Procedure Laterality Date    ATHERECTOMY, CORONARY N/A 11/10/2023    Procedure: Atherectomy-coronary. Shockwave balloon;  Surgeon: Scooby Sow MD;  Location: [facility] CATH LAB/EP;  Service: Cardiology;  Laterality: N/A;    CATARACT EXTRACTION Bilateral 3YRS    CLOSURE DEVICE  3/20/2023    Procedure: Placement of Closure Device;  Surgeon: Gordy Parmar MD;  Location: [facility] CATH LAB/EP;  Service: Cardiology;;    COLONOSCOPY W/ POLYPECTOMY  09/14/2018    CORONARY ANGIOGRAPHY N/A 4/11/2022    Procedure: ANGIOGRAM, CORONARY ARTERY;  Surgeon: Gordy Parmar MD;  Location: [facility] CATH LAB/EP;  Service: Cardiology;  Laterality: N/A;    CORONARY ANGIOGRAPHY N/A 11/10/2023    Procedure: ANGIOGRAM, CORONARY ARTERY;  Surgeon: Scooby Sow MD;  Location: [facility] CATH LAB/EP;  Service: Cardiology;  Laterality: N/A;    CORONARY ANGIOGRAPHY INCLUDING BYPASS GRAFTS WITH CATHETERIZATION OF LEFT HEART N/A 4/11/2022    Procedure: ANGIOGRAM, CORONARY, INCLUDING BYPASS GRAFT, WITH LEFT HEART CATHETERIZATION;  Surgeon: Gordy Parmar MD;  Location: [facility] CATH LAB/EP;  Service: Cardiology;  Laterality: N/A;    CORONARY ARTERY BYPASS GRAFT  1996    CORONARY BYPASS GRAFT ANGIOGRAPHY  4/20/2022    Procedure: Bypass graft study;  Surgeon: Gordy Parmar MD;  Location: [facility] CATH LAB/EP;  Service: Cardiology;;    CORONARY BYPASS GRAFT ANGIOGRAPHY  3/20/2023     Procedure: Bypass graft study;  Surgeon: Gordy Parmar MD;  Location: [facility] CATH LAB/EP;  Service: Cardiology;;    CORONARY BYPASS GRAFT ANGIOGRAPHY  11/10/2023    Procedure: Bypass graft study;  Surgeon: Scooby Sow MD;  Location: [facility] CATH LAB/EP;  Service: Cardiology;;    EYELID SURGERY  03/2012    IVUS, CORONARY  3/20/2023    Procedure: IVUS, Coronary;  Surgeon: Gordy Parmar MD;  Location: [facility] CATH LAB/EP;  Service: Cardiology;;    IVUS, CORONARY  11/10/2023    Procedure: IVUS, Coronary;  Surgeon: Scooby Sow MD;  Location: [facility] CATH LAB/EP;  Service: Cardiology;;    LEFT HEART CATHETERIZATION N/A 4/11/2022    Procedure: Left heart cath;  Surgeon: Gordy Parmar MD;  Location: [facility] CATH LAB/EP;  Service: Cardiology;  Laterality: N/A;    LEFT HEART CATHETERIZATION N/A 3/20/2023    Procedure: Left heart cath;  Surgeon: Gordy Parmar MD;  Location: [facility] CATH LAB/EP;  Service: Cardiology;  Laterality: N/A;    LEFT HEART CATHETERIZATION N/A 11/10/2023    Procedure: Left heart cath;  Surgeon: Scooby Sow MD;  Location: [facility] CATH LAB/EP;  Service: Cardiology;  Laterality: N/A;    PERCUTANEOUS TRANSLUMINAL BALLOON ANGIOPLASTY OF CORONARY ARTERY  4/20/2022    Procedure: Angioplasty-coronary;  Surgeon: Gordy Parmar MD;  Location: [facility] CATH LAB/EP;  Service: Cardiology;;    PERCUTANEOUS TRANSLUMINAL BALLOON ANGIOPLASTY OF CORONARY ARTERY  3/20/2023    Procedure: Angioplasty-coronary;  Surgeon: Gordy Parmar MD;  Location: [facility] CATH LAB/EP;  Service: Cardiology;;    PTCA, SINGLE VESSEL  11/10/2023    Procedure: PTCA, Single Vessel;  Surgeon: Scooby Sow MD;  Location: [facility] CATH LAB/EP;  Service: Cardiology;;    RIGHT HEART CATHETERIZATION Right 11/3/2021    Procedure: INSERTION, CATHETER, RIGHT HEART;  Surgeon: Temitope Rahman MD;  Location: [facility] CATH LAB/EP;  Service: Cardiology;  Laterality: Right;    STENT, DRUG ELUTING, SINGLE VESSEL, CORONARY  3/20/2023     Procedure: Stent, Drug Eluting, Single Vessel, Coronary;  Surgeon: Gordy Parmar MD;  Location: [facility] CATH LAB/EP;  Service: Cardiology;;    VEIN BYPASS SURGERY  1996    VEIN SURGERY Left 2017    PAD    VEIN SURGERY Right 2018    PAD       Review of patient's allergies indicates:  No Known Allergies    Medications:  Continuous Infusions:   heparin (porcine) in D5W 9 Units/kg/hr (24 0744)     Scheduled Meds:   aspirin  81 mg Oral Daily    atorvastatin  80 mg Oral QHS    azithromycin  500 mg Oral Daily    cefTRIAXone (ROCEPHIN) IVPB  1 g Intravenous Q24H    clopidogreL  75 mg Oral Nightly    levothyroxine  100 mcg Oral Before breakfast    metoprolol succinate  25 mg Oral Daily    mirtazapine  7.5 mg Oral QHS    pantoprazole  40 mg Oral Daily    pirfenidone  801 mg Oral TID    tamsulosin  0.4 mg Oral QHS    vitamin D  1,000 Units Oral Daily     PRN Meds:acetaminophen, acetaminophen, albuterol-ipratropium, bisacodyL, dextrose 10%, dextrose 10%, glucagon (human recombinant), guaiFENesin 100 mg/5 ml, heparin (PORCINE), heparin (PORCINE), insulin aspart U-100, iohexoL, morphine, nitroGLYCERIN, ondansetron, polyethylene glycol    Family History       Problem Relation (Age of Onset)    Diabetes Mother, Father, Sister, Brother, Sister    Kidney disease Sister          Tobacco Use    Smoking status: Former     Current packs/day: 0.00     Average packs/day: 2.0 packs/day for 27.0 years (54.0 ttl pk-yrs)     Types: Cigarettes     Start date:      Quit date:      Years since quittin.0    Smokeless tobacco: Never   Substance and Sexual Activity    Alcohol use: Yes     Comment: Occasionally has a drink    Drug use: No    Sexual activity: Yes     Partners: Female       Review of Systems   Constitutional:  Positive for activity change and fatigue.   HENT: Negative.     Eyes: Negative.    Respiratory:  Positive for cough, chest tightness and shortness of breath.    Cardiovascular:  Positive for chest pain.    Gastrointestinal: Negative.    Genitourinary: Negative.    Musculoskeletal: Negative.    Neurological:  Positive for weakness.     Objective:     Vital Signs (Most Recent):  Temp: 98.6 °F (37 °C) (01/02/24 1209)  Pulse: 110 (01/02/24 1442)  Resp: 18 (01/02/24 1209)  BP: (!) 146/76 (01/02/24 1442)  SpO2: 95 % (01/02/24 1209) Vital Signs (24h Range):  Temp:  [97.6 °F (36.4 °C)-100.5 °F (38.1 °C)] 98.6 °F (37 °C)  Pulse:  [104-167] 110  Resp:  [18-28] 18  SpO2:  [91 %-98 %] 95 %  BP: (122-157)/(63-76) 146/76     Weight: 64.9 kg (143 lb)  Body mass index is 24.55 kg/m².       Physical Exam  Vitals and nursing note reviewed.   Constitutional:       Appearance: He is normal weight. He is ill-appearing. He is not toxic-appearing.      Comments: HFNC   HENT:      Head: Normocephalic.      Nose: Nose normal.      Mouth/Throat:      Mouth: Mucous membranes are dry.   Cardiovascular:      Rate and Rhythm: Tachycardia present.   Pulmonary:      Effort: Tachypnea present.      Breath sounds: Decreased air movement present. Rhonchi present.      Comments: Conversational dyspnea   Musculoskeletal:         General: Normal range of motion.      Right lower leg: No edema.      Left lower leg: No edema.   Skin:     General: Skin is warm and dry.   Neurological:      General: No focal deficit present.      Mental Status: He is alert and oriented to person, place, and time.      Motor: Weakness present.      Comments: Hard of hearing    Psychiatric:         Mood and Affect: Mood normal.         Behavior: Behavior normal. Behavior is cooperative.         Thought Content: Thought content normal.         Judgment: Judgment normal.            Review of Symptoms      Symptom Assessment (ESAS 0-10 Scale)  Pain:  0  Dyspnea:  6  Anxiety:  0  Nausea:  0  Depression:  0  Anorexia:  0  Fatigue:  5  Insomnia:  0  Restlessness:  0  Agitation:  0         Performance Status:  40    Living Arrangements:  Lives with spouse and Lives in  home    Psychosocial/Cultural:   See Palliative Psychosocial Note: No  Social Issues Identified: Coping deficit pt/family and New Diagnosis/Trauma  Bereavement Risk: No  Caregiver Needs Discussed. Caregiver Distress: Yes: Issues of guilt, Intensity of family caregiving, and Caregiver Burnout Risk  Cultural: none identified at this time   **Primary  to Follow**  Palliative Care  Consult: No    Spiritual:  C - Community:  Family support      Time-Based Charting:  Yes  Chart Review: 17 minutes  Face to Face: 35 minutes  Symptom Assessment: 9 minutes  Coordination of Care: 8 minutes  Discharge Plannin minutes  Advance Care Plannin minutes  Goals of Care: 28 minutes    Total Time Spent: 128 minutes        Advance Care Planning   Advance Directives:   Living Will: No    LaPOST: No    Do Not Resuscitate Status: Yes    Medical Power of : Yes    Agent's Name:  Fausto Allen   Agent's Contact Number:  [phone]    Decision Making:  Patient answered questions and Family answered questions  Goals of Care: The patient and family endorses that what is most important right now is to focus on spending time at home, avoiding the hospital, remaining as independent as possible, symptom/pain control, quality of life, even if it means sacrificing a little time, and comfort and QOL     Accordingly, we have decided that the best plan to meet the patient's goals includes enrolling in hospice care at time of discharge.          Significant Labs: All pertinent labs within the past 24 hours have been reviewed.  CBC:   Recent Labs   Lab 24  0331   WBC 5.56   HGB 10.1*   HCT 33.2*   MCV 87        BMP:  Recent Labs   Lab 24  0331   *  309*     139   K 4.0  4.1     103   CO2 21*  21*   BUN 30*  30*   CREATININE 1.8*  1.8*   CALCIUM 9.1  9.2   MG 2.2     LFT:  Lab Results   Component Value Date    AST 40 2024    ALKPHOS 113 2024    BILITOT 0.4  01/02/2024     Albumin:   Albumin   Date Value Ref Range Status   01/02/2024 2.1 (L) 3.5 - 5.2 g/dL Final   01/02/2024 2.1 (L) 3.5 - 5.2 g/dL Final     Protein:   Total Protein   Date Value Ref Range Status   01/02/2024 7.3 6.0 - 8.4 g/dL Final     Lactic acid:   Lab Results   Component Value Date    LACTATE 1.2 12/31/2023       Significant Imaging: I have reviewed all pertinent imaging results/findings within the past 24 hours.

## 2024-01-02 NOTE — CODE/ RAPID DOCUMENTATION
Rapid Response Nurse Follow-up Note     Followed up with patient for proactive rounding.   No acute issues at this time. Pt reports no CP currently. Reviewed plan of care with bedside RNMonik .   Team will continue to follow.  Please call Rapid Response RN, Janiya Lan RN with any questions or concerns at N Phone [phone].

## 2024-01-02 NOTE — PROGRESS NOTES
Progress Note  Nephrology      Consult Requested By: Jyotsna Rodriguez MD      SUBJECTIVE:     Overnight events  Patient is a 73 y.o. male     Patient Active Problem List   Diagnosis    Blepharoptosis    Dermatochalasis    Coronary artery disease involving native coronary artery of native heart with refractory angina pectoris    S/P CABG (coronary artery bypass graft)    Chest pain    Essential hypertension    Hyperlipidemia    CKD (chronic kidney disease) stage 3, GFR 30-59 ml/min    Fibrosis of lung    History of GI diverticular bleed    Bilateral carotid artery disease    Right carotid bruit    Heart murmur    PAD (peripheral artery disease)    Acquired hypothyroidism    Intermittent claudication    Type 2 diabetes mellitus with stage 3a chronic kidney disease, without long-term current use of insulin    GI bleeding    Diverticular disease    Overweight (BMI 25.0-29.9)    Proteinuria    Calculus of kidney and ureter    Abdominal aortic atherosclerosis    Internal carotid artery occlusion, right    Pulmonary hypertension    Rheumatoid arthritis with rheumatoid factor, unspecified    Chronic obstructive pulmonary disease, unspecified COPD type    Coronary artery disease involving native coronary artery of native heart without angina pectoris    Left shoulder pain    Stiffness of left shoulder joint    History of coronary artery stent placement    Demand ischemia of myocardium    Chronic hypoxic respiratory failure, on home oxygen therapy    SAUL (acute kidney injury)    Acute on chronic respiratory failure with hypoxia    NSTEMI (non-ST elevated myocardial infarction)    Fever    ILD (interstitial lung disease)     Past Medical History:   Diagnosis Date    Angina pectoris     Arthritis     CKD (chronic kidney disease)     Colon polyps 09/14/2018    Coronary artery disease     Diabetes mellitus     Diabetes mellitus, type 2     GERD (gastroesophageal reflux disease)     History of tobacco use     Hyperlipidemia      Hypertension     S/P CABG (coronary artery bypass graft) 1996    Thyroid disease               OBJECTIVE:     Vitals:    01/02/24 0423 01/02/24 0458 01/02/24 0917 01/02/24 1209   BP:  (!) 147/70 (!) 157/72 (!) 146/76   BP Location:  Left arm Left arm Left arm   Patient Position:  Lying Lying Sitting   Pulse: (!) 115 (!) 112 104 (!) 117   Resp:  20 18 18   Temp:  99.5 °F (37.5 °C) 98.5 °F (36.9 °C) 98.6 °F (37 °C)   TempSrc:  Oral Oral Oral   SpO2:  96% 95% 95%   Weight:       Height:           Temp: 98.6 °F (37 °C) (01/02/24 1209)  Pulse: (!) 117 (01/02/24 1209)  Resp: 18 (01/02/24 1209)  BP: (!) 146/76 (01/02/24 1209)  SpO2: 95 % (01/02/24 1209)              Medications:   aspirin  81 mg Oral Daily    atorvastatin  80 mg Oral QHS    azithromycin  500 mg Oral Daily    cefTRIAXone (ROCEPHIN) IVPB  1 g Intravenous Q24H    clopidogreL  75 mg Oral Nightly    levothyroxine  100 mcg Oral Before breakfast    metoprolol succinate  25 mg Oral Daily    mirtazapine  7.5 mg Oral QHS    pantoprazole  40 mg Oral Daily    pirfenidone  801 mg Oral TID    tamsulosin  0.4 mg Oral QHS    vitamin D  1,000 Units Oral Daily      heparin (porcine) in D5W 9 Units/kg/hr (01/01/24 0744)               Physical Exam:  General appearance:SOB  Lungs: RR 20  Pulse oximeter 95 %  Heart: pulse 110  Abdomen: soft  Extremities: edema      Laboratory:  ABG  Labs reviewed  No results found for this or any previous visit (from the past 336 hour(s)).  Recent Results (from the past 336 hour(s))   CBC auto differential    Collection Time: 01/02/24  3:31 AM   Result Value Ref Range    WBC 5.56 3.90 - 12.70 K/uL    Hemoglobin 10.1 (L) 14.0 - 18.0 g/dL    Hematocrit 33.2 (L) 40.0 - 54.0 %    Platelets 155 150 - 450 K/uL   CBC auto differential    Collection Time: 01/01/24  3:11 AM   Result Value Ref Range    WBC 4.27 3.90 - 12.70 K/uL    Hemoglobin 10.1 (L) 14.0 - 18.0 g/dL    Hematocrit 32.2 (L) 40.0 - 54.0 %    Platelets 129 (L) 150 - 450 K/uL   CBC auto  "differential    Collection Time: 12/31/23  7:12 AM   Result Value Ref Range    WBC 3.89 (L) 3.90 - 12.70 K/uL    Hemoglobin 10.5 (L) 14.0 - 18.0 g/dL    Hematocrit 34.2 (L) 40.0 - 54.0 %    Platelets 127 (L) 150 - 450 K/uL     Urinalysis  No results for input(s): "COLORU", "CLARITYU", "SPECGRAV", "PHUR", "PROTEINUA", "GLUCOSEU", "BILIRUBINCON", "BLOODU", "WBCU", "RBCU", "BACTERIA", "MUCUS", "NITRITE", "LEUKOCYTESUR", "UROBILINOGEN", "HYALINECASTS" in the last 24 hours.    Diagnostic Results:  X-Ray: Reviewed  US: Reviewed  Echo: Reviewed  ACCESS    ASSESSMENT/PLAN:     SAUL/ CKD 3  Diabetes mellitus  UO 1350 cc  UA protein 3+, blood 2+  Creatinine 1.8  GFR 39 cc/min  BUN 30  Metabolic acidosis  Metabolic bone disease  Poor nutrition  Albumin 2.1  Anemia Hb 10.1  Iron 11  Blood pressure 146/76    EF 55 %  Weight daily  I and O  Avoid nephrotoxic agents, hypotension, hypovolemia    Renal diet as tolerated  "

## 2024-01-02 NOTE — HPI
"Per chart, " 73 y.o. male with significant past medical history of ILD on 4L NC at home, Angina pectoris, Arthritis, CKD (chronic kidney disease), Colon polyps (09/14/2018), Coronary artery disease, Diabetes mellitus, Diabetes mellitus, type 2, GERD (gastroesophageal reflux disease), History of tobacco use, Hyperlipidemia, Hypertension, S/P CABG (coronary artery bypass graft) (1996), and Thyroid disease presented to the ER from urgent care for hypoxia and SOB.  COVID and flu negative at .  Pt also vomited a few times in route, denies abdominal pain or chest pain.  Pt's wife reported low grade fever at home.  No sick contacts.  Pt denies chills, diaphoresis, nausea, cough, vocal changes, globus sensation, leg swelling or pain, fatigue, weakness, confusion, syncope.  Of note 11/11 cath lab severe native CAD, patent LIMA-LAD, recurrent ISR to SVG-BARON s/p succesfful PTCA to SVG-BARON branch recurrent ISR.  IVUS used.  Under expanded stent.  PTCA done with 3.0 scoring balloon followed by PTCA with 2.0 shockwave balloon followed by 3.5 and 4.0 NC balloon.  Pt advised to continue asa/plavix noting pt at risk for recurrent ISR and cards discussed option next time is to attempt opening native RCA  and coil the SVG graft given recurrent failures and recurrent attempts.     In the ED, T max 100.7 F.  Pt on 40L vapotherm 60% FiO2.  EKG Sinus Tach with PAC.  pH 7.368.  Troponin 0.387 compared to last (0.020 when admitted for HTN).   compared to last 90.  Pt given cefepime/vanc.  ED spoke with cardiology who recommended heparin drip.  CBC with thrombocytopenia.  CMP no significant electrolyte abnormalities and kidney function at baseline.  CXR Chronic appearing changes of the lungs which are overall more conspicuous from comparison radiograph 10/25/2023.  Additional consolidative opacity about the left lung base.  Considerations include atelectasis, noting that superimposed infectious or non infectious inflammatory " "infiltrate and pulmonary edema remain considerations.  CT chest/abdomen/pelvis pending. Pt remained on vapotherm, IV heparin drip initiated.     Pt follows with Dr. Barahona at [facility] for his pulmonary issues as an outpatient. He states that he has been on different treatments and that most of them have failed or caused significant side effects. He is currently taking pirfenidone and denies any major side effects to it. He wa referred to [facility] for Lung transplant evaluation but deemed not to be a candidate.     Pt seen by cardiology, "discussion with pt and his wife about the possibility of coronary angiogram and the risks of dialysis is significant increased and possibility of intubation. We have decided for medical management given his guarded prognosis and DNR status "  Pt followed by pulm/crit as well.  Introduction of hospice vs palliative care discussed. Palliative care consulted for Goals of care/ advanced care planning.      "

## 2024-01-02 NOTE — CONSULTS
[place] - Telemetry  Palliative Medicine  Consult Note    Patient Name: Jose Antonio Allen  MRN: [id number]  Admission Date: 12/30/2023  Hospital Length of Stay: 2 days  Code Status: DNR   Attending Provider: Jyotsna Rodriguez MD  Consulting Provider: Ebony Neri NP  Primary Care Physician: Abilio Weber MD  Principal Problem:NSTEMI (non-ST elevated myocardial infarction)    Patient information was obtained from patient, spouse/SO, relative(s), past medical records, and primary team.      Inpatient consult to Palliative Care  Consult performed by: Ebony Neri NP  Consult ordered by: Shawn Sheikh MD  Reason for consult: Goals of care/ advanced care planning        Assessment/Plan:     Palliative Care  Palliative care encounter  Mr. Allen is a 73 y/oM with a PMX of Angina pectoris, Arthritis, CKD, Colon polyps , Coronary artery disease, Diabetes mellitus, Diabetes mellitus, type 2, GERD, Hyperlipidemia, Hypertension, S/P CABG (1996), and Thyroid disease.. The patient presented to the [facility] on 12/30/2023 with a primary complaint of Shortness of Breath (Seen at Urgent care with c/o SOB. Sent here for further eval. States that he has been coughing at night, SOB today. Denies pain. Presents awake, alert with O2 via NRB - c/o nausea/) FLU and Covid negative.  Pt is SOB at baseline, this is worse than normal. Does endorse some orthopnea. He follows with Dr. Barahona at [facility] for his pulmonary issues as an outpatient.  He is currently taking pirfenidone and denies any major side effects to it. During this admission, XR revealed his chronic ILD changes but also some worsening LLL opacity.  Was found to be hypoxic on nasal cannula and put on HFNC. Coronary angiogram deemed risky and not in line with pts goals at this time. Stated to his wife that he would like to go home as he feel he is close to dying.  Palliative care consulted for goals of care/ advanced care planning.     -Pt seen with family  "present at the bedside including wife, daughter, and son.  Overall update of pts admission given by family.  Family with understanding of pts clinical trajectory and disease projection.  All questions addressed at this time.  Family understands that treatment options are quite limited and pt has voiced to family that he would like to go home and does not wish for heroic measures in this phase of his life.    -Goals of care discussed.  Family places highest priority on pts comfort and ability to be at home.  Discussed current increased O2 requirements and need for a home AirVo machine to meet pts needs.  Further introduced the topic of home hospice and how this option may help family meet their goals.  All questions addressed at this time. Family also discussed options with cardiology and pulmonology.  Family has decided to focus on pts comfort and quality of life moving forward.   -Discussed pts current air hunger. Morphine liquid solution 2.5 mg PO Q2H PRN dyspnea ordered by writer.  Pt initially concerned about addictive potential.  Encouraged pt to try medication given heavy symptom load. Pt and family agreeable for pt to trail dose at this time.  -Family agreeable to meet with home hospice company. CM and attending MD notified of plan.  Pt will have completed 72 hours of heparin gtt this evening.  Do not anticipate further AC needs.  -Pt remains a DNR.   -Will follow up with pt tomorrow.   -Family with additional questions, pulmonology team met with family and reviewed images. Family still struggling to grasp pts decline and increased O2 needs but appears to have better understanding of O2 requirements.  Family requesting to hear from pts long time pulmonologist.  Dr Ramirez reached out.  Family still wishes to meet with hospice nurse.         Thank you for your consult. I will follow-up with patient. Please contact us if you have any additional questions.    Subjective:     HPI:   Per chart, " 73 y.o. male with " significant past medical history of ILD on 4L NC at home, Angina pectoris, Arthritis, CKD (chronic kidney disease), Colon polyps (09/14/2018), Coronary artery disease, Diabetes mellitus, Diabetes mellitus, type 2, GERD (gastroesophageal reflux disease), History of tobacco use, Hyperlipidemia, Hypertension, S/P CABG (coronary artery bypass graft) (1996), and Thyroid disease presented to the ER from urgent care for hypoxia and SOB.  COVID and flu negative at .  Pt also vomited a few times in route, denies abdominal pain or chest pain.  Pt's wife reported low grade fever at home.  No sick contacts.  Pt denies chills, diaphoresis, nausea, cough, vocal changes, globus sensation, leg swelling or pain, fatigue, weakness, confusion, syncope.  Of note 11/11 cath lab severe native CAD, patent LIMA-LAD, recurrent ISR to SVG-BARON s/p succesfful PTCA to SVG-BARON branch recurrent ISR.  IVUS used.  Under expanded stent.  PTCA done with 3.0 scoring balloon followed by PTCA with 2.0 shockwave balloon followed by 3.5 and 4.0 NC balloon.  Pt advised to continue asa/plavix noting pt at risk for recurrent ISR and cards discussed option next time is to attempt opening native RCA  and coil the SVG graft given recurrent failures and recurrent attempts.     In the ED, T max 100.7 F.  Pt on 40L vapotherm 60% FiO2.  EKG Sinus Tach with PAC.  pH 7.368.  Troponin 0.387 compared to last (0.020 when admitted for HTN).   compared to last 90.  Pt given cefepime/vanc.  ED spoke with cardiology who recommended heparin drip.  CBC with thrombocytopenia.  CMP no significant electrolyte abnormalities and kidney function at baseline.  CXR Chronic appearing changes of the lungs which are overall more conspicuous from comparison radiograph 10/25/2023.  Additional consolidative opacity about the left lung base.  Considerations include atelectasis, noting that superimposed infectious or non infectious inflammatory infiltrate and pulmonary edema  "remain considerations.  CT chest/abdomen/pelvis pending. Pt remained on vapotherm, IV heparin drip initiated.     Pt follows with Dr. Barahona at [facility] for his pulmonary issues as an outpatient. He states that he has been on different treatments and that most of them have failed or caused significant side effects. He is currently taking pirfenidone and denies any major side effects to it. He wa referred to [facility] for Lung transplant evaluation but deemed not to be a candidate.     Pt seen by cardiology, "discussion with pt and his wife about the possibility of coronary angiogram and the risks of dialysis is significant increased and possibility of intubation. We have decided for medical management given his guarded prognosis and DNR status "  Pt followed by pulm/crit as well.  Introduction of hospice vs palliative care discussed. Palliative care consulted for Goals of care/ advanced care planning.        Hospital Course:  No notes on file    Interval History: Episode of severe CP overnight- resolved with nitro x2.  GOC discussion with pt and family at bedside.  Pt agreed to try morphine PRN dyspnea.     Past Medical History:   Diagnosis Date    Angina pectoris     Arthritis     CKD (chronic kidney disease)     Colon polyps 09/14/2018    Coronary artery disease     Diabetes mellitus     Diabetes mellitus, type 2     GERD (gastroesophageal reflux disease)     History of tobacco use     Hyperlipidemia     Hypertension     S/P CABG (coronary artery bypass graft) 1996    Thyroid disease        Past Surgical History:   Procedure Laterality Date    ATHERECTOMY, CORONARY N/A 11/10/2023    Procedure: Atherectomy-coronary. Shockwave balloon;  Surgeon: Scooby Sow MD;  Location: [facility] CATH LAB/EP;  Service: Cardiology;  Laterality: N/A;    CATARACT EXTRACTION Bilateral 3YRS    CLOSURE DEVICE  3/20/2023    Procedure: Placement of Closure Device;  Surgeon: Gordy Parmar MD;  Location: [facility] CATH LAB/EP;  " Service: Cardiology;;    COLONOSCOPY W/ POLYPECTOMY  09/14/2018    CORONARY ANGIOGRAPHY N/A 4/11/2022    Procedure: ANGIOGRAM, CORONARY ARTERY;  Surgeon: Gordy Parmar MD;  Location: [facility] CATH LAB/EP;  Service: Cardiology;  Laterality: N/A;    CORONARY ANGIOGRAPHY N/A 11/10/2023    Procedure: ANGIOGRAM, CORONARY ARTERY;  Surgeon: Scooby Sow MD;  Location: [facility] CATH LAB/EP;  Service: Cardiology;  Laterality: N/A;    CORONARY ANGIOGRAPHY INCLUDING BYPASS GRAFTS WITH CATHETERIZATION OF LEFT HEART N/A 4/11/2022    Procedure: ANGIOGRAM, CORONARY, INCLUDING BYPASS GRAFT, WITH LEFT HEART CATHETERIZATION;  Surgeon: Gordy Parmar MD;  Location: [facility] CATH LAB/EP;  Service: Cardiology;  Laterality: N/A;    CORONARY ARTERY BYPASS GRAFT  1996    CORONARY BYPASS GRAFT ANGIOGRAPHY  4/20/2022    Procedure: Bypass graft study;  Surgeon: Gordy Parmar MD;  Location: [facility] CATH LAB/EP;  Service: Cardiology;;    CORONARY BYPASS GRAFT ANGIOGRAPHY  3/20/2023    Procedure: Bypass graft study;  Surgeon: Gordy Parmar MD;  Location: [facility] CATH LAB/EP;  Service: Cardiology;;    CORONARY BYPASS GRAFT ANGIOGRAPHY  11/10/2023    Procedure: Bypass graft study;  Surgeon: Scooby Sow MD;  Location: [facility] CATH LAB/EP;  Service: Cardiology;;    EYELID SURGERY  03/2012    IVUS, CORONARY  3/20/2023    Procedure: IVUS, Coronary;  Surgeon: Gordy Parmar MD;  Location: [facility] CATH LAB/EP;  Service: Cardiology;;    IVUS, CORONARY  11/10/2023    Procedure: IVUS, Coronary;  Surgeon: Scooby Sow MD;  Location: [facility] CATH LAB/EP;  Service: Cardiology;;    LEFT HEART CATHETERIZATION N/A 4/11/2022    Procedure: Left heart cath;  Surgeon: Gordy Parmar MD;  Location: [facility] CATH LAB/EP;  Service: Cardiology;  Laterality: N/A;    LEFT HEART CATHETERIZATION N/A 3/20/2023    Procedure: Left heart cath;  Surgeon: Gordy Parmar MD;  Location: [facility] CATH LAB/EP;  Service: Cardiology;  Laterality: N/A;    LEFT HEART  CATHETERIZATION N/A 11/10/2023    Procedure: Left heart cath;  Surgeon: Scooby Sow MD;  Location: [facility] CATH LAB/EP;  Service: Cardiology;  Laterality: N/A;    PERCUTANEOUS TRANSLUMINAL BALLOON ANGIOPLASTY OF CORONARY ARTERY  4/20/2022    Procedure: Angioplasty-coronary;  Surgeon: Gordy Parmar MD;  Location: [facility] CATH LAB/EP;  Service: Cardiology;;    PERCUTANEOUS TRANSLUMINAL BALLOON ANGIOPLASTY OF CORONARY ARTERY  3/20/2023    Procedure: Angioplasty-coronary;  Surgeon: Gordy Parmar MD;  Location: [facility] CATH LAB/EP;  Service: Cardiology;;    PTCA, SINGLE VESSEL  11/10/2023    Procedure: PTCA, Single Vessel;  Surgeon: Scooby Sow MD;  Location: [facility] CATH LAB/EP;  Service: Cardiology;;    RIGHT HEART CATHETERIZATION Right 11/3/2021    Procedure: INSERTION, CATHETER, RIGHT HEART;  Surgeon: Temitope Rahman MD;  Location: [facility] CATH LAB/EP;  Service: Cardiology;  Laterality: Right;    STENT, DRUG ELUTING, SINGLE VESSEL, CORONARY  3/20/2023    Procedure: Stent, Drug Eluting, Single Vessel, Coronary;  Surgeon: Gordy Parmar MD;  Location: [facility] CATH LAB/EP;  Service: Cardiology;;    VEIN BYPASS SURGERY  1996    VEIN SURGERY Left 2017    PAD    VEIN SURGERY Right 2018    PAD       Review of patient's allergies indicates:  No Known Allergies    Medications:  Continuous Infusions:   heparin (porcine) in D5W 9 Units/kg/hr (01/01/24 0744)     Scheduled Meds:   aspirin  81 mg Oral Daily    atorvastatin  80 mg Oral QHS    azithromycin  500 mg Oral Daily    cefTRIAXone (ROCEPHIN) IVPB  1 g Intravenous Q24H    clopidogreL  75 mg Oral Nightly    levothyroxine  100 mcg Oral Before breakfast    metoprolol succinate  25 mg Oral Daily    mirtazapine  7.5 mg Oral QHS    pantoprazole  40 mg Oral Daily    pirfenidone  801 mg Oral TID    tamsulosin  0.4 mg Oral QHS    vitamin D  1,000 Units Oral Daily     PRN Meds:acetaminophen, acetaminophen, albuterol-ipratropium, bisacodyL, dextrose 10%, dextrose 10%, glucagon  (human recombinant), guaiFENesin 100 mg/5 ml, heparin (PORCINE), heparin (PORCINE), insulin aspart U-100, iohexoL, morphine, nitroGLYCERIN, ondansetron, polyethylene glycol    Family History       Problem Relation (Age of Onset)    Diabetes Mother, Father, Sister, Brother, Sister    Kidney disease Sister          Tobacco Use    Smoking status: Former     Current packs/day: 0.00     Average packs/day: 2.0 packs/day for 27.0 years (54.0 ttl pk-yrs)     Types: Cigarettes     Start date:      Quit date:      Years since quittin.0    Smokeless tobacco: Never   Substance and Sexual Activity    Alcohol use: Yes     Comment: Occasionally has a drink    Drug use: No    Sexual activity: Yes     Partners: Female       Review of Systems   Constitutional:  Positive for activity change and fatigue.   HENT: Negative.     Eyes: Negative.    Respiratory:  Positive for cough, chest tightness and shortness of breath.    Cardiovascular:  Positive for chest pain.   Gastrointestinal: Negative.    Genitourinary: Negative.    Musculoskeletal: Negative.    Neurological:  Positive for weakness.     Objective:     Vital Signs (Most Recent):  Temp: 98.6 °F (37 °C) (24 1209)  Pulse: 110 (24 1442)  Resp: 18 (24 1209)  BP: (!) 146/76 (24 1442)  SpO2: 95 % (24 1209) Vital Signs (24h Range):  Temp:  [97.6 °F (36.4 °C)-100.5 °F (38.1 °C)] 98.6 °F (37 °C)  Pulse:  [104-167] 110  Resp:  [18-28] 18  SpO2:  [91 %-98 %] 95 %  BP: (122-157)/(63-76) 146/76     Weight: 64.9 kg (143 lb)  Body mass index is 24.55 kg/m².       Physical Exam  Vitals and nursing note reviewed.   Constitutional:       Appearance: He is normal weight. He is ill-appearing. He is not toxic-appearing.      Comments: HFNC   HENT:      Head: Normocephalic.      Nose: Nose normal.      Mouth/Throat:      Mouth: Mucous membranes are dry.   Cardiovascular:      Rate and Rhythm: Tachycardia present.   Pulmonary:      Effort: Tachypnea present.       Breath sounds: Decreased air movement present. Rhonchi present.      Comments: Conversational dyspnea   Musculoskeletal:         General: Normal range of motion.      Right lower leg: No edema.      Left lower leg: No edema.   Skin:     General: Skin is warm and dry.   Neurological:      General: No focal deficit present.      Mental Status: He is alert and oriented to person, place, and time.      Motor: Weakness present.      Comments: Hard of hearing    Psychiatric:         Mood and Affect: Mood normal.         Behavior: Behavior normal. Behavior is cooperative.         Thought Content: Thought content normal.         Judgment: Judgment normal.            Review of Symptoms      Symptom Assessment (ESAS 0-10 Scale)  Pain:  0  Dyspnea:  6  Anxiety:  0  Nausea:  0  Depression:  0  Anorexia:  0  Fatigue:  5  Insomnia:  0  Restlessness:  0  Agitation:  0         Performance Status:  40    Living Arrangements:  Lives with spouse and Lives in home    Psychosocial/Cultural:   See Palliative Psychosocial Note: No  Social Issues Identified: Coping deficit pt/family and New Diagnosis/Trauma  Bereavement Risk: No  Caregiver Needs Discussed. Caregiver Distress: Yes: Issues of guilt, Intensity of family caregiving, and Caregiver Burnout Risk  Cultural: none identified at this time   **Primary  to Follow**  Palliative Care  Consult: No    Spiritual:  C - Community:  Family support      Time-Based Charting:  Yes  Chart Review: 17 minutes  Face to Face: 35 minutes  Symptom Assessment: 9 minutes  Coordination of Care: 8 minutes  Discharge Plannin minutes  Advance Care Plannin minutes  Goals of Care: 28 minutes    Total Time Spent: 128 minutes        Advance Care Planning  Advance Directives:   Living Will: No    LaPOST: No    Do Not Resuscitate Status: Yes    Medical Power of : Yes    Agent's Name:  Fausto Allen   Agent's Contact Number:  [phone]    Decision Making:  Patient  answered questions and Family answered questions  Goals of Care: The patient and family endorses that what is most important right now is to focus on spending time at home, avoiding the hospital, remaining as independent as possible, symptom/pain control, quality of life, even if it means sacrificing a little time, and comfort and QOL     Accordingly, we have decided that the best plan to meet the patient's goals includes enrolling in hospice care at time of discharge.          Significant Labs: All pertinent labs within the past 24 hours have been reviewed.  CBC:   Recent Labs   Lab 01/02/24  0331   WBC 5.56   HGB 10.1*   HCT 33.2*   MCV 87        BMP:  Recent Labs   Lab 01/02/24  0331   *  309*     139   K 4.0  4.1     103   CO2 21*  21*   BUN 30*  30*   CREATININE 1.8*  1.8*   CALCIUM 9.1  9.2   MG 2.2     LFT:  Lab Results   Component Value Date    AST 40 01/02/2024    ALKPHOS 113 01/02/2024    BILITOT 0.4 01/02/2024     Albumin:   Albumin   Date Value Ref Range Status   01/02/2024 2.1 (L) 3.5 - 5.2 g/dL Final   01/02/2024 2.1 (L) 3.5 - 5.2 g/dL Final     Protein:   Total Protein   Date Value Ref Range Status   01/02/2024 7.3 6.0 - 8.4 g/dL Final     Lactic acid:   Lab Results   Component Value Date    LACTATE 1.2 12/31/2023       Significant Imaging: I have reviewed all pertinent imaging results/findings within the past 24 hours.        Ebony Neri NP  Palliative Medicine  [facility]    Advance Care Planning     Date: 01/02/2024    Today a voluntary meeting took place: bedside    Patient Participation: Patient is able to participate     Attendees (Name and  Relationship to patient): Health care power of : pts wife Fausto Allen, pts daughter Kristin and , pts son.      Staff attendees (Name and  Role): GENEVIEVE Balderas R Diaz RN      ACP Conversation (General): Understanding of advance care planning and role of health care agent defined ILD,   CAD, CKD   Understanding of current condition ILD, CAD, CKD.  Other (specify below) Goals of care including home hospice vs home palliative care.      Code Status: DNR; status confirmed/order placed in chart    ACP Documents: None    Goals of care: The patient and family endorses that what is most important right now is to focus on spending time at home, avoiding the hospital, remaining as independent as possible, symptom/pain control, and improvement in condition but with limits to invasive therapies    Accordingly, we have decided that the best plan to meet the patient's goals includes enrolling in hospice care at time of discharge.       Recommendations/  Follow-up tasks: Family to meet with home hospice rep, discuss with pts pulmonologist       Length of ACP   conversation in minutes: 48 minutes      [facility]  I engaged the patient and family in a voluntary conversation about advance care planning and we specifically addressed what the goals of care would be moving forward, in light of the patient's change in clinical status, specifically ILD, CAD, CKD.  We did specifically address the patient's likely prognosis, which is poor.  We explored the patient's values and preferences for future care.  The patient and family endorses that what is most important right now is to focus on spending time at home, avoiding the hospital, remaining as independent as possible, symptom/pain control, quality of life, even if it means sacrificing a little time, and improvement in condition but with limits to invasive therapies    Accordingly, we have decided that the best plan to meet the patient's goals includes enrolling in hospice care at time of discharge     I did explain the role for hospice care at this stage of the patient's illness, including its ability to help the patient live with the best quality of life possible.  We will be making a hospice referral.

## 2024-01-02 NOTE — CODE/ RAPID DOCUMENTATION
Rapid Response Nurse Follow-up Note     Followed up with patient for proactive rounding.   Pt c/o of cp 9/10 RN provided nitro per order x2 with resolution of cp. Spouse at bedside. Reviewed plan of care with bedside RNMonik .   Team will continue to follow.  Please call Rapid Response RN, Janiya Lan RN with any questions or concerns at [facility] Phone: [phone].

## 2024-01-02 NOTE — PLAN OF CARE
01/02/24 1127   Post-Acute Status   Post-Acute Authorization Hospice   Hospice Status Referrals Sent

## 2024-01-02 NOTE — ASSESSMENT & PLAN NOTE
Mr. Allen is a 73 y/oM with a PMX of Angina pectoris, Arthritis, CKD, Colon polyps , Coronary artery disease, Diabetes mellitus, Diabetes mellitus, type 2, GERD, Hyperlipidemia, Hypertension, S/P CABG (1996), and Thyroid disease.. The patient presented to the [facility] on 12/30/2023 with a primary complaint of Shortness of Breath (Seen at Urgent care with c/o SOB. Sent here for further eval. States that he has been coughing at night, SOB today. Denies pain. Presents awake, alert with O2 via NRB - c/o nausea/) FLU and Covid negative.  Pt is SOB at baseline, this is worse than normal. Does endorse some orthopnea. He follows with Dr. Barahona at [facility] for his pulmonary issues as an outpatient.  He is currently taking pirfenidone and denies any major side effects to it. During this admission, XR revealed his chronic ILD changes but also some worsening LLL opacity.  Was found to be hypoxic on nasal cannula and put on HFNC. Coronary angiogram deemed risky and not in line with pts goals at this time. Stated to his wife that he would like to go home as he feel he is close to dying.  Palliative care consulted for goals of care/ advanced care planning.     -Pt seen with family present at the bedside including wife, daughter, and son.  Overall update of pts admission given by family.  Family with understanding of pts clinical trajectory and disease projection.  All questions addressed at this time.  Family understands that treatment options are quite limited and pt has voiced to family that he would like to go home and does not wish for heroic measures in this phase of his life.    -Goals of care discussed.  Family places highest priority on pts comfort and ability to be at home.  Discussed current increased O2 requirements and need for a home AirVo machine to meet pts needs.  Further introduced the topic of home hospice and how this option may help family meet their goals.  All questions addressed at this time. Family  also discussed options with cardiology and pulmonology.  Family has decided to focus on pts comfort and quality of life moving forward.   -Discussed pts current air hunger. Morphine liquid solution 2.5 mg PO Q2H PRN dyspnea ordered by writer.  Pt initially concerned about addictive potential.  Encouraged pt to try medication given heavy symptom load. Pt and family agreeable for pt to trail dose at this time.  -Family agreeable to meet with home hospice company. CM and attending MD notified of plan.  Pt will have completed 72 hours of heparin gtt this evening.  Do not anticipate further AC needs.  -Pt remains a DNR.   -Will follow up with pt tomorrow.   -Family with additional questions, pulmonology team met with family and reviewed images. Family still struggling to grasp pts decline and increased O2 needs but appears to have better understanding of O2 requirements.  Family requesting to hear from pts long time pulmonologist.  Dr Ramirez reached out.  Family still wishes to meet with hospice nurse.

## 2024-01-02 NOTE — HOSPITAL COURSE
Pt was started on IV abx empirically .   -per Pulmonary= patient follows with [facility] pulmonology, currently on pirfenidone, previously on Nintedanib, but did not tolerate; previously referred for Lung transplant alan at [facility] but deemed not to be a candidate due to his age/performance status    cardiac angiogram  initially planned on 01/02/2023= canceled due to elevation in creatinine and concern that patient may not get off the ventilator if intubated.  Case discussed with Dr. Rod on 01/01/2024   -on 12/31/2023=Had an extensive discussion with pulmonologist who has spoken to patient and patient's wife.  Patient has pretty advanced pulmonary disease and is likely not a candidate for lung transplant either.  Pulmonary had Goals of care discussion with patient and patient's wife.  Family was open to the concept of home hospice and palliative care.  Patient's wife was apprehensive about transferred to the ICU last night and wanted to avoid that.  Patient also wanted to wait ICU transfer last night.  Therefore patient did not get upgraded.   1/2/23: family and pt decided on hospice. Consulting hospice and getting airvo set up for pt to be home  1/3: deemed stable to be d/c with home hospice

## 2024-01-02 NOTE — PROGRESS NOTES
[facility] Medicine  Progress Note    Patient Name: Jose Antonio Allen  MRN: [id number]  Patient Class: IP- Inpatient   Admission Date: 12/30/2023  Length of Stay: 2 days  Attending Physician: Jyotsna Rodriguez MD  Primary Care Provider: Abilio Weber MD        Subjective:     Principal Problem:NSTEMI (non-ST elevated myocardial infarction)        HPI:  73 y.o. male with significant past medical history of ILD on 4L NC at home, Angina pectoris, Arthritis, CKD (chronic kidney disease), Colon polyps (09/14/2018), Coronary artery disease, Diabetes mellitus, Diabetes mellitus, type 2, GERD (gastroesophageal reflux disease), History of tobacco use, Hyperlipidemia, Hypertension, S/P CABG (coronary artery bypass graft) (1996), and Thyroid disease presented to the ER from urgent care for hypoxia and SOB.  COVID and flu negative at .  Pt also vomited a few times in route, denies abdominal pain or chest pain.  Pt's wife reported low grade fever at home.  No sick contacts.  Pt denies chills, diaphoresis, nausea, cough, vocal changes, globus sensation, leg swelling or pain, fatigue, weakness, confusion, syncope.  Of note 11/11 cath lab severe native CAD, patent LIMA-LAD, recurrent ISR to SVG-BARON s/p succesfful PTCA to SVG-BARON branch recurrent ISR.  IVUS used.  Under expanded stent.  PTCA done with 3.0 scoring balloon followed by PTCA with 2.0 shockwave balloon followed by 3.5 and 4.0 NC balloon.  Pt advised to continue asa/plavix noting pt at risk for recurrent ISR and cards discussed option next time is to attempt opening native RCA  and coil the SVG graft given recurrent failures and recurrent attempts.    In the ED, T max 100.7 F.  Pt on 40L vapotherm 60% FiO2.  EKG Sinus Tach with PAC.  pH 7.368.  Troponin 0.387 compared to last (0.020 when admitted for HTN).   compared to last 90.  Pt given cefepime/vanc.  ED spoke with cardiology who recommended heparin drip.  CBC with  thrombocytopenia.  CMP no significant electrolyte abnormalities and kidney function at baseline.  CXR Chronic appearing changes of the lungs which are overall more conspicuous from comparison radiograph 10/25/2023.  Additional consolidative opacity about the left lung base.  Considerations include atelectasis, noting that superimposed infectious or non infectious inflammatory infiltrate and pulmonary edema remain considerations.   CT chest/abdomen/pelvis pending.    Pt signed AMA form in ED but then he and his wife requested to resend the form.    Overview/Hospital Course:  1/2/23: family and pt decided on hospice. Consulting hospice and getting airvo set up for pt to be home    Interval History:  Patient awake alert in no acute distress.  Remains afebrile.    Still with dyspnea on exertion.  Still mildly tachypneic but in no acute distress. Family decided on hospice      Review of Systems   Constitutional:  Positive for fatigue.   HENT: Negative.     Eyes: Negative.    Respiratory:  Positive for shortness of breath.    Cardiovascular:  Positive for chest pain.   Gastrointestinal: Negative.    Endocrine: Negative.    Genitourinary: Negative.    Musculoskeletal: Negative.    Skin: Negative.    Allergic/Immunologic: Negative.    Neurological: Negative.    Hematological: Negative.    Psychiatric/Behavioral: Negative.     All other systems reviewed and are negative.    Objective:     Vital Signs (Most Recent):  Temp: 98.6 °F (37 °C) (01/02/24 1209)  Pulse: 110 (01/02/24 1442)  Resp: 20 (01/02/24 1505)  BP: (!) 146/76 (01/02/24 1442)  SpO2: 95 % (01/02/24 1209) Vital Signs (24h Range):  Temp:  [97.6 °F (36.4 °C)-100.5 °F (38.1 °C)] 98.6 °F (37 °C)  Pulse:  [104-167] 110  Resp:  [18-20] 20  SpO2:  [91 %-98 %] 95 %  BP: (122-157)/(63-76) 146/76     Weight: 64.9 kg (143 lb)  Body mass index is 24.55 kg/m².    Intake/Output Summary (Last 24 hours) at 1/2/2024 1632  Last data filed at 1/2/2024 0502  Gross per 24 hour   Intake  240 ml   Output 650 ml   Net -410 ml           Physical Exam  Vitals and nursing note reviewed.   Constitutional:       General: He is not in acute distress.     Appearance: Normal appearance.   HENT:      Head: Normocephalic and atraumatic.      Nose: Nose normal.      Mouth/Throat:      Mouth: Mucous membranes are moist.   Eyes:      Extraocular Movements: Extraocular movements intact.      Pupils: Pupils are equal, round, and reactive to light.   Cardiovascular:      Rate and Rhythm: Regular rhythm. Tachycardia present.      Pulses: Normal pulses.      Heart sounds: Normal heart sounds.   Pulmonary:      Effort: No respiratory distress.      Breath sounds: Wheezing and rhonchi present.      Comments: Tachypneic.  Course breath sounds bilaterally generalized  Abdominal:      General: Bowel sounds are normal. There is no distension.      Palpations: Abdomen is soft.      Tenderness: There is no abdominal tenderness.   Musculoskeletal:         General: Normal range of motion.      Cervical back: Neck supple.      Right lower leg: No edema.      Left lower leg: No edema.   Skin:     General: Skin is warm and dry.   Neurological:      General: No focal deficit present.      Mental Status: He is alert and oriented to person, place, and time. Mental status is at baseline.      Cranial Nerves: No cranial nerve deficit.   Psychiatric:         Mood and Affect: Mood normal.             Significant Labs: All pertinent labs within the past 24 hours have been reviewed.    Significant Imaging: I have reviewed all pertinent imaging results/findings within the past 24 hours.    Assessment/Plan:      * NSTEMI (non-ST elevated myocardial infarction)  CAD  HLD  PAD  History of CABG and stent placement  Patient presents with NSTEMI. Pt denies CP.  Patient is currently on NSTEMI Pathway.  ED discussed with cardiology and heparin drip initiated.    EKG reviewed. Troponins reviewed and results noted-   Recent Labs   Lab 12/31/23  4555    TROPONINI 2.966*     .     Lipid panel reviewed and shows-     Lab Results   Component Value Date    LDLCALC 35.4 (L) 12/31/2023     Lab Results   Component Value Date    TRIG 93 12/31/2023         Medical management includes; Anticoagulation and High Intensity Stain Echo has not been performed. Latest ECHO results are as follows- Results for orders placed during the hospital encounter of 10/14/23    Echo    Interpretation Summary    Left Ventricle: The left ventricle is normal in size. There is concentric remodeling. Normal wall motion. There is reduced systolic function. Ejection fraction by visual approximation is 55%. Grade I diastolic dysfunction.    Left Atrium: Left atrium is moderately dilated.    Right Ventricle: Systolic function is normal.    Aortic Valve: The aortic valve is a trileaflet valve. Moderately calcified noncoronary cusp. Mildly restricted motion.    Mitral Valve: There is mild regurgitation.    Pulmonary Artery: The estimated pulmonary artery systolic pressure is 32 mmHg.    IVC/SVC: Intermediate venous pressure at 8 mmHg.  .   Cardiology is consulted. Plan of care reviewed with cardiology team. Continue to monitor patient closely and adjust therapy as needed.   -serial cardiac enzymes on 12/30/2023= elevated troponin x3, currently decreasing  -continue beta-blocker, statin daily   -Continue aspirin and Plavix daily  - continue heparin IV infusion  -cardiac angiogram  initially planned on 01/02/2023= canceled due to elevation in creatinine and concern that patient may not get off the ventilator if intubated.  Case discussed with Dr. Rod on 01/01/2024    Palliative care encounter  Hospice decided  Consulting and trying to get airvo set up      ILD (interstitial lung disease)  -defer to Pulmonary  -per Pulmonary= patient follows with [facility] pulmonology, currently on pirfenidone, previously on Nintedanib, but did not tolerate; previously referred for Lung transplant eval at [facility] but deemed not to be a candidate due to his age/performance status       Fever  -last low-grade fever spike on 12/30/2023 afternoon so far   -no leukocytosis   -influenza a and B and COVID-19 on 12/30/2023= negative   -blood cultures x2 on 12/30/2023= remains negative so far  -urinalysis with reflex to culture on 12/31/2023= negative nitrites and leukocyte esterase, rare bacteria, 1 epithelial cell, 8 WBC  -monitor procalcitonin levels= elevated and increasing  -lactic acid on 12/31/2023= within normal limits   -Rocephin IV daily empirically.  Ordered on 12/30/2023   -Zithromax 500 mg p.o. daily empirically.  Ordered on 12/30/2023         Acute on chronic respiratory failure with hypoxia  ILD  See below  ABG reviewed, pH 7.368  Continue Vapotherm 40 L, 60% FiO2  -chest x-ray on 12/30/2023=-chest x-ray on 12/30/2023=Chronic appearing changes of the lungs which are overall more conspicuous from comparison radiograph 10/25/2023.  Additional consolidative opacity about the left lung base.  Considerations include atelectasis, noting that superimposed infectious or non infectious inflammatory infiltrate and pulmonary edema remain considerations.  Correlation is advised   -CT chest without contrast on 12/21/2023=1. Interstitial lung disease, UIP pattern, with honeycombing in the lung bases.2. Suspected airspace opacities in the right greater than left lower lobes, concerning for aspiration or overlying infection.3. Mild to moderate emphysematous changes of the lung apices.4. Cholelithiasis  -Pulmonology consulted= appreciate input and follow recommendations  -COVID and flu negative at   -COVID and influenza a and B on 12/30/2023= negative  -pirfenidone 801 mg p.o. t.i.d. ( home med)    -on 12/31/2023=Had an extensive discussion with pulmonologist who has spoken to patient and patient's wife.  Patient has pretty advanced pulmonary disease and is likely not a candidate for lung transplant either.  Pulmonary had  Goals of care discussion with patient and patient's wife.  Family was open to the concept of home hospice and palliative care.  Patient's wife was apprehensive about transferred to the ICU last night and wanted to avoid that.  Patient also wanted to wait ICU transfer last night.  Therefore patient did not get upgraded.  - long-term prognosis appears guarded  - wife amenable to a palliative care consult to discuss goals of care  - palliative care consulted= follow recommendations    Chronic hypoxic respiratory failure, on home oxygen therapy  Patient with Hypoxic Respiratory failure which is Acute on chronic.  he is on home oxygen at 4 LPM. Supplemental oxygen was provided and noted- Oxygen Concentration (%):  [50-60] 60    .   Signs/symptoms of respiratory failure include- increased work of breathing. Contributing diagnoses includes - Interstitial lung disease Labs and images were reviewed. Patient Has recent ABG, which has been reviewed. Will treat underlying causes and adjust management of respiratory failure as follows-NSTEMI treatment as above.  -Pulm consulted    History of coronary artery stent placement        Type 2 diabetes mellitus with stage 3a chronic kidney disease, without long-term current use of insulin  Patient's FSGs are uncontrolled due to hyperglycemia on current medication regimen.  Last A1c reviewed-   Lab Results   Component Value Date    HGBA1C 7.6 (H) 12/31/2023     Most recent fingerstick glucose reviewed-   Recent Labs   Lab 01/01/24  0042 01/01/24  0623   POCTGLUCOSE 203* 178*       Current correctional scale  Low  Maintain anti-hyperglycemic dose as follows-   Antihyperglycemics (From admission, onward)      Start     Stop Route Frequency Ordered    12/30/23 1801  insulin aspart U-100 pen 0-5 Units         -- SubQ Every 6 hours PRN 12/30/23 1702          Hold Oral hypoglycemics while patient is in the hospital.     Acquired hypothyroidism  Continue lt4  -TSH on 12/31/2023= within normal  limits      PAD (peripheral artery disease)  -continue antiplatelets and statin daily      CKD (chronic kidney disease) stage 3, GFR 30-59 ml/min  -monitor creatinine= increased   -Monitor UOP and serial BMP and adjust therapy as needed. Renally dose meds. Avoid nephrotoxic medications and procedures.  -At baseline 1.5-1.8  -renal ultrasound on 01/01/2024= pending  -nephrology consulted= follow recommendations   - cardiac angiogram has been canceled by Cardiology    Hyperlipidemia  -Lipitor 80 mg p.o. daily      Essential hypertension  Chronic, uncontrolled. Latest blood pressure and vitals reviewed-     Temp:  [36.7 °F (2.6 °C)-98.6 °F (37 °C)]   Pulse:  []   Resp:  [18-40]   BP: ()/(48-72)   SpO2:  [95 %-100 %] .   Home meds for hypertension were reviewed and noted below.   Hypertension Medications               amLODIPine (NORVASC) 5 MG tablet Take 5 mg by mouth once daily.    isosorbide mononitrate (IMDUR) 30 MG 24 hr tablet Take 1 tablet (30 mg total) by mouth once daily.    metoprolol succinate (TOPROL-XL) 100 MG 24 hr tablet Take 1 tablet (100 mg total) by mouth once daily.    nitroGLYCERIN (NITROSTAT) 0.4 MG SL tablet Place 1 tablet (0.4 mg total) under the tongue every 5 (five) minutes as needed for Chest pain.             Adjust BP medications as needed    Will utilize p.r.n. blood pressure medication only if patient's blood pressure greater than 180/110 and he develops symptoms such as worsening chest pain or shortness of breath.    Coronary artery disease involving native coronary artery of native heart with refractory angina pectoris  -treat as above      VTE Risk Mitigation (From admission, onward)           Ordered     heparin 25,000 units in dextrose 5% (100 units/ml) IV bolus from bag - ADDITIONAL PRN BOLUS - 60 units/kg (max bolus 4000 units)  As needed (PRN)        Question:  Heparin Infusion Adjustment (DO NOT MODIFY ANSWER)  Answer:   \\[email or web address]\epic\Images\Pharmacy\HeparinInfusions\heparin LOW INTENSITY nomogram for [facility] [id number].pdf    12/30/23 1618     heparin 25,000 units in dextrose 5% (100 units/ml) IV bolus from bag - ADDITIONAL PRN BOLUS - 30 units/kg (max bolus 4000 units)  As needed (PRN)        Question:  Heparin Infusion Adjustment (DO NOT MODIFY ANSWER)  Answer:  \\[email or web address]\epic\Images\Pharmacy\HeparinInfusions\heparin LOW INTENSITY nomogram for [facility] [id number].pdf    12/30/23 1618     IP VTE HIGH RISK PATIENT  Once         12/30/23 1653     Place sequential compression device  Until discontinued         12/30/23 1653     Place sequential compression device  Until discontinued         12/30/23 1641     heparin 25,000 units in dextrose 5% 250 mL (100 units/mL) infusion LOW INTENSITY nomogram - [facility]  Continuous        Question:  Begin at (units/kg/hr)  Answer:  12    12/30/23 1618                    Discharge Planning   KARLA:      Code Status: DNR   Is the patient medically ready for discharge?:     Reason for patient still in hospital (select all that apply): Treatment  Discharge Plan A: Hospice/home, Home with family                  Jyotsna Rodriguez MD  Department of Hospital Medicine   [facility]

## 2024-01-02 NOTE — PLAN OF CARE
[facility] - Telemetry  Initial Discharge Assessment       Primary Care Provider: Abilio Weber MD    Admission Diagnosis: Shortness of breath [R06.02]  Hypoxia [R09.02]  Non-ST elevation myocardial infarction (NSTEMI) [I21.4]  NSTEMI (non-ST elevation myocardial infarction) [I21.4]  NSTEMI (non-ST elevated myocardial infarction) [I21.4]  Chronic hypoxic respiratory failure, on home oxygen therapy [J96.11, Z99.81]    Admission Date: 12/30/2023  Expected Discharge Date:     DCA done with family. Pt lives at home with spouse. Pt has ILD and requiring a lot of oxygen at the moment. Pt has voiced wishes of wanting to go home with end of life care. Referrals sent via [employer]. Rep for Hospice alerted. Pending medical stability.     1350 - Met with pt and family at bedside. Palliative consult completed. Questions were answered. Family is requesting Gerichair along with oxygen at DC. H[facility] to meet with pt and family at bedside. DC pending oxygen for DC set up.    Payor: [employer] MANAGED MEDICARE / Plan: [employer] MEDICARE HMO / Product Type: Capitation /     Extended Emergency Contact Information  Primary Emergency Contact: AlejandroKristin  Mobile Phone: [phone]  Relation: Daughter  Secondary Emergency Contact: Fausto Allen  Address: [address] [place] [address]           [place] Mann [address] [facility]  Home Phone: [phone]  Mobile Phone: [phone]  Relation: Spouse    Discharge Plan A: Hospice/home, Home with family         [facility] - [facility] [address]  [address] [facility] [address]  Phone: [phone] Fax: [phone]    [facility]/pharmacy #[id number] - [place] - [address] AT [address] OF [facility]  [address] [address]  Phone: [phone] Fax: [phone]      Initial Assessment (most recent)       Adult Discharge Assessment - 01/02/24 1121          Discharge Assessment    Assessment Type Discharge Planning  "Assessment     Confirmed/corrected address, phone number and insurance Yes     Confirmed Demographics Correct on Facesheet     Source of Information family     People in Home spouse     Do you expect to return to your current living situation? Yes     Prior to hospitilization cognitive status: Alert/Oriented;No Deficits     Current cognitive status: Alert/Oriented;No Deficits     Equipment Currently Used at Home oxygen     Do you currently have service(s) that help you manage your care at home? No     Do you take prescription medications? Yes     Do you have any problems affording any of your prescribed medications? No     Is the patient taking medications as prescribed? yes     Who is going to help you get home at discharge? Kristin Allen (Daughter) [phone] (Mobile) Fausto Allen Spouse [phone]     How do you get to doctors appointments? family or friend will provide     Discharge Plan A Hospice/home;Home with family     DME Needed Upon Discharge  other (see comments)     Discharge Plan discussed with: Spouse/sig other     Name(s) and Number(s) Fausto Allen  Spouse  [phone]        OTHER    Name(s) of People in Home Kristin Allen  Daughter  [phone]    2  Fausto Allen  Spouse  [phone] (P)                    Future Appointments   Date Time Provider Department Center   2/19/2024 10:00 AM Scooby Sow MD [facility]   4/2/2024  7:30 AM Rey Connelly MD [facility]     BP (!) 157/72 (BP Location: Left arm, Patient Position: Lying)   Pulse 104   Temp 98.5 °F (36.9 °C) (Oral)   Resp 18   Ht 5' 4" (1.626 m)   Wt 65.1 kg (143 lb 8.3 oz)   SpO2 95%   BMI 24.64 kg/m²      aspirin  81 mg Oral Daily    atorvastatin  80 mg Oral QHS    azithromycin  500 mg Oral Daily    cefTRIAXone (ROCEPHIN) IVPB  1 g Intravenous Q24H    clopidogreL  75 mg Oral Nightly    levothyroxine  100 mcg Oral Before breakfast    metoprolol " succinate  25 mg Oral Daily    mirtazapine  7.5 mg Oral QHS    pantoprazole  40 mg Oral Daily    pirfenidone  801 mg Oral TID    tamsulosin  0.4 mg Oral QHS    vitamin D  1,000 Units Oral Daily     Consults (From admission, onward)          Status Ordering Provider     Inpatient consult to Palliative Care  Once        Provider:  (Not yet assigned)    Acknowledged NILAY VACA     Inpatient consult to [facility]phrology[facility] ([facility], [place])  Once        Provider:  Marbella Cameron MD    Completed NILAY VACA     Inpatient consult to Social Work/Case Management  Once        Provider:  (Not yet assigned)    Completed FITZ WALLACE     Inpatient consult to Pulmonology  Once        Provider:  Jkaob Crisostomo MD    Completed FITZ WALLACE     Inpatient consult to Cardiology-[facility]  Once        Provider:  Choco López MD    Completed SANAZ LAUREN

## 2024-01-03 NOTE — NURSING
RAPID RESPONSE NURSE PROACTIVE ROUNDING NOTE       Time of Visit: 0020    Admit Date: 2023  LOS: 3  Code Status: DNR   Date of Visit: 2024  : 1950  Age: 73 y.o.  Sex: male  Race:   Bed: K430/K430 A:   MRN: [id number]  Was the patient discharged from an ICU this admission? No   Was the patient discharged from a PACU within last 24 hours? No   Did the patient receive conscious sedation/general anesthesia in last 24 hours? No   Was the patient in the ED within the past 24 hours? No   Was the patient on NIPPV within the past 24 hours? No   Attending Physician: Jyotsna Rodriguez MD  Primary Service: Hospitalist   Time spent at the bedside: 15 -30 min    SITUATION    Notified by previous RRN during handoff  Reason for alert: Increased O2 requirements/ Follow up    Diagnosis: NSTEMI (non-ST elevated myocardial infarction)   has a past medical history of Angina pectoris, Arthritis, CKD (chronic kidney disease), Colon polyps, Coronary artery disease, Diabetes mellitus, Diabetes mellitus, type 2, GERD (gastroesophageal reflux disease), History of tobacco use, Hyperlipidemia, Hypertension, S/P CABG (coronary artery bypass graft), and Thyroid disease.    Last Vitals:  Temp: 99 °F (37.2 °C) (23)  Pulse: 114 (32)  Resp: 22 (23)  BP: 128/86 (23)  SpO2: 96 % (23)    Clinical Issues: Circulatory    ASSESSMENT/INTERVENTIONS    Pt sitting up in chair on laptop computer. No acute distress noted. Denies SOB at this time, only on exertion. No chest pain or discomfort. Pt on Vapotherm 30 L/ 60%, with spO2 of 96%.  Wife at bedside, all questions answered.    Palliative care encounter done on 23. Hospice consulted.     Discussed plan of care with bedside RNLucy.    PROVIDER ESCALATION    Physician escalation: No    Orders received and case discussed with NA.    Disposition:Remain in room 430    FOLLOW UP    Call back the Rapid Response NurseDORIS at  [phone] for additional questions or concerns.

## 2024-01-03 NOTE — SUBJECTIVE & OBJECTIVE
Interval History: No acute events reported overnight. Pt seen sitting up, eating breakfast,more alert, less dyspneic as compared to yesterday. Pt to d/c home on hospice today.     Medications:  Continuous Infusions:  Scheduled Meds:   aspirin  81 mg Oral Daily    atorvastatin  80 mg Oral QHS    azithromycin  500 mg Oral Daily    cefTRIAXone (ROCEPHIN) IVPB  1 g Intravenous Q24H    clopidogreL  75 mg Oral Nightly    levothyroxine  100 mcg Oral Before breakfast    metoprolol succinate  25 mg Oral Daily    mirtazapine  7.5 mg Oral QHS    pantoprazole  40 mg Oral Daily    pirfenidone  801 mg Oral TID    tamsulosin  0.4 mg Oral QHS    vitamin D  1,000 Units Oral Daily     PRN Meds:acetaminophen, acetaminophen, albuterol-ipratropium, bisacodyL, dextrose 10%, dextrose 10%, glucagon (human recombinant), guaiFENesin 100 mg/5 ml, insulin aspart U-100, iohexoL, LORazepam, morphine, nitroGLYCERIN, ondansetron, polyethylene glycol    Objective:     Vital Signs (Most Recent):  Temp: 99.4 °F (37.4 °C) (01/03/24 0737)  Pulse: 107 (01/03/24 0737)  Resp: 18 (01/03/24 0737)  BP: (!) 154/72 (01/03/24 0737)  SpO2: 95 % (01/03/24 0737) Vital Signs (24h Range):  Temp:  [98.2 °F (36.8 °C)-99.4 °F (37.4 °C)] 99.4 °F (37.4 °C)  Pulse:  [104-118] 107  Resp:  [16-24] 18  SpO2:  [91 %-96 %] 95 %  BP: (128-157)/(65-94) 154/72     Weight: 64.9 kg (143 lb)  Body mass index is 24.55 kg/m².    Physical Exam  Vitals and nursing note reviewed.   Constitutional:       Appearance: He is normal weight. He is ill-appearing. He is not toxic-appearing.      Comments: HFNC, more alert today, less dyspneic   HENT:      Head: Normocephalic.      Nose: Nose normal.      Mouth/Throat:      Mouth: Mucous membranes are moist.   Cardiovascular:      Rate and Rhythm: Tachycardia present.   Pulmonary:        Comments:   Musculoskeletal:         General: Normal range of motion.      Right lower leg: No edema.      Left lower leg: No edema.   Skin:     General: Skin is  warm and dry.   Neurological:      General: No focal deficit present.      Mental Status: He is alert and oriented to person, place, and time.      Motor: Weakness present.      Comments: Hard of hearing    Psychiatric:         Mood and Affect: Mood normal.         Behavior: Behavior normal. Behavior is cooperative.         Thought Content: Thought content normal.         Judgment: Judgment normal.      Review of Symptoms     Symptom Assessment (ESAS 0-10 Scale)  Pain:  0  Dyspnea:  4  Anxiety:  0  Nausea:  0  Depression:  0  Anorexia:  0  Fatigue:  3  Insomnia:  0  Restlessness:  0  Agitation:  0      Performance Status:  40     Living Arrangements:  Lives with spouse and Lives in home     Psychosocial/Cultural:   See Palliative Psychosocial Note: No  Social Issues Identified: Coping deficit pt/family and New Diagnosis/Trauma  Bereavement Risk: No  Caregiver Needs Discussed. Caregiver Distress: Yes: Issues of guilt, Intensity of family caregiving, and Caregiver Burnout Risk  Cultural: none identified at this time   **Primary  to Follow**  Palliative Care  Consult: No     Spiritual:  C - Community:  Family support      Time-Based Charting:  Yes  Chart Review: 10 minutes  Face to Face: 15 minutes  Symptom Assessment: 10 minutes  Coordination of Care: 10 minutes  Discharge Planning: 10 minutes    Total Time Spent: 55 minutes     Advance Care Planning  Advance Directives:   Living Will: No    [place]POST: No    Do Not Resuscitate Status: Yes    Medical Power of : Yes    Agent's Name:  Fausto Allen   Agent's Contact Number:  [phone]     Decision Making:  Patient answered questions and Family answered questions  Goals of Care: The patient and family endorses that what is most important right now is to focus on spending time at home, avoiding the hospital, remaining as independent as possible, symptom/pain control, quality of life, even if it means sacrificing a little time, and comfort  and QOL      Accordingly, we have decided that the best plan to meet the patient's goals includes enrolling in hospice care at time of discharge.   Significant Labs: All pertinent labs within the past 24 hours have been reviewed.  CBC:   Recent Labs   Lab 01/02/24  0331   WBC 5.56   HGB 10.1*   HCT 33.2*   MCV 87        BMP:  Recent Labs   Lab 01/03/24  0325   *      K 4.2      CO2 22*   BUN 29*   CREATININE 2.0*   CALCIUM 9.4     LFT:  Lab Results   Component Value Date    AST 40 01/02/2024    ALKPHOS 113 01/02/2024    BILITOT 0.4 01/02/2024     Albumin:   Albumin   Date Value Ref Range Status   01/03/2024 2.1 (L) 3.5 - 5.2 g/dL Final     Protein:   Total Protein   Date Value Ref Range Status   01/02/2024 7.3 6.0 - 8.4 g/dL Final     Lactic acid:   Lab Results   Component Value Date    LACTATE 1.2 12/31/2023       Significant Imaging: I have reviewed all pertinent imaging results/findings within the past 24 hours.

## 2024-01-03 NOTE — PLAN OF CARE
[facility]  Department of Hospital Medicine  [address] [place] [address]  [phone] after hours  [phone] fax    HOSPICE  ORDERS    01/03/2024    Admit to Hospice:  Home Hospice    Diagnoses:   Active Hospital Problems    Diagnosis  POA    *NSTEMI (non-ST elevated myocardial infarction) [I21.4]  Yes    Palliative care encounter [Z51.5]  Not Applicable    ILD (interstitial lung disease) [J84.9]  Yes    Fever [R50.9]  Yes    Acute on chronic respiratory failure with hypoxia [J96.21]  Yes    Chronic hypoxic respiratory failure, on home oxygen therapy [J96.11, Z99.81]  Not Applicable    History of coronary artery stent placement [Z95.5]  Not Applicable    Type 2 diabetes mellitus with stage 3a chronic kidney disease, without long-term current use of insulin [E11.22, N18.31]  Yes    Acquired hypothyroidism [E03.9]  Yes    PAD (peripheral artery disease) [I73.9]  Yes    CKD (chronic kidney disease) stage 3, GFR 30-59 ml/min [N18.30]  Yes    Coronary artery disease involving native coronary artery of native heart with refractory angina pectoris [I25.112]  Yes    Essential hypertension [I10]  Yes    Hyperlipidemia [E78.5]  Yes      Resolved Hospital Problems   No resolved problems to display.       Hospice Qualifying Diagnoses:        Patient has a life expectancy < 6 months due to:  Primary Hospice Diagnosis: NSTEMI and ILD   Comorbid Conditions Contributing to Decline: cad , dm, ckd  Vital Signs: Routine per Hospice Protocol.    Code Status: dnr    Allergies: Review of patient's allergies indicates:  No Known Allergies    Diet: regular    Activities: As tolerated    Goals of Care Treatment Preferences:  Code Status: DNR    Health care agent: pts wife Fausto Allen, pts daughter Kristin and , pts son.    Health care agent number: [phone]          What is most important right now is to focus on spending time at home, avoiding the hospital, remaining as  "independent as possible, symptom/pain control, quality of life, even if it means sacrificing a little time, improvement in condition but with limits to invasive therapies.  Accordingly, we have decided that the best plan to meet the patient's goals includes enrolling in hospice care.      Nursing: Per Hospice Routine. Routine Skin for Bedridden Patients: Apply moisture barrier cream to all skin folds and   wet areas in perineal area daily and after baths and all bowel movements.      Oxygen: Airvo , vapotherm 30L 60%     Other Miscellaneous Care: n/a    Medications:          Medication List        ASK your doctor about these medications      ACCU-CHEK ADRIEN PLUS METER Misc  Generic drug: blood-glucose meter  1 Product by Other route daily as needed.     ACCU-CHEK SMARTVIEW TEST STRIP Strp  Generic drug: blood sugar diagnostic     ACCU-CHEK SOFTCLIX LANCETS Misc  Generic drug: lancets  Inject 100 lancets into the skin daily as needed.     amLODIPine 5 MG tablet  Commonly known as: NORVASC  Take 5 mg by mouth once daily.  Ask about: Which instructions should I use?     aspirin 81 MG Chew  81 mg nightly.     atorvastatin 80 MG tablet  Commonly known as: LIPITOR  TAKE 1 TABLET EVERY DAY     clopidogreL 75 mg tablet  Commonly known as: PLAVIX  TAKE 1 TABLET EVERY DAY     DROPLET PEN NEEDLE 32 gauge x 5/32" Ndle  Generic drug: pen needle, diabetic  Inject 1 Product into the skin daily as needed.     ESBRIET 801 mg Tab  Generic drug: pirfenidone  3 (three) times daily.     insulin degludec 100 unit/mL (3 mL) insulin pen  Commonly known as: TRESIBA FLEXTOUCH U-100  Inject 50 Units into the skin every evening. F/u apt needed with PCP     isosorbide mononitrate 30 MG 24 hr tablet  Commonly known as: IMDUR  Take 1 tablet (30 mg total) by mouth once daily.     levothyroxine 100 MCG tablet  Commonly known as: SYNTHROID  Take 1 tablet (100 mcg total) by mouth before breakfast.     LUBRICANT EYE DROPS 0.5 % Dpet  Generic drug: " carboxymethylcellulose     metoprolol succinate 100 MG 24 hr tablet  Commonly known as: TOPROL-XL  Take 1 tablet (100 mg total) by mouth once daily.     mirtazapine 7.5 MG Tab  Commonly known as: REMERON  TAKE 1 TABLET BY MOUTH EVERY EVENING.     nitroGLYCERIN 0.4 MG SL tablet  Commonly known as: NITROSTAT  Place 1 tablet (0.4 mg total) under the tongue every 5 (five) minutes as needed for Chest pain.     * NovoLOG Flexpen U-100 Insulin 100 unit/mL (3 mL) Inpn pen  Generic drug: insulin aspart U-100  Inject 1-10 Units into the skin as needed. 110-150=1  151-200=2  201-250=4  251-300=6  301-350=8  351-400=10     * NovoLOG U-100 Insulin aspart 100 unit/mL injection  Generic drug: insulin aspart U-100  Inject 12 Units into the skin 3 (three) times daily after meals. Plus Sliding scale     omeprazole 20 MG capsule  Commonly known as: PRILOSEC  Take 20 mg by mouth 2 (two) times daily.     ORENCIA 125 mg/mL Syrg  Generic drug: abatacept  Inject into the skin once a week. Fridays     tamsulosin 0.4 mg Cap  Commonly known as: FLOMAX  Take 0.4 mg by mouth every evening.     vitamin D 1000 units Tab  Commonly known as: VITAMIN D3  Take 1,000 Units by mouth once daily.           * This list has 2 medication(s) that are the same as other medications prescribed for you. Read the directions carefully, and ask your doctor or other care provider to review them with you.                    DIABETES CARE: (Nurse to perform and educate diabetic management with blood glucose monitoring:        Fingerstick blood sugar a.m. and p.m.       Fingerstick blood sugar every 6 hours if patient is unable to eat    Report CBG < 60 or > 350 to physician.         Insulin Sliding Scale         Glucose  Novolog Insulin Subcutaneous        0 - 60   Orange juice or glucose tablet      No insulin   201-250  2 units   251-300  4 units   301-350  6 units   351-400  8 units   >400   10 units then call physician      Future Orders:  Hospice Medical  Director may dictate new orders for comfortable care measures & sign death certificate.        _________________________________  Jyotsna Rodriguez MD  01/03/2024

## 2024-01-03 NOTE — CARE UPDATE
I had a conversation with Mr. Allen, wife and son at bedside. I explained in detail that a coronary angiogram risks outweighs the benefits given his guarded condition. Family at bedside agrees with our plan (medical management).

## 2024-01-03 NOTE — PROGRESS NOTES
[facility]  Palliative Medicine  Progress Note    Patient Name: Jose Antonio Allen  MRN: [id number]  Admission Date: 12/30/2023  Hospital Length of Stay: 3 days  Code Status: DNR   Attending Provider: Jyotsna Rodriguez MD  Consulting Provider: Ebony Neri NP  Primary Care Physician: Abilio Weber MD  Principal Problem:NSTEMI (non-ST elevated myocardial infarction)    Patient information was obtained from patient, spouse/SO, past medical records, and primary team.      Assessment/Plan:     Palliative Care  Palliative care encounter  Mr. Allen is a 73 y/oM with a PMX of Angina pectoris, Arthritis, CKD, Colon polyps , Coronary artery disease, Diabetes mellitus, Diabetes mellitus, type 2, GERD, Hyperlipidemia, Hypertension, S/P CABG (1996), and Thyroid disease.. The patient presented to the [facility] on 12/30/2023 with a primary complaint of Shortness of Breath (Seen at Urgent care with c/o SOB. Sent here for further eval. States that he has been coughing at night, SOB today. Denies pain. Presents awake, alert with O2 via NRB - c/o nausea/) FLU and Covid negative.  Pt is SOB at baseline, this is worse than normal. Does endorse some orthopnea. He follows with Dr. Barahona at [facility] for his pulmonary issues as an outpatient.  He is currently taking pirfenidone and denies any major side effects to it. During this admission, XR revealed his chronic ILD changes but also some worsening LLL opacity.  Was found to be hypoxic on nasal cannula and put on HFNC. Coronary angiogram deemed risky and not in line with pts goals at this time. Stated to his wife that he would like to go home as he feel he is close to dying.  Palliative care consulted for goals of care/ advanced care planning.     1/3/2023  -Followed up with pt and wife at the bedside this morning.  Pt sitting up in chair, eating breakfast, in good spirits, more alert and interactive today, less dyspnea noted with conversation.    -Family signed  consents yesterday for pt to discharge home under the care of home hospice.  Equipment delivered to pts home including AirVo.   -Pt tried liquid morphine x1 dose yesterday, reports that it provided some relief.  Pt reports that he prefers nitro, but will utilize morphine for pain refractory to nitro.   -All questions addressed at this time.   -Pt will discharge home today under the care of home hospice. Pt and family remain in agreement with plan.       1/2/2024  -Pt seen with family present at the bedside including wife, daughter, and son.  Overall update of pts admission given by family.  Family with understanding of pts clinical trajectory and disease projection.  All questions addressed at this time.  Family understands that treatment options are quite limited and pt has voiced to family that he would like to go home and does not wish for heroic measures in this phase of his life.    -Goals of care discussed.  Family places highest priority on pts comfort and ability to be at home.  Discussed current increased O2 requirements and need for a home AirVo machine to meet pts needs.  Further introduced the topic of home hospice and how this option may help family meet their goals.  All questions addressed at this time. Family also discussed options with cardiology and pulmonology.  Family has decided to focus on pts comfort and quality of life moving forward.   -Discussed pts current air hunger. Morphine liquid solution 2.5 mg PO Q2H PRN dyspnea ordered by writer.  Pt initially concerned about addictive potential.  Encouraged pt to try medication given heavy symptom load. Pt and family agreeable for pt to trail dose at this time.  -Family agreeable to meet with home hospice company. CM and attending MD notified of plan.  Pt will have completed 72 hours of heparin gtt this evening.  Do not anticipate further AC needs.  -Pt remains a DNR.   -Will follow up with pt tomorrow.   -Family with additional questions,  "pulmonology team met with family and reviewed images. Family still struggling to grasp pts decline and increased O2 needs but appears to have better understanding of O2 requirements.  Family requesting to hear from pts long time pulmonologist.  Dr Ramirez reached out.  Family still wishes to meet with hospice nurse.       I will sign off. Please contact us if you have any additional questions.    Subjective:     Chief Complaint:   Chief Complaint   Patient presents with    Shortness of Breath     Seen at Urgent care with c/o SOB. Sent here for further eval. States that he has been coughing at night, SOB today. Denies pain. Presents awake, alert with O2 via NRB - c/o nausea         HPI:   Per chart, " 73 y.o. male with significant past medical history of ILD on 4L NC at home, Angina pectoris, Arthritis, CKD (chronic kidney disease), Colon polyps (09/14/2018), Coronary artery disease, Diabetes mellitus, Diabetes mellitus, type 2, GERD (gastroesophageal reflux disease), History of tobacco use, Hyperlipidemia, Hypertension, S/P CABG (coronary artery bypass graft) (1996), and Thyroid disease presented to the ER from urgent care for hypoxia and SOB.  COVID and flu negative at .  Pt also vomited a few times in route, denies abdominal pain or chest pain.  Pt's wife reported low grade fever at home.  No sick contacts.  Pt denies chills, diaphoresis, nausea, cough, vocal changes, globus sensation, leg swelling or pain, fatigue, weakness, confusion, syncope.  Of note 11/11 cath lab severe native CAD, patent LIMA-LAD, recurrent ISR to SVG-BARON s/p succesfful PTCA to SVG-BARON branch recurrent ISR.  IVUS used.  Under expanded stent.  PTCA done with 3.0 scoring balloon followed by PTCA with 2.0 shockwave balloon followed by 3.5 and 4.0 NC balloon.  Pt advised to continue asa/plavix noting pt at risk for recurrent ISR and cards discussed option next time is to attempt opening native RCA  and coil the SVG graft given recurrent " "failures and recurrent attempts.     In the ED, T max 100.7 F.  Pt on 40L vapotherm 60% FiO2.  EKG Sinus Tach with PAC.  pH 7.368.  Troponin 0.387 compared to last (0.020 when admitted for HTN).   compared to last 90.  Pt given cefepime/vanc.  ED spoke with cardiology who recommended heparin drip.  CBC with thrombocytopenia.  CMP no significant electrolyte abnormalities and kidney function at baseline.  CXR Chronic appearing changes of the lungs which are overall more conspicuous from comparison radiograph 10/25/2023.  Additional consolidative opacity about the left lung base.  Considerations include atelectasis, noting that superimposed infectious or non infectious inflammatory infiltrate and pulmonary edema remain considerations.  CT chest/abdomen/pelvis pending. Pt remained on vapotherm, IV heparin drip initiated.     Pt follows with Dr. Barahona at [facility] for his pulmonary issues as an outpatient. He states that he has been on different treatments and that most of them have failed or caused significant side effects. He is currently taking pirfenidone and denies any major side effects to it. He wa referred to [facility] for Lung transplant evaluation but deemed not to be a candidate.     Pt seen by cardiology, "discussion with pt and his wife about the possibility of coronary angiogram and the risks of dialysis is significant increased and possibility of intubation. We have decided for medical management given his guarded prognosis and DNR status "  Pt followed by pulm/crit as well.  Introduction of hospice vs palliative care discussed. Palliative care consulted for Goals of care/ advanced care planning.        Interval History: No acute events reported overnight. Pt seen sitting up, eating breakfast,more alert, less dyspneic as compared to yesterday. Pt to d/c home on hospice today.     Medications:  Continuous Infusions:  Scheduled Meds:   aspirin  81 mg Oral Daily    atorvastatin  80 mg Oral QHS    " azithromycin  500 mg Oral Daily    cefTRIAXone (ROCEPHIN) IVPB  1 g Intravenous Q24H    clopidogreL  75 mg Oral Nightly    levothyroxine  100 mcg Oral Before breakfast    metoprolol succinate  25 mg Oral Daily    mirtazapine  7.5 mg Oral QHS    pantoprazole  40 mg Oral Daily    pirfenidone  801 mg Oral TID    tamsulosin  0.4 mg Oral QHS    vitamin D  1,000 Units Oral Daily     PRN Meds:acetaminophen, acetaminophen, albuterol-ipratropium, bisacodyL, dextrose 10%, dextrose 10%, glucagon (human recombinant), guaiFENesin 100 mg/5 ml, insulin aspart U-100, iohexoL, LORazepam, morphine, nitroGLYCERIN, ondansetron, polyethylene glycol    Objective:     Vital Signs (Most Recent):  Temp: 99.4 °F (37.4 °C) (01/03/24 0737)  Pulse: 107 (01/03/24 0737)  Resp: 18 (01/03/24 0737)  BP: (!) 154/72 (01/03/24 0737)  SpO2: 95 % (01/03/24 0737) Vital Signs (24h Range):  Temp:  [98.2 °F (36.8 °C)-99.4 °F (37.4 °C)] 99.4 °F (37.4 °C)  Pulse:  [104-118] 107  Resp:  [16-24] 18  SpO2:  [91 %-96 %] 95 %  BP: (128-157)/(65-94) 154/72     Weight: 64.9 kg (143 lb)  Body mass index is 24.55 kg/m².    Physical Exam  Vitals and nursing note reviewed.   Constitutional:       Appearance: He is normal weight. He is ill-appearing. He is not toxic-appearing.      Comments: HFNC, more alert today, less dyspneic   HENT:      Head: Normocephalic.      Nose: Nose normal.      Mouth/Throat:      Mouth: Mucous membranes are moist.   Cardiovascular:      Rate and Rhythm: Tachycardia present.   Pulmonary:        Comments:   Musculoskeletal:         General: Normal range of motion.      Right lower leg: No edema.      Left lower leg: No edema.   Skin:     General: Skin is warm and dry.   Neurological:      General: No focal deficit present.      Mental Status: He is alert and oriented to person, place, and time.      Motor: Weakness present.      Comments: Hard of hearing    Psychiatric:         Mood and Affect: Mood normal.         Behavior: Behavior normal.  Behavior is cooperative.         Thought Content: Thought content normal.         Judgment: Judgment normal.      Review of Symptoms     Symptom Assessment (ESAS 0-10 Scale)  Pain:  0  Dyspnea:  4  Anxiety:  0  Nausea:  0  Depression:  0  Anorexia:  0  Fatigue:  3  Insomnia:  0  Restlessness:  0  Agitation:  0      Performance Status:  40     Living Arrangements:  Lives with spouse and Lives in home     Psychosocial/Cultural:   See Palliative Psychosocial Note: No  Social Issues Identified: Coping deficit pt/family and New Diagnosis/Trauma  Bereavement Risk: No  Caregiver Needs Discussed. Caregiver Distress: Yes: Issues of guilt, Intensity of family caregiving, and Caregiver Burnout Risk  Cultural: none identified at this time   **Primary  to Follow**  Palliative Care  Consult: No     Spiritual:  C - Community:  Family support      Time-Based Charting:  Yes  Chart Review: 10 minutes  Face to Face: 15 minutes  Symptom Assessment: 10 minutes  Coordination of Care: 10 minutes  Discharge Planning: 10 minutes    Total Time Spent: 55 minutes     Advance Care Planning  Advance Directives:   Living Will: No    LaPOST: No    Do Not Resuscitate Status: Yes    Medical Power of : Yes    Agent's Name:  Fausto Allen   Agent's Contact Number:  [phone]     Decision Making:  Patient answered questions and Family answered questions  Goals of Care: The patient and family endorses that what is most important right now is to focus on spending time at home, avoiding the hospital, remaining as independent as possible, symptom/pain control, quality of life, even if it means sacrificing a little time, and comfort and QOL      Accordingly, we have decided that the best plan to meet the patient's goals includes enrolling in hospice care at time of discharge.   Significant Labs: All pertinent labs within the past 24 hours have been reviewed.  CBC:   Recent Labs   Lab 01/02/24  0331   WBC 5.56   HGB  10.1*   HCT 33.2*   MCV 87        BMP:  Recent Labs   Lab 01/03/24  0325   *      K 4.2      CO2 22*   BUN 29*   CREATININE 2.0*   CALCIUM 9.4     LFT:  Lab Results   Component Value Date    AST 40 01/02/2024    ALKPHOS 113 01/02/2024    BILITOT 0.4 01/02/2024     Albumin:   Albumin   Date Value Ref Range Status   01/03/2024 2.1 (L) 3.5 - 5.2 g/dL Final     Protein:   Total Protein   Date Value Ref Range Status   01/02/2024 7.3 6.0 - 8.4 g/dL Final     Lactic acid:   Lab Results   Component Value Date    LACTATE 1.2 12/31/2023       Significant Imaging: I have reviewed all pertinent imaging results/findings within the past 24 hours.    Ebony Neri NP  Palliative Medicine  [place] [facility]

## 2024-01-03 NOTE — PLAN OF CARE
Problem: Adult Inpatient Plan of Care  Goal: Plan of Care Review  Outcome: Ongoing, Progressing     Problem: Dysrhythmia (Acute Coronary Syndrome)  Goal: Normalized Cardiac Rhythm  Outcome: Ongoing, Progressing     Problem: Diabetes Comorbidity  Goal: Blood Glucose Level Within Targeted Range  Outcome: Ongoing, Progressing     Problem: Fall Injury Risk  Goal: Absence of Fall and Fall-Related Injury  Outcome: Ongoing, Progressing     Problem: Gas Exchange Impaired  Goal: Optimal Gas Exchange  Outcome: Ongoing, Progressing     Problem: Coping Ineffective  Goal: Effective Coping  Outcome: Ongoing, Progressing

## 2024-01-03 NOTE — ASSESSMENT & PLAN NOTE
Mr. Allen is a 73 y/oM with a PMX of Angina pectoris, Arthritis, CKD, Colon polyps , Coronary artery disease, Diabetes mellitus, Diabetes mellitus, type 2, GERD, Hyperlipidemia, Hypertension, S/P CABG (1996), and Thyroid disease.. The patient presented to the [facility] on 12/30/2023 with a primary complaint of Shortness of Breath (Seen at Urgent care with c/o SOB. Sent here for further eval. States that he has been coughing at night, SOB today. Denies pain. Presents awake, alert with O2 via NRB - c/o nausea/) FLU and Covid negative.  Pt is SOB at baseline, this is worse than normal. Does endorse some orthopnea. He [place] with [place]. [place] for his pulmonary issues as an outpatient.  He is currently taking pirfenidone and denies any major side effects to it. During this admission, XR revealed his chronic ILD changes but also some worsening LLL opacity.  Was found to be hypoxic on nasal cannula and put on HFNC. Coronary angiogram deemed risky and not in line with pts goals at this time. Stated to his wife that he would like to go home as he feel he is close to dying.  Palliative care consulted for goals of care/ advanced care planning.     1/3/2023  -Followed up with pt and wife at the bedside this morning.  Pt sitting up in chair, eating breakfast, in good spirits, more alert and interactive today, less dyspnea noted with conversation.    -Family signed consents yesterday for pt to discharge home under the care of home hospice.  Equipment delivered to pts home including AirVo.   -Pt tried liquid morphine x1 dose yesterday, reports that it provided some relief.  Pt reports that he prefers nitro, but will utilize morphine for pain refractory to nitro.   -All questions addressed at this time.   -Pt will discharge home today under the care of home hospice. Pt and family remain in agreement with plan.       1/2/2024  -Pt seen with family present at the bedside including wife, daughter, and son.  Overall  update of pts admission given by family.  Family with understanding of pts clinical trajectory and disease projection.  All questions addressed at this time.  Family understands that treatment options are quite limited and pt has voiced to family that he would like to go home and does not wish for heroic measures in this phase of his life.    -Goals of care discussed.  Family places highest priority on pts comfort and ability to be at home.  Discussed current increased O2 requirements and need for a home AirVo machine to meet pts needs.  Further introduced the topic of home hospice and how this option may help family meet their goals.  All questions addressed at this time. Family also discussed options with cardiology and pulmonology.  Family has decided to focus on pts comfort and quality of life moving forward.   -Discussed pts current air hunger. Morphine liquid solution 2.5 mg PO Q2H PRN dyspnea ordered by writer.  Pt initially concerned about addictive potential.  Encouraged pt to try medication given heavy symptom load. Pt and family agreeable for pt to trail dose at this time.  -Family agreeable to meet with home hospice company. CM and attending MD notified of plan.  Pt will have completed 72 hours of heparin gtt this evening.  Do not anticipate further AC needs.  -Pt remains a DNR.   -Will follow up with pt tomorrow.   -Family with additional questions, pulmonology team met with family and reviewed images. Family still struggling to grasp pts decline and increased O2 needs but appears to have better understanding of O2 requirements.  Family requesting to hear from pts long time pulmonologist.  Dr Ramirez reached out.  Family still wishes to meet with hospice nurse.

## 2024-01-03 NOTE — PLAN OF CARE
"[facility] - Telemetry  Discharge Final Note    Primary Care Provider: Abilio Weber MD    Expected Discharge Date:     Pt to DC home with home hospice. Requires [facility] for transport. Equipment set up. Hospice requested transport for 12 pm via Critical ambulance transport. Home Hospice orders requested.    Final Discharge Note (most recent)       Final Note - 01/03/24 0942          Final Note    Assessment Type Final Discharge Note (P)      Anticipated Discharge Disposition Hospice/Home (P)      Hospital Resources/Appts/Education Provided Post-Acute resouces added to AVS (P)         Post-Acute Status    Post-Acute Authorization Hospice (P)      Hospice Status Set-up Complete/Auth obtained (P)    Oxygen to be delivered before noon today. [facility] Critical transport request sent. Alerted Summer. [facility] 40L at 60% noted. Pt choice done with spouse verbally on 1/2/2024.    Discharge Delays [facility] Arranged Transportation (P)    [facility] request for critical transport to support [facility]. Equipment is confirmed set up by hospice at the home.                  Future Appointments   Date Time Provider Department Center   2/19/2024 10:00 AM Scooby Sow MD [facility]   4/2/2024  7:30 AM Rey Connelly MD [facility]     BP (!) 154/72   Pulse 107   Temp 99.4 °F (37.4 °C)   Resp 18   Ht 5' 4" (1.626 m)   Wt 64.9 kg (143 lb)   SpO2 95%   BMI 24.55 kg/m²        Medication List        ASK your doctor about these medications      ACCU-CHEK ADRIEN PLUS METER Misc  Generic drug: blood-glucose meter     ACCU-CHEK SMARTVIEW TEST STRIP Strp  Generic drug: blood sugar diagnostic     ACCU-CHEK SOFTCLIX LANCETS Misc  Generic drug: lancets     amLODIPine 5 MG tablet  Commonly known as: NORVASC  Ask about: Which instructions should I use?     aspirin 81 MG Chew     atorvastatin 80 MG tablet  Commonly known as: LIPITOR  TAKE 1 TABLET EVERY DAY     clopidogreL 75 mg tablet  Commonly known as: PLAVIX  TAKE 1 TABLET EVERY " "DAY     DROPLET PEN NEEDLE 32 gauge x 5/32" Ndle  Generic drug: pen needle, diabetic     ESBRIET 801 mg Tab  Generic drug: pirfenidone     insulin degludec 100 unit/mL (3 mL) insulin pen  Commonly known as: TRESIBA FLEXTOUCH U-100  Inject 50 Units into the skin every evening. F/u apt needed with PCP     isosorbide mononitrate 30 MG 24 hr tablet  Commonly known as: IMDUR  Take 1 tablet (30 mg total) by mouth once daily.     levothyroxine 100 MCG tablet  Commonly known as: SYNTHROID  Take 1 tablet (100 mcg total) by mouth before breakfast.     LUBRICANT EYE DROPS 0.5 % Dpet  Generic drug: carboxymethylcellulose     metoprolol succinate 100 MG 24 hr tablet  Commonly known as: TOPROL-XL  Take 1 tablet (100 mg total) by mouth once daily.     mirtazapine 7.5 MG Tab  Commonly known as: REMERON  TAKE 1 TABLET BY MOUTH EVERY EVENING.     nitroGLYCERIN 0.4 MG SL tablet  Commonly known as: NITROSTAT  Place 1 tablet (0.4 mg total) under the tongue every 5 (five) minutes as needed for Chest pain.     * NovoLOG Flexpen U-100 Insulin 100 unit/mL (3 mL) Inpn pen  Generic drug: insulin aspart U-100     * NovoLOG U-100 Insulin aspart 100 unit/mL injection  Generic drug: insulin aspart U-100     omeprazole 20 MG capsule  Commonly known as: PRILOSEC     ORENCIA 125 mg/mL Syrg  Generic drug: abatacept     tamsulosin 0.4 mg Cap  Commonly known as: FLOMAX     vitamin D 1000 units Tab  Commonly known as: VITAMIN D3           * This list has 2 medication(s) that are the same as other medications prescribed for you. Read the directions carefully, and ask your doctor or other care provider to review them with you.                No orders of the defined types were placed in this encounter.                 "

## 2024-01-07 NOTE — ASSESSMENT & PLAN NOTE
-defer to Pulmonary  -per Pulmonary= patient follows with [facility] pulmonology, currently on pirfenidone, previously on Nintedanib, but did not tolerate; previously referred for Lung transplant eval at [facility] but deemed not to be a candidate due to his age/performance status

## 2024-01-07 NOTE — ASSESSMENT & PLAN NOTE
ILD  See below  ABG reviewed, pH 7.368  Continue Vapotherm 40 L, 60% FiO2  -chest x-ray on 12/30/2023=-chest x-ray on 12/30/2023=Chronic appearing changes of the lungs which are overall more conspicuous from comparison radiograph 10/25/2023.  Additional consolidative opacity about the left lung base.  Considerations include atelectasis, noting that superimposed infectious or non infectious inflammatory infiltrate and pulmonary edema remain considerations.  Correlation is advised   -CT chest without contrast on 12/21/2023=1. Interstitial lung disease, UIP pattern, with honeycombing in the lung bases.2. Suspected airspace opacities in the right greater than left lower lobes, concerning for aspiration or overlying infection.3. Mild to moderate emphysematous changes of the lung apices.4. Cholelithiasis  -Pulmonology consulted= appreciate input and follow recommendations  -COVID and flu negative at   -COVID and influenza a and B on 12/30/2023= negative  -pirfenidone 801 mg p.o. t.i.d. ( home med)    -on 12/31/2023=Had an extensive discussion with pulmonologist who has spoken to patient and patient's wife.  Patient has pretty advanced pulmonary disease and is likely not a candidate for lung transplant either.  Pulmonary had Goals of care discussion with patient and patient's wife.  Family was open to the concept of home hospice and palliative care.  Patient's wife was apprehensive about transferred to the ICU last night and wanted to avoid that.  Patient also wanted to wait ICU transfer last night.  Therefore patient did not get upgraded.  - long-term prognosis appears guarded  - wife amenable to a palliative care consult to discuss goals of care  - palliative care consulted= follow recommendations    Family decided on hospice  Stable to be d/c with home hospice

## 2024-01-07 NOTE — ASSESSMENT & PLAN NOTE
"Patient's FSGs are uncontrolled due to hyperglycemia on current medication regimen.  Last A1c reviewed-   Lab Results   Component Value Date    HGBA1C 7.6 (H) 12/31/2023     Most recent fingerstick glucose reviewed- No results for input(s): "POCTGLUCOSE" in the last 24 hours.    Current correctional scale  Low  Maintain anti-hyperglycemic dose as follows-   Antihyperglycemics (From admission, onward)    None        Hold Oral hypoglycemics while patient is in the hospital.   "

## 2024-01-07 NOTE — ASSESSMENT & PLAN NOTE
Chronic, uncontrolled. Latest blood pressure and vitals reviewed-      .   Home meds for hypertension were reviewed and noted below.   Hypertension Medications               amLODIPine (NORVASC) 5 MG tablet Take 5 mg by mouth once daily.    isosorbide mononitrate (IMDUR) 30 MG 24 hr tablet Take 1 tablet (30 mg total) by mouth once daily.    metoprolol succinate (TOPROL-XL) 100 MG 24 hr tablet Take 1 tablet (100 mg total) by mouth once daily.    nitroGLYCERIN (NITROSTAT) 0.4 MG SL tablet Place 1 tablet (0.4 mg total) under the tongue every 5 (five) minutes as needed for Chest pain.             Adjust BP medications as needed    Will utilize p.r.n. blood pressure medication only if patient's blood pressure greater than 180/110 and he develops symptoms such as worsening chest pain or shortness of breath.

## 2024-01-07 NOTE — DISCHARGE SUMMARY
[facility] Medicine  Discharge Summary      Patient Name: Jose Antonio Allen  MRN: [id number]  VAZQUEZ: [id number]  Patient Class: IP- Inpatient  Admission Date: 12/30/2023  Hospital Length of Stay: 3 days  Discharge Date and Time: 1/3/2024  1:40 PM  Attending Physician: No att. providers found   Discharging Provider: Jyotsna Rodriguez MD  Primary Care Provider: Abilio Weber MD    Primary Care Team: Networked reference to record PCT     HPI:   73 y.o. male with significant past medical history of ILD on 4L NC at home, Angina pectoris, Arthritis, CKD (chronic kidney disease), Colon polyps (09/14/2018), Coronary artery disease, Diabetes mellitus, Diabetes mellitus, type 2, GERD (gastroesophageal reflux disease), History of tobacco use, Hyperlipidemia, Hypertension, S/P CABG (coronary artery bypass graft) (1996), and Thyroid disease presented to the ER from urgent care for hypoxia and SOB.  COVID and flu negative at .  Pt also vomited a few times in route, denies abdominal pain or chest pain.  Pt's wife reported low grade fever at home.  No sick contacts.  Pt denies chills, diaphoresis, nausea, cough, vocal changes, globus sensation, leg swelling or pain, fatigue, weakness, confusion, syncope.  Of note 11/11 cath lab severe native CAD, patent LIMA-LAD, recurrent ISR to SVG-BARON s/p succesfful PTCA to SVG-BARON branch recurrent ISR.  IVUS used.  Under expanded stent.  PTCA done with 3.0 scoring balloon followed by PTCA with 2.0 shockwave balloon followed by 3.5 and 4.0 NC balloon.  Pt advised to continue asa/plavix noting pt at risk for recurrent ISR and cards discussed option next time is to attempt opening native RCA  and coil the SVG graft given recurrent failures and recurrent attempts.    In the ED, T max 100.7 F.  Pt on 40L vapotherm 60% FiO2.  EKG Sinus Tach with PAC.  pH 7.368.  Troponin 0.387 compared to last (0.020 when admitted for HTN).   compared to last 90.  Pt given  cefepime/vanc.  ED spoke with cardiology who recommended heparin drip.  CBC with thrombocytopenia.  CMP no significant electrolyte abnormalities and kidney function at baseline.  CXR Chronic appearing changes of the lungs which are overall more conspicuous from comparison radiograph 10/25/2023.  Additional consolidative opacity about the left lung base.  Considerations include atelectasis, noting that superimposed infectious or non infectious inflammatory infiltrate and pulmonary edema remain considerations.   CT chest/abdomen/pelvis pending.    Pt signed AMA form in ED but then he and his wife requested to resend the form.    * No surgery found *      Hospital Course:   Pt was started on IV abx empirically .   -per Pulmonary= patient follows with [facility] pulmonology, currently on pirfenidone, previously on Nintedanib, but did not tolerate; previously referred for Lung transplant eval at [facility] but deemed not to be a candidate due to his age/performance status    cardiac angiogram  initially planned on 01/02/2023= canceled due to elevation in creatinine and concern that patient may not get off the ventilator if intubated.  Case discussed with Dr. Rod on 01/01/2024   -on 12/31/2023=Had an extensive discussion with pulmonologist who has spoken to patient and patient's wife.  Patient has pretty advanced pulmonary disease and is likely not a candidate for lung transplant either.  Pulmonary had Goals of care discussion with patient and patient's wife.  Family was open to the concept of home hospice and palliative care.  Patient's wife was apprehensive about transferred to the ICU last night and wanted to avoid that.  Patient also wanted to wait ICU transfer last night.  Therefore patient did not get upgraded.   1/2/23: family and pt decided on hospice. Consulting hospice and getting airvo set up for pt to be home  1/3: deemed stable to be d/c with home hospice     Goals of Care Treatment  Preferences:  Code Status: DNR    Health care agent: pts wife Fausto Allen, pts daughter Kristin and , pts son.    Health care agent number: [phone]          What is most important right now is to focus on spending time at home, avoiding the hospital, remaining as independent as possible, symptom/pain control, quality of life, even if it means sacrificing a little time, improvement in condition but with limits to invasive therapies.  Accordingly, we have decided that the best plan to meet the patient's goals includes enrolling in hospice care.      Consults:   Consults (From admission, onward)          Status Ordering Provider     Inpatient consult to Palliative Care  Once        Provider:  (Not yet assigned)    Completed NILAY VACA     Inpatient consult to [facility] ([facility]kev[facility])  Once        Provider:  Marbella Cameron MD    Completed NILAY VACA     Inpatient consult to Social Work/Case Management  Once        Provider:  (Not yet assigned)    Completed FITZ WALLACE     Inpatient consult to Pulmonology  Once        Provider:  Jakob Crisostomo MD    Completed FITZ WALLACE     Inpatient consult to [facility]iology-[facility]  Once        Provider:  Choco López MD    Completed SANAZ LAUREN            Pulmonary  ILD (interstitial lung disease)  -defer to Pulmonary  -per Pulmonary= patient follows with [facility] pulmonology, currently on pirfenidone, previously on Nintedanib, but did not tolerate; previously referred for Lung transplant eval at [facility] but deemed not to be a candidate due to his age/performance status       Acute on chronic respiratory failure with hypoxia  ILD  See below  ABG reviewed, pH 7.368  Continue Vapotherm 40 L, 60% FiO2  -chest x-ray on 12/30/2023=-chest x-ray on 12/30/2023=Chronic appearing changes of the lungs which are overall more conspicuous from comparison radiograph 10/25/2023.  Additional  "consolidative opacity about the left lung base.  Considerations include atelectasis, noting that superimposed infectious or non infectious inflammatory infiltrate and pulmonary edema remain considerations.  Correlation is advised   -CT chest without contrast on 12/21/2023=1. Interstitial lung disease, UIP pattern, with honeycombing in the lung bases.2. Suspected airspace opacities in the right greater than left lower lobes, concerning for aspiration or overlying infection.3. Mild to moderate emphysematous changes of the lung apices.4. Cholelithiasis  -Pulmonology consulted= appreciate input and follow recommendations  -COVID and flu negative at   -COVID and influenza a and B on 12/30/2023= negative  -pirfenidone 801 mg p.o. t.i.d. ( home med)    -on 12/31/2023=Had an extensive discussion with pulmonologist who has spoken to patient and patient's wife.  Patient has pretty advanced pulmonary disease and is likely not a candidate for lung transplant either.  Pulmonary had Goals of care discussion with patient and patient's wife.  Family was open to the concept of home hospice and palliative care.  Patient's wife was apprehensive about transferred to the ICU last night and wanted to avoid that.  Patient also wanted to wait ICU transfer last night.  Therefore patient did not get upgraded.  - long-term prognosis appears guarded  - wife amenable to a palliative care consult to discuss goals of care  - palliative care consulted= follow recommendations    Family decided on hospice  Stable to be d/c with home hospice      Cardiac/Vascular  * NSTEMI (non-ST elevated myocardial infarction)  CAD  HLD  PAD  History of CABG and stent placement  Patient presents with NSTEMI. Pt denies CP.  Patient is currently on NSTEMI Pathway.  ED discussed with cardiology and heparin drip initiated.    EKG reviewed. Troponins reviewed and results noted-   No results for input(s): "TROPONINI", "TROPONINIHS" in the last 168 hours.  .     Lipid " panel reviewed and shows-     Lab Results   Component Value Date    LDLCALC 35.4 (L) 12/31/2023     Lab Results   Component Value Date    TRIG 93 12/31/2023         Medical management includes; Anticoagulation and High Intensity Stain Echo has not been performed. Latest ECHO results are as follows- Results for orders placed during the hospital encounter of 10/14/23    Echo    Interpretation Summary    Left Ventricle: The left ventricle is normal in size. There is concentric remodeling. Normal wall motion. There is reduced systolic function. Ejection fraction by visual approximation is 55%. Grade I diastolic dysfunction.    Left Atrium: Left atrium is moderately dilated.    Right Ventricle: Systolic function is normal.    Aortic Valve: The aortic valve is a trileaflet valve. Moderately calcified noncoronary cusp. Mildly restricted motion.    Mitral Valve: There is mild regurgitation.    Pulmonary Artery: The estimated pulmonary artery systolic pressure is 32 mmHg.    IVC/SVC: Intermediate venous pressure at 8 mmHg.  .   Cardiology is consulted. Plan of care reviewed with cardiology team. Continue to monitor patient closely and adjust therapy as needed.   -serial cardiac enzymes on 12/30/2023= elevated troponin x3, currently decreasing  -continue beta-blocker, statin daily   -Continue aspirin and Plavix daily  - continue heparin IV infusion  -cardiac angiogram  initially planned on 01/02/2023= canceled due to elevation in creatinine and concern that patient may not get off the ventilator if intubated.  Case discussed with Dr. Rod on 01/01/2024    History of coronary artery stent placement        PAD (peripheral artery disease)  -continue antiplatelets and statin daily      Hyperlipidemia  -Lipitor 80 mg p.o. daily      Essential hypertension  Chronic, uncontrolled. Latest blood pressure and vitals reviewed-      .   Home meds for hypertension were reviewed and noted below.   Hypertension Medications             "   amLODIPine (NORVASC) 5 MG tablet Take 5 mg by mouth once daily.    isosorbide mononitrate (IMDUR) 30 MG 24 hr tablet Take 1 tablet (30 mg total) by mouth once daily.    metoprolol succinate (TOPROL-XL) 100 MG 24 hr tablet Take 1 tablet (100 mg total) by mouth once daily.    nitroGLYCERIN (NITROSTAT) 0.4 MG SL tablet Place 1 tablet (0.4 mg total) under the tongue every 5 (five) minutes as needed for Chest pain.             Adjust BP medications as needed    Will utilize p.r.n. blood pressure medication only if patient's blood pressure greater than 180/110 and he develops symptoms such as worsening chest pain or shortness of breath.    Coronary artery disease involving native coronary artery of native heart with refractory angina pectoris  -treat as above    Renal/  CKD (chronic kidney disease) stage 3, GFR 30-59 ml/min  -monitor creatinine= increased   -Monitor UOP and serial BMP and adjust therapy as needed. Renally dose meds. Avoid nephrotoxic medications and procedures.  -At baseline 1.5-1.8  -renal ultrasound on 01/01/2024= pending  -nephrology consulted= follow recommendations   - cardiac angiogram has been canceled by Cardiology    Endocrine  Type 2 diabetes mellitus with stage 3a chronic kidney disease, without long-term current use of insulin  Patient's FSGs are uncontrolled due to hyperglycemia on current medication regimen.  Last A1c reviewed-   Lab Results   Component Value Date    HGBA1C 7.6 (H) 12/31/2023     Most recent fingerstick glucose reviewed- No results for input(s): "POCTGLUCOSE" in the last 24 hours.    Current correctional scale  Low  Maintain anti-hyperglycemic dose as follows-   Antihyperglycemics (From admission, onward)      None          Hold Oral hypoglycemics while patient is in the hospital.     Acquired hypothyroidism  Continue lt4  -TSH on 12/31/2023= within normal limits      Other  Palliative care encounter  Hospice decided      Fever  -last low-grade fever spike on 12/30/2023 " afternoon so far   -no leukocytosis   -influenza a and B and COVID-19 on 12/30/2023= negative   -blood cultures x2 on 12/30/2023= remains negative so far  -urinalysis with reflex to culture on 12/31/2023= negative nitrites and leukocyte esterase, rare bacteria, 1 epithelial cell, 8 WBC  -monitor procalcitonin levels= elevated and increasing  -lactic acid on 12/31/2023= within normal limits   -Rocephin IV daily empirically.  Ordered on 12/30/2023   -Zithromax 500 mg p.o. daily empirically.  Ordered on 12/30/2023         Chronic hypoxic respiratory failure, on home oxygen therapy  Patient with Hypoxic Respiratory failure which is Acute on chronic.  he is on home oxygen at 4 LPM. Supplemental oxygen was provided and noted-      .   Signs/symptoms of respiratory failure include- increased work of breathing. Contributing diagnoses includes - Interstitial lung disease Labs and images were reviewed. Patient Has recent ABG, which has been reviewed. Will treat underlying causes and adjust management of respiratory failure as follows-NSTEMI treatment as above.  -Pulm consulted      Final Active Diagnoses:    Diagnosis Date Noted POA    PRINCIPAL PROBLEM:  NSTEMI (non-ST elevated myocardial infarction) [I21.4] 12/30/2023 Yes    Palliative care encounter [Z51.5] 01/02/2024 Not Applicable    ILD (interstitial lung disease) [J84.9] 01/01/2024 Yes    Fever [R50.9] 12/31/2023 Yes    Acute on chronic respiratory failure with hypoxia [J96.21] 12/30/2023 Yes    Chronic hypoxic respiratory failure, on home oxygen therapy [J96.11, Z99.81] 10/16/2023 Not Applicable    History of coronary artery stent placement [Z95.5] 03/09/2023 Not Applicable    Type 2 diabetes mellitus with stage 3a chronic kidney disease, without long-term current use of insulin [E11.22, N18.31] 06/12/2020 Yes    Acquired hypothyroidism [E03.9] 11/21/2019 Yes    PAD (peripheral artery disease) [I73.9] 07/09/2018 Yes    CKD (chronic kidney disease) stage 3, GFR 30-59  "ml/min [N18.30]  Yes    Coronary artery disease involving native coronary artery of native heart with refractory angina pectoris [I25.112]  Yes    Essential hypertension [I10]  Yes    Hyperlipidemia [E78.5]  Yes      Problems Resolved During this Admission:       Discharged Condition: stable    Disposition: Hospice/Home    Follow Up:    Patient Instructions:      Diet Adult Regular     Notify your health care provider if you experience any of the following:  temperature >100.4     Notify your health care provider if you experience any of the following:  persistent nausea and vomiting or diarrhea     Notify your health care provider if you experience any of the following:  severe uncontrolled pain     Activity as tolerated       Significant Diagnostic Studies: Labs: BMP: No results for input(s): "GLU", "NA", "K", "CL", "CO2", "BUN", "CREATININE", "CALCIUM", "MG" in the last 48 hours. and CBC No results for input(s): "WBC", "HGB", "HCT", "PLT" in the last 48 hours.    Pending Diagnostic Studies:       None           Medications:  Reconciled Home Medications:      Medication List        CHANGE how you take these medications      atorvastatin 80 MG tablet  Commonly known as: LIPITOR  TAKE 1 TABLET EVERY DAY  What changed: when to take this     clopidogreL 75 mg tablet  Commonly known as: PLAVIX  TAKE 1 TABLET EVERY DAY  What changed: when to take this            CONTINUE taking these medications      ACCU-CHEK ADRIEN PLUS METER Misc  Generic drug: blood-glucose meter  1 Product by Other route daily as needed.     ACCU-CHEK SMARTVIEW TEST STRIP Strp  Generic drug: blood sugar diagnostic     ACCU-CHEK SOFTCLIX LANCETS Misc  Generic drug: lancets  Inject 100 lancets into the skin daily as needed.     amLODIPine 5 MG tablet  Commonly known as: NORVASC  Take 5 mg by mouth once daily.     aspirin 81 MG Chew  81 mg nightly.     DROPLET PEN NEEDLE 32 gauge x 5/32" Ndle  Generic drug: pen needle, diabetic  Inject 1 Product into " the skin daily as needed.     ESBRIET 801 mg Tab  Generic drug: pirfenidone  3 (three) times daily.     insulin degludec 100 unit/mL (3 mL) insulin pen  Commonly known as: TRESIBA FLEXTOUCH U-100  Inject 50 Units into the skin every evening. F/u apt needed with PCP     isosorbide mononitrate 30 MG 24 hr tablet  Commonly known as: IMDUR  Take 1 tablet (30 mg total) by mouth once daily.     levothyroxine 100 MCG tablet  Commonly known as: SYNTHROID  Take 1 tablet (100 mcg total) by mouth before breakfast.     LUBRICANT EYE DROPS 0.5 % Dpet  Generic drug: carboxymethylcellulose     metoprolol succinate 100 MG 24 hr tablet  Commonly known as: TOPROL-XL  Take 1 tablet (100 mg total) by mouth once daily.     mirtazapine 7.5 MG Tab  Commonly known as: REMERON  TAKE 1 TABLET BY MOUTH EVERY EVENING.     nitroGLYCERIN 0.4 MG SL tablet  Commonly known as: NITROSTAT  Place 1 tablet (0.4 mg total) under the tongue every 5 (five) minutes as needed for Chest pain.     * NovoLOG Flexpen U-100 Insulin 100 unit/mL (3 mL) Inpn pen  Generic drug: insulin aspart U-100  Inject 1-10 Units into the skin as needed. 110-150=1  151-200=2  201-250=4  251-300=6  301-350=8  351-400=10     * NovoLOG U-100 Insulin aspart 100 unit/mL injection  Generic drug: insulin aspart U-100  Inject 12 Units into the skin 3 (three) times daily after meals. Plus Sliding scale     omeprazole 20 MG capsule  Commonly known as: PRILOSEC  Take 20 mg by mouth 2 (two) times daily.     ORENCIA 125 mg/mL Syrg  Generic drug: abatacept  Inject into the skin once a week. Fridays     tamsulosin 0.4 mg Cap  Commonly known as: FLOMAX  Take 0.4 mg by mouth every evening.     vitamin D 1000 units Tab  Commonly known as: VITAMIN D3  Take 1,000 Units by mouth once daily.           * This list has 2 medication(s) that are the same as other medications prescribed for you. Read the directions carefully, and ask your doctor or other care provider to review them with you.                   Indwelling Lines/Drains at time of discharge:   Lines/Drains/Airways       None                   Time spent on the discharge of patient: >30 minutes         Jyotsna Rodriguez MD  Department of [facility] Medicine  [facility]

## 2024-01-07 NOTE — ASSESSMENT & PLAN NOTE
"CAD  HLD  PAD  History of CABG and stent placement  Patient presents with NSTEMI. Pt denies CP.  Patient is currently on NSTEMI Pathway.  ED discussed with cardiology and heparin drip initiated.    EKG reviewed. Troponins reviewed and results noted-   No results for input(s): "TROPONINI", "TROPONINIHS" in the last 168 hours.  .     Lipid panel reviewed and shows-     Lab Results   Component Value Date    LDLCALC 35.4 (L) 12/31/2023     Lab Results   Component Value Date    TRIG 93 12/31/2023         Medical management includes; Anticoagulation and High Intensity Stain Echo has not been performed. Latest ECHO results are as follows- Results for orders placed during the hospital encounter of 10/14/23    Echo    Interpretation Summary    Left Ventricle: The left ventricle is normal in size. There is concentric remodeling. Normal wall motion. There is reduced systolic function. Ejection fraction by visual approximation is 55%. Grade I diastolic dysfunction.    Left Atrium: Left atrium is moderately dilated.    Right Ventricle: Systolic function is normal.    Aortic Valve: The aortic valve is a trileaflet valve. Moderately calcified noncoronary cusp. Mildly restricted motion.    Mitral Valve: There is mild regurgitation.    Pulmonary Artery: The estimated pulmonary artery systolic pressure is 32 mmHg.    IVC/SVC: Intermediate venous pressure at 8 mmHg.  .   Cardiology is consulted. Plan of care reviewed with cardiology team. Continue to monitor patient closely and adjust therapy as needed.   -serial cardiac enzymes on 12/30/2023= elevated troponin x3, currently decreasing  -continue beta-blocker, statin daily   -Continue aspirin and Plavix daily  - continue heparin IV infusion  -cardiac angiogram  initially planned on 01/02/2023= canceled due to elevation in creatinine and concern that patient may not get off the ventilator if intubated.  Case discussed with Dr. Rod on 01/01/2024  "

## 2024-01-29 PROBLEM — N17.9 AKI (ACUTE KIDNEY INJURY): Status: RESOLVED | Noted: 2023-01-01 | Resolved: 2024-01-01

## 2024-02-06 NOTE — TELEPHONE ENCOUNTER
Spoke with patient to verify hi Metoprolol dose for refill.    ----- Message from Magda Suarez sent at 2/6/2024  3:54 PM CST -----  Contact: pt  Type:  Needs Medical Advice    Who Called: pt  Would the patient rather a call back or a response via [facility]? call  Best Call Back Number: [phone]   Additional Information:   Pt requesting a call regarding his care

## 2024-02-07 NOTE — TELEPHONE ENCOUNTER
Spoke with [facility].   Verified Metoprolol Rx.    ----- Message from Shannon Mejia sent at 2/7/2024  8:17 AM CST -----  Regarding: refill/CLARIFICATION NEEDED PHARMACY CALLING  Contact: [phone]  [facility] calling regarding Refills  (message) for #metoprolol succinate (TOPROL-XL) 100 MG 24 hr tablet.  Pt told Pharmacy he normally get Metoprolol Tarprate???  Please clarify       [facility] - [place], [place] - [address]  [address]  [place] [address]  Phone: [phone] Fax: [phone]

## 2024-02-16 NOTE — TELEPHONE ENCOUNTER
Metoprolol Rx order, wife stated she received a letter from pharmacy stating a new Rx is needed, I will forward request to Dr Connelly.

## 2024-02-16 NOTE — TELEPHONE ENCOUNTER
"----- Message from Kita Mcmahon sent at 2/16/2024 10:02 AM CST -----  Regarding: Prescription  "Type:  Patient Call Back    Who Called:Holly(Spouse)    What is the reqeust in detail:Pt pharmacy is requesting a new prescription for metoprolol succinate (TOPROL-XL) 100 MG 24 hr tablet. Please advise    Can the clinic reply by [facility]?no     Best Call Back Number:[phone]      Additional Information:[facility] - [place], [place] - [address] [phone]                "

## 2024-02-20 NOTE — TELEPHONE ENCOUNTER
After review of chart, prescription sent electronically sent to [facility] by provider this morning.

## 2024-02-20 NOTE — TELEPHONE ENCOUNTER
----- Message from Sukumar Martínez sent at 2/20/2024 12:57 PM CST -----  Regarding: Refill Request    Who Called: Patient Wife        New Prescription or Refill : Refill    RX Name and Strength:   levothyroxine (SYNTHROID) 100 MCG tablet      RX Name and Strength:         RX Name and Strength:      30 day or 90 day RX:     Preferred Pharmacy:[facility] - [facility] [address]        Would the patient rather a call back or a response via [facility]?    Best Call Back Number:   [phone]    Additional Information: Patient wife would like to have a call back from Dr. Connelly nurse

## 2024-04-01 PROBLEM — Z99.81 CHRONIC HYPOXIC RESPIRATORY FAILURE, ON HOME OXYGEN THERAPY: Status: RESOLVED | Noted: 2023-01-01 | Resolved: 2024-04-01

## 2024-04-01 PROBLEM — J96.21 ACUTE ON CHRONIC RESPIRATORY FAILURE WITH HYPOXIA: Status: RESOLVED | Noted: 2023-12-30 | Resolved: 2024-04-01

## 2024-04-01 PROBLEM — I21.4 NSTEMI (NON-ST ELEVATED MYOCARDIAL INFARCTION): Status: RESOLVED | Noted: 2023-12-30 | Resolved: 2024-04-01

## 2024-04-01 PROBLEM — J96.11 CHRONIC HYPOXIC RESPIRATORY FAILURE, ON HOME OXYGEN THERAPY: Status: RESOLVED | Noted: 2023-10-16 | Resolved: 2024-04-01

## (undated) DEVICE — VALVE CONTROL COPILOT

## (undated) DEVICE — PAD DEFIB CADENCE ADULT R2

## (undated) DEVICE — PAD RADI FEMORAL

## (undated) DEVICE — CATH SHOCKWAVE C2 IVL 3.0X12MM

## (undated) DEVICE — CATH NC QUANTUM APEX MR 3.5X20

## (undated) DEVICE — SYS EMB PROTEC.014 190CM

## (undated) DEVICE — SET MICRO PUNCT 4FR/MPIS-401

## (undated) DEVICE — KIT INTRODUCER MICROPUNCTR 4F

## (undated) DEVICE — CATH IMPULSE FL4 5FR 100CM

## (undated) DEVICE — DRAPE ANGIO BRACH 38X44IN

## (undated) DEVICE — Device

## (undated) DEVICE — INFLATOR ENCORE 26 BLLN INFL

## (undated) DEVICE — SEE MEDLINE ITEM 157187

## (undated) DEVICE — CATH EMERGE MR 12 X 3.00

## (undated) DEVICE — CATH IMA INFINITI 4FRX100CM

## (undated) DEVICE — CATH GUIDE LAUNCH MB1 6F 100CM

## (undated) DEVICE — BANDAGE D-STAT DRY HEMOSTATIC

## (undated) DEVICE — VISIPAQUE 320 200ML +PK

## (undated) DEVICE — GUIDEWIRE STD .035X180CM ANG

## (undated) DEVICE — CATH TELESCOPE GUIDE EXT 6FR

## (undated) DEVICE — CATH EAGLE EYE PLATINUM

## (undated) DEVICE — CATH IMPULSE 5F 100CM FR4

## (undated) DEVICE — COVER PROBE US 5.5X58L NON LTX

## (undated) DEVICE — CATH INFINITI 4F JL4 .042X100

## (undated) DEVICE — CATH NC EMERGE MR 3.5X12MM

## (undated) DEVICE — CATH 5FR MP 125CM 5/BX

## (undated) DEVICE — CATH EMERGE MR 12 X 2.50

## (undated) DEVICE — GLIDESHEATH SLENDER SS 5FR10CM

## (undated) DEVICE — GUIDEWIRE STD .035X260CM ANG

## (undated) DEVICE — GUIDE WIRE BMW 014 X190

## (undated) DEVICE — CATH SWAN GANZ STND 7FR

## (undated) DEVICE — CONTRAST VISIPAQUE 150ML

## (undated) DEVICE — TUBING HPCIL ROT M/F ADPT 48IN

## (undated) DEVICE — CATH EMERGE MR 15 X 3.00

## (undated) DEVICE — GUIDEWIRE AMPLATZ .035X260 SS

## (undated) DEVICE — CATH ANGIOSCULPT EVO 3X10MM

## (undated) DEVICE — SHEATH INTRODUCER 6FR 11CM

## (undated) DEVICE — CATH ANG PIGTAIL 4FR INFINITY

## (undated) DEVICE — GUIDEWIRE EMERALD 150CM PTFE

## (undated) DEVICE — GUIDEWIRE AMPLATZ STIFF 260X7

## (undated) DEVICE — KIT LEFT HEART MANIFOLD CUSTOM

## (undated) DEVICE — DEVICE PERCLOSE SUT CLSR 6FR

## (undated) DEVICE — VISE RADIFOCUS MULTI TORQUE

## (undated) DEVICE — CATH IMPULSE 5FR PIGTAIL 125CM

## (undated) DEVICE — SHEATH INTRODUCER 5FR 10CM

## (undated) DEVICE — CATH EAGLE EYE ST .014X20X150

## (undated) DEVICE — SHEATH INTRODUCER 4FR 11CM

## (undated) DEVICE — SHEATH INTRODUCER 7FR 11CM

## (undated) DEVICE — DEFIBRILLATOR STAT PADZ II

## (undated) DEVICE — SHEATH 6FR 35CM

## (undated) DEVICE — KIT GLIDESHEATH SLEND 6FR 10CM

## (undated) DEVICE — SYR MEDALLION 3ML PURPLE

## (undated) DEVICE — CATH NC EMERGE MR 4X12MM

## (undated) DEVICE — HEMOSTAT VASC BAND REG 24CM

## (undated) DEVICE — GUIDEWIRE RUNTHROUGH EF 180CM

## (undated) DEVICE — SPIKE SHORT LG BORE 1-WAY 2IN

## (undated) DEVICE — CATH FL 3.5 5FR

## (undated) DEVICE — CATH AL1 4FR

## (undated) DEVICE — KIT INTRODUCER W/GUIDEWIRE